# Patient Record
Sex: MALE | Race: WHITE | NOT HISPANIC OR LATINO
[De-identification: names, ages, dates, MRNs, and addresses within clinical notes are randomized per-mention and may not be internally consistent; named-entity substitution may affect disease eponyms.]

---

## 2017-02-15 ENCOUNTER — APPOINTMENT (OUTPATIENT)
Dept: SURGERY | Facility: CLINIC | Age: 72
End: 2017-02-15

## 2017-02-15 VITALS
BODY MASS INDEX: 29.4 KG/M2 | RESPIRATION RATE: 16 BRPM | HEIGHT: 71 IN | SYSTOLIC BLOOD PRESSURE: 130 MMHG | HEART RATE: 72 BPM | OXYGEN SATURATION: 95 % | TEMPERATURE: 97.6 F | WEIGHT: 210 LBS | DIASTOLIC BLOOD PRESSURE: 89 MMHG

## 2017-02-15 DIAGNOSIS — Z78.9 OTHER SPECIFIED HEALTH STATUS: ICD-10-CM

## 2017-02-15 DIAGNOSIS — I25.2 OLD MYOCARDIAL INFARCTION: ICD-10-CM

## 2017-03-09 ENCOUNTER — APPOINTMENT (OUTPATIENT)
Dept: SURGERY | Facility: AMBULATORY MEDICAL SERVICES | Age: 72
End: 2017-03-09

## 2017-03-15 ENCOUNTER — APPOINTMENT (OUTPATIENT)
Dept: SURGERY | Facility: CLINIC | Age: 72
End: 2017-03-15

## 2017-03-15 VITALS
RESPIRATION RATE: 15 BRPM | SYSTOLIC BLOOD PRESSURE: 118 MMHG | OXYGEN SATURATION: 95 % | TEMPERATURE: 97.7 F | HEART RATE: 84 BPM | DIASTOLIC BLOOD PRESSURE: 77 MMHG

## 2017-03-31 ENCOUNTER — APPOINTMENT (OUTPATIENT)
Dept: SURGERY | Facility: CLINIC | Age: 72
End: 2017-03-31

## 2017-03-31 VITALS
OXYGEN SATURATION: 96 % | HEART RATE: 83 BPM | SYSTOLIC BLOOD PRESSURE: 129 MMHG | DIASTOLIC BLOOD PRESSURE: 83 MMHG | RESPIRATION RATE: 16 BRPM | TEMPERATURE: 97.7 F

## 2017-06-07 ENCOUNTER — APPOINTMENT (OUTPATIENT)
Dept: ELECTROPHYSIOLOGY | Facility: CLINIC | Age: 72
End: 2017-06-07

## 2017-06-07 VITALS
OXYGEN SATURATION: 93 % | DIASTOLIC BLOOD PRESSURE: 83 MMHG | SYSTOLIC BLOOD PRESSURE: 119 MMHG | HEIGHT: 71 IN | BODY MASS INDEX: 30.8 KG/M2 | WEIGHT: 220 LBS | HEART RATE: 65 BPM

## 2017-12-13 ENCOUNTER — NON-APPOINTMENT (OUTPATIENT)
Age: 72
End: 2017-12-13

## 2017-12-13 ENCOUNTER — APPOINTMENT (OUTPATIENT)
Dept: ELECTROPHYSIOLOGY | Facility: CLINIC | Age: 72
End: 2017-12-13
Payer: MEDICARE

## 2017-12-13 VITALS
DIASTOLIC BLOOD PRESSURE: 80 MMHG | OXYGEN SATURATION: 95 % | HEIGHT: 71 IN | HEART RATE: 69 BPM | SYSTOLIC BLOOD PRESSURE: 129 MMHG

## 2017-12-13 PROCEDURE — 93280 PM DEVICE PROGR EVAL DUAL: CPT

## 2018-03-14 ENCOUNTER — APPOINTMENT (OUTPATIENT)
Dept: ELECTROPHYSIOLOGY | Facility: CLINIC | Age: 73
End: 2018-03-14
Payer: MEDICARE

## 2018-03-14 VITALS
OXYGEN SATURATION: 97 % | DIASTOLIC BLOOD PRESSURE: 81 MMHG | SYSTOLIC BLOOD PRESSURE: 127 MMHG | BODY MASS INDEX: 26.5 KG/M2 | WEIGHT: 190 LBS | HEART RATE: 63 BPM

## 2018-03-14 PROCEDURE — 93280 PM DEVICE PROGR EVAL DUAL: CPT

## 2018-06-20 ENCOUNTER — APPOINTMENT (OUTPATIENT)
Dept: ELECTROPHYSIOLOGY | Facility: CLINIC | Age: 73
End: 2018-06-20
Payer: MEDICARE

## 2018-06-20 VITALS
DIASTOLIC BLOOD PRESSURE: 79 MMHG | BODY MASS INDEX: 27.89 KG/M2 | SYSTOLIC BLOOD PRESSURE: 124 MMHG | OXYGEN SATURATION: 95 % | HEART RATE: 75 BPM | WEIGHT: 200 LBS

## 2018-06-20 PROCEDURE — 93280 PM DEVICE PROGR EVAL DUAL: CPT

## 2018-07-25 ENCOUNTER — APPOINTMENT (OUTPATIENT)
Dept: ELECTROPHYSIOLOGY | Facility: CLINIC | Age: 73
End: 2018-07-25
Payer: MEDICARE

## 2018-07-25 VITALS — DIASTOLIC BLOOD PRESSURE: 89 MMHG | SYSTOLIC BLOOD PRESSURE: 128 MMHG | OXYGEN SATURATION: 96 % | HEART RATE: 86 BPM

## 2018-07-25 PROCEDURE — 93280 PM DEVICE PROGR EVAL DUAL: CPT

## 2018-09-26 ENCOUNTER — APPOINTMENT (OUTPATIENT)
Dept: ELECTROPHYSIOLOGY | Facility: CLINIC | Age: 73
End: 2018-09-26
Payer: MEDICARE

## 2018-09-26 VITALS
HEART RATE: 73 BPM | WEIGHT: 200 LBS | OXYGEN SATURATION: 96 % | BODY MASS INDEX: 28 KG/M2 | HEIGHT: 71 IN | DIASTOLIC BLOOD PRESSURE: 81 MMHG | SYSTOLIC BLOOD PRESSURE: 134 MMHG

## 2018-09-26 PROCEDURE — 93280 PM DEVICE PROGR EVAL DUAL: CPT

## 2018-10-31 ENCOUNTER — APPOINTMENT (OUTPATIENT)
Dept: ELECTROPHYSIOLOGY | Facility: CLINIC | Age: 73
End: 2018-10-31
Payer: MEDICARE

## 2018-10-31 VITALS
WEIGHT: 190 LBS | HEART RATE: 76 BPM | SYSTOLIC BLOOD PRESSURE: 112 MMHG | OXYGEN SATURATION: 97 % | HEIGHT: 71 IN | BODY MASS INDEX: 26.6 KG/M2 | DIASTOLIC BLOOD PRESSURE: 74 MMHG

## 2018-10-31 DIAGNOSIS — I10 ESSENTIAL (PRIMARY) HYPERTENSION: ICD-10-CM

## 2018-10-31 PROCEDURE — 93000 ELECTROCARDIOGRAM COMPLETE: CPT

## 2018-10-31 PROCEDURE — 99214 OFFICE O/P EST MOD 30 MIN: CPT

## 2018-11-18 ENCOUNTER — NON-APPOINTMENT (OUTPATIENT)
Age: 73
End: 2018-11-18

## 2018-11-18 NOTE — PROCEDURE
[No] : not [NSR] : normal sinus rhythm [Pacemaker] : pacemaker [Threshold Testing Performed] : Threshold testing was performed [Atrial] : Atrial [None] : none

## 2018-11-18 NOTE — DISCUSSION/SUMMARY
[Pacemaker Function Normal] : normal pacemaker function [Patient] : the patient [FreeTextEntry1] : There is question of lung disease ( Emphysema/COPD) and he is following up with pulmonary.\par The rate sensor was reprogrammed to allow of quicker increase in HR\par Follow up echo.\par follow up eval within 3 months.

## 2018-11-18 NOTE — PHYSICAL EXAM
[General Appearance - Well Developed] : well developed [General Appearance - In No Acute Distress] : no acute distress [Respiration, Rhythm And Depth] : normal respiratory rhythm and effort [Exaggerated Use Of Accessory Muscles For Inspiration] : no accessory muscle use [Auscultation Breath Sounds / Voice Sounds] : lungs were clear to auscultation bilaterally [Heart Rate And Rhythm] : heart rate and rhythm were normal [Edema] : no peripheral edema present [Abdomen Tenderness] : non-tender [Nail Clubbing] : no clubbing of the fingernails [Cyanosis, Localized] : no localized cyanosis [Petechial Hemorrhages (___cm)] : no petechial hemorrhages [Well-Healed] : well-healed [Normal Conjunctiva] : the conjunctiva exhibited no abnormalities [No Oral Pallor] : no oral pallor [Abnormal Walk] : normal gait [] : no rash [Impaired Insight] : insight and judgment were intact [Left Infraclavicular] : left infraclavicular area

## 2018-12-19 ENCOUNTER — APPOINTMENT (OUTPATIENT)
Dept: ELECTROPHYSIOLOGY | Facility: CLINIC | Age: 73
End: 2018-12-19
Payer: MEDICARE

## 2018-12-19 ENCOUNTER — NON-APPOINTMENT (OUTPATIENT)
Age: 73
End: 2018-12-19

## 2018-12-19 VITALS
DIASTOLIC BLOOD PRESSURE: 76 MMHG | SYSTOLIC BLOOD PRESSURE: 113 MMHG | WEIGHT: 195 LBS | HEART RATE: 73 BPM | BODY MASS INDEX: 27.3 KG/M2 | HEIGHT: 71 IN | OXYGEN SATURATION: 94 %

## 2018-12-19 DIAGNOSIS — J43.9 EMPHYSEMA, UNSPECIFIED: ICD-10-CM

## 2018-12-19 DIAGNOSIS — I44.2 ATRIOVENTRICULAR BLOCK, COMPLETE: ICD-10-CM

## 2018-12-19 PROCEDURE — 99214 OFFICE O/P EST MOD 30 MIN: CPT

## 2018-12-19 PROCEDURE — 93000 ELECTROCARDIOGRAM COMPLETE: CPT

## 2018-12-19 RX ORDER — FLUTICASONE FUROATE, UMECLIDINIUM BROMIDE AND VILANTEROL TRIFENATATE 100; 62.5; 25 UG/1; UG/1; UG/1
100-62.5-25 POWDER RESPIRATORY (INHALATION)
Refills: 0 | Status: ACTIVE | COMMUNITY
Start: 2018-12-19

## 2018-12-30 ENCOUNTER — NON-APPOINTMENT (OUTPATIENT)
Age: 73
End: 2018-12-30

## 2018-12-30 PROBLEM — J43.9 EMPHYSEMA/COPD: Status: ACTIVE | Noted: 2017-02-15

## 2018-12-30 NOTE — HISTORY OF PRESENT ILLNESS
[SOB] : dyspnea [Syncope] : no syncope [Dizziness] : no dizziness [Chest Pain] : no chest pain or discomfort [Shoulder Pain] : no shoulder pain [Erythema at Site] : no erythema at device site [Swelling at Site] : no swelling at device site

## 2018-12-30 NOTE — DISCUSSION/SUMMARY
[Pacemaker Function Normal] : normal pacemaker function [Patient] : the patient [FreeTextEntry1] : He is feeling better since rate sensor adjusted. Saw pulmonary in followup and he has significant COPd and meds adjusted.  \par Follow up echo pending\par Schedule PM replacement.

## 2018-12-30 NOTE — PHYSICAL EXAM
[General Appearance - Well Developed] : well developed [General Appearance - In No Acute Distress] : no acute distress [Normal Conjunctiva] : the conjunctiva exhibited no abnormalities [No Oral Pallor] : no oral pallor [Respiration, Rhythm And Depth] : normal respiratory rhythm and effort [Exaggerated Use Of Accessory Muscles For Inspiration] : no accessory muscle use [Auscultation Breath Sounds / Voice Sounds] : lungs were clear to auscultation bilaterally [Heart Rate And Rhythm] : heart rate and rhythm were normal [Edema] : no peripheral edema present [Abdomen Tenderness] : non-tender [Abnormal Walk] : normal gait [Nail Clubbing] : no clubbing of the fingernails [Cyanosis, Localized] : no localized cyanosis [Petechial Hemorrhages (___cm)] : no petechial hemorrhages [] : no rash [Impaired Insight] : insight and judgment were intact [Left Infraclavicular] : left infraclavicular area [Well-Healed] : well-healed

## 2019-01-22 ENCOUNTER — APPOINTMENT (OUTPATIENT)
Dept: ELECTROPHYSIOLOGY | Facility: CLINIC | Age: 74
End: 2019-01-22

## 2019-01-22 ENCOUNTER — APPOINTMENT (OUTPATIENT)
Dept: ELECTROPHYSIOLOGY | Facility: CLINIC | Age: 74
End: 2019-01-22
Payer: MEDICARE

## 2019-01-22 VITALS
OXYGEN SATURATION: 95 % | SYSTOLIC BLOOD PRESSURE: 122 MMHG | HEIGHT: 71 IN | HEART RATE: 64 BPM | WEIGHT: 195 LBS | BODY MASS INDEX: 27.3 KG/M2 | DIASTOLIC BLOOD PRESSURE: 80 MMHG

## 2019-01-22 PROCEDURE — 93280 PM DEVICE PROGR EVAL DUAL: CPT

## 2019-01-23 ENCOUNTER — OUTPATIENT (OUTPATIENT)
Dept: OUTPATIENT SERVICES | Facility: HOSPITAL | Age: 74
LOS: 1 days | End: 2019-01-23
Payer: MEDICARE

## 2019-01-23 ENCOUNTER — APPOINTMENT (OUTPATIENT)
Dept: CV DIAGNOSITCS | Facility: HOSPITAL | Age: 74
End: 2019-01-23

## 2019-01-23 DIAGNOSIS — I44.2 ATRIOVENTRICULAR BLOCK, COMPLETE: ICD-10-CM

## 2019-01-23 PROCEDURE — 93306 TTE W/DOPPLER COMPLETE: CPT

## 2019-01-23 PROCEDURE — 93306 TTE W/DOPPLER COMPLETE: CPT | Mod: 26

## 2019-01-31 ENCOUNTER — OUTPATIENT (OUTPATIENT)
Dept: OUTPATIENT SERVICES | Facility: HOSPITAL | Age: 74
LOS: 1 days | End: 2019-01-31
Payer: MEDICARE

## 2019-01-31 VITALS
HEIGHT: 71 IN | RESPIRATION RATE: 16 BRPM | WEIGHT: 190.04 LBS | HEART RATE: 65 BPM | SYSTOLIC BLOOD PRESSURE: 171 MMHG | DIASTOLIC BLOOD PRESSURE: 87 MMHG | TEMPERATURE: 98 F | OXYGEN SATURATION: 94 %

## 2019-01-31 DIAGNOSIS — I25.10 ATHEROSCLEROTIC HEART DISEASE OF NATIVE CORONARY ARTERY WITHOUT ANGINA PECTORIS: ICD-10-CM

## 2019-01-31 DIAGNOSIS — Z95.5 PRESENCE OF CORONARY ANGIOPLASTY IMPLANT AND GRAFT: Chronic | ICD-10-CM

## 2019-01-31 LAB
ALBUMIN SERPL ELPH-MCNC: 4.9 G/DL — SIGNIFICANT CHANGE UP (ref 3.3–5)
ALP SERPL-CCNC: 62 U/L — SIGNIFICANT CHANGE UP (ref 40–120)
ALT FLD-CCNC: 21 U/L — SIGNIFICANT CHANGE UP (ref 10–45)
ANION GAP SERPL CALC-SCNC: 14 MMOL/L — SIGNIFICANT CHANGE UP (ref 5–17)
AST SERPL-CCNC: 25 U/L — SIGNIFICANT CHANGE UP (ref 10–40)
BILIRUB SERPL-MCNC: 0.5 MG/DL — SIGNIFICANT CHANGE UP (ref 0.2–1.2)
BUN SERPL-MCNC: 19 MG/DL — SIGNIFICANT CHANGE UP (ref 7–23)
CALCIUM SERPL-MCNC: 10.1 MG/DL — SIGNIFICANT CHANGE UP (ref 8.4–10.5)
CHLORIDE SERPL-SCNC: 105 MMOL/L — SIGNIFICANT CHANGE UP (ref 96–108)
CO2 SERPL-SCNC: 24 MMOL/L — SIGNIFICANT CHANGE UP (ref 22–31)
CREAT SERPL-MCNC: 1.26 MG/DL — SIGNIFICANT CHANGE UP (ref 0.5–1.3)
GLUCOSE SERPL-MCNC: 107 MG/DL — HIGH (ref 70–99)
HCT VFR BLD CALC: 46.9 % — SIGNIFICANT CHANGE UP (ref 39–50)
HGB BLD-MCNC: 15.7 G/DL — SIGNIFICANT CHANGE UP (ref 13–17)
MCHC RBC-ENTMCNC: 31.2 PG — SIGNIFICANT CHANGE UP (ref 27–34)
MCHC RBC-ENTMCNC: 33.5 GM/DL — SIGNIFICANT CHANGE UP (ref 32–36)
MCV RBC AUTO: 93.3 FL — SIGNIFICANT CHANGE UP (ref 80–100)
PLATELET # BLD AUTO: 258 K/UL — SIGNIFICANT CHANGE UP (ref 150–400)
POTASSIUM SERPL-MCNC: 4.7 MMOL/L — SIGNIFICANT CHANGE UP (ref 3.5–5.3)
POTASSIUM SERPL-SCNC: 4.7 MMOL/L — SIGNIFICANT CHANGE UP (ref 3.5–5.3)
PROT SERPL-MCNC: 8.5 G/DL — HIGH (ref 6–8.3)
RBC # BLD: 5.02 M/UL — SIGNIFICANT CHANGE UP (ref 4.2–5.8)
RBC # FLD: 12.7 % — SIGNIFICANT CHANGE UP (ref 10.3–14.5)
SODIUM SERPL-SCNC: 143 MMOL/L — SIGNIFICANT CHANGE UP (ref 135–145)
WBC # BLD: 10.3 K/UL — SIGNIFICANT CHANGE UP (ref 3.8–10.5)
WBC # FLD AUTO: 10.3 K/UL — SIGNIFICANT CHANGE UP (ref 3.8–10.5)

## 2019-01-31 PROCEDURE — 99152 MOD SED SAME PHYS/QHP 5/>YRS: CPT

## 2019-01-31 PROCEDURE — 93458 L HRT ARTERY/VENTRICLE ANGIO: CPT | Mod: 26,GC

## 2019-01-31 PROCEDURE — 93005 ELECTROCARDIOGRAM TRACING: CPT

## 2019-01-31 PROCEDURE — C1894: CPT

## 2019-01-31 PROCEDURE — 85027 COMPLETE CBC AUTOMATED: CPT

## 2019-01-31 PROCEDURE — 93458 L HRT ARTERY/VENTRICLE ANGIO: CPT

## 2019-01-31 PROCEDURE — 76937 US GUIDE VASCULAR ACCESS: CPT

## 2019-01-31 PROCEDURE — 93010 ELECTROCARDIOGRAM REPORT: CPT

## 2019-01-31 PROCEDURE — 80053 COMPREHEN METABOLIC PANEL: CPT

## 2019-01-31 PROCEDURE — 99203 OFFICE O/P NEW LOW 30 MIN: CPT

## 2019-01-31 PROCEDURE — 99152 MOD SED SAME PHYS/QHP 5/>YRS: CPT | Mod: GC

## 2019-01-31 PROCEDURE — C1887: CPT

## 2019-01-31 PROCEDURE — C1769: CPT

## 2019-01-31 PROCEDURE — 76937 US GUIDE VASCULAR ACCESS: CPT | Mod: 26,GC

## 2019-01-31 RX ORDER — SODIUM CHLORIDE 9 MG/ML
250 INJECTION INTRAMUSCULAR; INTRAVENOUS; SUBCUTANEOUS ONCE
Qty: 0 | Refills: 0 | Status: DISCONTINUED | OUTPATIENT
Start: 2019-01-31 | End: 2019-02-15

## 2019-01-31 NOTE — H&P CARDIOLOGY - PMH
Bradycardia    CAD (Coronary Artery Disease)    Depression    Dyslipidemia    HTN (Hypertension)    S/P PTCA (Percutaneous Transluminal Coronary Angioplasty)  2001  TIA (Transient Ischemic Attack)  18 years ago  PT was on coumadin for short while Bradycardia    CAD (Coronary Artery Disease)    COPD (chronic obstructive pulmonary disease)    Depression    Dyslipidemia    HTN (Hypertension)    S/P PTCA (Percutaneous Transluminal Coronary Angioplasty)  2001  TIA (Transient Ischemic Attack)  18 years ago  PT was on coumadin for short while

## 2019-01-31 NOTE — H&P CARDIOLOGY - HISTORY OF PRESENT ILLNESS
75 y/o male c/o tiredness dizziness never lost consciousness went to DR. Jones for physical check up and found to have sinus bradycardia. Pt here for PPM insertion. 75 y/o male with PMHx of CAD, s/p PCI, TIA, HTN, HLD, presents today for coroanry angiogram. Patient states he gets [place] 75 y/o male with PMHx of CAD, with PCI, TIA, HTN, HLD, presents today for coroanry angiogram. Patient states he gets WELLINGTON for over a year. Patient states  he w 73 y/o male with PMHx of CAD, with PCI, COPD, TIA, HTN, HLD, presents today for coronary angiogram. Patient states he gets [place] for over a year. Patient was seen and evaluated by Dr. Borden, TTE done which revealed severe LV dysfunction with EF of 30%. Patient is referred for angiogram. Denies chest pain, palpitation, dizziness / syncope.

## 2019-02-08 PROBLEM — J44.9 CHRONIC OBSTRUCTIVE PULMONARY DISEASE, UNSPECIFIED: Chronic | Status: ACTIVE | Noted: 2019-01-31

## 2019-02-25 ENCOUNTER — OUTPATIENT (OUTPATIENT)
Dept: OUTPATIENT SERVICES | Facility: HOSPITAL | Age: 74
LOS: 1 days | End: 2019-02-25
Payer: MEDICARE

## 2019-02-25 VITALS
WEIGHT: 190.04 LBS | SYSTOLIC BLOOD PRESSURE: 141 MMHG | HEIGHT: 70 IN | RESPIRATION RATE: 16 BRPM | TEMPERATURE: 98 F | DIASTOLIC BLOOD PRESSURE: 76 MMHG | HEART RATE: 67 BPM

## 2019-02-25 DIAGNOSIS — Z01.818 ENCOUNTER FOR OTHER PREPROCEDURAL EXAMINATION: ICD-10-CM

## 2019-02-25 DIAGNOSIS — Z95.5 PRESENCE OF CORONARY ANGIOPLASTY IMPLANT AND GRAFT: Chronic | ICD-10-CM

## 2019-02-25 DIAGNOSIS — I42.9 CARDIOMYOPATHY, UNSPECIFIED: ICD-10-CM

## 2019-02-25 LAB
ANION GAP SERPL CALC-SCNC: 14 MMOL/L — SIGNIFICANT CHANGE UP (ref 5–17)
BLD GP AB SCN SERPL QL: NEGATIVE — SIGNIFICANT CHANGE UP
BUN SERPL-MCNC: 24 MG/DL — HIGH (ref 7–23)
CALCIUM SERPL-MCNC: 10.4 MG/DL — SIGNIFICANT CHANGE UP (ref 8.4–10.5)
CHLORIDE SERPL-SCNC: 104 MMOL/L — SIGNIFICANT CHANGE UP (ref 96–108)
CO2 SERPL-SCNC: 24 MMOL/L — SIGNIFICANT CHANGE UP (ref 22–31)
CREAT SERPL-MCNC: 1.33 MG/DL — HIGH (ref 0.5–1.3)
GLUCOSE SERPL-MCNC: 121 MG/DL — HIGH (ref 70–99)
HCT VFR BLD CALC: 45.8 % — SIGNIFICANT CHANGE UP (ref 39–50)
HGB BLD-MCNC: 15.1 G/DL — SIGNIFICANT CHANGE UP (ref 13–17)
INR BLD: 1.05 RATIO — SIGNIFICANT CHANGE UP (ref 0.88–1.16)
MCHC RBC-ENTMCNC: 30.8 PG — SIGNIFICANT CHANGE UP (ref 27–34)
MCHC RBC-ENTMCNC: 33.1 GM/DL — SIGNIFICANT CHANGE UP (ref 32–36)
MCV RBC AUTO: 93 FL — SIGNIFICANT CHANGE UP (ref 80–100)
PLATELET # BLD AUTO: 206 K/UL — SIGNIFICANT CHANGE UP (ref 150–400)
POTASSIUM SERPL-MCNC: 4.8 MMOL/L — SIGNIFICANT CHANGE UP (ref 3.5–5.3)
POTASSIUM SERPL-SCNC: 4.8 MMOL/L — SIGNIFICANT CHANGE UP (ref 3.5–5.3)
PROTHROM AB SERPL-ACNC: 12 SEC — SIGNIFICANT CHANGE UP (ref 10–12.9)
RBC # BLD: 4.92 M/UL — SIGNIFICANT CHANGE UP (ref 4.2–5.8)
RBC # FLD: 12.6 % — SIGNIFICANT CHANGE UP (ref 10.3–14.5)
RH IG SCN BLD-IMP: NEGATIVE — SIGNIFICANT CHANGE UP
SODIUM SERPL-SCNC: 142 MMOL/L — SIGNIFICANT CHANGE UP (ref 135–145)
WBC # BLD: 7.5 K/UL — SIGNIFICANT CHANGE UP (ref 3.8–10.5)
WBC # FLD AUTO: 7.5 K/UL — SIGNIFICANT CHANGE UP (ref 3.8–10.5)

## 2019-02-25 PROCEDURE — 93010 ELECTROCARDIOGRAM REPORT: CPT

## 2019-02-25 NOTE — H&P CARDIOLOGY - HISTORY OF PRESENT ILLNESS
73 y/o  male with PMHx of CAD with PCI, COPD, TIA, HTN, HLD, s/p recent diagnostic angiogram for complaints of dyspnea and TTE which revealed severe LV dysfunction with EF of 30%. Pt presents for PST for pacemaker upgrade tomorrow with Dr Borden.  Pt denied dizziness, diaphoresis, palpitations, nausea, vomiting, peripheral edema, recent weight gain, or syncope.     Cards Dr Borden     < from: Cardiac Cath Lab - Adult (01.31.19 @ 16:49) >  VENTRICLES: No left ventriculogram was performed.  CORONARY VESSELS: The coronary circulation is right dominant.  LM:   --  Distal left main: There was a discrete 20 % stenosis.  LAD:   --  Proximal LAD: There was a 40 % stenosis.  --  Mid LAD: There was a 50 % stenosis. Lesion severity was assessed by  quantitative coronary angiography (QCA).  --  D1: There was a discrete 50 % stenosis at the ostium of the vessel  segment. In a second lesion, there was a discrete 60 % stenosis in the  middle third of the vessel segment.  --  D2: There was a 100 % stenosis.  CX:   --  Proximal circumflex: There was a discrete 30 % stenosisat the  site of a prior stent, in-stent.  --  OM1: Angiography showed minor luminal irregularities with no flow  limiting lesions.  RCA:   --  Proximal RCA: There was a 30 % stenosis.  --  Distal RCA: There was a 40 % stenosis.  COMPLICATIONS: There were no complications.  DIAGNOSTIC IMPRESSIONS: Progression of CAD since previous cath. Patent  stent. Severe small vessel D1 and D2 disease. Moderate LAD and RCA  disease.  DIAGNOSTIC RECOMMENDATIONS: Intensify medical therapy and f/u with Dr. Borden  Prepared and signed by  Winnie Mata M.D.  Signed 02/01/2019 06:56:27    < end of copied text >

## 2019-02-25 NOTE — H&P CARDIOLOGY - PMH
Bradycardia    CAD (Coronary Artery Disease)    COPD (chronic obstructive pulmonary disease)    Depression    Dyslipidemia    HTN (Hypertension)    S/P PTCA (Percutaneous Transluminal Coronary Angioplasty)  2001  TIA (Transient Ischemic Attack)  18 years ago  PT was on coumadin for short while

## 2019-02-26 ENCOUNTER — TRANSCRIPTION ENCOUNTER (OUTPATIENT)
Age: 74
End: 2019-02-26

## 2019-02-26 ENCOUNTER — INPATIENT (INPATIENT)
Facility: HOSPITAL | Age: 74
LOS: 0 days | Discharge: HOME | DRG: 227 | End: 2019-02-27
Attending: INTERNAL MEDICINE | Admitting: INTERNAL MEDICINE
Payer: MEDICARE

## 2019-02-26 VITALS
OXYGEN SATURATION: 98 % | SYSTOLIC BLOOD PRESSURE: 162 MMHG | DIASTOLIC BLOOD PRESSURE: 99 MMHG | RESPIRATION RATE: 18 BRPM | TEMPERATURE: 98 F | HEART RATE: 64 BPM

## 2019-02-26 DIAGNOSIS — I42.9 CARDIOMYOPATHY, UNSPECIFIED: ICD-10-CM

## 2019-02-26 DIAGNOSIS — Z95.5 PRESENCE OF CORONARY ANGIOPLASTY IMPLANT AND GRAFT: Chronic | ICD-10-CM

## 2019-02-26 PROCEDURE — 85610 PROTHROMBIN TIME: CPT

## 2019-02-26 PROCEDURE — 86901 BLOOD TYPING SEROLOGIC RH(D): CPT

## 2019-02-26 PROCEDURE — 93010 ELECTROCARDIOGRAM REPORT: CPT

## 2019-02-26 PROCEDURE — 86900 BLOOD TYPING SEROLOGIC ABO: CPT

## 2019-02-26 PROCEDURE — 33225 L VENTRIC PACING LEAD ADD-ON: CPT

## 2019-02-26 PROCEDURE — 86850 RBC ANTIBODY SCREEN: CPT

## 2019-02-26 PROCEDURE — 71045 X-RAY EXAM CHEST 1 VIEW: CPT | Mod: 26

## 2019-02-26 PROCEDURE — 33249 INSJ/RPLCMT DEFIB W/LEAD(S): CPT

## 2019-02-26 PROCEDURE — 93005 ELECTROCARDIOGRAM TRACING: CPT

## 2019-02-26 PROCEDURE — 80048 BASIC METABOLIC PNL TOTAL CA: CPT

## 2019-02-26 PROCEDURE — 85027 COMPLETE CBC AUTOMATED: CPT

## 2019-02-26 RX ORDER — OXYCODONE AND ACETAMINOPHEN 5; 325 MG/1; MG/1
1 TABLET ORAL ONCE
Qty: 0 | Refills: 0 | Status: DISCONTINUED | OUTPATIENT
Start: 2019-02-26 | End: 2019-02-26

## 2019-02-26 RX ORDER — ASPIRIN/CALCIUM CARB/MAGNESIUM 324 MG
81 TABLET ORAL DAILY
Qty: 0 | Refills: 0 | Status: DISCONTINUED | OUTPATIENT
Start: 2019-02-26 | End: 2019-02-27

## 2019-02-26 RX ORDER — CEFAZOLIN SODIUM 1 G
1000 VIAL (EA) INJECTION EVERY 8 HOURS
Qty: 0 | Refills: 0 | Status: COMPLETED | OUTPATIENT
Start: 2019-02-26 | End: 2019-02-27

## 2019-02-26 RX ORDER — ALBUTEROL 90 UG/1
2 AEROSOL, METERED ORAL EVERY 6 HOURS
Qty: 0 | Refills: 0 | Status: DISCONTINUED | OUTPATIENT
Start: 2019-02-26 | End: 2019-02-27

## 2019-02-26 RX ORDER — ACETAMINOPHEN 500 MG
650 TABLET ORAL EVERY 6 HOURS
Qty: 0 | Refills: 0 | Status: DISCONTINUED | OUTPATIENT
Start: 2019-02-26 | End: 2019-02-27

## 2019-02-26 RX ORDER — ASPIRIN/CALCIUM CARB/MAGNESIUM 324 MG
325 TABLET ORAL DAILY
Qty: 0 | Refills: 0 | Status: DISCONTINUED | OUTPATIENT
Start: 2019-02-26 | End: 2019-02-26

## 2019-02-26 RX ORDER — FOLIC ACID 0.8 MG
1 TABLET ORAL DAILY
Qty: 0 | Refills: 0 | Status: DISCONTINUED | OUTPATIENT
Start: 2019-02-26 | End: 2019-02-27

## 2019-02-26 RX ORDER — ATENOLOL 25 MG/1
25 TABLET ORAL DAILY
Qty: 0 | Refills: 0 | Status: DISCONTINUED | OUTPATIENT
Start: 2019-02-26 | End: 2019-02-27

## 2019-02-26 RX ORDER — SIMVASTATIN 20 MG/1
40 TABLET, FILM COATED ORAL AT BEDTIME
Qty: 0 | Refills: 0 | Status: DISCONTINUED | OUTPATIENT
Start: 2019-02-26 | End: 2019-02-27

## 2019-02-26 RX ORDER — FLUOXETINE HCL 10 MG
20 CAPSULE ORAL DAILY
Qty: 0 | Refills: 0 | Status: DISCONTINUED | OUTPATIENT
Start: 2019-02-26 | End: 2019-02-27

## 2019-02-26 RX ORDER — ZALEPLON 10 MG
5 CAPSULE ORAL ONCE
Qty: 0 | Refills: 0 | Status: DISCONTINUED | OUTPATIENT
Start: 2019-02-26 | End: 2019-02-26

## 2019-02-26 RX ADMIN — SIMVASTATIN 40 MILLIGRAM(S): 20 TABLET, FILM COATED ORAL at 22:27

## 2019-02-26 RX ADMIN — OXYCODONE AND ACETAMINOPHEN 1 TABLET(S): 5; 325 TABLET ORAL at 13:31

## 2019-02-26 RX ADMIN — Medication 650 MILLIGRAM(S): at 16:35

## 2019-02-26 RX ADMIN — Medication 100 MILLIGRAM(S): at 16:36

## 2019-02-26 RX ADMIN — Medication 650 MILLIGRAM(S): at 15:35

## 2019-02-26 RX ADMIN — OXYCODONE AND ACETAMINOPHEN 1 TABLET(S): 5; 325 TABLET ORAL at 14:15

## 2019-02-26 RX ADMIN — Medication 5 MILLIGRAM(S): at 22:47

## 2019-02-26 NOTE — CHART NOTE - NSCHARTNOTEFT_GEN_A_CORE
s/p Bi-V-ICD upgrade    tolerated procedure well- no complaints  site benign- with no hematoma/bleeding  EKG AV paced 60    monitor overnight.   for discharge in am if site/condition remain stable  chest xray result pending

## 2019-02-27 VITALS
TEMPERATURE: 98 F | HEART RATE: 64 BPM | SYSTOLIC BLOOD PRESSURE: 161 MMHG | OXYGEN SATURATION: 94 % | DIASTOLIC BLOOD PRESSURE: 85 MMHG | RESPIRATION RATE: 18 BRPM

## 2019-02-27 DIAGNOSIS — I50.22 CHRONIC SYSTOLIC (CONGESTIVE) HEART FAILURE: ICD-10-CM

## 2019-02-27 DIAGNOSIS — I25.10 ATHEROSCLEROTIC HEART DISEASE OF NATIVE CORONARY ARTERY WITHOUT ANGINA PECTORIS: ICD-10-CM

## 2019-02-27 DIAGNOSIS — J44.9 CHRONIC OBSTRUCTIVE PULMONARY DISEASE, UNSPECIFIED: ICD-10-CM

## 2019-02-27 DIAGNOSIS — E78.5 HYPERLIPIDEMIA, UNSPECIFIED: ICD-10-CM

## 2019-02-27 DIAGNOSIS — I10 ESSENTIAL (PRIMARY) HYPERTENSION: ICD-10-CM

## 2019-02-27 LAB
ANION GAP SERPL CALC-SCNC: 13 MMOL/L — SIGNIFICANT CHANGE UP (ref 5–17)
BUN SERPL-MCNC: 19 MG/DL — SIGNIFICANT CHANGE UP (ref 7–23)
CALCIUM SERPL-MCNC: 9.7 MG/DL — SIGNIFICANT CHANGE UP (ref 8.4–10.5)
CHLORIDE SERPL-SCNC: 102 MMOL/L — SIGNIFICANT CHANGE UP (ref 96–108)
CO2 SERPL-SCNC: 24 MMOL/L — SIGNIFICANT CHANGE UP (ref 22–31)
CREAT SERPL-MCNC: 1.05 MG/DL — SIGNIFICANT CHANGE UP (ref 0.5–1.3)
GLUCOSE SERPL-MCNC: 98 MG/DL — SIGNIFICANT CHANGE UP (ref 70–99)
HCT VFR BLD CALC: 41.3 % — SIGNIFICANT CHANGE UP (ref 39–50)
HGB BLD-MCNC: 14.1 G/DL — SIGNIFICANT CHANGE UP (ref 13–17)
MCHC RBC-ENTMCNC: 31.9 PG — SIGNIFICANT CHANGE UP (ref 27–34)
MCHC RBC-ENTMCNC: 34.1 GM/DL — SIGNIFICANT CHANGE UP (ref 32–36)
MCV RBC AUTO: 93.5 FL — SIGNIFICANT CHANGE UP (ref 80–100)
PLATELET # BLD AUTO: 175 K/UL — SIGNIFICANT CHANGE UP (ref 150–400)
POTASSIUM SERPL-MCNC: 4.4 MMOL/L — SIGNIFICANT CHANGE UP (ref 3.5–5.3)
POTASSIUM SERPL-SCNC: 4.4 MMOL/L — SIGNIFICANT CHANGE UP (ref 3.5–5.3)
RBC # BLD: 4.41 M/UL — SIGNIFICANT CHANGE UP (ref 4.2–5.8)
RBC # FLD: 12.5 % — SIGNIFICANT CHANGE UP (ref 10.3–14.5)
SODIUM SERPL-SCNC: 139 MMOL/L — SIGNIFICANT CHANGE UP (ref 135–145)
WBC # BLD: 9.8 K/UL — SIGNIFICANT CHANGE UP (ref 3.8–10.5)
WBC # FLD AUTO: 9.8 K/UL — SIGNIFICANT CHANGE UP (ref 3.8–10.5)

## 2019-02-27 PROCEDURE — 85027 COMPLETE CBC AUTOMATED: CPT

## 2019-02-27 PROCEDURE — 93005 ELECTROCARDIOGRAM TRACING: CPT

## 2019-02-27 PROCEDURE — 71046 X-RAY EXAM CHEST 2 VIEWS: CPT | Mod: 26

## 2019-02-27 PROCEDURE — 33249 INSJ/RPLCMT DEFIB W/LEAD(S): CPT

## 2019-02-27 PROCEDURE — C1900: CPT

## 2019-02-27 PROCEDURE — C1777: CPT

## 2019-02-27 PROCEDURE — 93010 ELECTROCARDIOGRAM REPORT: CPT

## 2019-02-27 PROCEDURE — C1892: CPT

## 2019-02-27 PROCEDURE — 33225 L VENTRIC PACING LEAD ADD-ON: CPT

## 2019-02-27 PROCEDURE — C1882: CPT

## 2019-02-27 PROCEDURE — C1769: CPT

## 2019-02-27 PROCEDURE — 71045 X-RAY EXAM CHEST 1 VIEW: CPT

## 2019-02-27 PROCEDURE — C1887: CPT

## 2019-02-27 PROCEDURE — 80048 BASIC METABOLIC PNL TOTAL CA: CPT

## 2019-02-27 PROCEDURE — 71046 X-RAY EXAM CHEST 2 VIEWS: CPT

## 2019-02-27 PROCEDURE — C1894: CPT

## 2019-02-27 RX ORDER — DIAZEPAM 5 MG
1 TABLET ORAL
Qty: 0 | Refills: 0 | COMMUNITY

## 2019-02-27 RX ORDER — ASPIRIN/CALCIUM CARB/MAGNESIUM 324 MG
1 TABLET ORAL
Qty: 0 | Refills: 0 | COMMUNITY

## 2019-02-27 RX ORDER — ASPIRIN/CALCIUM CARB/MAGNESIUM 324 MG
1 TABLET ORAL
Qty: 0 | Refills: 0 | DISCHARGE
Start: 2019-02-27

## 2019-02-27 RX ADMIN — Medication 100 MILLIGRAM(S): at 07:52

## 2019-02-27 RX ADMIN — Medication 100 MILLIGRAM(S): at 00:24

## 2019-02-27 RX ADMIN — Medication 650 MILLIGRAM(S): at 06:57

## 2019-02-27 RX ADMIN — Medication 650 MILLIGRAM(S): at 07:55

## 2019-02-27 RX ADMIN — ATENOLOL 25 MILLIGRAM(S): 25 TABLET ORAL at 05:29

## 2019-02-27 RX ADMIN — Medication 81 MILLIGRAM(S): at 11:02

## 2019-02-27 NOTE — PROGRESS NOTE ADULT - PROBLEM SELECTOR PLAN 5
Goal is to keep LDL<70. Continue with your cholesterol medications as prescribed. Eat a heart healthy diet that is low in saturated fats and salt, and includes whole grains, fruits, vegetables and lean protein; exercise regularly (consult with your physician or cardiologist first); maintain a heart healthy weight; if you smoke - quit (A resource to help you stop smoking is the [facility] Tobacco Control – phone number [phone].). Continue to follow with your primary physician or cardiologist.

## 2019-02-27 NOTE — PROGRESS NOTE ADULT - ASSESSMENT
75 y/o  male with PMHx of CAD with PCI, COPD, TIA, HTN, HLD, s/p recent diagnostic angiogram for complaints of dyspnea and TTE which revealed severe LV dysfunction with EF of 30%. Pt presents for PST for pacemaker upgrade tomorrow with Dr Borden.  Pt denied dizziness, diaphoresis, palpitations, nausea, vomiting, peripheral edema, recent weight gain, or syncope.      s/p Bi-V-ICD upgrade 2/26/19. Tolerated procedure well. No complaints. Site is benign-no hematoma, bleeding or swelling  AV paced rate 60s on tele. VSS  Await chest xray results and plan for discharge to home today

## 2019-02-27 NOTE — DISCHARGE NOTE ADULT - SECONDARY DIAGNOSIS.
Heart failure with reduced ejection fraction COPD (chronic obstructive pulmonary disease) HTN (Hypertension)

## 2019-02-27 NOTE — DISCHARGE NOTE ADULT - PLAN OF CARE
Your incision will be without infection. Your heartbeat will remain controlled. Appointment: follow up with your electrophysiology (EP) doctor in 7-10 days after discharge. Please call the EP office ([phone]) to make appointment.   Incision care (where your cut was made): sugical glue will naturally fall off our skin.  Do not scrub, rub, or pick at the surgical glue. Call your doctor if you have any fever; chills; redness; swelling; increased soreness; warmth or drainage at the incision site.    ID Card: Do carry your ICD Identification (ID) card with you at all times.   Call your doctor immediately if you have hiccups for a long time, fainting, dizziness, lightheadedness, palpitations, chest pain or the same symptoms that you had before the procedure.   You should not have a MRI unless you have a MRI safe device.   IF YOU RECEIVE A SHOCK: • If you receive 1 shock, you must call our EP office. • If you receive 2 or more shocks, you should call 911 and go to ER for further evaluation or treatment by the EP team. remain symptom free take medications as instructed  daily weight  call Dr Borden for 3 lb weight gain continue current medications  follow up with Pulmonary as directed BP<140/80 Continue with your blood pressure medications; eat a heart healthy diet with low salt diet; exercise regularly (consult with your physician or cardiologist first); maintain a heart healthy weight; if you smoke - quit (A resource to help you stop smoking is the [facility] Tobacco [facility] – phone number [phone].); include healthy ways to manage stress. Continue to follow with your primary care physician or cardiologist.

## 2019-02-27 NOTE — DISCHARGE NOTE ADULT - HOSPITAL COURSE
73 y/o  male with PMHx of CAD with PCI, COPD, TIA, HTN, HLD, s/p recent diagnostic angiogram for complaints of dyspnea and TTE which revealed severe LV dysfunction with EF of 30%. Pt presents for PST for pacemaker upgrade tomorrow with Dr Borden.  Pt denied dizziness, diaphoresis, palpitations, nausea, vomiting, peripheral edema, recent weight gain, or syncope. Pt is now s/p BIV upgrade. Pt tolerated the procedure well, site benign. Post procedure discharge instructions discussed and questions addressed 75 y/o  male with PMHx of CAD with PCI, COPD, TIA, HTN, HLD, s/p recent diagnostic angiogram for complaints of dyspnea and TTE which revealed severe LV dysfunction with EF of 30%. Pt presents for PST for pacemaker upgrade tomorrow with Dr Borden.  Pt denied dizziness, diaphoresis, palpitations, nausea, vomiting, peripheral edema, recent weight gain, or syncope.      Pt is now s/p BIV upgrade. Pt tolerated the procedure well, site benign. chest Xray reviewed - no pneumo. Post procedure discharge instructions discussed and questions addressed .  S/B [employer] Rep and teaching done  seen by Dr Borden this am and cleared for discharge to home-  ACE to be started as outpatient and ASA 81 mg on discharge (as per Dr Borden

## 2019-02-27 NOTE — DISCHARGE NOTE ADULT - PATIENT PORTAL LINK FT
You can access the [facility] Patient Portal, offered by [facility], by registering with the following website: [email or web address]

## 2019-02-27 NOTE — PROGRESS NOTE ADULT - SUBJECTIVE AND OBJECTIVE BOX
58seen and examined at bedside  no complaints    MEDICATIONS  (STANDING):  aspirin enteric coated 81 milliGRAM(s) Oral daily  ATENolol  Tablet 25 milliGRAM(s) Oral daily  FLUoxetine 20 milliGRAM(s) Oral daily  folic acid 1 milliGRAM(s) Oral daily  simvastatin 40 milliGRAM(s) Oral at bedtime    PAST MEDICAL & SURGICAL HISTORY:  COPD (chronic obstructive pulmonary disease)  TIA (Transient Ischemic Attack): 18 years ago  PT was on coumadin for short while  S/P PTCA (Percutaneous Transluminal Coronary Angioplasty): 2001  CAD (Coronary Artery Disease)  Dyslipidemia  Bradycardia  Depression  HTN (Hypertension)  S/P drug eluting coronary stent placement  S/P Rotator Cuff Surgery    Vital Signs Last 24 Hrs  T(C): 36.4 (27 Feb 2019 05:17), Max: 36.9 (26 Feb 2019 22:29)  T(F): 97.5 (27 Feb 2019 05:17), Max: 98.4 (26 Feb 2019 22:29)  HR: 73 (27 Feb 2019 05:17) (60 - 73)  BP: 150/89 (27 Feb 2019 05:17) (106/95 - 162/99)  BP(mean): --  RR: 17 (27 Feb 2019 05:17) (17 - 18)  SpO2: 97% (27 Feb 2019 05:17) (93% - 98%)    PE  A+O x 4 ambulating without complaint  Pulm decreased BS alyse- no audible rales, scattered scant wheeze   CVS  S1S2 RRR   abd soft, non tender +BS  site benign- no hematoma, no bleeding or swelling  no evidence of infection seen and examined at bedside  no complaints    MEDICATIONS  (STANDING):  aspirin enteric coated 81 milliGRAM(s) Oral daily  ATENolol  Tablet 25 milliGRAM(s) Oral daily  FLUoxetine 20 milliGRAM(s) Oral daily  folic acid 1 milliGRAM(s) Oral daily  simvastatin 40 milliGRAM(s) Oral at bedtime    PAST MEDICAL & SURGICAL HISTORY:  COPD (chronic obstructive pulmonary disease)  TIA (Transient Ischemic Attack): 18 years ago  PT was on coumadin for short while  S/P PTCA (Percutaneous Transluminal Coronary Angioplasty): 2001  CAD (Coronary Artery Disease)  Dyslipidemia  Bradycardia  Depression  HTN (Hypertension)  S/P drug eluting coronary stent placement  S/P Rotator Cuff Surgery    Vital Signs Last 24 Hrs  T(C): 36.4 (27 Feb 2019 05:17), Max: 36.9 (26 Feb 2019 22:29)  T(F): 97.5 (27 Feb 2019 05:17), Max: 98.4 (26 Feb 2019 22:29)  HR: 73 (27 Feb 2019 05:17) (60 - 73)  BP: 150/89 (27 Feb 2019 05:17) (106/95 - 162/99)  BP(mean): --  RR: 17 (27 Feb 2019 05:17) (17 - 18)  SpO2: 97% (27 Feb 2019 05:17) (93% - 98%)    PE  A+O x 4 ambulating without complaint  Pulm decreased BS alyse- no audible rales, scattered scant wheeze   CVS  S1S2 RRR   abd soft, non tender +BS  site benign- no hematoma, no bleeding or swelling  no evidence of infection

## 2019-02-27 NOTE — PROGRESS NOTE ADULT - PROBLEM SELECTOR PLAN 4
Continue with your blood pressure medications; eat a heart healthy diet with low salt diet; exercise regularly (consult with your physician or cardiologist first); maintain a heart healthy weight; if you smoke - quit (A resource to help you stop smoking is the [facility] Tobacco [facility] – phone number [phone].); include healthy ways to manage stress. Continue to follow with your primary care physician or cardiologist.

## 2019-02-27 NOTE — DISCHARGE NOTE ADULT - MEDICATION SUMMARY - MEDICATIONS TO TAKE
I will START or STAY ON the medications listed below when I get home from the hospital:    Ecotrin 325 mg oral delayed release tablet  -- 1 tab(s) by mouth once a day  -- Indication: For Cardiac protection     FLUoxetine 20 mg oral capsule  -- 1 cap(s) by mouth once a day  -- Indication: For anxiety    simvastatin 40 mg oral tablet  -- 1 tab(s) by mouth once a day (at bedtime)  -- Indication: For high cholesterol    atenolol 25 mg oral tablet  -- 1 tab(s) by mouth once a day  -- Indication: For High blood pressure     Trelegy Ellipta inhalation powder  -- 1 puff(s) inhaled once a day  -- Indication: For COPD    ProAir HFA 90 mcg/inh inhalation aerosol  -- 2 puff(s) inhaled 4 times a day, As Needed  -- Indication: For COPD    folic acid 1 mg oral tablet  -- 1 tab(s) by mouth once a day  -- Indication: For suopplement I will START or STAY ON the medications listed below when I get home from the hospital:    aspirin 81 mg oral delayed release tablet  -- 1 tab(s) by mouth once a day  -- Indication: For CAD (Coronary Artery Disease)    FLUoxetine 20 mg oral capsule  -- 1 cap(s) by mouth once a day  -- Indication: For anxiety    simvastatin 40 mg oral tablet  -- 1 tab(s) by mouth once a day (at bedtime)  -- Indication: For high cholesterol    atenolol 25 mg oral tablet  -- 1 tab(s) by mouth once a day  -- Indication: For High blood pressure     ProAir HFA 90 mcg/inh inhalation aerosol  -- 2 puff(s) inhaled 4 times a day, As Needed  -- Indication: For COPD    Trelegy Ellipta inhalation powder  -- 1 puff(s) inhaled once a day  -- Indication: For COPD    folic acid 1 mg oral tablet  -- 1 tab(s) by mouth once a day  -- Indication: For suopplement

## 2019-02-27 NOTE — DISCHARGE NOTE ADULT - CARE PROVIDER_API CALL
Patient coughing x 4 days, gen malaise, chills at night. States she is here with patient who is having surgery on the 8th floor, feels like she may have bronchitis. Unknown fever  
Mark Borden (MD)  Cardiac Electrophysiology; Cardiology; Internal Medicine  [address] [place] [address] [id number]  Phone: [phone]  Fax: [phone]  Follow Up Time:

## 2019-02-27 NOTE — PROGRESS NOTE ADULT - PROBLEM SELECTOR PLAN 1
s/p Bi-V-ICD upgrade  continue currentmedications  daily weight - report weight gain in excess of 3 lbs  low salt diet  follow up with Dr Borden as scheduled s/p Bi-V-ICD upgrade  continue current medications  daily weight - report weight gain in excess of 3 lbs  low salt diet  follow up with Dr Borden as scheduled

## 2019-02-27 NOTE — DISCHARGE NOTE ADULT - CARE PLAN
Principal Discharge DX:	ICD (implantable cardioverter-defibrillator) in place  Goal:	Your incision will be without infection. Your heartbeat will remain controlled.  Assessment and plan of treatment:	Appointment: follow up with your electrophysiology (EP) doctor in 7-10 days after discharge. Please call the EP office ([phone]) to make appointment.   Incision care (where your cut was made): sugical glue will naturally fall off our skin.  Do not scrub, rub, or pick at the surgical glue. Call your doctor if you have any fever; chills; redness; swelling; increased soreness; warmth or drainage at the incision site.    ID Card: Do carry your ICD Identification (ID) card with you at all times.   Call your doctor immediately if you have hiccups for a long time, fainting, dizziness, lightheadedness, palpitations, chest pain or the same symptoms that you had before the procedure.   You should not have a MRI unless you have a MRI safe device.   IF YOU RECEIVE A SHOCK: • If you receive 1 shock, you must call our EP office. • If you receive 2 or more shocks, you should call 911 and go to ER for further evaluation or treatment by the EP team. Principal Discharge DX:	ICD (implantable cardioverter-defibrillator) in place  Goal:	Your incision will be without infection. Your heartbeat will remain controlled.  Assessment and plan of treatment:	Appointment: follow up with your electrophysiology (EP) doctor in 7-10 days after discharge. Please call the EP office ([phone]) to make appointment.   Incision care (where your cut was made): sugical glue will naturally fall off our skin.  Do not scrub, rub, or pick at the surgical glue. Call your doctor if you have any fever; chills; redness; swelling; increased soreness; warmth or drainage at the incision site.    ID Card: Do carry your ICD Identification (ID) card with you at all times.   Call your doctor immediately if you have hiccups for a long time, fainting, dizziness, lightheadedness, palpitations, chest pain or the same symptoms that you had before the procedure.   You should not have a MRI unless you have a MRI safe device.   IF YOU RECEIVE A SHOCK: • If you receive 1 shock, you must call our EP office. • If you receive 2 or more shocks, you should call 911 and go to ER for further evaluation or treatment by the EP team.  Secondary Diagnosis:	Heart failure with reduced ejection fraction  Goal:	remain symptom free  Assessment and plan of treatment:	take medications as instructed  daily weight  call Dr Borden for 3 lb weight gain  Secondary Diagnosis:	COPD (chronic obstructive pulmonary disease)  Goal:	remain symptom free  Assessment and plan of treatment:	continue current medications  follow up with Pulmonary as directed  Secondary Diagnosis:	HTN (Hypertension)  Goal:	BP<140/80  Assessment and plan of treatment:	Continue with your blood pressure medications; eat a heart healthy diet with low salt diet; exercise regularly (consult with your physician or cardiologist first); maintain a heart healthy weight; if you smoke - quit (A resource to help you stop smoking is the [facility] Tobacco Control – phone number [phone].); include healthy ways to manage stress. Continue to follow with your primary care physician or cardiologist.

## 2019-02-27 NOTE — DISCHARGE NOTE ADULT - NS AS DC FU [facility] CONTRAINDICATIONS ACEI/ARB MEDS
ACE will be initiated as outpatient as d/w Dr Borden/other reasons documented by MD/NP/PA or Pharmacist

## 2019-03-05 ENCOUNTER — INBOUND DOCUMENT (OUTPATIENT)
Age: 74
End: 2019-03-05

## 2019-03-06 ENCOUNTER — APPOINTMENT (OUTPATIENT)
Dept: ELECTROPHYSIOLOGY | Facility: CLINIC | Age: 74
End: 2019-03-06
Payer: MEDICARE

## 2019-03-06 VITALS
WEIGHT: 195 LBS | OXYGEN SATURATION: 99 % | DIASTOLIC BLOOD PRESSURE: 91 MMHG | BODY MASS INDEX: 27.3 KG/M2 | HEART RATE: 63 BPM | SYSTOLIC BLOOD PRESSURE: 146 MMHG | HEIGHT: 71 IN

## 2019-03-06 PROCEDURE — 99024 POSTOP FOLLOW-UP VISIT: CPT

## 2019-03-19 ENCOUNTER — NON-APPOINTMENT (OUTPATIENT)
Age: 74
End: 2019-03-19

## 2019-06-18 ENCOUNTER — APPOINTMENT (OUTPATIENT)
Dept: ELECTROPHYSIOLOGY | Facility: CLINIC | Age: 74
End: 2019-06-18
Payer: MEDICARE

## 2019-06-18 VITALS
SYSTOLIC BLOOD PRESSURE: 159 MMHG | DIASTOLIC BLOOD PRESSURE: 76 MMHG | HEART RATE: 70 BPM | HEIGHT: 71 IN | OXYGEN SATURATION: 93 %

## 2019-06-18 PROCEDURE — 93284 PRGRMG EVAL IMPLANTABLE DFB: CPT

## 2019-07-12 ENCOUNTER — TRANSCRIPTION ENCOUNTER (OUTPATIENT)
Age: 74
End: 2019-07-12

## 2019-09-20 ENCOUNTER — APPOINTMENT (OUTPATIENT)
Dept: ELECTROPHYSIOLOGY | Facility: CLINIC | Age: 74
End: 2019-09-20
Payer: MEDICARE

## 2019-09-20 PROCEDURE — 93296 REM INTERROG EVL PM/IDS: CPT

## 2019-09-20 PROCEDURE — 93295 DEV INTERROG REMOTE 1/2/MLT: CPT

## 2019-12-20 ENCOUNTER — APPOINTMENT (OUTPATIENT)
Dept: ELECTROPHYSIOLOGY | Facility: CLINIC | Age: 74
End: 2019-12-20
Payer: MEDICARE

## 2019-12-20 PROCEDURE — 93296 REM INTERROG EVL PM/IDS: CPT

## 2019-12-20 PROCEDURE — 93295 DEV INTERROG REMOTE 1/2/MLT: CPT

## 2020-03-19 ENCOUNTER — APPOINTMENT (OUTPATIENT)
Dept: ELECTROPHYSIOLOGY | Facility: CLINIC | Age: 75
End: 2020-03-19
Payer: MEDICARE

## 2020-03-19 PROCEDURE — 93296 REM INTERROG EVL PM/IDS: CPT

## 2020-03-19 PROCEDURE — 93295 DEV INTERROG REMOTE 1/2/MLT: CPT

## 2020-06-18 ENCOUNTER — APPOINTMENT (OUTPATIENT)
Dept: ELECTROPHYSIOLOGY | Facility: CLINIC | Age: 75
End: 2020-06-18
Payer: MEDICARE

## 2020-06-18 PROCEDURE — 93296 REM INTERROG EVL PM/IDS: CPT

## 2020-06-18 PROCEDURE — 93295 DEV INTERROG REMOTE 1/2/MLT: CPT

## 2020-07-31 NOTE — DISCHARGE NOTE ADULT - PRINCIPAL DIAGNOSIS
ICD (implantable cardioverter-defibrillator) in place
Use Map Statement For Sites (Optional): No
12-Feb-2017

## 2020-09-17 ENCOUNTER — APPOINTMENT (OUTPATIENT)
Dept: ELECTROPHYSIOLOGY | Facility: CLINIC | Age: 75
End: 2020-09-17

## 2020-09-18 ENCOUNTER — APPOINTMENT (OUTPATIENT)
Dept: ELECTROPHYSIOLOGY | Facility: CLINIC | Age: 75
End: 2020-09-18
Payer: MEDICARE

## 2020-09-18 PROCEDURE — 93295 DEV INTERROG REMOTE 1/2/MLT: CPT

## 2020-09-18 PROCEDURE — 93296 REM INTERROG EVL PM/IDS: CPT

## 2020-10-19 ENCOUNTER — APPOINTMENT (OUTPATIENT)
Dept: ELECTROPHYSIOLOGY | Facility: CLINIC | Age: 75
End: 2020-10-19
Payer: MEDICARE

## 2020-10-19 ENCOUNTER — NON-APPOINTMENT (OUTPATIENT)
Age: 75
End: 2020-10-19

## 2020-10-19 VITALS
BODY MASS INDEX: 26.5 KG/M2 | RESPIRATION RATE: 15 BRPM | WEIGHT: 190 LBS | DIASTOLIC BLOOD PRESSURE: 84 MMHG | SYSTOLIC BLOOD PRESSURE: 135 MMHG | HEART RATE: 63 BPM | OXYGEN SATURATION: 94 %

## 2020-10-19 PROCEDURE — 99213 OFFICE O/P EST LOW 20 MIN: CPT

## 2020-10-19 PROCEDURE — 93289 INTERROG DEVICE EVAL HEART: CPT

## 2020-10-19 PROCEDURE — 93000 ELECTROCARDIOGRAM COMPLETE: CPT | Mod: 59

## 2020-10-19 PROCEDURE — 99072 ADDL SUPL MATRL&STAF TM PHE: CPT

## 2020-10-19 RX ORDER — FOLIC ACID-PYRIDOXINE-CYANOCOBALAMIN TAB 2.5-25-2 MG 2.5-25-2 MG
2.5-25-2 TAB ORAL
Qty: 30 | Refills: 4 | Status: ACTIVE | COMMUNITY

## 2020-10-19 NOTE — PHYSICAL EXAM
[General Appearance - Well Developed] : well developed [Normal Appearance] : normal appearance [Well Groomed] : well groomed [General Appearance - Well Nourished] : well nourished [Heart Rate And Rhythm] : heart rate and rhythm were normal [No Deformities] : no deformities [General Appearance - In No Acute Distress] : no acute distress [Heart Sounds] : normal S1 and S2 [Murmurs] : no murmurs present [Respiration, Rhythm And Depth] : normal respiratory rhythm and effort [Auscultation Breath Sounds / Voice Sounds] : lungs were clear to auscultation bilaterally [Exaggerated Use Of Accessory Muscles For Inspiration] : no accessory muscle use [Clean] : clean [Dry] : dry [Well-Healed] : well-healed [Abdomen Tenderness] : non-tender [Abdomen Soft] : soft [Abdomen Mass (___ Cm)] : no abdominal mass palpated [Nail Clubbing] : no clubbing of the fingernails [Cyanosis, Localized] : no localized cyanosis [] : no ischemic changes [Petechial Hemorrhages (___cm)] : no petechial hemorrhages

## 2020-10-19 NOTE — DISCUSSION/SUMMARY
[FreeTextEntry1] : In summary, this is a 75 year old man with ICM s/p CRT-D device. I am pleased to see him doing well today with normal device function. He will undergo repeat TTE at his PMDs office and have the results sent to us.\par \par  *** appeared to understand the whole discussion and verbalized that all of his questions were answered to his satisfaction.\par \par Thank you for allowing me to be involved in the care of this pleasant man. Please feel free to contact me with any questions.\par \par \par \par Jose Alberto Grande MD\par  of Cardiology\par Electrophysiology Section\par [address] [place] [address], [address] [place] [address]\[place] [address]\par Office: [phone]\par Fax: [phone]\par

## 2021-01-13 ENCOUNTER — APPOINTMENT (OUTPATIENT)
Dept: ELECTROPHYSIOLOGY | Facility: CLINIC | Age: 76
End: 2021-01-13
Payer: MEDICARE

## 2021-01-13 PROCEDURE — 93295 DEV INTERROG REMOTE 1/2/MLT: CPT

## 2021-01-13 PROCEDURE — 93296 REM INTERROG EVL PM/IDS: CPT

## 2021-02-03 ENCOUNTER — APPOINTMENT (OUTPATIENT)
Dept: SURGERY | Facility: CLINIC | Age: 76
End: 2021-02-03
Payer: MEDICARE

## 2021-02-03 VITALS
BODY MASS INDEX: 27.2 KG/M2 | HEART RATE: 78 BPM | HEIGHT: 70 IN | TEMPERATURE: 98.1 F | DIASTOLIC BLOOD PRESSURE: 102 MMHG | OXYGEN SATURATION: 95 % | SYSTOLIC BLOOD PRESSURE: 160 MMHG | WEIGHT: 190 LBS | RESPIRATION RATE: 16 BRPM

## 2021-02-03 PROCEDURE — 99204 OFFICE O/P NEW MOD 45 MIN: CPT

## 2021-02-03 PROCEDURE — 99072 ADDL SUPL MATRL&STAF TM PHE: CPT

## 2021-02-03 RX ORDER — ASPIRIN ENTERIC COATED TABLETS 81 MG 81 MG/1
81 TABLET, DELAYED RELEASE ORAL
Qty: 90 | Refills: 3 | Status: DISCONTINUED | COMMUNITY
End: 2021-02-03

## 2021-02-03 RX ORDER — DIAZEPAM 5 MG/1
5 TABLET ORAL
Refills: 0 | Status: ACTIVE | COMMUNITY

## 2021-02-03 RX ORDER — IRBESARTAN 300 MG/1
300 TABLET ORAL
Refills: 0 | Status: ACTIVE | COMMUNITY

## 2021-02-03 RX ORDER — VITAMIN B COMPLEX
CAPSULE ORAL
Refills: 0 | Status: DISCONTINUED | COMMUNITY
End: 2021-02-03

## 2021-02-03 NOTE — HISTORY OF PRESENT ILLNESS
[de-identified] : Jim is a 76 year old male here for evaluation of abdominal pain. Patient is status post left scrotal hernia repair on 3/9/17.   [de-identified] : Status post left scrotal hernia repair 4 years ago. Recently patient had an episode of blood per rectum and noted some abdominal pain for which he was seen by his gastroenterologist. Patient was sent for a CT scan which noted a right inguinal hernia. Patient denies having noted a bulge in the right groin.

## 2021-02-03 NOTE — PHYSICAL EXAM
[Normal Heart Sounds] : normal heart sounds [No Rash or Lesion] : No rash or lesion [Alert] : alert [Oriented to Person] : oriented to person [Oriented to Place] : oriented to place [Oriented to Time] : oriented to time [Calm] : calm [JVD] : no jugular venous distention  [de-identified] : Well nourished male, in no apparent distress [de-identified] : WNL [de-identified] : Soft, nondistended, nontender. No palpable mass or organomegaly. Left groin- well-healed surgical scar and no palpable recurrence. Right groin-  moderate sized, nontender, reducible inguinal hernia. [de-identified] : Full ROM

## 2021-03-11 ENCOUNTER — APPOINTMENT (OUTPATIENT)
Dept: SURGERY | Facility: AMBULATORY MEDICAL SERVICES | Age: 76
End: 2021-03-11
Payer: MEDICARE

## 2021-03-11 PROCEDURE — 49505 PRP I/HERN INIT REDUC >5 YR: CPT | Mod: RT

## 2021-03-11 PROCEDURE — 55520 REMOVAL OF SPERM CORD LESION: CPT | Mod: 59,RT

## 2021-03-24 ENCOUNTER — APPOINTMENT (OUTPATIENT)
Dept: SURGERY | Facility: CLINIC | Age: 76
End: 2021-03-24
Payer: MEDICARE

## 2021-03-24 VITALS
TEMPERATURE: 97.6 F | SYSTOLIC BLOOD PRESSURE: 160 MMHG | RESPIRATION RATE: 18 BRPM | DIASTOLIC BLOOD PRESSURE: 74 MMHG | OXYGEN SATURATION: 94 % | HEART RATE: 61 BPM

## 2021-03-24 DIAGNOSIS — K40.90 UNILATERAL INGUINAL HERNIA, W/OUT OBSTRUCTION OR GANGRENE, NOT SPECIFIED AS RECURRENT: ICD-10-CM

## 2021-03-24 PROCEDURE — 99024 POSTOP FOLLOW-UP VISIT: CPT

## 2021-03-24 NOTE — HISTORY OF PRESENT ILLNESS
[de-identified] : Jim is a 75 y/o male here for a post operative visit. 3/11/21- ambulatory OR at [facility]- right inguinal hernia repair with medium oval. Pathology:  lipoma. \par \par In the office today the patient reports feeling well. No pain at incision site. Denies fever/chills. He is having some GI disturbances today but he states that he has a long standing hx of IBS-C.  [de-identified] : Status post right inguinal hernia repair on 3/11/21. At present has no complaint of pain other than gas pains likely related to recent constipation.

## 2021-03-24 NOTE — CONSULT LETTER
[Dear  ___] : Dear ~ELYSE, [Sincerely,] : Sincerely, [FreeTextEntry2] : Dr. Charmaine Sears [FreeTextEntry1] : I saw Jim Olvera back in the office for a postop check on March 24th. Since undergoing his right inguinal hernia repair on the 11th, Mr. Olvera has done well. At today's visit he stated that he was pain free, having required no narcotics in the postop period. His only complaint was that of some gas pains, likely related to a degree of constipation postop.\par \par On exam, the abdomen was soft, nondistended and nontender and the right groin incision was noted to be healing well. The repair was fully intact and there were no signs of infection or palpable seroma present.\par \par I advised Mr. Olvera that he may gradually liberalize his activities as he feels able and I suggested that he use MiraLax as needed for constipation. Otherwise, I have discharged him from any further postoperative followup. Thanks once again for allowing me to participate in this pleasant gentleman's care. [FreeTextEntry3] : \par \par Federico Wahl M.D., F.A.C.S.

## 2021-03-24 NOTE — PHYSICAL EXAM
[de-identified] : Soft, nondistended, nontender. Right groin incision healing well with a normal ridge. Repair intact. No palpable seroma or signs of infection. Small area of fading ecchymosis below the umbilicus. Right testicle normal.

## 2021-03-24 NOTE — PLAN
[FreeTextEntry1] : Patient to liberalize activities as tolerated and return here p.r.n. Advised to use MiraLax as needed for constipation.

## 2021-04-12 RX ORDER — METFORMIN HYDROCHLORIDE 500 MG/1
500 TABLET, COATED ORAL
Refills: 0 | Status: DISCONTINUED | COMMUNITY
End: 2021-04-12

## 2021-04-12 RX ORDER — SITAGLIPTIN 100 MG/1
100 TABLET, FILM COATED ORAL
Refills: 0 | Status: DISCONTINUED | COMMUNITY
End: 2021-04-12

## 2021-04-13 ENCOUNTER — APPOINTMENT (OUTPATIENT)
Dept: SURGERY | Facility: CLINIC | Age: 76
End: 2021-04-13
Payer: MEDICARE

## 2021-04-13 VITALS
TEMPERATURE: 96.9 F | HEART RATE: 87 BPM | SYSTOLIC BLOOD PRESSURE: 157 MMHG | DIASTOLIC BLOOD PRESSURE: 61 MMHG | OXYGEN SATURATION: 95 % | RESPIRATION RATE: 17 BRPM

## 2021-04-13 DIAGNOSIS — Z09 ENCOUNTER FOR FOLLOW-UP EXAMINATION AFTER COMPLETED TREATMENT FOR CONDITIONS OTHER THAN MALIGNANT NEOPLASM: ICD-10-CM

## 2021-04-13 PROCEDURE — 99024 POSTOP FOLLOW-UP VISIT: CPT

## 2021-04-13 NOTE — HISTORY OF PRESENT ILLNESS
[de-identified] : Jim is a 75 y/o male here for a post operative visit. 3/11/21- ambulatory OR at [facility]- right inguinal hernia repair with medium oval. Pathology: lipoma. \par Last seen Dr. Wahl on 05/24/2021.  Patient's complaint is that of a jason feeling when walking on the balls of his toes.\par

## 2021-04-13 NOTE — PHYSICAL EXAM
[de-identified] : The right inguinal hernia repair is intact as is the left inguinal hernia repair.  Examination of sensation along the ilioinguinal and genital branch of the genitofemoral are intact as is the lateral femoral cutaneous distribution.  The patient has good dorsiflexion and plantar flexion of the of the ankle sensation in the balls of the feet and the along the calcaneus are normal on examination of the balls of the toes patient states they feel very abnormal.

## 2021-04-13 NOTE — ASSESSMENT
[FreeTextEntry1] : I have told the patient that I do not have an answer for his complaint and that I would see a podiatrist to better evaluate this which may be a local foot problem.

## 2021-04-15 ENCOUNTER — NON-APPOINTMENT (OUTPATIENT)
Age: 76
End: 2021-04-15

## 2021-04-15 ENCOUNTER — APPOINTMENT (OUTPATIENT)
Dept: ELECTROPHYSIOLOGY | Facility: CLINIC | Age: 76
End: 2021-04-15
Payer: MEDICARE

## 2021-04-15 PROCEDURE — 93296 REM INTERROG EVL PM/IDS: CPT

## 2021-04-15 PROCEDURE — 93295 DEV INTERROG REMOTE 1/2/MLT: CPT

## 2021-05-18 ENCOUNTER — APPOINTMENT (OUTPATIENT)
Dept: VASCULAR SURGERY | Facility: CLINIC | Age: 76
End: 2021-05-18

## 2021-07-15 ENCOUNTER — NON-APPOINTMENT (OUTPATIENT)
Age: 76
End: 2021-07-15

## 2021-07-15 ENCOUNTER — APPOINTMENT (OUTPATIENT)
Dept: ELECTROPHYSIOLOGY | Facility: CLINIC | Age: 76
End: 2021-07-15
Payer: MEDICARE

## 2021-07-15 PROCEDURE — 93296 REM INTERROG EVL PM/IDS: CPT

## 2021-07-15 PROCEDURE — 93295 DEV INTERROG REMOTE 1/2/MLT: CPT

## 2021-10-12 ENCOUNTER — APPOINTMENT (OUTPATIENT)
Dept: ELECTROPHYSIOLOGY | Facility: CLINIC | Age: 76
End: 2021-10-12

## 2021-10-14 ENCOUNTER — APPOINTMENT (OUTPATIENT)
Dept: ELECTROPHYSIOLOGY | Facility: CLINIC | Age: 76
End: 2021-10-14
Payer: MEDICARE

## 2021-10-14 ENCOUNTER — NON-APPOINTMENT (OUTPATIENT)
Age: 76
End: 2021-10-14

## 2021-10-14 PROCEDURE — 93295 DEV INTERROG REMOTE 1/2/MLT: CPT | Mod: NC

## 2021-10-14 PROCEDURE — 93296 REM INTERROG EVL PM/IDS: CPT

## 2021-10-20 ENCOUNTER — NON-APPOINTMENT (OUTPATIENT)
Age: 76
End: 2021-10-20

## 2021-10-20 ENCOUNTER — APPOINTMENT (OUTPATIENT)
Dept: ELECTROPHYSIOLOGY | Facility: CLINIC | Age: 76
End: 2021-10-20
Payer: MEDICARE

## 2021-10-20 VITALS
HEART RATE: 66 BPM | OXYGEN SATURATION: 96 % | SYSTOLIC BLOOD PRESSURE: 125 MMHG | HEIGHT: 70 IN | DIASTOLIC BLOOD PRESSURE: 78 MMHG

## 2021-10-20 PROCEDURE — 93288 INTERROG EVL PM/LDLS PM IP: CPT

## 2021-10-20 PROCEDURE — 93000 ELECTROCARDIOGRAM COMPLETE: CPT | Mod: 59

## 2021-10-20 PROCEDURE — 99213 OFFICE O/P EST LOW 20 MIN: CPT

## 2021-10-20 RX ORDER — TAMOXIFEN CITRATE 20 MG/1
20 TABLET, FILM COATED ORAL
Refills: 0 | Status: DISCONTINUED | COMMUNITY
End: 2021-10-20

## 2021-10-20 NOTE — DISCUSSION/SUMMARY
[FreeTextEntry1] : In summary, this is a 76 year old man with ICM s/p CRT-D device. I am pleased to see him doing well today with normal device function. He will undergo repeat TTE at his [facility]Ds office and have the results sent to us.\par \par  *** appeared to understand the whole discussion and verbalized that all of his questions were answered to his satisfaction.\par \par Thank you for allowing me to be involved in the care of this pleasant man. Please feel free to contact me with any questions.\par \par \par \par Jose Alberto Grande MD\par  of Cardiology\par Electrophysiology Section\par [address] [place] [address], [address] [place] [address]\[place] [address]\par Office: [phone]\par Fax: [phone]\par

## 2021-10-20 NOTE — HISTORY OF PRESENT ILLNESS
[de-identified] : Mr. Jim Olvera presents for device follow up. He is a 76 year old man with history of complete heart s/p PPM with worsening LV dysfunction s/p upgrade to CRT-D, CAD s/p MI, HTN, HL. He presents for device follow up. He denies any chest pain, SOB, PND/orthopnea, LE edema, dizziness or syncope.\par \par Interrogation shows normal pace/sense function on this [employer] CRT-D device. He is biV paced 99% with battery life 6.8 years. There have been no events.

## 2021-10-25 NOTE — H&P CARDIOLOGY - PSH
Prescription sent for amoxicillin 7-day course.   Please advise him to call back if symptoms do not improve S/P drug eluting coronary stent placement    S/P Rotator Cuff Surgery

## 2022-01-01 ENCOUNTER — APPOINTMENT (OUTPATIENT)
Dept: ELECTROPHYSIOLOGY | Facility: CLINIC | Age: 77
End: 2022-01-01

## 2022-01-01 ENCOUNTER — NON-APPOINTMENT (OUTPATIENT)
Age: 77
End: 2022-01-01

## 2022-01-01 PROCEDURE — 93295 DEV INTERROG REMOTE 1/2/MLT: CPT

## 2022-01-01 PROCEDURE — 93296 REM INTERROG EVL PM/IDS: CPT

## 2022-01-14 ENCOUNTER — NON-APPOINTMENT (OUTPATIENT)
Age: 77
End: 2022-01-14

## 2022-01-14 ENCOUNTER — APPOINTMENT (OUTPATIENT)
Dept: ELECTROPHYSIOLOGY | Facility: CLINIC | Age: 77
End: 2022-01-14
Payer: MEDICARE

## 2022-01-14 PROCEDURE — 93295 DEV INTERROG REMOTE 1/2/MLT: CPT

## 2022-01-14 PROCEDURE — 93296 REM INTERROG EVL PM/IDS: CPT

## 2022-04-15 ENCOUNTER — APPOINTMENT (OUTPATIENT)
Dept: ELECTROPHYSIOLOGY | Facility: CLINIC | Age: 77
End: 2022-04-15
Payer: MEDICARE

## 2022-04-15 ENCOUNTER — NON-APPOINTMENT (OUTPATIENT)
Age: 77
End: 2022-04-15

## 2022-04-15 PROCEDURE — 93296 REM INTERROG EVL PM/IDS: CPT

## 2022-04-15 PROCEDURE — 93295 DEV INTERROG REMOTE 1/2/MLT: CPT

## 2022-07-15 ENCOUNTER — APPOINTMENT (OUTPATIENT)
Dept: ELECTROPHYSIOLOGY | Facility: CLINIC | Age: 77
End: 2022-07-15

## 2022-07-15 ENCOUNTER — NON-APPOINTMENT (OUTPATIENT)
Age: 77
End: 2022-07-15

## 2022-07-15 PROCEDURE — 93295 DEV INTERROG REMOTE 1/2/MLT: CPT

## 2022-07-15 PROCEDURE — 93296 REM INTERROG EVL PM/IDS: CPT

## 2023-01-01 ENCOUNTER — APPOINTMENT (OUTPATIENT)
Dept: NUCLEAR MEDICINE | Facility: IMAGING CENTER | Age: 78
End: 2023-01-01
Payer: MEDICARE

## 2023-01-01 ENCOUNTER — TRANSCRIPTION ENCOUNTER (OUTPATIENT)
Age: 78
End: 2023-01-01

## 2023-01-01 ENCOUNTER — EMERGENCY (EMERGENCY)
Facility: HOSPITAL | Age: 78
LOS: 1 days | Discharge: HOME | End: 2023-01-01
Attending: EMERGENCY MEDICINE
Payer: MEDICARE

## 2023-01-01 ENCOUNTER — APPOINTMENT (OUTPATIENT)
Dept: CARDIOLOGY | Facility: CLINIC | Age: 78
End: 2023-01-01
Payer: MEDICARE

## 2023-01-01 ENCOUNTER — RESULT REVIEW (OUTPATIENT)
Age: 78
End: 2023-01-01

## 2023-01-01 ENCOUNTER — APPOINTMENT (OUTPATIENT)
Dept: ELECTROPHYSIOLOGY | Facility: CLINIC | Age: 78
End: 2023-01-01
Payer: MEDICARE

## 2023-01-01 ENCOUNTER — INPATIENT (INPATIENT)
Facility: HOSPITAL | Age: 78
LOS: 8 days | Discharge: OTHER INSTITUTION | End: 2023-07-26
Attending: HOSPITALIST | Admitting: HOSPITALIST
Payer: MEDICARE

## 2023-01-01 ENCOUNTER — APPOINTMENT (OUTPATIENT)
Dept: CT IMAGING | Facility: IMAGING CENTER | Age: 78
End: 2023-01-01

## 2023-01-01 ENCOUNTER — INPATIENT (INPATIENT)
Facility: HOSPITAL | Age: 78
LOS: 3 days | Discharge: NURSING, REHAB OR LONG-TERM CARE | DRG: 687 | End: 2023-06-16
Attending: HOSPITALIST | Admitting: HOSPITALIST
Payer: MEDICARE

## 2023-01-01 ENCOUNTER — LABORATORY RESULT (OUTPATIENT)
Age: 78
End: 2023-01-01

## 2023-01-01 ENCOUNTER — APPOINTMENT (OUTPATIENT)
Dept: HEMATOLOGY ONCOLOGY | Facility: CLINIC | Age: 78
End: 2023-01-01
Payer: MEDICARE

## 2023-01-01 ENCOUNTER — APPOINTMENT (OUTPATIENT)
Dept: CARDIOLOGY | Facility: CLINIC | Age: 78
End: 2023-01-01

## 2023-01-01 ENCOUNTER — NON-APPOINTMENT (OUTPATIENT)
Age: 78
End: 2023-01-01

## 2023-01-01 ENCOUNTER — APPOINTMENT (OUTPATIENT)
Dept: OTOLARYNGOLOGY | Facility: CLINIC | Age: 78
End: 2023-01-01
Payer: MEDICARE

## 2023-01-01 ENCOUNTER — INPATIENT (INPATIENT)
Facility: HOSPITAL | Age: 78
LOS: 3 days | Discharge: NURSING, REHAB OR LONG-TERM CARE | DRG: 669 | End: 2023-04-28
Attending: SPECIALIST | Admitting: SPECIALIST
Payer: MEDICARE

## 2023-01-01 ENCOUNTER — OUTPATIENT (OUTPATIENT)
Dept: OUTPATIENT SERVICES | Facility: HOSPITAL | Age: 78
LOS: 1 days | End: 2023-01-01
Payer: MEDICARE

## 2023-01-01 ENCOUNTER — APPOINTMENT (OUTPATIENT)
Dept: OTOLARYNGOLOGY | Facility: CLINIC | Age: 78
End: 2023-01-01

## 2023-01-01 ENCOUNTER — APPOINTMENT (OUTPATIENT)
Dept: UROLOGY | Facility: HOSPITAL | Age: 78
End: 2023-01-01

## 2023-01-01 ENCOUNTER — INPATIENT (INPATIENT)
Facility: HOSPITAL | Age: 78
LOS: 5 days | Discharge: HOME HEALTH | DRG: 314 | End: 2023-07-11
Attending: INTERNAL MEDICINE | Admitting: STUDENT IN AN ORGANIZED HEALTH CARE EDUCATION/TRAINING PROGRAM
Payer: MEDICARE

## 2023-01-01 ENCOUNTER — INPATIENT (INPATIENT)
Facility: HOSPITAL | Age: 78
LOS: 5 days | Discharge: NURSING, REHAB OR LONG-TERM CARE | DRG: 190 | End: 2023-05-05
Attending: INTERNAL MEDICINE | Admitting: STUDENT IN AN ORGANIZED HEALTH CARE EDUCATION/TRAINING PROGRAM
Payer: MEDICARE

## 2023-01-01 ENCOUNTER — APPOINTMENT (OUTPATIENT)
Dept: UROLOGY | Facility: CLINIC | Age: 78
End: 2023-01-01
Payer: MEDICARE

## 2023-01-01 ENCOUNTER — EMERGENCY (EMERGENCY)
Facility: HOSPITAL | Age: 78
LOS: 1 days | Discharge: HOME | End: 2023-01-01
Attending: STUDENT IN AN ORGANIZED HEALTH CARE EDUCATION/TRAINING PROGRAM
Payer: MEDICARE

## 2023-01-01 ENCOUNTER — APPOINTMENT (OUTPATIENT)
Dept: CT IMAGING | Facility: CLINIC | Age: 78
End: 2023-01-01
Payer: MEDICARE

## 2023-01-01 ENCOUNTER — OUTPATIENT (OUTPATIENT)
Dept: OUTPATIENT SERVICES | Facility: HOSPITAL | Age: 78
LOS: 1 days | Discharge: HOME | End: 2023-01-01

## 2023-01-01 VITALS
DIASTOLIC BLOOD PRESSURE: 74 MMHG | HEIGHT: 70 IN | WEIGHT: 119.93 LBS | HEART RATE: 73 BPM | TEMPERATURE: 98 F | OXYGEN SATURATION: 100 % | RESPIRATION RATE: 24 BRPM | SYSTOLIC BLOOD PRESSURE: 109 MMHG

## 2023-01-01 VITALS
OXYGEN SATURATION: 98 % | RESPIRATION RATE: 20 BRPM | TEMPERATURE: 97.4 F | DIASTOLIC BLOOD PRESSURE: 67 MMHG | HEART RATE: 94 BPM | SYSTOLIC BLOOD PRESSURE: 99 MMHG | BODY MASS INDEX: 19.29 KG/M2 | WEIGHT: 125.64 LBS

## 2023-01-01 VITALS
HEIGHT: 66 IN | TEMPERATURE: 98 F | HEART RATE: 75 BPM | OXYGEN SATURATION: 96 % | WEIGHT: 138.01 LBS | DIASTOLIC BLOOD PRESSURE: 78 MMHG | RESPIRATION RATE: 20 BRPM | SYSTOLIC BLOOD PRESSURE: 150 MMHG

## 2023-01-01 VITALS
TEMPERATURE: 98 F | OXYGEN SATURATION: 100 % | HEART RATE: 67 BPM | HEIGHT: 69 IN | DIASTOLIC BLOOD PRESSURE: 70 MMHG | RESPIRATION RATE: 17 BRPM | WEIGHT: 128.31 LBS | SYSTOLIC BLOOD PRESSURE: 108 MMHG

## 2023-01-01 VITALS
BODY MASS INDEX: 19.62 KG/M2 | WEIGHT: 125 LBS | HEART RATE: 90 BPM | HEIGHT: 67 IN | SYSTOLIC BLOOD PRESSURE: 90 MMHG | DIASTOLIC BLOOD PRESSURE: 62 MMHG

## 2023-01-01 VITALS
TEMPERATURE: 99 F | HEART RATE: 71 BPM | DIASTOLIC BLOOD PRESSURE: 92 MMHG | RESPIRATION RATE: 20 BRPM | HEIGHT: 66 IN | OXYGEN SATURATION: 97 % | SYSTOLIC BLOOD PRESSURE: 125 MMHG

## 2023-01-01 VITALS
WEIGHT: 130.07 LBS | SYSTOLIC BLOOD PRESSURE: 81 MMHG | DIASTOLIC BLOOD PRESSURE: 46 MMHG | OXYGEN SATURATION: 97 % | HEART RATE: 65 BPM | RESPIRATION RATE: 17 BRPM | TEMPERATURE: 98 F | HEIGHT: 70 IN

## 2023-01-01 VITALS
RESPIRATION RATE: 17 BRPM | DIASTOLIC BLOOD PRESSURE: 62 MMHG | SYSTOLIC BLOOD PRESSURE: 92 MMHG | OXYGEN SATURATION: 97 % | HEIGHT: 66 IN | HEART RATE: 67 BPM | WEIGHT: 138.01 LBS | TEMPERATURE: 98 F

## 2023-01-01 VITALS
DIASTOLIC BLOOD PRESSURE: 78 MMHG | RESPIRATION RATE: 18 BRPM | HEART RATE: 70 BPM | TEMPERATURE: 98 F | OXYGEN SATURATION: 98 % | SYSTOLIC BLOOD PRESSURE: 133 MMHG

## 2023-01-01 VITALS
HEART RATE: 65 BPM | HEIGHT: 67.68 IN | RESPIRATION RATE: 18 BRPM | BODY MASS INDEX: 20.78 KG/M2 | WEIGHT: 135.56 LBS | DIASTOLIC BLOOD PRESSURE: 79 MMHG | OXYGEN SATURATION: 99 % | TEMPERATURE: 97.5 F | SYSTOLIC BLOOD PRESSURE: 131 MMHG

## 2023-01-01 VITALS
DIASTOLIC BLOOD PRESSURE: 87 MMHG | SYSTOLIC BLOOD PRESSURE: 146 MMHG | HEIGHT: 68 IN | RESPIRATION RATE: 20 BRPM | TEMPERATURE: 98 F | HEART RATE: 70 BPM | WEIGHT: 149.91 LBS | OXYGEN SATURATION: 96 %

## 2023-01-01 VITALS
DIASTOLIC BLOOD PRESSURE: 69 MMHG | HEART RATE: 76 BPM | OXYGEN SATURATION: 95 % | SYSTOLIC BLOOD PRESSURE: 120 MMHG | TEMPERATURE: 98 F | RESPIRATION RATE: 18 BRPM

## 2023-01-01 VITALS — WEIGHT: 138 LBS | OXYGEN SATURATION: 96 % | BODY MASS INDEX: 19.8 KG/M2 | HEART RATE: 81 BPM

## 2023-01-01 VITALS
OXYGEN SATURATION: 99 % | TEMPERATURE: 98 F | DIASTOLIC BLOOD PRESSURE: 65 MMHG | HEART RATE: 61 BPM | RESPIRATION RATE: 18 BRPM | SYSTOLIC BLOOD PRESSURE: 105 MMHG

## 2023-01-01 VITALS
HEART RATE: 61 BPM | RESPIRATION RATE: 18 BRPM | SYSTOLIC BLOOD PRESSURE: 118 MMHG | OXYGEN SATURATION: 95 % | DIASTOLIC BLOOD PRESSURE: 65 MMHG | TEMPERATURE: 98 F

## 2023-01-01 VITALS
TEMPERATURE: 99 F | HEART RATE: 76 BPM | SYSTOLIC BLOOD PRESSURE: 105 MMHG | RESPIRATION RATE: 18 BRPM | OXYGEN SATURATION: 95 % | DIASTOLIC BLOOD PRESSURE: 73 MMHG

## 2023-01-01 VITALS
OXYGEN SATURATION: 95 % | SYSTOLIC BLOOD PRESSURE: 104 MMHG | TEMPERATURE: 98 F | DIASTOLIC BLOOD PRESSURE: 56 MMHG | HEART RATE: 68 BPM | RESPIRATION RATE: 19 BRPM

## 2023-01-01 VITALS — HEIGHT: 70 IN

## 2023-01-01 VITALS — SYSTOLIC BLOOD PRESSURE: 100 MMHG | DIASTOLIC BLOOD PRESSURE: 60 MMHG

## 2023-01-01 VITALS
DIASTOLIC BLOOD PRESSURE: 74 MMHG | OXYGEN SATURATION: 100 % | RESPIRATION RATE: 18 BRPM | SYSTOLIC BLOOD PRESSURE: 125 MMHG | HEART RATE: 65 BPM

## 2023-01-01 DIAGNOSIS — E78.5 HYPERLIPIDEMIA, UNSPECIFIED: ICD-10-CM

## 2023-01-01 DIAGNOSIS — C67.9 MALIGNANT NEOPLASM OF BLADDER, UNSPECIFIED: ICD-10-CM

## 2023-01-01 DIAGNOSIS — N17.9 ACUTE KIDNEY FAILURE, UNSPECIFIED: ICD-10-CM

## 2023-01-01 DIAGNOSIS — N32.89 OTHER SPECIFIED DISORDERS OF BLADDER: ICD-10-CM

## 2023-01-01 DIAGNOSIS — I31.39 OTHER PERICARDIAL EFFUSION (NONINFLAMMATORY): ICD-10-CM

## 2023-01-01 DIAGNOSIS — Z95.5 PRESENCE OF CORONARY ANGIOPLASTY IMPLANT AND GRAFT: Chronic | ICD-10-CM

## 2023-01-01 DIAGNOSIS — Z01.818 ENCOUNTER FOR OTHER PREPROCEDURAL EXAMINATION: ICD-10-CM

## 2023-01-01 DIAGNOSIS — F41.9 ANXIETY DISORDER, UNSPECIFIED: ICD-10-CM

## 2023-01-01 DIAGNOSIS — Z95.810 PRESENCE OF AUTOMATIC (IMPLANTABLE) CARDIAC DEFIBRILLATOR: Chronic | ICD-10-CM

## 2023-01-01 DIAGNOSIS — Z95.810 PRESENCE OF AUTOMATIC (IMPLANTABLE) CARDIAC DEFIBRILLATOR: ICD-10-CM

## 2023-01-01 DIAGNOSIS — I25.10 ATHEROSCLEROTIC HEART DISEASE OF NATIVE CORONARY ARTERY WITHOUT ANGINA PECTORIS: ICD-10-CM

## 2023-01-01 DIAGNOSIS — D64.9 ANEMIA, UNSPECIFIED: ICD-10-CM

## 2023-01-01 DIAGNOSIS — R31.9 HEMATURIA, UNSPECIFIED: ICD-10-CM

## 2023-01-01 DIAGNOSIS — I10 ESSENTIAL (PRIMARY) HYPERTENSION: ICD-10-CM

## 2023-01-01 DIAGNOSIS — R31.0 GROSS HEMATURIA: ICD-10-CM

## 2023-01-01 DIAGNOSIS — Z95.0 PRESENCE OF CARDIAC PACEMAKER: Chronic | ICD-10-CM

## 2023-01-01 DIAGNOSIS — C80.1 MALIGNANT (PRIMARY) NEOPLASM, UNSPECIFIED: ICD-10-CM

## 2023-01-01 DIAGNOSIS — K59.00 CONSTIPATION, UNSPECIFIED: ICD-10-CM

## 2023-01-01 DIAGNOSIS — Z29.9 ENCOUNTER FOR PROPHYLACTIC MEASURES, UNSPECIFIED: ICD-10-CM

## 2023-01-01 DIAGNOSIS — R09.89 OTHER SPECIFIED SYMPTOMS AND SIGNS INVOLVING THE CIRCULATORY AND RESPIRATORY SYSTEMS: ICD-10-CM

## 2023-01-01 DIAGNOSIS — Z98.890 OTHER SPECIFIED POSTPROCEDURAL STATES: Chronic | ICD-10-CM

## 2023-01-01 DIAGNOSIS — N39.0 URINARY TRACT INFECTION, SITE NOT SPECIFIED: ICD-10-CM

## 2023-01-01 DIAGNOSIS — N18.30 CHRONIC KIDNEY DISEASE, STAGE 3 UNSPECIFIED: ICD-10-CM

## 2023-01-01 DIAGNOSIS — I50.22 CHRONIC SYSTOLIC (CONGESTIVE) HEART FAILURE: ICD-10-CM

## 2023-01-01 DIAGNOSIS — Z00.8 ENCOUNTER FOR OTHER GENERAL EXAMINATION: ICD-10-CM

## 2023-01-01 DIAGNOSIS — G93.40 ENCEPHALOPATHY, UNSPECIFIED: ICD-10-CM

## 2023-01-01 DIAGNOSIS — R06.02 SHORTNESS OF BREATH: ICD-10-CM

## 2023-01-01 DIAGNOSIS — J44.9 CHRONIC OBSTRUCTIVE PULMONARY DISEASE, UNSPECIFIED: ICD-10-CM

## 2023-01-01 DIAGNOSIS — D72.829 ELEVATED WHITE BLOOD CELL COUNT, UNSPECIFIED: ICD-10-CM

## 2023-01-01 DIAGNOSIS — R33.9 RETENTION OF URINE, UNSPECIFIED: ICD-10-CM

## 2023-01-01 DIAGNOSIS — I50.9 HEART FAILURE, UNSPECIFIED: ICD-10-CM

## 2023-01-01 DIAGNOSIS — R91.8 OTHER NONSPECIFIC ABNORMAL FINDING OF LUNG FIELD: ICD-10-CM

## 2023-01-01 DIAGNOSIS — J43.9 EMPHYSEMA, UNSPECIFIED: ICD-10-CM

## 2023-01-01 DIAGNOSIS — G89.3 NEOPLASM RELATED PAIN (ACUTE) (CHRONIC): ICD-10-CM

## 2023-01-01 DIAGNOSIS — Z95.0 PRESENCE OF CARDIAC PACEMAKER: ICD-10-CM

## 2023-01-01 DIAGNOSIS — G93.41 METABOLIC ENCEPHALOPATHY: ICD-10-CM

## 2023-01-01 DIAGNOSIS — Z71.89 OTHER SPECIFIED COUNSELING: ICD-10-CM

## 2023-01-01 DIAGNOSIS — J44.1 CHRONIC OBSTRUCTIVE PULMONARY DISEASE WITH (ACUTE) EXACERBATION: ICD-10-CM

## 2023-01-01 DIAGNOSIS — Z79.899 OTHER LONG TERM (CURRENT) DRUG THERAPY: ICD-10-CM

## 2023-01-01 DIAGNOSIS — Z85.51 PERSONAL HISTORY OF MALIGNANT NEOPLASM OF BLADDER: ICD-10-CM

## 2023-01-01 DIAGNOSIS — I50.20 UNSPECIFIED SYSTOLIC (CONGESTIVE) HEART FAILURE: ICD-10-CM

## 2023-01-01 DIAGNOSIS — N40.1 BENIGN PROSTATIC HYPERPLASIA WITH LOWER URINARY TRACT SYMPTOMS: ICD-10-CM

## 2023-01-01 DIAGNOSIS — I25.5 ISCHEMIC CARDIOMYOPATHY: ICD-10-CM

## 2023-01-01 DIAGNOSIS — R53.81 OTHER MALAISE: ICD-10-CM

## 2023-01-01 DIAGNOSIS — R22.1 LOCALIZED SWELLING, MASS AND LUMP, NECK: ICD-10-CM

## 2023-01-01 DIAGNOSIS — Z51.5 ENCOUNTER FOR PALLIATIVE CARE: ICD-10-CM

## 2023-01-01 LAB
ALBUMIN SERPL ELPH-MCNC: 2.5 G/DL — LOW (ref 3.3–5)
ALBUMIN SERPL ELPH-MCNC: 2.9 G/DL — LOW (ref 3.3–5)
ALBUMIN SERPL ELPH-MCNC: 2.9 G/DL — LOW (ref 3.3–5)
ALBUMIN SERPL ELPH-MCNC: 3 G/DL — LOW (ref 3.3–5)
ALBUMIN SERPL ELPH-MCNC: 3 G/DL — LOW (ref 3.3–5)
ALBUMIN SERPL ELPH-MCNC: 3.1 G/DL — LOW (ref 3.3–5)
ALBUMIN SERPL ELPH-MCNC: 3.3 G/DL — SIGNIFICANT CHANGE UP (ref 3.3–5)
ALBUMIN SERPL ELPH-MCNC: 3.4 G/DL
ALBUMIN SERPL ELPH-MCNC: 3.4 G/DL — SIGNIFICANT CHANGE UP (ref 3.3–5)
ALBUMIN SERPL ELPH-MCNC: 3.7 G/DL — SIGNIFICANT CHANGE UP (ref 3.3–5)
ALBUMIN SERPL ELPH-MCNC: 3.7 G/DL — SIGNIFICANT CHANGE UP (ref 3.3–5)
ALBUMIN SERPL ELPH-MCNC: 4.2 G/DL — SIGNIFICANT CHANGE UP (ref 3.3–5)
ALP BLD-CCNC: 75 U/L
ALP SERPL-CCNC: 58 U/L — SIGNIFICANT CHANGE UP (ref 40–120)
ALP SERPL-CCNC: 60 U/L — SIGNIFICANT CHANGE UP (ref 40–120)
ALP SERPL-CCNC: 60 U/L — SIGNIFICANT CHANGE UP (ref 40–120)
ALP SERPL-CCNC: 61 U/L — SIGNIFICANT CHANGE UP (ref 40–120)
ALP SERPL-CCNC: 62 U/L — SIGNIFICANT CHANGE UP (ref 40–120)
ALP SERPL-CCNC: 62 U/L — SIGNIFICANT CHANGE UP (ref 40–120)
ALP SERPL-CCNC: 64 U/L — SIGNIFICANT CHANGE UP (ref 40–120)
ALP SERPL-CCNC: 64 U/L — SIGNIFICANT CHANGE UP (ref 40–120)
ALP SERPL-CCNC: 74 U/L — SIGNIFICANT CHANGE UP (ref 40–120)
ALP SERPL-CCNC: 76 U/L — SIGNIFICANT CHANGE UP (ref 40–120)
ALP SERPL-CCNC: 77 U/L — SIGNIFICANT CHANGE UP (ref 40–120)
ALT FLD-CCNC: 10 U/L — SIGNIFICANT CHANGE UP (ref 10–45)
ALT FLD-CCNC: 10 U/L — SIGNIFICANT CHANGE UP (ref 10–45)
ALT FLD-CCNC: 10 U/L — SIGNIFICANT CHANGE UP (ref 4–41)
ALT FLD-CCNC: 10 U/L — SIGNIFICANT CHANGE UP (ref 4–41)
ALT FLD-CCNC: 12 U/L — SIGNIFICANT CHANGE UP (ref 10–45)
ALT FLD-CCNC: 12 U/L — SIGNIFICANT CHANGE UP (ref 10–45)
ALT FLD-CCNC: 15 U/L — SIGNIFICANT CHANGE UP (ref 10–45)
ALT FLD-CCNC: 5 U/L — LOW (ref 10–45)
ALT FLD-CCNC: 8 U/L — LOW (ref 10–45)
ALT FLD-CCNC: 9 U/L — LOW (ref 10–45)
ALT FLD-CCNC: 9 U/L — SIGNIFICANT CHANGE UP (ref 4–41)
ALT SERPL-CCNC: 13 U/L
AMPHET UR-MCNC: NEGATIVE — SIGNIFICANT CHANGE UP
ANION GAP SERPL CALC-SCNC: 10 MMOL/L
ANION GAP SERPL CALC-SCNC: 10 MMOL/L — SIGNIFICANT CHANGE UP (ref 5–17)
ANION GAP SERPL CALC-SCNC: 10 MMOL/L — SIGNIFICANT CHANGE UP (ref 5–17)
ANION GAP SERPL CALC-SCNC: 11 MMOL/L — SIGNIFICANT CHANGE UP (ref 5–17)
ANION GAP SERPL CALC-SCNC: 12 MMOL/L — SIGNIFICANT CHANGE UP (ref 5–17)
ANION GAP SERPL CALC-SCNC: 12 MMOL/L — SIGNIFICANT CHANGE UP (ref 5–17)
ANION GAP SERPL CALC-SCNC: 13 MMOL/L — SIGNIFICANT CHANGE UP (ref 5–17)
ANION GAP SERPL CALC-SCNC: 14 MMOL/L — SIGNIFICANT CHANGE UP (ref 5–17)
ANION GAP SERPL CALC-SCNC: 15 MMOL/L — SIGNIFICANT CHANGE UP (ref 5–17)
ANION GAP SERPL CALC-SCNC: 16 MMOL/L — SIGNIFICANT CHANGE UP (ref 5–17)
ANION GAP SERPL CALC-SCNC: 16 MMOL/L — SIGNIFICANT CHANGE UP (ref 5–17)
ANION GAP SERPL CALC-SCNC: 17 MMOL/L — HIGH (ref 7–14)
ANION GAP SERPL CALC-SCNC: 18 MMOL/L — HIGH (ref 7–14)
ANION GAP SERPL CALC-SCNC: 20 MMOL/L — HIGH (ref 7–14)
APAP SERPL-MCNC: <15 UG/ML — SIGNIFICANT CHANGE UP (ref 10–30)
APPEARANCE UR: ABNORMAL
APPEARANCE UR: CLEAR — SIGNIFICANT CHANGE UP
APTT BLD: 28.3 SEC — SIGNIFICANT CHANGE UP (ref 27.5–35.5)
APTT BLD: 33.6 SEC — SIGNIFICANT CHANGE UP (ref 27.5–35.5)
APTT BLD: 35.2 SEC — SIGNIFICANT CHANGE UP (ref 27.5–35.5)
APTT BLD: 35.3 SEC — SIGNIFICANT CHANGE UP (ref 27.5–35.5)
APTT BLD: 35.3 SEC — SIGNIFICANT CHANGE UP (ref 27–36.3)
APTT BLD: 35.6 SEC — SIGNIFICANT CHANGE UP (ref 27–36.3)
AST SERPL-CCNC: 12 U/L — SIGNIFICANT CHANGE UP (ref 4–40)
AST SERPL-CCNC: 13 U/L — SIGNIFICANT CHANGE UP (ref 4–40)
AST SERPL-CCNC: 14 U/L — SIGNIFICANT CHANGE UP (ref 10–40)
AST SERPL-CCNC: 15 U/L — SIGNIFICANT CHANGE UP (ref 10–40)
AST SERPL-CCNC: 16 U/L — SIGNIFICANT CHANGE UP (ref 10–40)
AST SERPL-CCNC: 17 U/L
AST SERPL-CCNC: 17 U/L — SIGNIFICANT CHANGE UP (ref 10–40)
AST SERPL-CCNC: 19 U/L — SIGNIFICANT CHANGE UP (ref 10–40)
AST SERPL-CCNC: 20 U/L — SIGNIFICANT CHANGE UP (ref 10–40)
AST SERPL-CCNC: 24 U/L — SIGNIFICANT CHANGE UP (ref 10–40)
AST SERPL-CCNC: 24 U/L — SIGNIFICANT CHANGE UP (ref 10–40)
AST SERPL-CCNC: 9 U/L — SIGNIFICANT CHANGE UP (ref 4–40)
BACTERIA # UR AUTO: ABNORMAL
BACTERIA # UR AUTO: ABNORMAL
BACTERIA # UR AUTO: NEGATIVE — SIGNIFICANT CHANGE UP
BACTERIA # UR AUTO: NEGATIVE — SIGNIFICANT CHANGE UP
BACTERIA UR CULT: NORMAL
BARBITURATES UR SCN-MCNC: NEGATIVE — SIGNIFICANT CHANGE UP
BASE EXCESS BLDV CALC-SCNC: -0.2 MMOL/L — SIGNIFICANT CHANGE UP (ref -2–3)
BASE EXCESS BLDV CALC-SCNC: 0.3 MMOL/L — SIGNIFICANT CHANGE UP (ref -2–3)
BASE EXCESS BLDV CALC-SCNC: 1.7 MMOL/L — SIGNIFICANT CHANGE UP (ref -2–3)
BASE EXCESS BLDV CALC-SCNC: 2.4 MMOL/L — SIGNIFICANT CHANGE UP (ref -2–3)
BASOPHILS # BLD AUTO: 0.01 K/UL — SIGNIFICANT CHANGE UP (ref 0–0.2)
BASOPHILS # BLD AUTO: 0.02 K/UL — SIGNIFICANT CHANGE UP (ref 0–0.2)
BASOPHILS # BLD AUTO: 0.03 K/UL — SIGNIFICANT CHANGE UP (ref 0–0.2)
BASOPHILS # BLD AUTO: 0.03 K/UL — SIGNIFICANT CHANGE UP (ref 0–0.2)
BASOPHILS # BLD AUTO: 0.04 K/UL — SIGNIFICANT CHANGE UP (ref 0–0.2)
BASOPHILS # BLD AUTO: 0.06 K/UL — SIGNIFICANT CHANGE UP (ref 0–0.2)
BASOPHILS NFR BLD AUTO: 0.1 % — SIGNIFICANT CHANGE UP (ref 0–2)
BASOPHILS NFR BLD AUTO: 0.2 % — SIGNIFICANT CHANGE UP (ref 0–2)
BASOPHILS NFR BLD AUTO: 0.2 % — SIGNIFICANT CHANGE UP (ref 0–2)
BASOPHILS NFR BLD AUTO: 0.3 % — SIGNIFICANT CHANGE UP (ref 0–2)
BASOPHILS NFR BLD AUTO: 0.3 % — SIGNIFICANT CHANGE UP (ref 0–2)
BASOPHILS NFR BLD AUTO: 0.4 % — SIGNIFICANT CHANGE UP (ref 0–2)
BENZODIAZ UR-MCNC: POSITIVE
BILIRUB SERPL-MCNC: 0.2 MG/DL — SIGNIFICANT CHANGE UP (ref 0.2–1.2)
BILIRUB SERPL-MCNC: 0.3 MG/DL — SIGNIFICANT CHANGE UP (ref 0.2–1.2)
BILIRUB SERPL-MCNC: <0.2 MG/DL
BILIRUB SERPL-MCNC: <0.2 MG/DL — SIGNIFICANT CHANGE UP (ref 0.2–1.2)
BILIRUB UR-MCNC: ABNORMAL
BILIRUB UR-MCNC: NEGATIVE — SIGNIFICANT CHANGE UP
BLD GP AB SCN SERPL QL: NEGATIVE — SIGNIFICANT CHANGE UP
BLD GP AB SCN SERPL QL: NEGATIVE — SIGNIFICANT CHANGE UP
BUN SERPL-MCNC: 110 MG/DL — HIGH (ref 7–23)
BUN SERPL-MCNC: 18 MG/DL — SIGNIFICANT CHANGE UP (ref 7–23)
BUN SERPL-MCNC: 20 MG/DL — SIGNIFICANT CHANGE UP (ref 7–23)
BUN SERPL-MCNC: 22 MG/DL — SIGNIFICANT CHANGE UP (ref 7–23)
BUN SERPL-MCNC: 25 MG/DL — HIGH (ref 7–23)
BUN SERPL-MCNC: 26 MG/DL
BUN SERPL-MCNC: 26 MG/DL — HIGH (ref 7–23)
BUN SERPL-MCNC: 26 MG/DL — HIGH (ref 7–23)
BUN SERPL-MCNC: 27 MG/DL — HIGH (ref 7–23)
BUN SERPL-MCNC: 27 MG/DL — HIGH (ref 7–23)
BUN SERPL-MCNC: 28 MG/DL — HIGH (ref 7–23)
BUN SERPL-MCNC: 29 MG/DL — HIGH (ref 7–23)
BUN SERPL-MCNC: 30 MG/DL — HIGH (ref 7–23)
BUN SERPL-MCNC: 35 MG/DL — HIGH (ref 7–23)
BUN SERPL-MCNC: 37 MG/DL — HIGH (ref 7–23)
BUN SERPL-MCNC: 39 MG/DL — HIGH (ref 7–23)
BUN SERPL-MCNC: 42 MG/DL — HIGH (ref 7–23)
BUN SERPL-MCNC: 49 MG/DL — HIGH (ref 7–23)
BUN SERPL-MCNC: 57 MG/DL — HIGH (ref 7–23)
BUN SERPL-MCNC: 57 MG/DL — HIGH (ref 7–23)
BUN SERPL-MCNC: 89 MG/DL — HIGH (ref 7–23)
BUN SERPL-MCNC: 93 MG/DL — HIGH (ref 7–23)
CA-I SERPL-SCNC: 1.3 MMOL/L — SIGNIFICANT CHANGE UP (ref 1.15–1.33)
CA-I SERPL-SCNC: 1.32 MMOL/L — SIGNIFICANT CHANGE UP (ref 1.15–1.33)
CA-I SERPL-SCNC: 1.39 MMOL/L — HIGH (ref 1.15–1.33)
CA-I SERPL-SCNC: 1.42 MMOL/L — HIGH (ref 1.15–1.33)
CALCIUM SERPL-MCNC: 10.1 MG/DL — SIGNIFICANT CHANGE UP (ref 8.4–10.5)
CALCIUM SERPL-MCNC: 10.1 MG/DL — SIGNIFICANT CHANGE UP (ref 8.4–10.5)
CALCIUM SERPL-MCNC: 10.2 MG/DL — SIGNIFICANT CHANGE UP (ref 8.4–10.5)
CALCIUM SERPL-MCNC: 10.5 MG/DL — SIGNIFICANT CHANGE UP (ref 8.4–10.5)
CALCIUM SERPL-MCNC: 10.7 MG/DL — HIGH (ref 8.4–10.5)
CALCIUM SERPL-MCNC: 10.7 MG/DL — HIGH (ref 8.4–10.5)
CALCIUM SERPL-MCNC: 9.2 MG/DL — SIGNIFICANT CHANGE UP (ref 8.4–10.5)
CALCIUM SERPL-MCNC: 9.2 MG/DL — SIGNIFICANT CHANGE UP (ref 8.4–10.5)
CALCIUM SERPL-MCNC: 9.3 MG/DL — SIGNIFICANT CHANGE UP (ref 8.4–10.5)
CALCIUM SERPL-MCNC: 9.4 MG/DL
CALCIUM SERPL-MCNC: 9.5 MG/DL — SIGNIFICANT CHANGE UP (ref 8.4–10.5)
CALCIUM SERPL-MCNC: 9.6 MG/DL — SIGNIFICANT CHANGE UP (ref 8.4–10.5)
CALCIUM SERPL-MCNC: 9.7 MG/DL — SIGNIFICANT CHANGE UP (ref 8.4–10.5)
CALCIUM SERPL-MCNC: 9.8 MG/DL — SIGNIFICANT CHANGE UP (ref 8.4–10.5)
CALCIUM SERPL-MCNC: 9.9 MG/DL — SIGNIFICANT CHANGE UP (ref 8.4–10.5)
CHLORIDE BLDV-SCNC: 104 MMOL/L — SIGNIFICANT CHANGE UP (ref 96–108)
CHLORIDE BLDV-SCNC: 105 MMOL/L — SIGNIFICANT CHANGE UP (ref 96–108)
CHLORIDE BLDV-SCNC: 105 MMOL/L — SIGNIFICANT CHANGE UP (ref 96–108)
CHLORIDE BLDV-SCNC: 106 MMOL/L — SIGNIFICANT CHANGE UP (ref 96–108)
CHLORIDE SERPL-SCNC: 100 MMOL/L — SIGNIFICANT CHANGE UP (ref 96–108)
CHLORIDE SERPL-SCNC: 100 MMOL/L — SIGNIFICANT CHANGE UP (ref 96–108)
CHLORIDE SERPL-SCNC: 101 MMOL/L — SIGNIFICANT CHANGE UP (ref 96–108)
CHLORIDE SERPL-SCNC: 101 MMOL/L — SIGNIFICANT CHANGE UP (ref 96–108)
CHLORIDE SERPL-SCNC: 102 MMOL/L — SIGNIFICANT CHANGE UP (ref 96–108)
CHLORIDE SERPL-SCNC: 102 MMOL/L — SIGNIFICANT CHANGE UP (ref 96–108)
CHLORIDE SERPL-SCNC: 103 MMOL/L — SIGNIFICANT CHANGE UP (ref 96–108)
CHLORIDE SERPL-SCNC: 104 MMOL/L
CHLORIDE SERPL-SCNC: 104 MMOL/L — SIGNIFICANT CHANGE UP (ref 96–108)
CHLORIDE SERPL-SCNC: 105 MMOL/L — SIGNIFICANT CHANGE UP (ref 96–108)
CHLORIDE SERPL-SCNC: 95 MMOL/L — LOW (ref 96–108)
CHLORIDE SERPL-SCNC: 95 MMOL/L — LOW (ref 96–108)
CHLORIDE SERPL-SCNC: 97 MMOL/L — LOW (ref 98–107)
CHLORIDE SERPL-SCNC: 97 MMOL/L — LOW (ref 98–107)
CHLORIDE SERPL-SCNC: 97 MMOL/L — SIGNIFICANT CHANGE UP (ref 96–108)
CHLORIDE SERPL-SCNC: 99 MMOL/L — SIGNIFICANT CHANGE UP (ref 96–108)
CHLORIDE SERPL-SCNC: 99 MMOL/L — SIGNIFICANT CHANGE UP (ref 98–107)
CK SERPL-CCNC: 15 U/L — LOW (ref 30–200)
CO2 BLDV-SCNC: 27 MMOL/L — HIGH (ref 22–26)
CO2 BLDV-SCNC: 27 MMOL/L — HIGH (ref 22–26)
CO2 BLDV-SCNC: 28 MMOL/L — HIGH (ref 22–26)
CO2 BLDV-SCNC: 28 MMOL/L — HIGH (ref 22–26)
CO2 SERPL-SCNC: 19 MMOL/L — LOW (ref 22–31)
CO2 SERPL-SCNC: 19 MMOL/L — LOW (ref 22–31)
CO2 SERPL-SCNC: 20 MMOL/L — LOW (ref 22–31)
CO2 SERPL-SCNC: 21 MMOL/L — LOW (ref 22–31)
CO2 SERPL-SCNC: 22 MMOL/L — SIGNIFICANT CHANGE UP (ref 22–31)
CO2 SERPL-SCNC: 23 MMOL/L — SIGNIFICANT CHANGE UP (ref 22–31)
CO2 SERPL-SCNC: 23 MMOL/L — SIGNIFICANT CHANGE UP (ref 22–31)
CO2 SERPL-SCNC: 25 MMOL/L — SIGNIFICANT CHANGE UP (ref 22–31)
CO2 SERPL-SCNC: 25 MMOL/L — SIGNIFICANT CHANGE UP (ref 22–31)
CO2 SERPL-SCNC: 26 MMOL/L — SIGNIFICANT CHANGE UP (ref 22–31)
CO2 SERPL-SCNC: 26 MMOL/L — SIGNIFICANT CHANGE UP (ref 22–31)
CO2 SERPL-SCNC: 27 MMOL/L
CO2 SERPL-SCNC: 27 MMOL/L — SIGNIFICANT CHANGE UP (ref 22–31)
CO2 SERPL-SCNC: 27 MMOL/L — SIGNIFICANT CHANGE UP (ref 22–31)
CO2 SERPL-SCNC: 28 MMOL/L — SIGNIFICANT CHANGE UP (ref 22–31)
CO2 SERPL-SCNC: 28 MMOL/L — SIGNIFICANT CHANGE UP (ref 22–31)
CO2 SERPL-SCNC: 30 MMOL/L — SIGNIFICANT CHANGE UP (ref 22–31)
COCAINE METAB.OTHER UR-MCNC: NEGATIVE — SIGNIFICANT CHANGE UP
COLOR SPEC: ABNORMAL
COLOR SPEC: ABNORMAL
COLOR SPEC: SIGNIFICANT CHANGE UP
COLOR SPEC: YELLOW — SIGNIFICANT CHANGE UP
CREAT SERPL-MCNC: 1.2 MG/DL — SIGNIFICANT CHANGE UP (ref 0.5–1.3)
CREAT SERPL-MCNC: 1.41 MG/DL — HIGH (ref 0.5–1.3)
CREAT SERPL-MCNC: 1.5 MG/DL — HIGH (ref 0.5–1.3)
CREAT SERPL-MCNC: 1.5 MG/DL — HIGH (ref 0.5–1.3)
CREAT SERPL-MCNC: 1.51 MG/DL — HIGH (ref 0.5–1.3)
CREAT SERPL-MCNC: 1.54 MG/DL — HIGH (ref 0.5–1.3)
CREAT SERPL-MCNC: 1.55 MG/DL — HIGH (ref 0.5–1.3)
CREAT SERPL-MCNC: 1.57 MG/DL
CREAT SERPL-MCNC: 1.58 MG/DL — HIGH (ref 0.5–1.3)
CREAT SERPL-MCNC: 1.63 MG/DL — HIGH (ref 0.5–1.3)
CREAT SERPL-MCNC: 1.7 MG/DL — HIGH (ref 0.5–1.3)
CREAT SERPL-MCNC: 1.8 MG/DL — HIGH (ref 0.5–1.3)
CREAT SERPL-MCNC: 1.86 MG/DL — HIGH (ref 0.5–1.3)
CREAT SERPL-MCNC: 11.38 MG/DL — HIGH (ref 0.5–1.3)
CREAT SERPL-MCNC: 2.01 MG/DL — HIGH (ref 0.5–1.3)
CREAT SERPL-MCNC: 2.02 MG/DL — HIGH (ref 0.5–1.3)
CREAT SERPL-MCNC: 2.05 MG/DL — HIGH (ref 0.5–1.3)
CREAT SERPL-MCNC: 2.53 MG/DL — HIGH (ref 0.5–1.3)
CREAT SERPL-MCNC: 2.82 MG/DL — HIGH (ref 0.5–1.3)
CREAT SERPL-MCNC: 2.9 MG/DL — HIGH (ref 0.5–1.3)
CREAT SERPL-MCNC: 8.68 MG/DL — HIGH (ref 0.5–1.3)
CREAT SERPL-MCNC: 9.54 MG/DL — HIGH (ref 0.5–1.3)
CULTURE RESULTS: NO GROWTH — SIGNIFICANT CHANGE UP
CULTURE RESULTS: SIGNIFICANT CHANGE UP
DIFF PNL FLD: ABNORMAL
EGFR: 21 ML/MIN/1.73M2 — LOW
EGFR: 22 ML/MIN/1.73M2 — LOW
EGFR: 25 ML/MIN/1.73M2 — LOW
EGFR: 32 ML/MIN/1.73M2 — LOW
EGFR: 33 ML/MIN/1.73M2 — LOW
EGFR: 33 ML/MIN/1.73M2 — LOW
EGFR: 36 ML/MIN/1.73M2 — LOW
EGFR: 38 ML/MIN/1.73M2 — LOW
EGFR: 4 ML/MIN/1.73M2 — LOW
EGFR: 40 ML/MIN/1.73M2 — LOW
EGFR: 42 ML/MIN/1.73M2 — LOW
EGFR: 44 ML/MIN/1.73M2
EGFR: 44 ML/MIN/1.73M2 — LOW
EGFR: 45 ML/MIN/1.73M2 — LOW
EGFR: 45 ML/MIN/1.73M2 — LOW
EGFR: 46 ML/MIN/1.73M2 — LOW
EGFR: 47 ML/MIN/1.73M2 — LOW
EGFR: 47 ML/MIN/1.73M2 — LOW
EGFR: 5 ML/MIN/1.73M2 — LOW
EGFR: 50 ML/MIN/1.73M2 — LOW
EGFR: 6 ML/MIN/1.73M2 — LOW
EGFR: 61 ML/MIN/1.73M2 — SIGNIFICANT CHANGE UP
EOSINOPHIL # BLD AUTO: 0 K/UL — SIGNIFICANT CHANGE UP (ref 0–0.5)
EOSINOPHIL # BLD AUTO: 0.01 K/UL — SIGNIFICANT CHANGE UP (ref 0–0.5)
EOSINOPHIL # BLD AUTO: 0.03 K/UL — SIGNIFICANT CHANGE UP (ref 0–0.5)
EOSINOPHIL # BLD AUTO: 0.06 K/UL — SIGNIFICANT CHANGE UP (ref 0–0.5)
EOSINOPHIL # BLD AUTO: 0.07 K/UL — SIGNIFICANT CHANGE UP (ref 0–0.5)
EOSINOPHIL # BLD AUTO: 0.08 K/UL — SIGNIFICANT CHANGE UP (ref 0–0.5)
EOSINOPHIL # BLD AUTO: 0.09 K/UL — SIGNIFICANT CHANGE UP (ref 0–0.5)
EOSINOPHIL # BLD AUTO: 0.11 K/UL — SIGNIFICANT CHANGE UP (ref 0–0.5)
EOSINOPHIL # BLD AUTO: 0.18 K/UL — SIGNIFICANT CHANGE UP (ref 0–0.5)
EOSINOPHIL # BLD AUTO: 0.36 K/UL — SIGNIFICANT CHANGE UP (ref 0–0.5)
EOSINOPHIL NFR BLD AUTO: 0 % — SIGNIFICANT CHANGE UP (ref 0–6)
EOSINOPHIL NFR BLD AUTO: 0.1 % — SIGNIFICANT CHANGE UP (ref 0–6)
EOSINOPHIL NFR BLD AUTO: 0.2 % — SIGNIFICANT CHANGE UP (ref 0–6)
EOSINOPHIL NFR BLD AUTO: 0.5 % — SIGNIFICANT CHANGE UP (ref 0–6)
EOSINOPHIL NFR BLD AUTO: 0.6 % — SIGNIFICANT CHANGE UP (ref 0–6)
EOSINOPHIL NFR BLD AUTO: 0.9 % — SIGNIFICANT CHANGE UP (ref 0–6)
EOSINOPHIL NFR BLD AUTO: 1.1 % — SIGNIFICANT CHANGE UP (ref 0–6)
EOSINOPHIL NFR BLD AUTO: 3.5 % — SIGNIFICANT CHANGE UP (ref 0–6)
EPI CELLS # UR: 1 /HPF — SIGNIFICANT CHANGE UP
EPI CELLS # UR: 1 /HPF — SIGNIFICANT CHANGE UP
EPI CELLS # UR: 3 /HPF — SIGNIFICANT CHANGE UP
ETHANOL SERPL-MCNC: <10 MG/DL — SIGNIFICANT CHANGE UP (ref 0–10)
FERRITIN SERPL-MCNC: 182 NG/ML — SIGNIFICANT CHANGE UP (ref 30–400)
FLUAV AG NPH QL: SIGNIFICANT CHANGE UP
FLUBV AG NPH QL: SIGNIFICANT CHANGE UP
FOLATE SERPL-MCNC: >20 NG/ML — SIGNIFICANT CHANGE UP
GAS PNL BLDV: 137 MMOL/L — SIGNIFICANT CHANGE UP (ref 136–145)
GAS PNL BLDV: 137 MMOL/L — SIGNIFICANT CHANGE UP (ref 136–145)
GAS PNL BLDV: 138 MMOL/L — SIGNIFICANT CHANGE UP (ref 136–145)
GAS PNL BLDV: 141 MMOL/L — SIGNIFICANT CHANGE UP (ref 136–145)
GAS PNL BLDV: SIGNIFICANT CHANGE UP
GI PCR PANEL: DETECTED
GLUCOSE BLDC GLUCOMTR-MCNC: 116 MG/DL — HIGH (ref 70–99)
GLUCOSE BLDC GLUCOMTR-MCNC: 116 MG/DL — HIGH (ref 70–99)
GLUCOSE BLDV-MCNC: 112 MG/DL — HIGH (ref 70–99)
GLUCOSE BLDV-MCNC: 114 MG/DL — HIGH (ref 70–99)
GLUCOSE BLDV-MCNC: 118 MG/DL — HIGH (ref 70–99)
GLUCOSE BLDV-MCNC: 170 MG/DL — HIGH (ref 70–99)
GLUCOSE SERPL-MCNC: 100 MG/DL — HIGH (ref 70–99)
GLUCOSE SERPL-MCNC: 101 MG/DL — HIGH (ref 70–99)
GLUCOSE SERPL-MCNC: 104 MG/DL — HIGH (ref 70–99)
GLUCOSE SERPL-MCNC: 107 MG/DL — HIGH (ref 70–99)
GLUCOSE SERPL-MCNC: 110 MG/DL — HIGH (ref 70–99)
GLUCOSE SERPL-MCNC: 112 MG/DL — HIGH (ref 70–99)
GLUCOSE SERPL-MCNC: 113 MG/DL — HIGH (ref 70–99)
GLUCOSE SERPL-MCNC: 114 MG/DL
GLUCOSE SERPL-MCNC: 116 MG/DL — HIGH (ref 70–99)
GLUCOSE SERPL-MCNC: 121 MG/DL — HIGH (ref 70–99)
GLUCOSE SERPL-MCNC: 122 MG/DL — HIGH (ref 70–99)
GLUCOSE SERPL-MCNC: 122 MG/DL — HIGH (ref 70–99)
GLUCOSE SERPL-MCNC: 129 MG/DL — HIGH (ref 70–99)
GLUCOSE SERPL-MCNC: 147 MG/DL — HIGH (ref 70–99)
GLUCOSE SERPL-MCNC: 175 MG/DL — HIGH (ref 70–99)
GLUCOSE SERPL-MCNC: 97 MG/DL — SIGNIFICANT CHANGE UP (ref 70–99)
GLUCOSE SERPL-MCNC: 97 MG/DL — SIGNIFICANT CHANGE UP (ref 70–99)
GLUCOSE SERPL-MCNC: 99 MG/DL — SIGNIFICANT CHANGE UP (ref 70–99)
GLUCOSE UR QL: ABNORMAL
GLUCOSE UR QL: NEGATIVE — SIGNIFICANT CHANGE UP
HBV CORE AB SER-ACNC: REACTIVE
HBV CORE IGG+IGM SER QL: REACTIVE
HBV SURFACE AB SER QL: NONREACTIVE
HBV SURFACE AB SER-ACNC: SIGNIFICANT CHANGE UP
HBV SURFACE AB SER-ACNC: SIGNIFICANT CHANGE UP
HBV SURFACE AG SER QL: NONREACTIVE
HBV SURFACE AG SER-ACNC: SIGNIFICANT CHANGE UP
HCO3 BLDV-SCNC: 25 MMOL/L — SIGNIFICANT CHANGE UP (ref 22–29)
HCO3 BLDV-SCNC: 26 MMOL/L — SIGNIFICANT CHANGE UP (ref 22–29)
HCO3 BLDV-SCNC: 26 MMOL/L — SIGNIFICANT CHANGE UP (ref 22–29)
HCO3 BLDV-SCNC: 27 MMOL/L — SIGNIFICANT CHANGE UP (ref 22–29)
HCT VFR BLD CALC: 24.9 % — LOW (ref 39–50)
HCT VFR BLD CALC: 25.3 % — LOW (ref 39–50)
HCT VFR BLD CALC: 25.4 % — LOW (ref 39–50)
HCT VFR BLD CALC: 25.8 % — LOW (ref 39–50)
HCT VFR BLD CALC: 27 % — LOW (ref 39–50)
HCT VFR BLD CALC: 27.2 % — LOW (ref 39–50)
HCT VFR BLD CALC: 27.9 % — LOW (ref 39–50)
HCT VFR BLD CALC: 28.2 % — LOW (ref 39–50)
HCT VFR BLD CALC: 28.2 % — LOW (ref 39–50)
HCT VFR BLD CALC: 28.4 % — LOW (ref 39–50)
HCT VFR BLD CALC: 28.7 % — LOW (ref 39–50)
HCT VFR BLD CALC: 28.8 % — LOW (ref 39–50)
HCT VFR BLD CALC: 29 % — LOW (ref 39–50)
HCT VFR BLD CALC: 29.3 % — LOW (ref 39–50)
HCT VFR BLD CALC: 29.6 % — LOW (ref 39–50)
HCT VFR BLD CALC: 30 % — LOW (ref 39–50)
HCT VFR BLD CALC: 30 % — LOW (ref 39–50)
HCT VFR BLD CALC: 30.3 % — LOW (ref 39–50)
HCT VFR BLD CALC: 30.6 % — LOW (ref 39–50)
HCT VFR BLD CALC: 30.7 % — LOW (ref 39–50)
HCT VFR BLD CALC: 30.9 % — LOW (ref 39–50)
HCT VFR BLD CALC: 31.6 % — LOW (ref 39–50)
HCT VFR BLD CALC: 36.3 % — LOW (ref 39–50)
HCT VFR BLD CALC: 39.3 % — SIGNIFICANT CHANGE UP (ref 39–50)
HCT VFR BLDA CALC: 26 % — LOW (ref 39–51)
HCT VFR BLDA CALC: 27 % — LOW (ref 39–51)
HCT VFR BLDA CALC: 29 % — LOW (ref 39–51)
HCT VFR BLDA CALC: 29 % — LOW (ref 39–51)
HCV AB S/CO SERPL IA: 12.16 S/CO — HIGH (ref 0–0.99)
HCV AB SER QL: REACTIVE
HCV AB SERPL-IMP: REACTIVE
HCV RNA FLD QL NAA+PROBE: SIGNIFICANT CHANGE UP
HCV RNA SPEC QL PROBE+SIG AMP: SIGNIFICANT CHANGE UP
HCV S/CO RATIO: 13.43 S/CO
HGB BLD CALC-MCNC: 8.7 G/DL — LOW (ref 12.6–17.4)
HGB BLD CALC-MCNC: 9.1 G/DL — LOW (ref 12.6–17.4)
HGB BLD CALC-MCNC: 9.7 G/DL — LOW (ref 12.6–17.4)
HGB BLD CALC-MCNC: 9.7 G/DL — LOW (ref 12.6–17.4)
HGB BLD-MCNC: 10 G/DL — LOW (ref 13–17)
HGB BLD-MCNC: 10 G/DL — LOW (ref 13–17)
HGB BLD-MCNC: 12.1 G/DL — LOW (ref 13–17)
HGB BLD-MCNC: 12.8 G/DL — LOW (ref 13–17)
HGB BLD-MCNC: 7.7 G/DL — LOW (ref 13–17)
HGB BLD-MCNC: 8.3 G/DL — LOW (ref 13–17)
HGB BLD-MCNC: 8.5 G/DL — LOW (ref 13–17)
HGB BLD-MCNC: 8.6 G/DL — LOW (ref 13–17)
HGB BLD-MCNC: 8.7 G/DL — LOW (ref 13–17)
HGB BLD-MCNC: 8.9 G/DL — LOW (ref 13–17)
HGB BLD-MCNC: 9.2 G/DL — LOW (ref 13–17)
HGB BLD-MCNC: 9.2 G/DL — LOW (ref 13–17)
HGB BLD-MCNC: 9.3 G/DL — LOW (ref 13–17)
HGB BLD-MCNC: 9.4 G/DL — LOW (ref 13–17)
HGB BLD-MCNC: 9.5 G/DL — LOW (ref 13–17)
HGB BLD-MCNC: 9.5 G/DL — LOW (ref 13–17)
HGB BLD-MCNC: 9.6 G/DL — LOW (ref 13–17)
HGB BLD-MCNC: 9.7 G/DL — LOW (ref 13–17)
HGB BLD-MCNC: 9.7 G/DL — LOW (ref 13–17)
HGB BLD-MCNC: 9.9 G/DL — LOW (ref 13–17)
HYALINE CASTS # UR AUTO: 0 /LPF — SIGNIFICANT CHANGE UP (ref 0–7)
HYALINE CASTS # UR AUTO: 1 /LPF — SIGNIFICANT CHANGE UP (ref 0–7)
HYALINE CASTS # UR AUTO: 2 /LPF — SIGNIFICANT CHANGE UP (ref 0–2)
HYALINE CASTS # UR AUTO: 6 /LPF — HIGH (ref 0–2)
IANC: 13.01 K/UL — HIGH (ref 1.8–7.4)
IMM GRANULOCYTES NFR BLD AUTO: 0.2 % — SIGNIFICANT CHANGE UP (ref 0–0.9)
IMM GRANULOCYTES NFR BLD AUTO: 0.5 % — SIGNIFICANT CHANGE UP (ref 0–0.9)
IMM GRANULOCYTES NFR BLD AUTO: 0.6 % — SIGNIFICANT CHANGE UP (ref 0–0.9)
IMM GRANULOCYTES NFR BLD AUTO: 0.6 % — SIGNIFICANT CHANGE UP (ref 0–0.9)
IMM GRANULOCYTES NFR BLD AUTO: 0.7 % — SIGNIFICANT CHANGE UP (ref 0–0.9)
IMM GRANULOCYTES NFR BLD AUTO: 0.8 % — SIGNIFICANT CHANGE UP (ref 0–0.9)
INR BLD: 1.09 RATIO — SIGNIFICANT CHANGE UP (ref 0.88–1.16)
INR BLD: 1.16 RATIO — SIGNIFICANT CHANGE UP (ref 0.88–1.16)
INR BLD: 1.19 RATIO — HIGH (ref 0.88–1.16)
INR BLD: 1.2 RATIO — HIGH (ref 0.88–1.16)
INR BLD: 1.23 RATIO — HIGH (ref 0.88–1.16)
INR BLD: 1.25 RATIO — HIGH (ref 0.88–1.16)
IRON SATN MFR SERPL: 17 % — SIGNIFICANT CHANGE UP (ref 16–55)
IRON SATN MFR SERPL: 45 UG/DL — SIGNIFICANT CHANGE UP (ref 45–165)
KETONES UR-MCNC: ABNORMAL
KETONES UR-MCNC: ABNORMAL
KETONES UR-MCNC: NEGATIVE — SIGNIFICANT CHANGE UP
KETONES UR-MCNC: NEGATIVE — SIGNIFICANT CHANGE UP
LACTATE BLDV-MCNC: 1.6 MMOL/L — SIGNIFICANT CHANGE UP (ref 0.5–2)
LACTATE BLDV-MCNC: 2.3 MMOL/L — HIGH (ref 0.5–2)
LACTATE BLDV-MCNC: 2.5 MMOL/L — HIGH (ref 0.5–2)
LACTATE BLDV-MCNC: 4.1 MMOL/L — CRITICAL HIGH (ref 0.5–2)
LACTATE SERPL-SCNC: 1.6 MMOL/L — SIGNIFICANT CHANGE UP (ref 0.5–2)
LEUKOCYTE ESTERASE UR-ACNC: ABNORMAL
LEUKOCYTE ESTERASE UR-ACNC: ABNORMAL
LEUKOCYTE ESTERASE UR-ACNC: NEGATIVE — SIGNIFICANT CHANGE UP
LEUKOCYTE ESTERASE UR-ACNC: NEGATIVE — SIGNIFICANT CHANGE UP
LIDOCAIN IGE QN: 13 U/L — SIGNIFICANT CHANGE UP (ref 7–60)
LIDOCAIN IGE QN: 28 U/L — SIGNIFICANT CHANGE UP (ref 7–60)
LIDOCAIN IGE QN: 35 U/L — SIGNIFICANT CHANGE UP (ref 7–60)
LYMPHOCYTES # BLD AUTO: 0.68 K/UL — LOW (ref 1–3.3)
LYMPHOCYTES # BLD AUTO: 0.71 K/UL — LOW (ref 1–3.3)
LYMPHOCYTES # BLD AUTO: 0.86 K/UL — LOW (ref 1–3.3)
LYMPHOCYTES # BLD AUTO: 1.38 K/UL — SIGNIFICANT CHANGE UP (ref 1–3.3)
LYMPHOCYTES # BLD AUTO: 1.42 K/UL — SIGNIFICANT CHANGE UP (ref 1–3.3)
LYMPHOCYTES # BLD AUTO: 1.42 K/UL — SIGNIFICANT CHANGE UP (ref 1–3.3)
LYMPHOCYTES # BLD AUTO: 1.66 K/UL — SIGNIFICANT CHANGE UP (ref 1–3.3)
LYMPHOCYTES # BLD AUTO: 1.79 K/UL — SIGNIFICANT CHANGE UP (ref 1–3.3)
LYMPHOCYTES # BLD AUTO: 1.98 K/UL — SIGNIFICANT CHANGE UP (ref 1–3.3)
LYMPHOCYTES # BLD AUTO: 10.2 % — LOW (ref 13–44)
LYMPHOCYTES # BLD AUTO: 12.7 % — LOW (ref 13–44)
LYMPHOCYTES # BLD AUTO: 13.3 % — SIGNIFICANT CHANGE UP (ref 13–44)
LYMPHOCYTES # BLD AUTO: 14 % — SIGNIFICANT CHANGE UP (ref 13–44)
LYMPHOCYTES # BLD AUTO: 15 % — SIGNIFICANT CHANGE UP (ref 13–44)
LYMPHOCYTES # BLD AUTO: 2.13 K/UL — SIGNIFICANT CHANGE UP (ref 1–3.3)
LYMPHOCYTES # BLD AUTO: 21 % — SIGNIFICANT CHANGE UP (ref 13–44)
LYMPHOCYTES # BLD AUTO: 4.8 % — LOW (ref 13–44)
LYMPHOCYTES # BLD AUTO: 5.7 % — LOW (ref 13–44)
LYMPHOCYTES # BLD AUTO: 7.9 % — LOW (ref 13–44)
LYMPHOCYTES # BLD AUTO: 8.9 % — LOW (ref 13–44)
MAGNESIUM SERPL-MCNC: 2 MG/DL — SIGNIFICANT CHANGE UP (ref 1.6–2.6)
MAGNESIUM SERPL-MCNC: 2 MG/DL — SIGNIFICANT CHANGE UP (ref 1.6–2.6)
MAGNESIUM SERPL-MCNC: 2.1 MG/DL — SIGNIFICANT CHANGE UP (ref 1.6–2.6)
MAGNESIUM SERPL-MCNC: 2.1 MG/DL — SIGNIFICANT CHANGE UP (ref 1.6–2.6)
MAGNESIUM SERPL-MCNC: 2.4 MG/DL — SIGNIFICANT CHANGE UP (ref 1.6–2.6)
MAGNESIUM SERPL-MCNC: 2.4 MG/DL — SIGNIFICANT CHANGE UP (ref 1.6–2.6)
MAGNESIUM SERPL-MCNC: 2.5 MG/DL — SIGNIFICANT CHANGE UP (ref 1.6–2.6)
MCHC RBC-ENTMCNC: 28.7 PG — SIGNIFICANT CHANGE UP (ref 27–34)
MCHC RBC-ENTMCNC: 28.8 PG — SIGNIFICANT CHANGE UP (ref 27–34)
MCHC RBC-ENTMCNC: 28.9 PG — SIGNIFICANT CHANGE UP (ref 27–34)
MCHC RBC-ENTMCNC: 29 PG — SIGNIFICANT CHANGE UP (ref 27–34)
MCHC RBC-ENTMCNC: 29.1 PG — SIGNIFICANT CHANGE UP (ref 27–34)
MCHC RBC-ENTMCNC: 29.2 PG — SIGNIFICANT CHANGE UP (ref 27–34)
MCHC RBC-ENTMCNC: 29.2 PG — SIGNIFICANT CHANGE UP (ref 27–34)
MCHC RBC-ENTMCNC: 29.7 PG — SIGNIFICANT CHANGE UP (ref 27–34)
MCHC RBC-ENTMCNC: 29.7 PG — SIGNIFICANT CHANGE UP (ref 27–34)
MCHC RBC-ENTMCNC: 29.8 PG — SIGNIFICANT CHANGE UP (ref 27–34)
MCHC RBC-ENTMCNC: 30 PG — SIGNIFICANT CHANGE UP (ref 27–34)
MCHC RBC-ENTMCNC: 30.1 PG — SIGNIFICANT CHANGE UP (ref 27–34)
MCHC RBC-ENTMCNC: 30.2 PG — SIGNIFICANT CHANGE UP (ref 27–34)
MCHC RBC-ENTMCNC: 30.2 PG — SIGNIFICANT CHANGE UP (ref 27–34)
MCHC RBC-ENTMCNC: 30.6 GM/DL — LOW (ref 32–36)
MCHC RBC-ENTMCNC: 30.7 GM/DL — LOW (ref 32–36)
MCHC RBC-ENTMCNC: 30.7 GM/DL — LOW (ref 32–36)
MCHC RBC-ENTMCNC: 30.8 PG — SIGNIFICANT CHANGE UP (ref 27–34)
MCHC RBC-ENTMCNC: 30.9 GM/DL — LOW (ref 32–36)
MCHC RBC-ENTMCNC: 30.9 PG — SIGNIFICANT CHANGE UP (ref 27–34)
MCHC RBC-ENTMCNC: 30.9 PG — SIGNIFICANT CHANGE UP (ref 27–34)
MCHC RBC-ENTMCNC: 31 G/DL — LOW (ref 32–36)
MCHC RBC-ENTMCNC: 31 GM/DL — LOW (ref 32–36)
MCHC RBC-ENTMCNC: 31 PG — SIGNIFICANT CHANGE UP (ref 27–34)
MCHC RBC-ENTMCNC: 31.1 PG — SIGNIFICANT CHANGE UP (ref 27–34)
MCHC RBC-ENTMCNC: 31.4 GM/DL — LOW (ref 32–36)
MCHC RBC-ENTMCNC: 31.4 PG — SIGNIFICANT CHANGE UP (ref 27–34)
MCHC RBC-ENTMCNC: 31.6 GM/DL — LOW (ref 32–36)
MCHC RBC-ENTMCNC: 31.6 PG — SIGNIFICANT CHANGE UP (ref 27–34)
MCHC RBC-ENTMCNC: 31.7 GM/DL — LOW (ref 32–36)
MCHC RBC-ENTMCNC: 31.7 GM/DL — LOW (ref 32–36)
MCHC RBC-ENTMCNC: 31.7 PG — SIGNIFICANT CHANGE UP (ref 27–34)
MCHC RBC-ENTMCNC: 31.8 GM/DL — LOW (ref 32–36)
MCHC RBC-ENTMCNC: 31.8 PG — SIGNIFICANT CHANGE UP (ref 27–34)
MCHC RBC-ENTMCNC: 31.9 GM/DL — LOW (ref 32–36)
MCHC RBC-ENTMCNC: 32 GM/DL — SIGNIFICANT CHANGE UP (ref 32–36)
MCHC RBC-ENTMCNC: 32.2 GM/DL — SIGNIFICANT CHANGE UP (ref 32–36)
MCHC RBC-ENTMCNC: 32.3 PG — SIGNIFICANT CHANGE UP (ref 27–34)
MCHC RBC-ENTMCNC: 32.6 GM/DL — SIGNIFICANT CHANGE UP (ref 32–36)
MCHC RBC-ENTMCNC: 32.6 GM/DL — SIGNIFICANT CHANGE UP (ref 32–36)
MCHC RBC-ENTMCNC: 32.8 GM/DL — SIGNIFICANT CHANGE UP (ref 32–36)
MCHC RBC-ENTMCNC: 33 GM/DL — SIGNIFICANT CHANGE UP (ref 32–36)
MCHC RBC-ENTMCNC: 33 GM/DL — SIGNIFICANT CHANGE UP (ref 32–36)
MCHC RBC-ENTMCNC: 33.3 GM/DL — SIGNIFICANT CHANGE UP (ref 32–36)
MCHC RBC-ENTMCNC: 33.5 GM/DL — SIGNIFICANT CHANGE UP (ref 32–36)
MCHC RBC-ENTMCNC: 34 GM/DL — SIGNIFICANT CHANGE UP (ref 32–36)
MCV RBC AUTO: 90.7 FL — SIGNIFICANT CHANGE UP (ref 80–100)
MCV RBC AUTO: 91 FL — SIGNIFICANT CHANGE UP (ref 80–100)
MCV RBC AUTO: 91.3 FL — SIGNIFICANT CHANGE UP (ref 80–100)
MCV RBC AUTO: 92.8 FL — SIGNIFICANT CHANGE UP (ref 80–100)
MCV RBC AUTO: 93.8 FL — SIGNIFICANT CHANGE UP (ref 80–100)
MCV RBC AUTO: 93.8 FL — SIGNIFICANT CHANGE UP (ref 80–100)
MCV RBC AUTO: 94.1 FL — SIGNIFICANT CHANGE UP (ref 80–100)
MCV RBC AUTO: 94.3 FL — SIGNIFICANT CHANGE UP (ref 80–100)
MCV RBC AUTO: 94.5 FL — SIGNIFICANT CHANGE UP (ref 80–100)
MCV RBC AUTO: 94.6 FL — SIGNIFICANT CHANGE UP (ref 80–100)
MCV RBC AUTO: 94.7 FL — SIGNIFICANT CHANGE UP (ref 80–100)
MCV RBC AUTO: 94.8 FL — SIGNIFICANT CHANGE UP (ref 80–100)
MCV RBC AUTO: 95.2 FL — SIGNIFICANT CHANGE UP (ref 80–100)
MCV RBC AUTO: 95.3 FL — SIGNIFICANT CHANGE UP (ref 80–100)
MCV RBC AUTO: 95.3 FL — SIGNIFICANT CHANGE UP (ref 80–100)
MCV RBC AUTO: 95.6 FL — SIGNIFICANT CHANGE UP (ref 80–100)
MCV RBC AUTO: 95.7 FL — SIGNIFICANT CHANGE UP (ref 80–100)
MCV RBC AUTO: 96.6 FL — SIGNIFICANT CHANGE UP (ref 80–100)
MCV RBC AUTO: 96.6 FL — SIGNIFICANT CHANGE UP (ref 80–100)
MCV RBC AUTO: 96.9 FL — SIGNIFICANT CHANGE UP (ref 80–100)
MCV RBC AUTO: 97.2 FL — SIGNIFICANT CHANGE UP (ref 80–100)
MCV RBC AUTO: 97.8 FL — SIGNIFICANT CHANGE UP (ref 80–100)
METHADONE UR-MCNC: NEGATIVE — SIGNIFICANT CHANGE UP
MONOCYTES # BLD AUTO: 0.13 K/UL — SIGNIFICANT CHANGE UP (ref 0–0.9)
MONOCYTES # BLD AUTO: 0.48 K/UL — SIGNIFICANT CHANGE UP (ref 0–0.9)
MONOCYTES # BLD AUTO: 0.9 K/UL — SIGNIFICANT CHANGE UP (ref 0–0.9)
MONOCYTES # BLD AUTO: 0.92 K/UL — HIGH (ref 0–0.9)
MONOCYTES # BLD AUTO: 0.95 K/UL — HIGH (ref 0–0.9)
MONOCYTES # BLD AUTO: 0.97 K/UL — HIGH (ref 0–0.9)
MONOCYTES # BLD AUTO: 1.03 K/UL — HIGH (ref 0–0.9)
MONOCYTES # BLD AUTO: 1.06 K/UL — HIGH (ref 0–0.9)
MONOCYTES # BLD AUTO: 1.2 K/UL — HIGH (ref 0–0.9)
MONOCYTES # BLD AUTO: 1.21 K/UL — HIGH (ref 0–0.9)
MONOCYTES NFR BLD AUTO: 1.5 % — LOW (ref 2–14)
MONOCYTES NFR BLD AUTO: 3.2 % — SIGNIFICANT CHANGE UP (ref 2–14)
MONOCYTES NFR BLD AUTO: 6.4 % — SIGNIFICANT CHANGE UP (ref 2–14)
MONOCYTES NFR BLD AUTO: 7 % — SIGNIFICANT CHANGE UP (ref 2–14)
MONOCYTES NFR BLD AUTO: 7.5 % — SIGNIFICANT CHANGE UP (ref 2–14)
MONOCYTES NFR BLD AUTO: 7.9 % — SIGNIFICANT CHANGE UP (ref 2–14)
MONOCYTES NFR BLD AUTO: 8.3 % — SIGNIFICANT CHANGE UP (ref 2–14)
MONOCYTES NFR BLD AUTO: 8.6 % — SIGNIFICANT CHANGE UP (ref 2–14)
MONOCYTES NFR BLD AUTO: 8.7 % — SIGNIFICANT CHANGE UP (ref 2–14)
MONOCYTES NFR BLD AUTO: 9.4 % — SIGNIFICANT CHANGE UP (ref 2–14)
MRSA PCR RESULT.: SIGNIFICANT CHANGE UP
MRSA PCR RESULT.: SIGNIFICANT CHANGE UP
NEUTROPHILS # BLD AUTO: 10.08 K/UL — HIGH (ref 1.8–7.4)
NEUTROPHILS # BLD AUTO: 10.27 K/UL — HIGH (ref 1.8–7.4)
NEUTROPHILS # BLD AUTO: 11.06 K/UL — HIGH (ref 1.8–7.4)
NEUTROPHILS # BLD AUTO: 12.99 K/UL — HIGH (ref 1.8–7.4)
NEUTROPHILS # BLD AUTO: 13.01 K/UL — HIGH (ref 1.8–7.4)
NEUTROPHILS # BLD AUTO: 13.63 K/UL — HIGH (ref 1.8–7.4)
NEUTROPHILS # BLD AUTO: 6.67 K/UL — SIGNIFICANT CHANGE UP (ref 1.8–7.4)
NEUTROPHILS # BLD AUTO: 7.7 K/UL — HIGH (ref 1.8–7.4)
NEUTROPHILS # BLD AUTO: 7.99 K/UL — HIGH (ref 1.8–7.4)
NEUTROPHILS # BLD AUTO: 9.69 K/UL — HIGH (ref 1.8–7.4)
NEUTROPHILS NFR BLD AUTO: 65.6 % — SIGNIFICANT CHANGE UP (ref 43–77)
NEUTROPHILS NFR BLD AUTO: 75.9 % — SIGNIFICANT CHANGE UP (ref 43–77)
NEUTROPHILS NFR BLD AUTO: 76.3 % — SIGNIFICANT CHANGE UP (ref 43–77)
NEUTROPHILS NFR BLD AUTO: 76.7 % — SIGNIFICANT CHANGE UP (ref 43–77)
NEUTROPHILS NFR BLD AUTO: 78.5 % — HIGH (ref 43–77)
NEUTROPHILS NFR BLD AUTO: 79.7 % — HIGH (ref 43–77)
NEUTROPHILS NFR BLD AUTO: 81.6 % — HIGH (ref 43–77)
NEUTROPHILS NFR BLD AUTO: 86.5 % — HIGH (ref 43–77)
NEUTROPHILS NFR BLD AUTO: 89.7 % — HIGH (ref 43–77)
NEUTROPHILS NFR BLD AUTO: 91.1 % — HIGH (ref 43–77)
NITRITE UR-MCNC: NEGATIVE — SIGNIFICANT CHANGE UP
NRBC # BLD: 0 /100 WBCS — SIGNIFICANT CHANGE UP (ref 0–0)
NRBC # FLD: 0 K/UL — SIGNIFICANT CHANGE UP (ref 0–0)
NT-PROBNP SERPL-SCNC: 2346 PG/ML — HIGH (ref 0–300)
NT-PROBNP SERPL-SCNC: 4021 PG/ML — HIGH (ref 0–300)
NT-PROBNP SERPL-SCNC: 4113 PG/ML — HIGH (ref 0–300)
NT-PROBNP SERPL-SCNC: 5463 PG/ML — HIGH (ref 0–300)
OPIATES UR-MCNC: NEGATIVE — SIGNIFICANT CHANGE UP
OXYCODONE UR-MCNC: NEGATIVE — SIGNIFICANT CHANGE UP
PCO2 BLDV: 37 MMHG — LOW (ref 42–55)
PCO2 BLDV: 42 MMHG — SIGNIFICANT CHANGE UP (ref 42–55)
PCO2 BLDV: 45 MMHG — SIGNIFICANT CHANGE UP (ref 42–55)
PCO2 BLDV: 49 MMHG — SIGNIFICANT CHANGE UP (ref 42–55)
PCP SPEC-MCNC: SIGNIFICANT CHANGE UP
PCP UR-MCNC: NEGATIVE — SIGNIFICANT CHANGE UP
PH BLDV: 7.34 — SIGNIFICANT CHANGE UP (ref 7.32–7.43)
PH BLDV: 7.36 — SIGNIFICANT CHANGE UP (ref 7.32–7.43)
PH BLDV: 7.41 — SIGNIFICANT CHANGE UP (ref 7.32–7.43)
PH BLDV: 7.46 — HIGH (ref 7.32–7.43)
PH UR: 5.5 — SIGNIFICANT CHANGE UP (ref 5–8)
PH UR: 6.5 — SIGNIFICANT CHANGE UP (ref 5–8)
PH UR: 7 — SIGNIFICANT CHANGE UP (ref 5–8)
PH UR: 8.5 — HIGH (ref 5–8)
PHOSPHATE SERPL-MCNC: 2.8 MG/DL — SIGNIFICANT CHANGE UP (ref 2.5–4.5)
PHOSPHATE SERPL-MCNC: 3 MG/DL — SIGNIFICANT CHANGE UP (ref 2.5–4.5)
PHOSPHATE SERPL-MCNC: 3.7 MG/DL — SIGNIFICANT CHANGE UP (ref 2.5–4.5)
PHOSPHATE SERPL-MCNC: 6 MG/DL — HIGH (ref 2.5–4.5)
PHOSPHATE SERPL-MCNC: 6.9 MG/DL — HIGH (ref 2.5–4.5)
PHOSPHATE SERPL-MCNC: 7.5 MG/DL — HIGH (ref 2.5–4.5)
PLATELET # BLD AUTO: 184 K/UL — SIGNIFICANT CHANGE UP (ref 150–400)
PLATELET # BLD AUTO: 188 K/UL — SIGNIFICANT CHANGE UP (ref 150–400)
PLATELET # BLD AUTO: 193 K/UL — SIGNIFICANT CHANGE UP (ref 150–400)
PLATELET # BLD AUTO: 206 K/UL — SIGNIFICANT CHANGE UP (ref 150–400)
PLATELET # BLD AUTO: 209 K/UL — SIGNIFICANT CHANGE UP (ref 150–400)
PLATELET # BLD AUTO: 248 K/UL — SIGNIFICANT CHANGE UP (ref 150–400)
PLATELET # BLD AUTO: 284 K/UL — SIGNIFICANT CHANGE UP (ref 150–400)
PLATELET # BLD AUTO: 290 K/UL — SIGNIFICANT CHANGE UP (ref 150–400)
PLATELET # BLD AUTO: 297 K/UL — SIGNIFICANT CHANGE UP (ref 150–400)
PLATELET # BLD AUTO: 310 K/UL — SIGNIFICANT CHANGE UP (ref 150–400)
PLATELET # BLD AUTO: 313 K/UL — SIGNIFICANT CHANGE UP (ref 150–400)
PLATELET # BLD AUTO: 313 K/UL — SIGNIFICANT CHANGE UP (ref 150–400)
PLATELET # BLD AUTO: 326 K/UL — SIGNIFICANT CHANGE UP (ref 150–400)
PLATELET # BLD AUTO: 334 K/UL — SIGNIFICANT CHANGE UP (ref 150–400)
PLATELET # BLD AUTO: 366 K/UL — SIGNIFICANT CHANGE UP (ref 150–400)
PLATELET # BLD AUTO: 371 K/UL — SIGNIFICANT CHANGE UP (ref 150–400)
PLATELET # BLD AUTO: 371 K/UL — SIGNIFICANT CHANGE UP (ref 150–400)
PLATELET # BLD AUTO: 388 K/UL — SIGNIFICANT CHANGE UP (ref 150–400)
PLATELET # BLD AUTO: 394 K/UL — SIGNIFICANT CHANGE UP (ref 150–400)
PLATELET # BLD AUTO: 399 K/UL — SIGNIFICANT CHANGE UP (ref 150–400)
PLATELET # BLD AUTO: 421 K/UL — HIGH (ref 150–400)
PLATELET # BLD AUTO: 436 K/UL — HIGH (ref 150–400)
PLATELET # BLD AUTO: 438 K/UL — HIGH (ref 150–400)
PLATELET # BLD AUTO: 443 K/UL — HIGH (ref 150–400)
PO2 BLDV: 17 MMHG — LOW (ref 25–45)
PO2 BLDV: 20 MMHG — LOW (ref 25–45)
PO2 BLDV: 24 MMHG — LOW (ref 25–45)
PO2 BLDV: 39 MMHG — SIGNIFICANT CHANGE UP (ref 25–45)
POTASSIUM BLDV-SCNC: 4.2 MMOL/L — SIGNIFICANT CHANGE UP (ref 3.5–5.1)
POTASSIUM BLDV-SCNC: 4.7 MMOL/L — SIGNIFICANT CHANGE UP (ref 3.5–5.1)
POTASSIUM BLDV-SCNC: 4.9 MMOL/L — SIGNIFICANT CHANGE UP (ref 3.5–5.1)
POTASSIUM BLDV-SCNC: 5.1 MMOL/L — SIGNIFICANT CHANGE UP (ref 3.5–5.1)
POTASSIUM SERPL-MCNC: 3.7 MMOL/L — SIGNIFICANT CHANGE UP (ref 3.5–5.3)
POTASSIUM SERPL-MCNC: 3.8 MMOL/L — SIGNIFICANT CHANGE UP (ref 3.5–5.3)
POTASSIUM SERPL-MCNC: 3.9 MMOL/L — SIGNIFICANT CHANGE UP (ref 3.5–5.3)
POTASSIUM SERPL-MCNC: 4.1 MMOL/L — SIGNIFICANT CHANGE UP (ref 3.5–5.3)
POTASSIUM SERPL-MCNC: 4.1 MMOL/L — SIGNIFICANT CHANGE UP (ref 3.5–5.3)
POTASSIUM SERPL-MCNC: 4.2 MMOL/L — SIGNIFICANT CHANGE UP (ref 3.5–5.3)
POTASSIUM SERPL-MCNC: 4.2 MMOL/L — SIGNIFICANT CHANGE UP (ref 3.5–5.3)
POTASSIUM SERPL-MCNC: 4.3 MMOL/L — SIGNIFICANT CHANGE UP (ref 3.5–5.3)
POTASSIUM SERPL-MCNC: 4.4 MMOL/L — SIGNIFICANT CHANGE UP (ref 3.5–5.3)
POTASSIUM SERPL-MCNC: 4.5 MMOL/L — SIGNIFICANT CHANGE UP (ref 3.5–5.3)
POTASSIUM SERPL-MCNC: 4.6 MMOL/L — SIGNIFICANT CHANGE UP (ref 3.5–5.3)
POTASSIUM SERPL-MCNC: 4.7 MMOL/L — SIGNIFICANT CHANGE UP (ref 3.5–5.3)
POTASSIUM SERPL-MCNC: 4.7 MMOL/L — SIGNIFICANT CHANGE UP (ref 3.5–5.3)
POTASSIUM SERPL-MCNC: 4.8 MMOL/L — SIGNIFICANT CHANGE UP (ref 3.5–5.3)
POTASSIUM SERPL-MCNC: 5.2 MMOL/L — SIGNIFICANT CHANGE UP (ref 3.5–5.3)
POTASSIUM SERPL-SCNC: 3.7 MMOL/L — SIGNIFICANT CHANGE UP (ref 3.5–5.3)
POTASSIUM SERPL-SCNC: 3.8 MMOL/L — SIGNIFICANT CHANGE UP (ref 3.5–5.3)
POTASSIUM SERPL-SCNC: 3.9 MMOL/L — SIGNIFICANT CHANGE UP (ref 3.5–5.3)
POTASSIUM SERPL-SCNC: 4.1 MMOL/L — SIGNIFICANT CHANGE UP (ref 3.5–5.3)
POTASSIUM SERPL-SCNC: 4.1 MMOL/L — SIGNIFICANT CHANGE UP (ref 3.5–5.3)
POTASSIUM SERPL-SCNC: 4.2 MMOL/L — SIGNIFICANT CHANGE UP (ref 3.5–5.3)
POTASSIUM SERPL-SCNC: 4.2 MMOL/L — SIGNIFICANT CHANGE UP (ref 3.5–5.3)
POTASSIUM SERPL-SCNC: 4.3 MMOL/L — SIGNIFICANT CHANGE UP (ref 3.5–5.3)
POTASSIUM SERPL-SCNC: 4.4 MMOL/L — SIGNIFICANT CHANGE UP (ref 3.5–5.3)
POTASSIUM SERPL-SCNC: 4.5 MMOL/L — SIGNIFICANT CHANGE UP (ref 3.5–5.3)
POTASSIUM SERPL-SCNC: 4.6 MMOL/L — SIGNIFICANT CHANGE UP (ref 3.5–5.3)
POTASSIUM SERPL-SCNC: 4.7 MMOL/L — SIGNIFICANT CHANGE UP (ref 3.5–5.3)
POTASSIUM SERPL-SCNC: 4.7 MMOL/L — SIGNIFICANT CHANGE UP (ref 3.5–5.3)
POTASSIUM SERPL-SCNC: 4.8 MMOL/L — SIGNIFICANT CHANGE UP (ref 3.5–5.3)
POTASSIUM SERPL-SCNC: 4.9 MMOL/L
POTASSIUM SERPL-SCNC: 5.2 MMOL/L — SIGNIFICANT CHANGE UP (ref 3.5–5.3)
PROT SERPL-MCNC: 5.9 G/DL — LOW (ref 6–8.3)
PROT SERPL-MCNC: 6.1 G/DL
PROT SERPL-MCNC: 6.2 G/DL — SIGNIFICANT CHANGE UP (ref 6–8.3)
PROT SERPL-MCNC: 6.5 G/DL — SIGNIFICANT CHANGE UP (ref 6–8.3)
PROT SERPL-MCNC: 6.6 G/DL — SIGNIFICANT CHANGE UP (ref 6–8.3)
PROT SERPL-MCNC: 6.6 G/DL — SIGNIFICANT CHANGE UP (ref 6–8.3)
PROT SERPL-MCNC: 6.7 G/DL — SIGNIFICANT CHANGE UP (ref 6–8.3)
PROT SERPL-MCNC: 6.9 G/DL — SIGNIFICANT CHANGE UP (ref 6–8.3)
PROT SERPL-MCNC: 6.9 G/DL — SIGNIFICANT CHANGE UP (ref 6–8.3)
PROT SERPL-MCNC: 7.2 G/DL — SIGNIFICANT CHANGE UP (ref 6–8.3)
PROT SERPL-MCNC: 7.4 G/DL — SIGNIFICANT CHANGE UP (ref 6–8.3)
PROT SERPL-MCNC: 7.9 G/DL — SIGNIFICANT CHANGE UP (ref 6–8.3)
PROT UR-MCNC: ABNORMAL
PROTHROM AB SERPL-ACNC: 12.6 SEC — SIGNIFICANT CHANGE UP (ref 10.5–13.4)
PROTHROM AB SERPL-ACNC: 13.5 SEC — HIGH (ref 10.5–13.4)
PROTHROM AB SERPL-ACNC: 13.8 SEC — HIGH (ref 10.5–13.4)
PROTHROM AB SERPL-ACNC: 13.9 SEC — HIGH (ref 10.5–13.4)
PROTHROM AB SERPL-ACNC: 14.3 SEC — HIGH (ref 10.5–13.4)
PROTHROM AB SERPL-ACNC: 14.4 SEC — HIGH (ref 10.5–13.4)
RAPID RVP RESULT: SIGNIFICANT CHANGE UP
RBC # BLD: 2.62 M/UL — LOW (ref 4.2–5.8)
RBC # BLD: 2.63 M/UL — LOW (ref 4.2–5.8)
RBC # BLD: 2.64 M/UL — LOW (ref 4.2–5.8)
RBC # BLD: 2.75 M/UL — LOW (ref 4.2–5.8)
RBC # BLD: 2.87 M/UL — LOW (ref 4.2–5.8)
RBC # BLD: 2.91 M/UL — LOW (ref 4.2–5.8)
RBC # BLD: 2.96 M/UL — LOW (ref 4.2–5.8)
RBC # BLD: 2.99 M/UL — LOW (ref 4.2–5.8)
RBC # BLD: 3 M/UL — LOW (ref 4.2–5.8)
RBC # BLD: 3 M/UL — LOW (ref 4.2–5.8)
RBC # BLD: 3.06 M/UL — LOW (ref 4.2–5.8)
RBC # BLD: 3.07 M/UL — LOW (ref 4.2–5.8)
RBC # BLD: 3.11 M/UL — LOW (ref 4.2–5.8)
RBC # BLD: 3.13 M/UL — LOW (ref 4.2–5.8)
RBC # BLD: 3.13 M/UL — LOW (ref 4.2–5.8)
RBC # BLD: 3.15 M/UL — LOW (ref 4.2–5.8)
RBC # BLD: 3.18 M/UL — LOW (ref 4.2–5.8)
RBC # BLD: 3.18 M/UL — LOW (ref 4.2–5.8)
RBC # BLD: 3.21 M/UL — LOW (ref 4.2–5.8)
RBC # BLD: 3.25 M/UL — LOW (ref 4.2–5.8)
RBC # BLD: 3.25 M/UL — LOW (ref 4.2–5.8)
RBC # BLD: 3.33 M/UL — LOW (ref 4.2–5.8)
RBC # BLD: 3.81 M/UL — LOW (ref 4.2–5.8)
RBC # BLD: 4.11 M/UL — LOW (ref 4.2–5.8)
RBC # FLD: 13.1 % — SIGNIFICANT CHANGE UP (ref 10.3–14.5)
RBC # FLD: 15.3 % — HIGH (ref 10.3–14.5)
RBC # FLD: 15.4 % — HIGH (ref 10.3–14.5)
RBC # FLD: 15.5 % — HIGH (ref 10.3–14.5)
RBC # FLD: 15.7 % — HIGH (ref 10.3–14.5)
RBC # FLD: 15.8 % — HIGH (ref 10.3–14.5)
RBC # FLD: 15.9 % — HIGH (ref 10.3–14.5)
RBC # FLD: 15.9 % — HIGH (ref 10.3–14.5)
RBC # FLD: 16 % — HIGH (ref 10.3–14.5)
RBC # FLD: 16 % — HIGH (ref 10.3–14.5)
RBC # FLD: 17.1 % — HIGH (ref 10.3–14.5)
RBC # FLD: 17.2 % — HIGH (ref 10.3–14.5)
RBC # FLD: 17.2 % — HIGH (ref 10.3–14.5)
RBC # FLD: 17.4 % — HIGH (ref 10.3–14.5)
RBC # FLD: 18.5 % — HIGH (ref 10.3–14.5)
RBC # FLD: 19.2 % — HIGH (ref 10.3–14.5)
RBC # FLD: 19.3 % — HIGH (ref 10.3–14.5)
RBC # FLD: 19.6 % — HIGH (ref 10.3–14.5)
RBC # FLD: 19.6 % — HIGH (ref 10.3–14.5)
RBC # FLD: 19.8 % — HIGH (ref 10.3–14.5)
RBC CASTS # UR COMP ASSIST: 1155 /HPF — HIGH (ref 0–4)
RBC CASTS # UR COMP ASSIST: 4476 /HPF — HIGH (ref 0–4)
RBC CASTS # UR COMP ASSIST: 55 /HPF — HIGH (ref 0–4)
RBC CASTS # UR COMP ASSIST: >50 /HPF — HIGH (ref 0–4)
RH IG SCN BLD-IMP: NEGATIVE — SIGNIFICANT CHANGE UP
RH IG SCN BLD-IMP: NEGATIVE — SIGNIFICANT CHANGE UP
RSV RNA NPH QL NAA+NON-PROBE: SIGNIFICANT CHANGE UP
S AUREUS DNA NOSE QL NAA+PROBE: SIGNIFICANT CHANGE UP
S AUREUS DNA NOSE QL NAA+PROBE: SIGNIFICANT CHANGE UP
SALICYLATES SERPL-MCNC: <2 MG/DL — LOW (ref 15–30)
SAO2 % BLDV: 19.6 % — LOW (ref 67–88)
SAO2 % BLDV: 24.2 % — LOW (ref 67–88)
SAO2 % BLDV: 32.7 % — LOW (ref 67–88)
SAO2 % BLDV: 62.5 % — LOW (ref 67–88)
SARS-COV-2 RNA SPEC QL NAA+PROBE: SIGNIFICANT CHANGE UP
SODIUM SERPL-SCNC: 132 MMOL/L — LOW (ref 135–145)
SODIUM SERPL-SCNC: 134 MMOL/L — LOW (ref 135–145)
SODIUM SERPL-SCNC: 135 MMOL/L — SIGNIFICANT CHANGE UP (ref 135–145)
SODIUM SERPL-SCNC: 136 MMOL/L — SIGNIFICANT CHANGE UP (ref 135–145)
SODIUM SERPL-SCNC: 137 MMOL/L — SIGNIFICANT CHANGE UP (ref 135–145)
SODIUM SERPL-SCNC: 137 MMOL/L — SIGNIFICANT CHANGE UP (ref 135–145)
SODIUM SERPL-SCNC: 138 MMOL/L — SIGNIFICANT CHANGE UP (ref 135–145)
SODIUM SERPL-SCNC: 139 MMOL/L — SIGNIFICANT CHANGE UP (ref 135–145)
SODIUM SERPL-SCNC: 140 MMOL/L — SIGNIFICANT CHANGE UP (ref 135–145)
SODIUM SERPL-SCNC: 142 MMOL/L
SODIUM SERPL-SCNC: 142 MMOL/L — SIGNIFICANT CHANGE UP (ref 135–145)
SP GR SPEC: 1.01 — SIGNIFICANT CHANGE UP (ref 1.01–1.03)
SP GR SPEC: 1.02 — SIGNIFICANT CHANGE UP (ref 1.01–1.02)
SP GR SPEC: 1.05 — HIGH (ref 1.01–1.02)
SP GR SPEC: >1.05 (ref 1.01–1.02)
SPECIMEN SOURCE: SIGNIFICANT CHANGE UP
SURGICAL PATHOLOGY STUDY: SIGNIFICANT CHANGE UP
SURGICAL PATHOLOGY STUDY: SIGNIFICANT CHANGE UP
T PALLIDUM AB TITR SER: NEGATIVE — SIGNIFICANT CHANGE UP
THC UR QL: POSITIVE
TIBC SERPL-MCNC: 274 UG/DL — SIGNIFICANT CHANGE UP (ref 220–430)
TROPONIN T, HIGH SENSITIVITY RESULT: 25 NG/L — SIGNIFICANT CHANGE UP (ref 0–51)
TROPONIN T, HIGH SENSITIVITY RESULT: 26 NG/L — SIGNIFICANT CHANGE UP (ref 0–51)
TROPONIN T, HIGH SENSITIVITY RESULT: 32 NG/L — SIGNIFICANT CHANGE UP (ref 0–51)
TSH SERPL-MCNC: 1.55 UIU/ML — SIGNIFICANT CHANGE UP (ref 0.27–4.2)
UIBC SERPL-MCNC: 228 UG/DL — SIGNIFICANT CHANGE UP (ref 110–370)
URATE SERPL-MCNC: 10.9 MG/DL — HIGH (ref 3.4–8.8)
URINE CYTOLOGY: NORMAL
UROBILINOGEN FLD QL: ABNORMAL
UROBILINOGEN FLD QL: NEGATIVE — SIGNIFICANT CHANGE UP
VIT B12 SERPL-MCNC: 1684 PG/ML — HIGH (ref 232–1245)
WBC # BLD: 10.16 K/UL — SIGNIFICANT CHANGE UP (ref 3.8–10.5)
WBC # BLD: 10.41 K/UL — SIGNIFICANT CHANGE UP (ref 3.8–10.5)
WBC # BLD: 10.85 K/UL — HIGH (ref 3.8–10.5)
WBC # BLD: 11.46 K/UL — HIGH (ref 3.8–10.5)
WBC # BLD: 11.89 K/UL — HIGH (ref 3.8–10.5)
WBC # BLD: 12.27 K/UL — HIGH (ref 3.8–10.5)
WBC # BLD: 12.77 K/UL — HIGH (ref 3.8–10.5)
WBC # BLD: 12.81 K/UL — HIGH (ref 3.8–10.5)
WBC # BLD: 12.85 K/UL — HIGH (ref 3.8–10.5)
WBC # BLD: 12.9 K/UL — HIGH (ref 3.8–10.5)
WBC # BLD: 12.98 K/UL — HIGH (ref 3.8–10.5)
WBC # BLD: 13 K/UL — HIGH (ref 3.8–10.5)
WBC # BLD: 13.03 K/UL — HIGH (ref 3.8–10.5)
WBC # BLD: 13.08 K/UL — HIGH (ref 3.8–10.5)
WBC # BLD: 13.1 K/UL — HIGH (ref 3.8–10.5)
WBC # BLD: 13.11 K/UL — HIGH (ref 3.8–10.5)
WBC # BLD: 13.2 K/UL — HIGH (ref 3.8–10.5)
WBC # BLD: 13.89 K/UL — HIGH (ref 3.8–10.5)
WBC # BLD: 14.49 K/UL — HIGH (ref 3.8–10.5)
WBC # BLD: 14.94 K/UL — HIGH (ref 3.8–10.5)
WBC # BLD: 15.06 K/UL — HIGH (ref 3.8–10.5)
WBC # BLD: 15.4 K/UL — HIGH (ref 3.8–10.5)
WBC # BLD: 15.93 K/UL — HIGH (ref 3.8–10.5)
WBC # BLD: 8.59 K/UL — SIGNIFICANT CHANGE UP (ref 3.8–10.5)
WBC # FLD AUTO: 10.16 K/UL — SIGNIFICANT CHANGE UP (ref 3.8–10.5)
WBC # FLD AUTO: 10.41 K/UL — SIGNIFICANT CHANGE UP (ref 3.8–10.5)
WBC # FLD AUTO: 10.85 K/UL — HIGH (ref 3.8–10.5)
WBC # FLD AUTO: 11.46 K/UL — HIGH (ref 3.8–10.5)
WBC # FLD AUTO: 11.89 K/UL — HIGH (ref 3.8–10.5)
WBC # FLD AUTO: 12.27 K/UL — HIGH (ref 3.8–10.5)
WBC # FLD AUTO: 12.77 K/UL — HIGH (ref 3.8–10.5)
WBC # FLD AUTO: 12.81 K/UL — HIGH (ref 3.8–10.5)
WBC # FLD AUTO: 12.85 K/UL — HIGH (ref 3.8–10.5)
WBC # FLD AUTO: 12.9 K/UL — HIGH (ref 3.8–10.5)
WBC # FLD AUTO: 12.98 K/UL — HIGH (ref 3.8–10.5)
WBC # FLD AUTO: 13 K/UL — HIGH (ref 3.8–10.5)
WBC # FLD AUTO: 13.03 K/UL — HIGH (ref 3.8–10.5)
WBC # FLD AUTO: 13.08 K/UL — HIGH (ref 3.8–10.5)
WBC # FLD AUTO: 13.1 K/UL — HIGH (ref 3.8–10.5)
WBC # FLD AUTO: 13.11 K/UL — HIGH (ref 3.8–10.5)
WBC # FLD AUTO: 13.2 K/UL — HIGH (ref 3.8–10.5)
WBC # FLD AUTO: 13.89 K/UL — HIGH (ref 3.8–10.5)
WBC # FLD AUTO: 14.49 K/UL — HIGH (ref 3.8–10.5)
WBC # FLD AUTO: 14.94 K/UL — HIGH (ref 3.8–10.5)
WBC # FLD AUTO: 15.06 K/UL — HIGH (ref 3.8–10.5)
WBC # FLD AUTO: 15.4 K/UL — HIGH (ref 3.8–10.5)
WBC # FLD AUTO: 15.93 K/UL — HIGH (ref 3.8–10.5)
WBC # FLD AUTO: 8.59 K/UL — SIGNIFICANT CHANGE UP (ref 3.8–10.5)
WBC UR QL: 10 /HPF — HIGH (ref 0–5)
WBC UR QL: 19 /HPF — HIGH (ref 0–5)
WBC UR QL: 37 /HPF — HIGH (ref 0–5)
WBC UR QL: 7 /HPF — HIGH (ref 0–5)
Y ENTEROCOL DNA STL QL NAA+NON-PROBE: DETECTED

## 2023-01-01 PROCEDURE — 99285 EMERGENCY DEPT VISIT HI MDM: CPT

## 2023-01-01 PROCEDURE — 31575 DIAGNOSTIC LARYNGOSCOPY: CPT

## 2023-01-01 PROCEDURE — 99223 1ST HOSP IP/OBS HIGH 75: CPT

## 2023-01-01 PROCEDURE — 86901 BLOOD TYPING SEROLOGIC RH(D): CPT

## 2023-01-01 PROCEDURE — 93005 ELECTROCARDIOGRAM TRACING: CPT

## 2023-01-01 PROCEDURE — 99233 SBSQ HOSP IP/OBS HIGH 50: CPT

## 2023-01-01 PROCEDURE — 85730 THROMBOPLASTIN TIME PARTIAL: CPT

## 2023-01-01 PROCEDURE — 94660 CPAP INITIATION&MGMT: CPT

## 2023-01-01 PROCEDURE — 80048 BASIC METABOLIC PNL TOTAL CA: CPT

## 2023-01-01 PROCEDURE — 99283 EMERGENCY DEPT VISIT LOW MDM: CPT

## 2023-01-01 PROCEDURE — 85027 COMPLETE CBC AUTOMATED: CPT

## 2023-01-01 PROCEDURE — 99497 ADVNCD CARE PLAN 30 MIN: CPT | Mod: 25

## 2023-01-01 PROCEDURE — 96376 TX/PRO/DX INJ SAME DRUG ADON: CPT | Mod: XU

## 2023-01-01 PROCEDURE — 85025 COMPLETE CBC W/AUTO DIFF WBC: CPT

## 2023-01-01 PROCEDURE — 87640 STAPH A DNA AMP PROBE: CPT

## 2023-01-01 PROCEDURE — 97116 GAIT TRAINING THERAPY: CPT

## 2023-01-01 PROCEDURE — 87635 SARS-COV-2 COVID-19 AMP PRB: CPT

## 2023-01-01 PROCEDURE — 87086 URINE CULTURE/COLONY COUNT: CPT

## 2023-01-01 PROCEDURE — 99233 SBSQ HOSP IP/OBS HIGH 50: CPT | Mod: GC

## 2023-01-01 PROCEDURE — 84132 ASSAY OF SERUM POTASSIUM: CPT

## 2023-01-01 PROCEDURE — 99232 SBSQ HOSP IP/OBS MODERATE 35: CPT | Mod: GC

## 2023-01-01 PROCEDURE — 99285 EMERGENCY DEPT VISIT HI MDM: CPT | Mod: 25

## 2023-01-01 PROCEDURE — 99204 OFFICE O/P NEW MOD 45 MIN: CPT | Mod: 25

## 2023-01-01 PROCEDURE — 84443 ASSAY THYROID STIM HORMONE: CPT

## 2023-01-01 PROCEDURE — 80053 COMPREHEN METABOLIC PANEL: CPT

## 2023-01-01 PROCEDURE — 84484 ASSAY OF TROPONIN QUANT: CPT

## 2023-01-01 PROCEDURE — 93306 TTE W/DOPPLER COMPLETE: CPT | Mod: 26

## 2023-01-01 PROCEDURE — 71260 CT THORAX DX C+: CPT | Mod: 26,MA

## 2023-01-01 PROCEDURE — 36410 VNPNXR 3YR/> PHY/QHP DX/THER: CPT

## 2023-01-01 PROCEDURE — 96375 TX/PRO/DX INJ NEW DRUG ADDON: CPT | Mod: XU

## 2023-01-01 PROCEDURE — 71045 X-RAY EXAM CHEST 1 VIEW: CPT | Mod: 26

## 2023-01-01 PROCEDURE — 84100 ASSAY OF PHOSPHORUS: CPT

## 2023-01-01 PROCEDURE — 85018 HEMOGLOBIN: CPT

## 2023-01-01 PROCEDURE — 88341 IMHCHEM/IMCYTCHM EA ADD ANTB: CPT

## 2023-01-01 PROCEDURE — 88342 IMHCHEM/IMCYTCHM 1ST ANTB: CPT

## 2023-01-01 PROCEDURE — 93308 TTE F-UP OR LMTD: CPT | Mod: 26

## 2023-01-01 PROCEDURE — 76705 ECHO EXAM OF ABDOMEN: CPT | Mod: 26

## 2023-01-01 PROCEDURE — 74177 CT ABD & PELVIS W/CONTRAST: CPT | Mod: 26,MA

## 2023-01-01 PROCEDURE — 97161 PT EVAL LOW COMPLEX 20 MIN: CPT

## 2023-01-01 PROCEDURE — 76937 US GUIDE VASCULAR ACCESS: CPT

## 2023-01-01 PROCEDURE — 12345: CPT | Mod: NC,GC

## 2023-01-01 PROCEDURE — 94640 AIRWAY INHALATION TREATMENT: CPT

## 2023-01-01 PROCEDURE — 36000 PLACE NEEDLE IN VEIN: CPT

## 2023-01-01 PROCEDURE — 82803 BLOOD GASES ANY COMBINATION: CPT

## 2023-01-01 PROCEDURE — 84295 ASSAY OF SERUM SODIUM: CPT

## 2023-01-01 PROCEDURE — 99222 1ST HOSP IP/OBS MODERATE 55: CPT

## 2023-01-01 PROCEDURE — 88360 TUMOR IMMUNOHISTOCHEM/MANUAL: CPT | Mod: 26

## 2023-01-01 PROCEDURE — 71250 CT THORAX DX C-: CPT | Mod: 26

## 2023-01-01 PROCEDURE — 82435 ASSAY OF BLOOD CHLORIDE: CPT

## 2023-01-01 PROCEDURE — 82330 ASSAY OF CALCIUM: CPT

## 2023-01-01 PROCEDURE — 83540 ASSAY OF IRON: CPT

## 2023-01-01 PROCEDURE — 83880 ASSAY OF NATRIURETIC PEPTIDE: CPT

## 2023-01-01 PROCEDURE — 86850 RBC ANTIBODY SCREEN: CPT

## 2023-01-01 PROCEDURE — 99291 CRITICAL CARE FIRST HOUR: CPT

## 2023-01-01 PROCEDURE — 99232 SBSQ HOSP IP/OBS MODERATE 35: CPT

## 2023-01-01 PROCEDURE — 78815 PET IMAGE W/CT SKULL-THIGH: CPT | Mod: 26,PI

## 2023-01-01 PROCEDURE — 81001 URINALYSIS AUTO W/SCOPE: CPT

## 2023-01-01 PROCEDURE — 80307 DRUG TEST PRSMV CHEM ANLYZR: CPT

## 2023-01-01 PROCEDURE — 85610 PROTHROMBIN TIME: CPT

## 2023-01-01 PROCEDURE — 99221 1ST HOSP IP/OBS SF/LOW 40: CPT | Mod: GC

## 2023-01-01 PROCEDURE — 96375 TX/PRO/DX INJ NEW DRUG ADDON: CPT

## 2023-01-01 PROCEDURE — 83735 ASSAY OF MAGNESIUM: CPT

## 2023-01-01 PROCEDURE — 85014 HEMATOCRIT: CPT

## 2023-01-01 PROCEDURE — 83690 ASSAY OF LIPASE: CPT

## 2023-01-01 PROCEDURE — 96374 THER/PROPH/DIAG INJ IV PUSH: CPT | Mod: XU

## 2023-01-01 PROCEDURE — 86900 BLOOD TYPING SEROLOGIC ABO: CPT

## 2023-01-01 PROCEDURE — 71260 CT THORAX DX C+: CPT | Mod: MA

## 2023-01-01 PROCEDURE — 99204 OFFICE O/P NEW MOD 45 MIN: CPT

## 2023-01-01 PROCEDURE — 99222 1ST HOSP IP/OBS MODERATE 55: CPT | Mod: GC

## 2023-01-01 PROCEDURE — U0005: CPT

## 2023-01-01 PROCEDURE — 82947 ASSAY GLUCOSE BLOOD QUANT: CPT

## 2023-01-01 PROCEDURE — 88112 CYTOPATH CELL ENHANCE TECH: CPT | Mod: 26

## 2023-01-01 PROCEDURE — 36415 COLL VENOUS BLD VENIPUNCTURE: CPT

## 2023-01-01 PROCEDURE — 99221 1ST HOSP IP/OBS SF/LOW 40: CPT

## 2023-01-01 PROCEDURE — 86923 COMPATIBILITY TEST ELECTRIC: CPT

## 2023-01-01 PROCEDURE — 93296 REM INTERROG EVL PM/IDS: CPT

## 2023-01-01 PROCEDURE — 87637 SARSCOV2&INF A&B&RSV AMP PRB: CPT

## 2023-01-01 PROCEDURE — 83605 ASSAY OF LACTIC ACID: CPT

## 2023-01-01 PROCEDURE — 71045 X-RAY EXAM CHEST 1 VIEW: CPT

## 2023-01-01 PROCEDURE — G0463: CPT

## 2023-01-01 PROCEDURE — 99239 HOSP IP/OBS DSCHRG MGMT >30: CPT

## 2023-01-01 PROCEDURE — 82746 ASSAY OF FOLIC ACID SERUM: CPT

## 2023-01-01 PROCEDURE — 97162 PT EVAL MOD COMPLEX 30 MIN: CPT

## 2023-01-01 PROCEDURE — 99239 HOSP IP/OBS DSCHRG MGMT >30: CPT | Mod: GC

## 2023-01-01 PROCEDURE — 82962 GLUCOSE BLOOD TEST: CPT

## 2023-01-01 PROCEDURE — 36430 TRANSFUSION BLD/BLD COMPNT: CPT

## 2023-01-01 PROCEDURE — 97110 THERAPEUTIC EXERCISES: CPT

## 2023-01-01 PROCEDURE — 86780 TREPONEMA PALLIDUM: CPT

## 2023-01-01 PROCEDURE — 12345: CPT | Mod: NC

## 2023-01-01 PROCEDURE — 74177 CT ABD & PELVIS W/CONTRAST: CPT | Mod: MA

## 2023-01-01 PROCEDURE — 76705 ECHO EXAM OF ABDOMEN: CPT

## 2023-01-01 PROCEDURE — 87641 MR-STAPH DNA AMP PROBE: CPT

## 2023-01-01 PROCEDURE — 93308 TTE F-UP OR LMTD: CPT

## 2023-01-01 PROCEDURE — 88307 TISSUE EXAM BY PATHOLOGIST: CPT

## 2023-01-01 PROCEDURE — 82607 VITAMIN B-12: CPT

## 2023-01-01 PROCEDURE — 71250 CT THORAX DX C-: CPT

## 2023-01-01 PROCEDURE — 74174 CTA ABD&PLVS W/CONTRAST: CPT | Mod: 26,MA

## 2023-01-01 PROCEDURE — 96374 THER/PROPH/DIAG INJ IV PUSH: CPT

## 2023-01-01 PROCEDURE — 93306 TTE W/DOPPLER COMPLETE: CPT

## 2023-01-01 PROCEDURE — 78815 PET IMAGE W/CT SKULL-THIGH: CPT

## 2023-01-01 PROCEDURE — 93000 ELECTROCARDIOGRAM COMPLETE: CPT

## 2023-01-01 PROCEDURE — P9016: CPT

## 2023-01-01 PROCEDURE — 0225U NFCT DS DNA&RNA 21 SARSCOV2: CPT

## 2023-01-01 PROCEDURE — 99284 EMERGENCY DEPT VISIT MOD MDM: CPT | Mod: 25

## 2023-01-01 PROCEDURE — 71275 CT ANGIOGRAPHY CHEST: CPT | Mod: MA

## 2023-01-01 PROCEDURE — A9552: CPT

## 2023-01-01 PROCEDURE — 99284 EMERGENCY DEPT VISIT MOD MDM: CPT

## 2023-01-01 PROCEDURE — 93010 ELECTROCARDIOGRAM REPORT: CPT | Mod: 76

## 2023-01-01 PROCEDURE — 71275 CT ANGIOGRAPHY CHEST: CPT | Mod: 26,MA

## 2023-01-01 PROCEDURE — 99223 1ST HOSP IP/OBS HIGH 75: CPT | Mod: GC

## 2023-01-01 PROCEDURE — U0003: CPT

## 2023-01-01 PROCEDURE — 76937 US GUIDE VASCULAR ACCESS: CPT | Mod: 26

## 2023-01-01 PROCEDURE — 99205 OFFICE O/P NEW HI 60 MIN: CPT

## 2023-01-01 PROCEDURE — 74174 CTA ABD&PLVS W/CONTRAST: CPT | Mod: MA

## 2023-01-01 PROCEDURE — 51700 IRRIGATION OF BLADDER: CPT

## 2023-01-01 PROCEDURE — 99214 OFFICE O/P EST MOD 30 MIN: CPT

## 2023-01-01 PROCEDURE — 76770 US EXAM ABDO BACK WALL COMP: CPT | Mod: 26

## 2023-01-01 PROCEDURE — 82728 ASSAY OF FERRITIN: CPT

## 2023-01-01 PROCEDURE — 93295 DEV INTERROG REMOTE 1/2/MLT: CPT

## 2023-01-01 PROCEDURE — 88360 TUMOR IMMUNOHISTOCHEM/MANUAL: CPT

## 2023-01-01 PROCEDURE — 83550 IRON BINDING TEST: CPT

## 2023-01-01 RX ORDER — SODIUM CHLORIDE 9 MG/ML
750 INJECTION INTRAMUSCULAR; INTRAVENOUS; SUBCUTANEOUS
Refills: 0 | Status: DISCONTINUED | OUTPATIENT
Start: 2023-01-01 | End: 2023-01-01

## 2023-01-01 RX ORDER — SENNA PLUS 8.6 MG/1
2 TABLET ORAL AT BEDTIME
Refills: 0 | Status: DISCONTINUED | OUTPATIENT
Start: 2023-01-01 | End: 2023-01-01

## 2023-01-01 RX ORDER — DIAZEPAM 5 MG
5 TABLET ORAL ONCE
Refills: 0 | Status: DISCONTINUED | OUTPATIENT
Start: 2023-01-01 | End: 2023-01-01

## 2023-01-01 RX ORDER — OXYBUTYNIN CHLORIDE 5 MG
1 TABLET ORAL
Qty: 0 | Refills: 0 | DISCHARGE

## 2023-01-01 RX ORDER — POLYETHYLENE GLYCOL 3350 17 G/17G
17 POWDER, FOR SOLUTION ORAL DAILY
Refills: 0 | Status: DISCONTINUED | OUTPATIENT
Start: 2023-01-01 | End: 2023-01-01

## 2023-01-01 RX ORDER — FLUOXETINE HCL 10 MG
40 CAPSULE ORAL DAILY
Refills: 0 | Status: DISCONTINUED | OUTPATIENT
Start: 2023-01-01 | End: 2023-01-01

## 2023-01-01 RX ORDER — SODIUM CHLORIDE 9 MG/ML
750 INJECTION INTRAMUSCULAR; INTRAVENOUS; SUBCUTANEOUS ONCE
Refills: 0 | Status: DISCONTINUED | OUTPATIENT
Start: 2023-01-01 | End: 2023-01-01

## 2023-01-01 RX ORDER — LANOLIN ALCOHOL/MO/W.PET/CERES
6 CREAM (GRAM) TOPICAL AT BEDTIME
Refills: 0 | Status: COMPLETED | OUTPATIENT
Start: 2023-01-01 | End: 2023-01-01

## 2023-01-01 RX ORDER — IPRATROPIUM/ALBUTEROL SULFATE 18-103MCG
3 AEROSOL WITH ADAPTER (GRAM) INHALATION EVERY 6 HOURS
Refills: 0 | Status: DISCONTINUED | OUTPATIENT
Start: 2023-01-01 | End: 2023-01-01

## 2023-01-01 RX ORDER — IBUPROFEN 600 MG/1
600 TABLET, FILM COATED ORAL
Qty: 30 | Refills: 0 | Status: COMPLETED | COMMUNITY
Start: 2023-01-01

## 2023-01-01 RX ORDER — HYDROMORPHONE HYDROCHLORIDE 2 MG/ML
0.3 INJECTION INTRAMUSCULAR; INTRAVENOUS; SUBCUTANEOUS ONCE
Refills: 0 | Status: DISCONTINUED | OUTPATIENT
Start: 2023-01-01 | End: 2023-01-01

## 2023-01-01 RX ORDER — DIAZEPAM 5 MG
5 TABLET ORAL THREE TIMES A DAY
Refills: 0 | Status: DISCONTINUED | OUTPATIENT
Start: 2023-01-01 | End: 2023-01-01

## 2023-01-01 RX ORDER — CHLORHEXIDINE GLUCONATE 213 G/1000ML
1 SOLUTION TOPICAL DAILY
Refills: 0 | Status: DISCONTINUED | OUTPATIENT
Start: 2023-01-01 | End: 2023-01-01

## 2023-01-01 RX ORDER — POLYETHYLENE GLYCOL 3350 17 G/17G
17 POWDER, FOR SOLUTION ORAL
Refills: 0 | Status: DISCONTINUED | OUTPATIENT
Start: 2023-01-01 | End: 2023-01-01

## 2023-01-01 RX ORDER — MORPHINE SULFATE 50 MG/1
2 CAPSULE, EXTENDED RELEASE ORAL ONCE
Refills: 0 | Status: DISCONTINUED | OUTPATIENT
Start: 2023-01-01 | End: 2023-01-01

## 2023-01-01 RX ORDER — PIPERACILLIN AND TAZOBACTAM 4; .5 G/20ML; G/20ML
3.38 INJECTION, POWDER, LYOPHILIZED, FOR SOLUTION INTRAVENOUS ONCE
Refills: 0 | Status: COMPLETED | OUTPATIENT
Start: 2023-01-01 | End: 2023-01-01

## 2023-01-01 RX ORDER — DIAZEPAM 5 MG
5 TABLET ORAL AT BEDTIME
Refills: 0 | Status: DISCONTINUED | OUTPATIENT
Start: 2023-01-01 | End: 2023-01-01

## 2023-01-01 RX ORDER — TIOTROPIUM BROMIDE 18 UG/1
2 CAPSULE ORAL; RESPIRATORY (INHALATION) DAILY
Refills: 0 | Status: DISCONTINUED | OUTPATIENT
Start: 2023-01-01 | End: 2023-01-01

## 2023-01-01 RX ORDER — CEFAZOLIN SODIUM 1 G
1000 VIAL (EA) INJECTION ONCE
Refills: 0 | Status: COMPLETED | OUTPATIENT
Start: 2023-01-01 | End: 2023-01-01

## 2023-01-01 RX ORDER — ONDANSETRON 8 MG/1
4 TABLET, FILM COATED ORAL EVERY 8 HOURS
Refills: 0 | Status: DISCONTINUED | OUTPATIENT
Start: 2023-01-01 | End: 2023-01-01

## 2023-01-01 RX ORDER — TAMSULOSIN HYDROCHLORIDE 0.4 MG/1
0.4 CAPSULE ORAL AT BEDTIME
Refills: 0 | Status: DISCONTINUED | OUTPATIENT
Start: 2023-01-01 | End: 2023-01-01

## 2023-01-01 RX ORDER — OXYCODONE HYDROCHLORIDE 5 MG/1
2.5 TABLET ORAL ONCE
Refills: 0 | Status: DISCONTINUED | OUTPATIENT
Start: 2023-01-01 | End: 2023-01-01

## 2023-01-01 RX ORDER — DIAZEPAM 5 MG
1 TABLET ORAL
Refills: 0 | DISCHARGE

## 2023-01-01 RX ORDER — ACETAMINOPHEN 500 MG
2 TABLET ORAL
Refills: 0 | DISCHARGE

## 2023-01-01 RX ORDER — ATENOLOL 25 MG/1
25 TABLET ORAL DAILY
Refills: 0 | Status: DISCONTINUED | OUTPATIENT
Start: 2023-01-01 | End: 2023-01-01

## 2023-01-01 RX ORDER — ATORVASTATIN CALCIUM 80 MG/1
40 TABLET, FILM COATED ORAL AT BEDTIME
Refills: 0 | Status: DISCONTINUED | OUTPATIENT
Start: 2023-01-01 | End: 2023-01-01

## 2023-01-01 RX ORDER — FLUTICASONE FUROATE, UMECLIDINIUM BROMIDE AND VILANTEROL TRIFENATATE 200; 62.5; 25 UG/1; UG/1; UG/1
1 POWDER RESPIRATORY (INHALATION) DAILY
Refills: 0 | Status: DISCONTINUED | OUTPATIENT
Start: 2023-01-01 | End: 2023-01-01

## 2023-01-01 RX ORDER — ALBUTEROL 90 UG/1
2 AEROSOL, METERED ORAL EVERY 6 HOURS
Refills: 0 | Status: DISCONTINUED | OUTPATIENT
Start: 2023-01-01 | End: 2023-01-01

## 2023-01-01 RX ORDER — IPRATROPIUM/ALBUTEROL SULFATE 18-103MCG
3 AEROSOL WITH ADAPTER (GRAM) INHALATION ONCE
Refills: 0 | Status: COMPLETED | OUTPATIENT
Start: 2023-01-01 | End: 2023-01-01

## 2023-01-01 RX ORDER — ALBUTEROL 90 UG/1
2 AEROSOL, METERED ORAL
Qty: 0 | Refills: 0 | DISCHARGE

## 2023-01-01 RX ORDER — METOPROLOL TARTRATE 50 MG
25 TABLET ORAL DAILY
Refills: 0 | Status: DISCONTINUED | OUTPATIENT
Start: 2023-01-01 | End: 2023-01-01

## 2023-01-01 RX ORDER — ACETAMINOPHEN 500 MG
975 TABLET ORAL ONCE
Refills: 0 | Status: COMPLETED | OUTPATIENT
Start: 2023-01-01 | End: 2023-01-01

## 2023-01-01 RX ORDER — ATROPA BELLADONNA AND OPIUM 16.2; 6 MG/1; MG/1
1 SUPPOSITORY RECTAL ONCE
Refills: 0 | Status: DISCONTINUED | OUTPATIENT
Start: 2023-01-01 | End: 2023-01-01

## 2023-01-01 RX ORDER — ACETAMINOPHEN 500 MG
1000 TABLET ORAL ONCE
Refills: 0 | Status: COMPLETED | OUTPATIENT
Start: 2023-01-01 | End: 2023-01-01

## 2023-01-01 RX ORDER — ACETAMINOPHEN 500 MG
650 TABLET ORAL EVERY 6 HOURS
Refills: 0 | Status: DISCONTINUED | OUTPATIENT
Start: 2023-01-01 | End: 2023-01-01

## 2023-01-01 RX ORDER — FENTANYL CITRATE 50 UG/ML
25 INJECTION INTRAVENOUS ONCE
Refills: 0 | Status: DISCONTINUED | OUTPATIENT
Start: 2023-01-01 | End: 2023-01-01

## 2023-01-01 RX ORDER — HYDROMORPHONE HYDROCHLORIDE 2 MG/ML
0.25 INJECTION INTRAMUSCULAR; INTRAVENOUS; SUBCUTANEOUS
Refills: 0 | Status: DISCONTINUED | OUTPATIENT
Start: 2023-01-01 | End: 2023-01-01

## 2023-01-01 RX ORDER — IPRATROPIUM/ALBUTEROL SULFATE 18-103MCG
3 AEROSOL WITH ADAPTER (GRAM) INHALATION
Refills: 0 | Status: COMPLETED | OUTPATIENT
Start: 2023-01-01 | End: 2023-01-01

## 2023-01-01 RX ORDER — BUDESONIDE AND FORMOTEROL FUMARATE DIHYDRATE 160; 4.5 UG/1; UG/1
2 AEROSOL RESPIRATORY (INHALATION)
Refills: 0 | Status: DISCONTINUED | OUTPATIENT
Start: 2023-01-01 | End: 2023-01-01

## 2023-01-01 RX ORDER — OXYBUTYNIN CHLORIDE 5 MG
5 TABLET ORAL EVERY 8 HOURS
Refills: 0 | Status: DISCONTINUED | OUTPATIENT
Start: 2023-01-01 | End: 2023-01-01

## 2023-01-01 RX ORDER — HYDROMORPHONE HYDROCHLORIDE 2 MG/ML
2 INJECTION INTRAMUSCULAR; INTRAVENOUS; SUBCUTANEOUS EVERY 4 HOURS
Refills: 0 | Status: DISCONTINUED | OUTPATIENT
Start: 2023-01-01 | End: 2023-01-01

## 2023-01-01 RX ORDER — POLYETHYLENE GLYCOL 3350 17 G/17G
17 POWDER, FOR SOLUTION ORAL ONCE
Refills: 0 | Status: COMPLETED | OUTPATIENT
Start: 2023-01-01 | End: 2023-01-01

## 2023-01-01 RX ORDER — FLUTICASONE FUROATE, UMECLIDINIUM BROMIDE AND VILANTEROL TRIFENATATE 200; 62.5; 25 UG/1; UG/1; UG/1
1 POWDER RESPIRATORY (INHALATION)
Refills: 0 | DISCHARGE

## 2023-01-01 RX ORDER — SENNA PLUS 8.6 MG/1
2 TABLET ORAL
Qty: 0 | Refills: 0 | DISCHARGE
Start: 2023-01-01

## 2023-01-01 RX ORDER — ATENOLOL 25 MG/1
1 TABLET ORAL
Refills: 0 | DISCHARGE

## 2023-01-01 RX ORDER — LANOLIN ALCOHOL/MO/W.PET/CERES
3 CREAM (GRAM) TOPICAL AT BEDTIME
Refills: 0 | Status: DISCONTINUED | OUTPATIENT
Start: 2023-01-01 | End: 2023-01-01

## 2023-01-01 RX ORDER — FENTANYL CITRATE 50 UG/ML
50 INJECTION INTRAVENOUS ONCE
Refills: 0 | Status: DISCONTINUED | OUTPATIENT
Start: 2023-01-01 | End: 2023-01-01

## 2023-01-01 RX ORDER — SODIUM CHLORIDE 9 MG/ML
1000 INJECTION INTRAMUSCULAR; INTRAVENOUS; SUBCUTANEOUS ONCE
Refills: 0 | Status: COMPLETED | OUTPATIENT
Start: 2023-01-01 | End: 2023-01-01

## 2023-01-01 RX ORDER — FLUOXETINE HCL 10 MG
1 CAPSULE ORAL
Refills: 0 | DISCHARGE

## 2023-01-01 RX ORDER — ALPRAZOLAM 0.25 MG
0.25 TABLET ORAL ONCE
Refills: 0 | Status: DISCONTINUED | OUTPATIENT
Start: 2023-01-01 | End: 2023-01-01

## 2023-01-01 RX ORDER — TAMSULOSIN HYDROCHLORIDE 0.4 MG/1
0.4 CAPSULE ORAL
Qty: 100 | Refills: 0 | Status: ACTIVE | COMMUNITY
Start: 2023-01-01

## 2023-01-01 RX ORDER — CEFAZOLIN SODIUM 1 G
VIAL (EA) INJECTION
Refills: 0 | Status: DISCONTINUED | OUTPATIENT
Start: 2023-01-01 | End: 2023-01-01

## 2023-01-01 RX ORDER — FOLIC ACID 0.8 MG
1 TABLET ORAL
Qty: 0 | Refills: 0 | DISCHARGE

## 2023-01-01 RX ORDER — FLUOXETINE HCL 10 MG
1 CAPSULE ORAL
Qty: 0 | Refills: 0 | DISCHARGE
Start: 2023-01-01

## 2023-01-01 RX ORDER — HYDROMORPHONE HYDROCHLORIDE 2 MG/ML
1 INJECTION INTRAMUSCULAR; INTRAVENOUS; SUBCUTANEOUS EVERY 4 HOURS
Refills: 0 | Status: DISCONTINUED | OUTPATIENT
Start: 2023-01-01 | End: 2023-01-01

## 2023-01-01 RX ORDER — BISACODYL 5 MG
5 TABLET, DELAYED RELEASE (ENTERIC COATED) ORAL
Qty: 15 | Refills: 0 | Status: COMPLETED | COMMUNITY
Start: 2023-01-01 | End: 2023-01-01

## 2023-01-01 RX ORDER — FLUOXETINE HCL 10 MG
1 CAPSULE ORAL
Qty: 0 | Refills: 0 | DISCHARGE

## 2023-01-01 RX ORDER — METOPROLOL TARTRATE 50 MG
1 TABLET ORAL
Qty: 30 | Refills: 0
Start: 2023-01-01

## 2023-01-01 RX ORDER — OLANZAPINE 15 MG/1
2.5 TABLET, FILM COATED ORAL ONCE
Refills: 0 | Status: COMPLETED | OUTPATIENT
Start: 2023-01-01 | End: 2023-01-01

## 2023-01-01 RX ORDER — PHENAZOPYRIDINE HCL 100 MG
100 TABLET ORAL EVERY 8 HOURS
Refills: 0 | Status: DISCONTINUED | OUTPATIENT
Start: 2023-01-01 | End: 2023-01-01

## 2023-01-01 RX ORDER — MIRTAZAPINE 45 MG/1
1 TABLET, ORALLY DISINTEGRATING ORAL
Qty: 0 | Refills: 0 | DISCHARGE
Start: 2023-01-01

## 2023-01-01 RX ORDER — POLYETHYLENE GLYCOL 3350 17 G/17G
17 POWDER, FOR SOLUTION ORAL
Qty: 0 | Refills: 0 | DISCHARGE
Start: 2023-01-01

## 2023-01-01 RX ORDER — DIAZEPAM 5 MG
5 TABLET ORAL DAILY
Refills: 0 | Status: DISCONTINUED | OUTPATIENT
Start: 2023-01-01 | End: 2023-01-01

## 2023-01-01 RX ORDER — DIAZEPAM 5 MG
5 TABLET ORAL
Refills: 0 | Status: DISCONTINUED | OUTPATIENT
Start: 2023-01-01 | End: 2023-01-01

## 2023-01-01 RX ORDER — HYDROMORPHONE HYDROCHLORIDE 2 MG/ML
0.5 INJECTION INTRAMUSCULAR; INTRAVENOUS; SUBCUTANEOUS EVERY 4 HOURS
Refills: 0 | Status: DISCONTINUED | OUTPATIENT
Start: 2023-01-01 | End: 2023-01-01

## 2023-01-01 RX ORDER — ALBUTEROL 90 UG/1
2.5 AEROSOL, METERED ORAL ONCE
Refills: 0 | Status: COMPLETED | OUTPATIENT
Start: 2023-01-01 | End: 2023-01-01

## 2023-01-01 RX ORDER — ACETAMINOPHEN 500 MG
9758 TABLET ORAL ONCE
Refills: 0 | Status: DISCONTINUED | OUTPATIENT
Start: 2023-01-01 | End: 2023-01-01

## 2023-01-01 RX ORDER — FENTANYL CITRATE 50 UG/ML
25 INJECTION INTRAVENOUS
Refills: 0 | Status: DISCONTINUED | OUTPATIENT
Start: 2023-01-01 | End: 2023-01-01

## 2023-01-01 RX ORDER — DIAZEPAM 5 MG
1 TABLET ORAL
Qty: 0 | Refills: 0 | DISCHARGE
Start: 2023-01-01

## 2023-01-01 RX ORDER — ONDANSETRON 8 MG/1
4 TABLET, FILM COATED ORAL ONCE
Refills: 0 | Status: DISCONTINUED | OUTPATIENT
Start: 2023-01-01 | End: 2023-01-01

## 2023-01-01 RX ORDER — ATENOLOL 25 MG/1
1 TABLET ORAL
Qty: 0 | Refills: 0 | DISCHARGE
Start: 2023-01-01

## 2023-01-01 RX ORDER — ACETAMINOPHEN 500 MG
2 TABLET ORAL
Qty: 50 | Refills: 0
Start: 2023-01-01

## 2023-01-01 RX ORDER — MAGNESIUM SULFATE 500 MG/ML
2 VIAL (ML) INJECTION ONCE
Refills: 0 | Status: COMPLETED | OUTPATIENT
Start: 2023-01-01 | End: 2023-01-01

## 2023-01-01 RX ORDER — DIAZEPAM 5 MG
10 TABLET ORAL ONCE
Refills: 0 | Status: DISCONTINUED | OUTPATIENT
Start: 2023-01-01 | End: 2023-01-01

## 2023-01-01 RX ORDER — HEPARIN SODIUM 5000 [USP'U]/ML
5000 INJECTION INTRAVENOUS; SUBCUTANEOUS EVERY 12 HOURS
Refills: 0 | Status: DISCONTINUED | OUTPATIENT
Start: 2023-01-01 | End: 2023-01-01

## 2023-01-01 RX ORDER — FLUTICASONE FUROATE, UMECLIDINIUM BROMIDE AND VILANTEROL TRIFENATATE 200; 62.5; 25 UG/1; UG/1; UG/1
1 POWDER RESPIRATORY (INHALATION)
Qty: 0 | Refills: 0 | DISCHARGE

## 2023-01-01 RX ORDER — MIRTAZAPINE 45 MG/1
7.5 TABLET, ORALLY DISINTEGRATING ORAL AT BEDTIME
Refills: 0 | Status: DISCONTINUED | OUTPATIENT
Start: 2023-01-01 | End: 2023-01-01

## 2023-01-01 RX ORDER — ACETAMINOPHEN 500 MG
2 TABLET ORAL
Qty: 0 | Refills: 0 | DISCHARGE
Start: 2023-01-01

## 2023-01-01 RX ORDER — ALBUTEROL 90 UG/1
2 AEROSOL, METERED ORAL
Refills: 0 | Status: DISCONTINUED | OUTPATIENT
Start: 2023-01-01 | End: 2023-01-01

## 2023-01-01 RX ORDER — HYDROMORPHONE HYDROCHLORIDE 2 MG/ML
4 INJECTION INTRAMUSCULAR; INTRAVENOUS; SUBCUTANEOUS EVERY 4 HOURS
Refills: 0 | Status: DISCONTINUED | OUTPATIENT
Start: 2023-01-01 | End: 2023-01-01

## 2023-01-01 RX ORDER — SODIUM CHLORIDE 9 MG/ML
1000 INJECTION, SOLUTION INTRAVENOUS
Refills: 0 | Status: DISCONTINUED | OUTPATIENT
Start: 2023-01-01 | End: 2023-01-01

## 2023-01-01 RX ORDER — CEFTRIAXONE 500 MG/1
1000 INJECTION, POWDER, FOR SOLUTION INTRAMUSCULAR; INTRAVENOUS ONCE
Refills: 0 | Status: COMPLETED | OUTPATIENT
Start: 2023-01-01 | End: 2023-01-01

## 2023-01-01 RX ORDER — CEFAZOLIN SODIUM 1 G
2000 VIAL (EA) INJECTION ONCE
Refills: 0 | Status: COMPLETED | OUTPATIENT
Start: 2023-01-01 | End: 2023-01-01

## 2023-01-01 RX ORDER — LIDOCAINE HCL 20 MG/ML
1 VIAL (ML) INJECTION EVERY 8 HOURS
Refills: 0 | Status: DISCONTINUED | OUTPATIENT
Start: 2023-01-01 | End: 2023-01-01

## 2023-01-01 RX ORDER — HALOPERIDOL DECANOATE 100 MG/ML
2.5 INJECTION INTRAMUSCULAR ONCE
Refills: 0 | Status: COMPLETED | OUTPATIENT
Start: 2023-01-01 | End: 2023-01-01

## 2023-01-01 RX ORDER — CEFAZOLIN SODIUM 1 G
1000 VIAL (EA) INJECTION EVERY 12 HOURS
Refills: 0 | Status: DISCONTINUED | OUTPATIENT
Start: 2023-01-01 | End: 2023-01-01

## 2023-01-01 RX ORDER — IPRATROPIUM/ALBUTEROL SULFATE 18-103MCG
3 AEROSOL WITH ADAPTER (GRAM) INHALATION EVERY 4 HOURS
Refills: 0 | Status: DISCONTINUED | OUTPATIENT
Start: 2023-01-01 | End: 2023-01-01

## 2023-01-01 RX ORDER — SIMETHICONE 80 MG/1
80 TABLET, CHEWABLE ORAL ONCE
Refills: 0 | Status: COMPLETED | OUTPATIENT
Start: 2023-01-01 | End: 2023-01-01

## 2023-01-01 RX ORDER — SIMVASTATIN 20 MG/1
1 TABLET, FILM COATED ORAL
Qty: 0 | Refills: 0 | DISCHARGE

## 2023-01-01 RX ORDER — ALBUTEROL SULFATE 90 UG/1
108 (90 BASE) AEROSOL, METERED RESPIRATORY (INHALATION)
Refills: 0 | Status: DISCONTINUED | COMMUNITY
End: 2023-01-01

## 2023-01-01 RX ORDER — PANTOPRAZOLE SODIUM 20 MG/1
40 TABLET, DELAYED RELEASE ORAL
Refills: 0 | Status: DISCONTINUED | OUTPATIENT
Start: 2023-01-01 | End: 2023-01-01

## 2023-01-01 RX ORDER — HYDROMORPHONE HYDROCHLORIDE 2 MG/ML
1 INJECTION INTRAMUSCULAR; INTRAVENOUS; SUBCUTANEOUS
Qty: 0 | Refills: 0 | DISCHARGE
Start: 2023-01-01

## 2023-01-01 RX ORDER — MAGNESIUM HYDROXIDE 400 MG/1
30 TABLET, CHEWABLE ORAL ONCE
Refills: 0 | Status: COMPLETED | OUTPATIENT
Start: 2023-01-01 | End: 2023-01-01

## 2023-01-01 RX ORDER — HEPARIN SODIUM 5000 [USP'U]/ML
5000 INJECTION INTRAVENOUS; SUBCUTANEOUS EVERY 8 HOURS
Refills: 0 | Status: DISCONTINUED | OUTPATIENT
Start: 2023-01-01 | End: 2023-01-01

## 2023-01-01 RX ORDER — CEFTRIAXONE 500 MG/1
1000 INJECTION, POWDER, FOR SOLUTION INTRAMUSCULAR; INTRAVENOUS EVERY 24 HOURS
Refills: 0 | Status: DISCONTINUED | OUTPATIENT
Start: 2023-01-01 | End: 2023-01-01

## 2023-01-01 RX ORDER — ACETAMINOPHEN 500 MG
1000 TABLET ORAL ONCE
Refills: 0 | Status: DISCONTINUED | OUTPATIENT
Start: 2023-01-01 | End: 2023-01-01

## 2023-01-01 RX ORDER — MORPHINE SULFATE 50 MG/1
4 CAPSULE, EXTENDED RELEASE ORAL ONCE
Refills: 0 | Status: DISCONTINUED | OUTPATIENT
Start: 2023-01-01 | End: 2023-01-01

## 2023-01-01 RX ORDER — CEFPODOXIME PROXETIL 100 MG
1 TABLET ORAL
Qty: 14 | Refills: 0
Start: 2023-01-01 | End: 2023-01-01

## 2023-01-01 RX ORDER — OXYBUTYNIN CHLORIDE 5 MG
1 TABLET ORAL
Qty: 0 | Refills: 0 | DISCHARGE
Start: 2023-01-01

## 2023-01-01 RX ORDER — PIPERACILLIN AND TAZOBACTAM 4; .5 G/20ML; G/20ML
3.38 INJECTION, POWDER, LYOPHILIZED, FOR SOLUTION INTRAVENOUS EVERY 12 HOURS
Refills: 0 | Status: DISCONTINUED | OUTPATIENT
Start: 2023-01-01 | End: 2023-01-01

## 2023-01-01 RX ORDER — OXYBUTYNIN CHLORIDE 5 MG
5 TABLET ORAL THREE TIMES A DAY
Refills: 0 | Status: DISCONTINUED | OUTPATIENT
Start: 2023-01-01 | End: 2023-01-01

## 2023-01-01 RX ORDER — MAGNESIUM HYDROXIDE 400 MG/1
30 TABLET, CHEWABLE ORAL
Qty: 0 | Refills: 0 | DISCHARGE
Start: 2023-01-01

## 2023-01-01 RX ORDER — LANOLIN ALCOHOL/MO/W.PET/CERES
1 CREAM (GRAM) TOPICAL
Qty: 0 | Refills: 0 | DISCHARGE
Start: 2023-01-01

## 2023-01-01 RX ORDER — DIAZEPAM 5 MG
2 TABLET ORAL
Refills: 0 | DISCHARGE

## 2023-01-01 RX ORDER — LIDOCAINE HCL 20 MG/ML
0.2 VIAL (ML) INJECTION ONCE
Refills: 0 | Status: DISCONTINUED | OUTPATIENT
Start: 2023-01-01 | End: 2023-01-01

## 2023-01-01 RX ORDER — ALBUTEROL 90 UG/1
2 AEROSOL, METERED ORAL
Qty: 1 | Refills: 0
Start: 2023-01-01

## 2023-01-01 RX ORDER — FLUOXETINE HYDROCHLORIDE 40 MG/1
40 CAPSULE ORAL
Qty: 90 | Refills: 0 | Status: ACTIVE | COMMUNITY
Start: 2023-01-01

## 2023-01-01 RX ORDER — DIAZEPAM 5 MG
2 TABLET ORAL ONCE
Refills: 0 | Status: DISCONTINUED | OUTPATIENT
Start: 2023-01-01 | End: 2023-01-01

## 2023-01-01 RX ORDER — MAGNESIUM HYDROXIDE 400 MG/1
30 TABLET, CHEWABLE ORAL DAILY
Refills: 0 | Status: DISCONTINUED | OUTPATIENT
Start: 2023-01-01 | End: 2023-01-01

## 2023-01-01 RX ORDER — SODIUM CHLORIDE 9 MG/ML
3 INJECTION INTRAMUSCULAR; INTRAVENOUS; SUBCUTANEOUS EVERY 8 HOURS
Refills: 0 | Status: DISCONTINUED | OUTPATIENT
Start: 2023-01-01 | End: 2023-01-01

## 2023-01-01 RX ORDER — ATENOLOL 25 MG/1
1 TABLET ORAL
Qty: 0 | Refills: 0 | DISCHARGE

## 2023-01-01 RX ORDER — TAMSULOSIN HYDROCHLORIDE 0.4 MG/1
1 CAPSULE ORAL
Qty: 0 | Refills: 0 | DISCHARGE
Start: 2023-01-01

## 2023-01-01 RX ORDER — SIMVASTATIN 20 MG/1
1 TABLET, FILM COATED ORAL
Refills: 0 | DISCHARGE

## 2023-01-01 RX ADMIN — Medication 650 MILLIGRAM(S): at 22:36

## 2023-01-01 RX ADMIN — Medication 40 MILLIGRAM(S): at 05:16

## 2023-01-01 RX ADMIN — Medication 650 MILLIGRAM(S): at 16:02

## 2023-01-01 RX ADMIN — Medication 400 MILLIGRAM(S): at 21:30

## 2023-01-01 RX ADMIN — TAMSULOSIN HYDROCHLORIDE 0.4 MILLIGRAM(S): 0.4 CAPSULE ORAL at 22:14

## 2023-01-01 RX ADMIN — Medication 400 MILLIGRAM(S): at 15:19

## 2023-01-01 RX ADMIN — Medication 25 MILLIGRAM(S): at 05:18

## 2023-01-01 RX ADMIN — Medication 5 MILLIGRAM(S): at 22:36

## 2023-01-01 RX ADMIN — Medication 650 MILLIGRAM(S): at 06:35

## 2023-01-01 RX ADMIN — CEFTRIAXONE 100 MILLIGRAM(S): 500 INJECTION, POWDER, FOR SOLUTION INTRAMUSCULAR; INTRAVENOUS at 21:55

## 2023-01-01 RX ADMIN — Medication 10 MILLIGRAM(S): at 21:12

## 2023-01-01 RX ADMIN — Medication 100 MILLIGRAM(S): at 07:35

## 2023-01-01 RX ADMIN — Medication 40 MILLIGRAM(S): at 13:17

## 2023-01-01 RX ADMIN — Medication 1000 MILLIGRAM(S): at 21:52

## 2023-01-01 RX ADMIN — POLYETHYLENE GLYCOL 3350 17 GRAM(S): 17 POWDER, FOR SOLUTION ORAL at 11:27

## 2023-01-01 RX ADMIN — TAMSULOSIN HYDROCHLORIDE 0.4 MILLIGRAM(S): 0.4 CAPSULE ORAL at 22:35

## 2023-01-01 RX ADMIN — Medication 5 MILLIGRAM(S): at 09:15

## 2023-01-01 RX ADMIN — TAMSULOSIN HYDROCHLORIDE 0.4 MILLIGRAM(S): 0.4 CAPSULE ORAL at 21:24

## 2023-01-01 RX ADMIN — HEPARIN SODIUM 5000 UNIT(S): 5000 INJECTION INTRAVENOUS; SUBCUTANEOUS at 14:44

## 2023-01-01 RX ADMIN — SENNA PLUS 2 TABLET(S): 8.6 TABLET ORAL at 06:23

## 2023-01-01 RX ADMIN — ATENOLOL 25 MILLIGRAM(S): 25 TABLET ORAL at 05:54

## 2023-01-01 RX ADMIN — Medication 5 MILLIGRAM(S): at 11:30

## 2023-01-01 RX ADMIN — ATORVASTATIN CALCIUM 40 MILLIGRAM(S): 80 TABLET, FILM COATED ORAL at 21:24

## 2023-01-01 RX ADMIN — CHLORHEXIDINE GLUCONATE 1 APPLICATION(S): 213 SOLUTION TOPICAL at 09:24

## 2023-01-01 RX ADMIN — Medication 650 MILLIGRAM(S): at 16:59

## 2023-01-01 RX ADMIN — Medication 3 MILLILITER(S): at 17:58

## 2023-01-01 RX ADMIN — MORPHINE SULFATE 2 MILLIGRAM(S): 50 CAPSULE, EXTENDED RELEASE ORAL at 02:13

## 2023-01-01 RX ADMIN — MIRTAZAPINE 7.5 MILLIGRAM(S): 45 TABLET, ORALLY DISINTEGRATING ORAL at 21:50

## 2023-01-01 RX ADMIN — ATORVASTATIN CALCIUM 40 MILLIGRAM(S): 80 TABLET, FILM COATED ORAL at 21:32

## 2023-01-01 RX ADMIN — Medication 3 MILLILITER(S): at 05:53

## 2023-01-01 RX ADMIN — FLUTICASONE FUROATE, UMECLIDINIUM BROMIDE AND VILANTEROL TRIFENATATE 1 PUFF(S): 200; 62.5; 25 POWDER RESPIRATORY (INHALATION) at 11:40

## 2023-01-01 RX ADMIN — PIPERACILLIN AND TAZOBACTAM 25 GRAM(S): 4; .5 INJECTION, POWDER, LYOPHILIZED, FOR SOLUTION INTRAVENOUS at 20:29

## 2023-01-01 RX ADMIN — Medication 3 MILLILITER(S): at 09:54

## 2023-01-01 RX ADMIN — PANTOPRAZOLE SODIUM 40 MILLIGRAM(S): 20 TABLET, DELAYED RELEASE ORAL at 05:16

## 2023-01-01 RX ADMIN — Medication 3 MILLILITER(S): at 17:57

## 2023-01-01 RX ADMIN — ATORVASTATIN CALCIUM 40 MILLIGRAM(S): 80 TABLET, FILM COATED ORAL at 21:38

## 2023-01-01 RX ADMIN — Medication 5 MILLIGRAM(S): at 12:24

## 2023-01-01 RX ADMIN — Medication 3 MILLILITER(S): at 18:36

## 2023-01-01 RX ADMIN — Medication 3 MILLILITER(S): at 22:38

## 2023-01-01 RX ADMIN — HYDROMORPHONE HYDROCHLORIDE 0.3 MILLIGRAM(S): 2 INJECTION INTRAMUSCULAR; INTRAVENOUS; SUBCUTANEOUS at 14:23

## 2023-01-01 RX ADMIN — Medication 10 MILLIGRAM(S): at 21:43

## 2023-01-01 RX ADMIN — Medication 650 MILLIGRAM(S): at 02:00

## 2023-01-01 RX ADMIN — Medication 650 MILLIGRAM(S): at 23:15

## 2023-01-01 RX ADMIN — SODIUM CHLORIDE 1000 MILLILITER(S): 9 INJECTION INTRAMUSCULAR; INTRAVENOUS; SUBCUTANEOUS at 18:36

## 2023-01-01 RX ADMIN — Medication 30 MILLILITER(S): at 00:07

## 2023-01-01 RX ADMIN — Medication 40 MILLIGRAM(S): at 05:39

## 2023-01-01 RX ADMIN — Medication 5 MILLIGRAM(S): at 02:14

## 2023-01-01 RX ADMIN — ATORVASTATIN CALCIUM 40 MILLIGRAM(S): 80 TABLET, FILM COATED ORAL at 22:59

## 2023-01-01 RX ADMIN — Medication 650 MILLIGRAM(S): at 17:02

## 2023-01-01 RX ADMIN — HEPARIN SODIUM 5000 UNIT(S): 5000 INJECTION INTRAVENOUS; SUBCUTANEOUS at 05:39

## 2023-01-01 RX ADMIN — Medication 650 MILLIGRAM(S): at 04:10

## 2023-01-01 RX ADMIN — Medication 3 MILLIGRAM(S): at 02:10

## 2023-01-01 RX ADMIN — Medication 650 MILLIGRAM(S): at 10:33

## 2023-01-01 RX ADMIN — MIRTAZAPINE 7.5 MILLIGRAM(S): 45 TABLET, ORALLY DISINTEGRATING ORAL at 22:13

## 2023-01-01 RX ADMIN — SODIUM CHLORIDE 1000 MILLILITER(S): 9 INJECTION INTRAMUSCULAR; INTRAVENOUS; SUBCUTANEOUS at 10:06

## 2023-01-01 RX ADMIN — Medication 3 MILLIGRAM(S): at 02:08

## 2023-01-01 RX ADMIN — BUDESONIDE AND FORMOTEROL FUMARATE DIHYDRATE 2 PUFF(S): 160; 4.5 AEROSOL RESPIRATORY (INHALATION) at 05:45

## 2023-01-01 RX ADMIN — Medication 5 MILLIGRAM(S): at 00:37

## 2023-01-01 RX ADMIN — Medication 1 TABLET(S): at 13:20

## 2023-01-01 RX ADMIN — Medication 650 MILLIGRAM(S): at 11:15

## 2023-01-01 RX ADMIN — PIPERACILLIN AND TAZOBACTAM 25 GRAM(S): 4; .5 INJECTION, POWDER, LYOPHILIZED, FOR SOLUTION INTRAVENOUS at 18:04

## 2023-01-01 RX ADMIN — ATORVASTATIN CALCIUM 40 MILLIGRAM(S): 80 TABLET, FILM COATED ORAL at 21:46

## 2023-01-01 RX ADMIN — CEFTRIAXONE 100 MILLIGRAM(S): 500 INJECTION, POWDER, FOR SOLUTION INTRAMUSCULAR; INTRAVENOUS at 14:11

## 2023-01-01 RX ADMIN — Medication 100 MILLIGRAM(S): at 17:24

## 2023-01-01 RX ADMIN — PIPERACILLIN AND TAZOBACTAM 25 GRAM(S): 4; .5 INJECTION, POWDER, LYOPHILIZED, FOR SOLUTION INTRAVENOUS at 07:35

## 2023-01-01 RX ADMIN — Medication 650 MILLIGRAM(S): at 10:20

## 2023-01-01 RX ADMIN — Medication 3 MILLILITER(S): at 15:20

## 2023-01-01 RX ADMIN — Medication 400 MILLIGRAM(S): at 13:17

## 2023-01-01 RX ADMIN — Medication 40 MILLIGRAM(S): at 12:50

## 2023-01-01 RX ADMIN — BUDESONIDE AND FORMOTEROL FUMARATE DIHYDRATE 2 PUFF(S): 160; 4.5 AEROSOL RESPIRATORY (INHALATION) at 05:27

## 2023-01-01 RX ADMIN — Medication 5 MILLIGRAM(S): at 21:40

## 2023-01-01 RX ADMIN — SENNA PLUS 2 TABLET(S): 8.6 TABLET ORAL at 21:13

## 2023-01-01 RX ADMIN — Medication 650 MILLIGRAM(S): at 03:10

## 2023-01-01 RX ADMIN — Medication 150 GRAM(S): at 20:16

## 2023-01-01 RX ADMIN — Medication 3 MILLILITER(S): at 19:04

## 2023-01-01 RX ADMIN — Medication 3 MILLILITER(S): at 17:09

## 2023-01-01 RX ADMIN — Medication 650 MILLIGRAM(S): at 22:42

## 2023-01-01 RX ADMIN — Medication 650 MILLIGRAM(S): at 10:13

## 2023-01-01 RX ADMIN — Medication 650 MILLIGRAM(S): at 05:07

## 2023-01-01 RX ADMIN — Medication 650 MILLIGRAM(S): at 06:15

## 2023-01-01 RX ADMIN — Medication 3 MILLILITER(S): at 13:45

## 2023-01-01 RX ADMIN — Medication 2 MILLIGRAM(S): at 23:52

## 2023-01-01 RX ADMIN — BUDESONIDE AND FORMOTEROL FUMARATE DIHYDRATE 2 PUFF(S): 160; 4.5 AEROSOL RESPIRATORY (INHALATION) at 05:54

## 2023-01-01 RX ADMIN — Medication 5 MILLIGRAM(S): at 21:43

## 2023-01-01 RX ADMIN — Medication 40 MILLIGRAM(S): at 11:42

## 2023-01-01 RX ADMIN — Medication 3 MILLILITER(S): at 11:08

## 2023-01-01 RX ADMIN — Medication 1000 MILLIGRAM(S): at 22:00

## 2023-01-01 RX ADMIN — BUDESONIDE AND FORMOTEROL FUMARATE DIHYDRATE 2 PUFF(S): 160; 4.5 AEROSOL RESPIRATORY (INHALATION) at 17:58

## 2023-01-01 RX ADMIN — Medication 5 MILLIGRAM(S): at 14:48

## 2023-01-01 RX ADMIN — Medication 100 MILLIGRAM(S): at 17:58

## 2023-01-01 RX ADMIN — Medication 6 MILLIGRAM(S): at 00:15

## 2023-01-01 RX ADMIN — Medication 5 MILLIGRAM(S): at 21:32

## 2023-01-01 RX ADMIN — ATORVASTATIN CALCIUM 40 MILLIGRAM(S): 80 TABLET, FILM COATED ORAL at 21:39

## 2023-01-01 RX ADMIN — Medication 100 MILLIGRAM(S): at 06:31

## 2023-01-01 RX ADMIN — BUDESONIDE AND FORMOTEROL FUMARATE DIHYDRATE 2 PUFF(S): 160; 4.5 AEROSOL RESPIRATORY (INHALATION) at 05:39

## 2023-01-01 RX ADMIN — Medication 5 MILLIGRAM(S): at 21:38

## 2023-01-01 RX ADMIN — TAMSULOSIN HYDROCHLORIDE 0.4 MILLIGRAM(S): 0.4 CAPSULE ORAL at 23:10

## 2023-01-01 RX ADMIN — Medication 650 MILLIGRAM(S): at 12:45

## 2023-01-01 RX ADMIN — Medication 100 MILLIGRAM(S): at 17:16

## 2023-01-01 RX ADMIN — FLUTICASONE FUROATE, UMECLIDINIUM BROMIDE AND VILANTEROL TRIFENATATE 1 PUFF(S): 200; 62.5; 25 POWDER RESPIRATORY (INHALATION) at 14:14

## 2023-01-01 RX ADMIN — HYDROMORPHONE HYDROCHLORIDE 4 MILLIGRAM(S): 2 INJECTION INTRAMUSCULAR; INTRAVENOUS; SUBCUTANEOUS at 13:00

## 2023-01-01 RX ADMIN — ATORVASTATIN CALCIUM 40 MILLIGRAM(S): 80 TABLET, FILM COATED ORAL at 21:43

## 2023-01-01 RX ADMIN — Medication 650 MILLIGRAM(S): at 09:56

## 2023-01-01 RX ADMIN — Medication 3 MILLILITER(S): at 09:13

## 2023-01-01 RX ADMIN — BUDESONIDE AND FORMOTEROL FUMARATE DIHYDRATE 2 PUFF(S): 160; 4.5 AEROSOL RESPIRATORY (INHALATION) at 06:19

## 2023-01-01 RX ADMIN — Medication 650 MILLIGRAM(S): at 17:20

## 2023-01-01 RX ADMIN — ATENOLOL 25 MILLIGRAM(S): 25 TABLET ORAL at 05:53

## 2023-01-01 RX ADMIN — Medication 650 MILLIGRAM(S): at 21:13

## 2023-01-01 RX ADMIN — BUDESONIDE AND FORMOTEROL FUMARATE DIHYDRATE 2 PUFF(S): 160; 4.5 AEROSOL RESPIRATORY (INHALATION) at 09:23

## 2023-01-01 RX ADMIN — TAMSULOSIN HYDROCHLORIDE 0.4 MILLIGRAM(S): 0.4 CAPSULE ORAL at 21:44

## 2023-01-01 RX ADMIN — Medication 40 MILLIGRAM(S): at 12:10

## 2023-01-01 RX ADMIN — Medication 5 MILLIGRAM(S): at 06:28

## 2023-01-01 RX ADMIN — Medication 975 MILLIGRAM(S): at 18:36

## 2023-01-01 RX ADMIN — HALOPERIDOL DECANOATE 2.5 MILLIGRAM(S): 100 INJECTION INTRAMUSCULAR at 01:00

## 2023-01-01 RX ADMIN — MIRTAZAPINE 7.5 MILLIGRAM(S): 45 TABLET, ORALLY DISINTEGRATING ORAL at 22:45

## 2023-01-01 RX ADMIN — Medication 5 MILLIGRAM(S): at 10:58

## 2023-01-01 RX ADMIN — Medication 3 MILLILITER(S): at 13:39

## 2023-01-01 RX ADMIN — TAMSULOSIN HYDROCHLORIDE 0.4 MILLIGRAM(S): 0.4 CAPSULE ORAL at 21:40

## 2023-01-01 RX ADMIN — SODIUM CHLORIDE 1000 MILLILITER(S): 9 INJECTION INTRAMUSCULAR; INTRAVENOUS; SUBCUTANEOUS at 13:35

## 2023-01-01 RX ADMIN — MAGNESIUM HYDROXIDE 30 MILLILITER(S): 400 TABLET, CHEWABLE ORAL at 09:27

## 2023-01-01 RX ADMIN — TAMSULOSIN HYDROCHLORIDE 0.4 MILLIGRAM(S): 0.4 CAPSULE ORAL at 21:33

## 2023-01-01 RX ADMIN — CHLORHEXIDINE GLUCONATE 1 APPLICATION(S): 213 SOLUTION TOPICAL at 12:28

## 2023-01-01 RX ADMIN — CHLORHEXIDINE GLUCONATE 1 APPLICATION(S): 213 SOLUTION TOPICAL at 11:39

## 2023-01-01 RX ADMIN — BUDESONIDE AND FORMOTEROL FUMARATE DIHYDRATE 2 PUFF(S): 160; 4.5 AEROSOL RESPIRATORY (INHALATION) at 09:04

## 2023-01-01 RX ADMIN — ALBUTEROL 2.5 MILLIGRAM(S): 90 AEROSOL, METERED ORAL at 20:19

## 2023-01-01 RX ADMIN — SENNA PLUS 2 TABLET(S): 8.6 TABLET ORAL at 05:50

## 2023-01-01 RX ADMIN — BUDESONIDE AND FORMOTEROL FUMARATE DIHYDRATE 2 PUFF(S): 160; 4.5 AEROSOL RESPIRATORY (INHALATION) at 21:24

## 2023-01-01 RX ADMIN — ATENOLOL 25 MILLIGRAM(S): 25 TABLET ORAL at 05:35

## 2023-01-01 RX ADMIN — HYDROMORPHONE HYDROCHLORIDE 2 MILLIGRAM(S): 2 INJECTION INTRAMUSCULAR; INTRAVENOUS; SUBCUTANEOUS at 17:15

## 2023-01-01 RX ADMIN — BUDESONIDE AND FORMOTEROL FUMARATE DIHYDRATE 2 PUFF(S): 160; 4.5 AEROSOL RESPIRATORY (INHALATION) at 21:32

## 2023-01-01 RX ADMIN — CHLORHEXIDINE GLUCONATE 1 APPLICATION(S): 213 SOLUTION TOPICAL at 13:34

## 2023-01-01 RX ADMIN — SENNA PLUS 2 TABLET(S): 8.6 TABLET ORAL at 22:36

## 2023-01-01 RX ADMIN — Medication 40 MILLIGRAM(S): at 12:25

## 2023-01-01 RX ADMIN — Medication 5 MILLIGRAM(S): at 23:47

## 2023-01-01 RX ADMIN — Medication 40 MILLIGRAM(S): at 11:41

## 2023-01-01 RX ADMIN — TIOTROPIUM BROMIDE 2 PUFF(S): 18 CAPSULE ORAL; RESPIRATORY (INHALATION) at 11:26

## 2023-01-01 RX ADMIN — TIOTROPIUM BROMIDE 2 PUFF(S): 18 CAPSULE ORAL; RESPIRATORY (INHALATION) at 13:18

## 2023-01-01 RX ADMIN — Medication 3 MILLIGRAM(S): at 22:00

## 2023-01-01 RX ADMIN — SODIUM CHLORIDE 75 MILLILITER(S): 9 INJECTION, SOLUTION INTRAVENOUS at 14:18

## 2023-01-01 RX ADMIN — BUDESONIDE AND FORMOTEROL FUMARATE DIHYDRATE 2 PUFF(S): 160; 4.5 AEROSOL RESPIRATORY (INHALATION) at 17:36

## 2023-01-01 RX ADMIN — Medication 5 MILLIGRAM(S): at 08:43

## 2023-01-01 RX ADMIN — MORPHINE SULFATE 4 MILLIGRAM(S): 50 CAPSULE, EXTENDED RELEASE ORAL at 13:35

## 2023-01-01 RX ADMIN — SENNA PLUS 2 TABLET(S): 8.6 TABLET ORAL at 21:22

## 2023-01-01 RX ADMIN — ATORVASTATIN CALCIUM 40 MILLIGRAM(S): 80 TABLET, FILM COATED ORAL at 21:25

## 2023-01-01 RX ADMIN — Medication 40 MILLIGRAM(S): at 12:01

## 2023-01-01 RX ADMIN — TIOTROPIUM BROMIDE 2 PUFF(S): 18 CAPSULE ORAL; RESPIRATORY (INHALATION) at 12:26

## 2023-01-01 RX ADMIN — FENTANYL CITRATE 50 MICROGRAM(S): 50 INJECTION INTRAVENOUS at 10:30

## 2023-01-01 RX ADMIN — BUDESONIDE AND FORMOTEROL FUMARATE DIHYDRATE 2 PUFF(S): 160; 4.5 AEROSOL RESPIRATORY (INHALATION) at 05:20

## 2023-01-01 RX ADMIN — ATENOLOL 25 MILLIGRAM(S): 25 TABLET ORAL at 12:00

## 2023-01-01 RX ADMIN — Medication 975 MILLIGRAM(S): at 01:57

## 2023-01-01 RX ADMIN — Medication 40 MILLIGRAM(S): at 12:44

## 2023-01-01 RX ADMIN — Medication 3 MILLIGRAM(S): at 02:42

## 2023-01-01 RX ADMIN — Medication 100 MILLIGRAM(S): at 07:08

## 2023-01-01 RX ADMIN — Medication 5 MILLIGRAM(S): at 21:29

## 2023-01-01 RX ADMIN — MORPHINE SULFATE 2 MILLIGRAM(S): 50 CAPSULE, EXTENDED RELEASE ORAL at 21:54

## 2023-01-01 RX ADMIN — ATENOLOL 25 MILLIGRAM(S): 25 TABLET ORAL at 09:41

## 2023-01-01 RX ADMIN — Medication 650 MILLIGRAM(S): at 17:30

## 2023-01-01 RX ADMIN — Medication 1 MILLIGRAM(S): at 00:59

## 2023-01-01 RX ADMIN — Medication 3 MILLILITER(S): at 22:13

## 2023-01-01 RX ADMIN — Medication 5 MILLIGRAM(S): at 20:02

## 2023-01-01 RX ADMIN — Medication 3 MILLILITER(S): at 12:25

## 2023-01-01 RX ADMIN — Medication 650 MILLIGRAM(S): at 03:54

## 2023-01-01 RX ADMIN — Medication 3 MILLILITER(S): at 21:13

## 2023-01-01 RX ADMIN — ATENOLOL 25 MILLIGRAM(S): 25 TABLET ORAL at 05:07

## 2023-01-01 RX ADMIN — MIRTAZAPINE 7.5 MILLIGRAM(S): 45 TABLET, ORALLY DISINTEGRATING ORAL at 22:37

## 2023-01-01 RX ADMIN — Medication 3 MILLILITER(S): at 02:17

## 2023-01-01 RX ADMIN — TAMSULOSIN HYDROCHLORIDE 0.4 MILLIGRAM(S): 0.4 CAPSULE ORAL at 22:45

## 2023-01-01 RX ADMIN — BUDESONIDE AND FORMOTEROL FUMARATE DIHYDRATE 2 PUFF(S): 160; 4.5 AEROSOL RESPIRATORY (INHALATION) at 17:04

## 2023-01-01 RX ADMIN — MIRTAZAPINE 7.5 MILLIGRAM(S): 45 TABLET, ORALLY DISINTEGRATING ORAL at 21:38

## 2023-01-01 RX ADMIN — TAMSULOSIN HYDROCHLORIDE 0.4 MILLIGRAM(S): 0.4 CAPSULE ORAL at 21:39

## 2023-01-01 RX ADMIN — SENNA PLUS 2 TABLET(S): 8.6 TABLET ORAL at 21:46

## 2023-01-01 RX ADMIN — Medication 650 MILLIGRAM(S): at 21:32

## 2023-01-01 RX ADMIN — CEFTRIAXONE 100 MILLIGRAM(S): 500 INJECTION, POWDER, FOR SOLUTION INTRAMUSCULAR; INTRAVENOUS at 21:25

## 2023-01-01 RX ADMIN — Medication 40 MILLIGRAM(S): at 12:27

## 2023-01-01 RX ADMIN — HEPARIN SODIUM 5000 UNIT(S): 5000 INJECTION INTRAVENOUS; SUBCUTANEOUS at 18:21

## 2023-01-01 RX ADMIN — Medication 5 MILLIGRAM(S): at 09:40

## 2023-01-01 RX ADMIN — MORPHINE SULFATE 2 MILLIGRAM(S): 50 CAPSULE, EXTENDED RELEASE ORAL at 01:57

## 2023-01-01 RX ADMIN — Medication 1 TABLET(S): at 12:26

## 2023-01-01 RX ADMIN — SENNA PLUS 2 TABLET(S): 8.6 TABLET ORAL at 21:25

## 2023-01-01 RX ADMIN — Medication 650 MILLIGRAM(S): at 22:32

## 2023-01-01 RX ADMIN — HYDROMORPHONE HYDROCHLORIDE 0.3 MILLIGRAM(S): 2 INJECTION INTRAMUSCULAR; INTRAVENOUS; SUBCUTANEOUS at 13:52

## 2023-01-01 RX ADMIN — BUDESONIDE AND FORMOTEROL FUMARATE DIHYDRATE 2 PUFF(S): 160; 4.5 AEROSOL RESPIRATORY (INHALATION) at 09:00

## 2023-01-01 RX ADMIN — Medication 3 MILLILITER(S): at 01:28

## 2023-01-01 RX ADMIN — Medication 3 MILLIGRAM(S): at 23:34

## 2023-01-01 RX ADMIN — Medication 3 MILLILITER(S): at 06:16

## 2023-01-01 RX ADMIN — ATORVASTATIN CALCIUM 40 MILLIGRAM(S): 80 TABLET, FILM COATED ORAL at 23:10

## 2023-01-01 RX ADMIN — ATORVASTATIN CALCIUM 40 MILLIGRAM(S): 80 TABLET, FILM COATED ORAL at 22:35

## 2023-01-01 RX ADMIN — PIPERACILLIN AND TAZOBACTAM 25 GRAM(S): 4; .5 INJECTION, POWDER, LYOPHILIZED, FOR SOLUTION INTRAVENOUS at 19:01

## 2023-01-01 RX ADMIN — Medication 3 MILLILITER(S): at 06:19

## 2023-01-01 RX ADMIN — Medication 3 MILLILITER(S): at 05:20

## 2023-01-01 RX ADMIN — CHLORHEXIDINE GLUCONATE 1 APPLICATION(S): 213 SOLUTION TOPICAL at 13:17

## 2023-01-01 RX ADMIN — Medication 40 MILLIGRAM(S): at 12:40

## 2023-01-01 RX ADMIN — TIOTROPIUM BROMIDE 2 PUFF(S): 18 CAPSULE ORAL; RESPIRATORY (INHALATION) at 11:42

## 2023-01-01 RX ADMIN — Medication 3 MILLILITER(S): at 21:09

## 2023-01-01 RX ADMIN — Medication 100 MILLIGRAM(S): at 20:27

## 2023-01-01 RX ADMIN — Medication 100 MILLIGRAM(S): at 07:19

## 2023-01-01 RX ADMIN — MIRTAZAPINE 7.5 MILLIGRAM(S): 45 TABLET, ORALLY DISINTEGRATING ORAL at 21:13

## 2023-01-01 RX ADMIN — Medication 5 MILLIGRAM(S): at 11:08

## 2023-01-01 RX ADMIN — BUDESONIDE AND FORMOTEROL FUMARATE DIHYDRATE 2 PUFF(S): 160; 4.5 AEROSOL RESPIRATORY (INHALATION) at 17:09

## 2023-01-01 RX ADMIN — Medication 3 MILLIGRAM(S): at 23:52

## 2023-01-01 RX ADMIN — Medication 40 MILLIGRAM(S): at 14:12

## 2023-01-01 RX ADMIN — Medication 3 MILLILITER(S): at 05:08

## 2023-01-01 RX ADMIN — CEFTRIAXONE 100 MILLIGRAM(S): 500 INJECTION, POWDER, FOR SOLUTION INTRAMUSCULAR; INTRAVENOUS at 21:54

## 2023-01-01 RX ADMIN — Medication 5 MILLIGRAM(S): at 15:15

## 2023-01-01 RX ADMIN — Medication 3 MILLIGRAM(S): at 21:32

## 2023-01-01 RX ADMIN — Medication 125 MILLIGRAM(S): at 19:04

## 2023-01-01 RX ADMIN — HEPARIN SODIUM 5000 UNIT(S): 5000 INJECTION INTRAVENOUS; SUBCUTANEOUS at 06:30

## 2023-01-01 RX ADMIN — Medication 3 MILLILITER(S): at 05:26

## 2023-01-01 RX ADMIN — CHLORHEXIDINE GLUCONATE 1 APPLICATION(S): 213 SOLUTION TOPICAL at 12:14

## 2023-01-01 RX ADMIN — BUDESONIDE AND FORMOTEROL FUMARATE DIHYDRATE 2 PUFF(S): 160; 4.5 AEROSOL RESPIRATORY (INHALATION) at 05:07

## 2023-01-01 RX ADMIN — ATENOLOL 25 MILLIGRAM(S): 25 TABLET ORAL at 06:47

## 2023-01-01 RX ADMIN — Medication 1 MILLIGRAM(S): at 22:51

## 2023-01-01 RX ADMIN — Medication 650 MILLIGRAM(S): at 21:30

## 2023-01-01 RX ADMIN — HYDROMORPHONE HYDROCHLORIDE 4 MILLIGRAM(S): 2 INJECTION INTRAMUSCULAR; INTRAVENOUS; SUBCUTANEOUS at 12:05

## 2023-01-01 RX ADMIN — HYDROMORPHONE HYDROCHLORIDE 4 MILLIGRAM(S): 2 INJECTION INTRAMUSCULAR; INTRAVENOUS; SUBCUTANEOUS at 04:08

## 2023-01-01 RX ADMIN — BUDESONIDE AND FORMOTEROL FUMARATE DIHYDRATE 2 PUFF(S): 160; 4.5 AEROSOL RESPIRATORY (INHALATION) at 10:39

## 2023-01-01 RX ADMIN — Medication 5 MILLIGRAM(S): at 18:47

## 2023-01-01 RX ADMIN — OLANZAPINE 2.5 MILLIGRAM(S): 15 TABLET, FILM COATED ORAL at 00:44

## 2023-01-01 RX ADMIN — BUDESONIDE AND FORMOTEROL FUMARATE DIHYDRATE 2 PUFF(S): 160; 4.5 AEROSOL RESPIRATORY (INHALATION) at 06:10

## 2023-01-01 RX ADMIN — POLYETHYLENE GLYCOL 3350 17 GRAM(S): 17 POWDER, FOR SOLUTION ORAL at 12:25

## 2023-01-01 RX ADMIN — Medication 400 MILLIGRAM(S): at 20:59

## 2023-01-01 RX ADMIN — CHLORHEXIDINE GLUCONATE 1 APPLICATION(S): 213 SOLUTION TOPICAL at 11:29

## 2023-01-01 RX ADMIN — HEPARIN SODIUM 5000 UNIT(S): 5000 INJECTION INTRAVENOUS; SUBCUTANEOUS at 05:35

## 2023-01-01 RX ADMIN — MIRTAZAPINE 7.5 MILLIGRAM(S): 45 TABLET, ORALLY DISINTEGRATING ORAL at 21:02

## 2023-01-01 RX ADMIN — Medication 650 MILLIGRAM(S): at 19:22

## 2023-01-01 RX ADMIN — TIOTROPIUM BROMIDE 2 PUFF(S): 18 CAPSULE ORAL; RESPIRATORY (INHALATION) at 11:09

## 2023-01-01 RX ADMIN — BUDESONIDE AND FORMOTEROL FUMARATE DIHYDRATE 2 PUFF(S): 160; 4.5 AEROSOL RESPIRATORY (INHALATION) at 12:27

## 2023-01-01 RX ADMIN — Medication 40 MILLIGRAM(S): at 11:49

## 2023-01-01 RX ADMIN — BUDESONIDE AND FORMOTEROL FUMARATE DIHYDRATE 2 PUFF(S): 160; 4.5 AEROSOL RESPIRATORY (INHALATION) at 21:43

## 2023-01-01 RX ADMIN — Medication 5 MILLIGRAM(S): at 09:50

## 2023-01-01 RX ADMIN — ATORVASTATIN CALCIUM 40 MILLIGRAM(S): 80 TABLET, FILM COATED ORAL at 22:10

## 2023-01-01 RX ADMIN — Medication 650 MILLIGRAM(S): at 22:18

## 2023-01-01 RX ADMIN — HYDROMORPHONE HYDROCHLORIDE 0.5 MILLIGRAM(S): 2 INJECTION INTRAMUSCULAR; INTRAVENOUS; SUBCUTANEOUS at 17:30

## 2023-01-01 RX ADMIN — PIPERACILLIN AND TAZOBACTAM 200 GRAM(S): 4; .5 INJECTION, POWDER, LYOPHILIZED, FOR SOLUTION INTRAVENOUS at 12:37

## 2023-01-01 RX ADMIN — BUDESONIDE AND FORMOTEROL FUMARATE DIHYDRATE 2 PUFF(S): 160; 4.5 AEROSOL RESPIRATORY (INHALATION) at 22:58

## 2023-01-01 RX ADMIN — POLYETHYLENE GLYCOL 3350 17 GRAM(S): 17 POWDER, FOR SOLUTION ORAL at 12:00

## 2023-01-01 RX ADMIN — Medication 3 MILLILITER(S): at 10:45

## 2023-01-01 RX ADMIN — TIOTROPIUM BROMIDE 2 PUFF(S): 18 CAPSULE ORAL; RESPIRATORY (INHALATION) at 12:00

## 2023-01-01 RX ADMIN — PANTOPRAZOLE SODIUM 40 MILLIGRAM(S): 20 TABLET, DELAYED RELEASE ORAL at 08:20

## 2023-01-01 RX ADMIN — Medication 40 MILLIGRAM(S): at 05:18

## 2023-01-01 RX ADMIN — Medication 5 MILLIGRAM(S): at 19:21

## 2023-01-01 RX ADMIN — HEPARIN SODIUM 5000 UNIT(S): 5000 INJECTION INTRAVENOUS; SUBCUTANEOUS at 22:36

## 2023-01-01 RX ADMIN — BUDESONIDE AND FORMOTEROL FUMARATE DIHYDRATE 2 PUFF(S): 160; 4.5 AEROSOL RESPIRATORY (INHALATION) at 18:11

## 2023-01-01 RX ADMIN — MIRTAZAPINE 7.5 MILLIGRAM(S): 45 TABLET, ORALLY DISINTEGRATING ORAL at 23:10

## 2023-01-01 RX ADMIN — Medication 5 MILLIGRAM(S): at 15:27

## 2023-01-01 RX ADMIN — CHLORHEXIDINE GLUCONATE 1 APPLICATION(S): 213 SOLUTION TOPICAL at 12:45

## 2023-01-01 RX ADMIN — Medication 5 MILLIGRAM(S): at 18:25

## 2023-01-01 RX ADMIN — BUDESONIDE AND FORMOTEROL FUMARATE DIHYDRATE 2 PUFF(S): 160; 4.5 AEROSOL RESPIRATORY (INHALATION) at 05:19

## 2023-01-01 RX ADMIN — BUDESONIDE AND FORMOTEROL FUMARATE DIHYDRATE 2 PUFF(S): 160; 4.5 AEROSOL RESPIRATORY (INHALATION) at 05:16

## 2023-01-01 RX ADMIN — HEPARIN SODIUM 5000 UNIT(S): 5000 INJECTION INTRAVENOUS; SUBCUTANEOUS at 21:46

## 2023-01-01 RX ADMIN — Medication 100 MILLIGRAM(S): at 18:03

## 2023-01-01 RX ADMIN — Medication 5 MILLIGRAM(S): at 11:17

## 2023-01-01 RX ADMIN — TAMSULOSIN HYDROCHLORIDE 0.4 MILLIGRAM(S): 0.4 CAPSULE ORAL at 21:25

## 2023-01-01 RX ADMIN — Medication 650 MILLIGRAM(S): at 23:36

## 2023-01-01 RX ADMIN — Medication 3 MILLILITER(S): at 05:46

## 2023-01-01 RX ADMIN — SODIUM CHLORIDE 75 MILLILITER(S): 9 INJECTION, SOLUTION INTRAVENOUS at 21:19

## 2023-01-01 RX ADMIN — ATORVASTATIN CALCIUM 40 MILLIGRAM(S): 80 TABLET, FILM COATED ORAL at 21:22

## 2023-01-01 RX ADMIN — ATORVASTATIN CALCIUM 40 MILLIGRAM(S): 80 TABLET, FILM COATED ORAL at 21:40

## 2023-01-01 RX ADMIN — ATENOLOL 25 MILLIGRAM(S): 25 TABLET ORAL at 06:23

## 2023-01-01 RX ADMIN — HEPARIN SODIUM 5000 UNIT(S): 5000 INJECTION INTRAVENOUS; SUBCUTANEOUS at 13:20

## 2023-01-01 RX ADMIN — ATENOLOL 25 MILLIGRAM(S): 25 TABLET ORAL at 06:10

## 2023-01-01 RX ADMIN — SENNA PLUS 2 TABLET(S): 8.6 TABLET ORAL at 05:54

## 2023-01-01 RX ADMIN — Medication 5 MILLIGRAM(S): at 14:16

## 2023-01-01 RX ADMIN — Medication 40 MILLIGRAM(S): at 12:49

## 2023-01-01 RX ADMIN — TAMSULOSIN HYDROCHLORIDE 0.4 MILLIGRAM(S): 0.4 CAPSULE ORAL at 22:59

## 2023-01-01 RX ADMIN — Medication 40 MILLIGRAM(S): at 14:00

## 2023-01-01 RX ADMIN — BUDESONIDE AND FORMOTEROL FUMARATE DIHYDRATE 2 PUFF(S): 160; 4.5 AEROSOL RESPIRATORY (INHALATION) at 08:45

## 2023-01-01 RX ADMIN — POLYETHYLENE GLYCOL 3350 17 GRAM(S): 17 POWDER, FOR SOLUTION ORAL at 13:38

## 2023-01-01 RX ADMIN — Medication 40 MILLIGRAM(S): at 11:28

## 2023-01-01 RX ADMIN — POLYETHYLENE GLYCOL 3350 17 GRAM(S): 17 POWDER, FOR SOLUTION ORAL at 14:12

## 2023-01-01 RX ADMIN — POLYETHYLENE GLYCOL 3350 17 GRAM(S): 17 POWDER, FOR SOLUTION ORAL at 05:50

## 2023-01-01 RX ADMIN — HEPARIN SODIUM 5000 UNIT(S): 5000 INJECTION INTRAVENOUS; SUBCUTANEOUS at 17:00

## 2023-01-01 RX ADMIN — Medication 5 MILLIGRAM(S): at 09:13

## 2023-01-01 RX ADMIN — TIOTROPIUM BROMIDE 2 PUFF(S): 18 CAPSULE ORAL; RESPIRATORY (INHALATION) at 10:40

## 2023-01-01 RX ADMIN — Medication 650 MILLIGRAM(S): at 10:00

## 2023-01-01 RX ADMIN — ATENOLOL 25 MILLIGRAM(S): 25 TABLET ORAL at 05:32

## 2023-01-01 RX ADMIN — Medication 1000 MILLIGRAM(S): at 13:30

## 2023-01-01 RX ADMIN — TIOTROPIUM BROMIDE 2 PUFF(S): 18 CAPSULE ORAL; RESPIRATORY (INHALATION) at 11:30

## 2023-01-01 RX ADMIN — Medication 1 TABLET(S): at 11:42

## 2023-01-01 RX ADMIN — HEPARIN SODIUM 5000 UNIT(S): 5000 INJECTION INTRAVENOUS; SUBCUTANEOUS at 18:36

## 2023-01-01 RX ADMIN — Medication 5 MILLIGRAM(S): at 09:56

## 2023-01-01 RX ADMIN — ATORVASTATIN CALCIUM 40 MILLIGRAM(S): 80 TABLET, FILM COATED ORAL at 22:13

## 2023-01-01 RX ADMIN — Medication 40 MILLIGRAM(S): at 13:33

## 2023-01-01 RX ADMIN — TAMSULOSIN HYDROCHLORIDE 0.4 MILLIGRAM(S): 0.4 CAPSULE ORAL at 22:10

## 2023-01-01 RX ADMIN — CHLORHEXIDINE GLUCONATE 1 APPLICATION(S): 213 SOLUTION TOPICAL at 12:49

## 2023-01-01 RX ADMIN — Medication 650 MILLIGRAM(S): at 15:23

## 2023-01-01 RX ADMIN — Medication 100 MILLIGRAM(S): at 18:29

## 2023-01-01 RX ADMIN — TIOTROPIUM BROMIDE 2 PUFF(S): 18 CAPSULE ORAL; RESPIRATORY (INHALATION) at 11:06

## 2023-01-01 RX ADMIN — Medication 3 MILLILITER(S): at 06:09

## 2023-01-01 RX ADMIN — Medication 5 MILLIGRAM(S): at 16:02

## 2023-01-01 RX ADMIN — Medication 5 MILLIGRAM(S): at 21:08

## 2023-01-01 RX ADMIN — Medication 1 TABLET(S): at 14:11

## 2023-01-01 RX ADMIN — HEPARIN SODIUM 5000 UNIT(S): 5000 INJECTION INTRAVENOUS; SUBCUTANEOUS at 06:10

## 2023-01-01 RX ADMIN — FENTANYL CITRATE 50 MICROGRAM(S): 50 INJECTION INTRAVENOUS at 19:15

## 2023-01-01 RX ADMIN — Medication 3 MILLILITER(S): at 23:26

## 2023-01-01 RX ADMIN — FENTANYL CITRATE 50 MICROGRAM(S): 50 INJECTION INTRAVENOUS at 20:07

## 2023-01-01 RX ADMIN — BUDESONIDE AND FORMOTEROL FUMARATE DIHYDRATE 2 PUFF(S): 160; 4.5 AEROSOL RESPIRATORY (INHALATION) at 18:18

## 2023-01-01 RX ADMIN — Medication 5 MILLIGRAM(S): at 22:13

## 2023-01-01 RX ADMIN — HEPARIN SODIUM 5000 UNIT(S): 5000 INJECTION INTRAVENOUS; SUBCUTANEOUS at 18:14

## 2023-01-01 RX ADMIN — TIOTROPIUM BROMIDE 2 PUFF(S): 18 CAPSULE ORAL; RESPIRATORY (INHALATION) at 13:36

## 2023-01-01 RX ADMIN — CHLORHEXIDINE GLUCONATE 1 APPLICATION(S): 213 SOLUTION TOPICAL at 12:30

## 2023-01-01 RX ADMIN — CEFTRIAXONE 100 MILLIGRAM(S): 500 INJECTION, POWDER, FOR SOLUTION INTRAMUSCULAR; INTRAVENOUS at 23:10

## 2023-01-01 RX ADMIN — PIPERACILLIN AND TAZOBACTAM 25 GRAM(S): 4; .5 INJECTION, POWDER, LYOPHILIZED, FOR SOLUTION INTRAVENOUS at 08:35

## 2023-01-01 RX ADMIN — TIOTROPIUM BROMIDE 2 PUFF(S): 18 CAPSULE ORAL; RESPIRATORY (INHALATION) at 11:39

## 2023-01-01 RX ADMIN — TAMSULOSIN HYDROCHLORIDE 0.4 MILLIGRAM(S): 0.4 CAPSULE ORAL at 21:50

## 2023-01-01 RX ADMIN — OXYCODONE HYDROCHLORIDE 2.5 MILLIGRAM(S): 5 TABLET ORAL at 00:45

## 2023-01-01 RX ADMIN — Medication 1 MILLIGRAM(S): at 17:58

## 2023-01-01 RX ADMIN — TIOTROPIUM BROMIDE 2 PUFF(S): 18 CAPSULE ORAL; RESPIRATORY (INHALATION) at 12:10

## 2023-01-01 RX ADMIN — HEPARIN SODIUM 5000 UNIT(S): 5000 INJECTION INTRAVENOUS; SUBCUTANEOUS at 05:16

## 2023-01-01 RX ADMIN — Medication 1 TABLET(S): at 11:17

## 2023-01-01 RX ADMIN — TIOTROPIUM BROMIDE 2 PUFF(S): 18 CAPSULE ORAL; RESPIRATORY (INHALATION) at 09:02

## 2023-01-01 RX ADMIN — Medication 650 MILLIGRAM(S): at 22:02

## 2023-01-01 RX ADMIN — Medication 650 MILLIGRAM(S): at 22:03

## 2023-01-01 RX ADMIN — Medication 10 MILLIGRAM(S): at 16:13

## 2023-01-01 RX ADMIN — Medication 3 MILLILITER(S): at 17:04

## 2023-01-01 RX ADMIN — BUDESONIDE AND FORMOTEROL FUMARATE DIHYDRATE 2 PUFF(S): 160; 4.5 AEROSOL RESPIRATORY (INHALATION) at 12:00

## 2023-01-01 RX ADMIN — Medication 650 MILLIGRAM(S): at 06:30

## 2023-01-01 RX ADMIN — HYDROMORPHONE HYDROCHLORIDE 1 MILLIGRAM(S): 2 INJECTION INTRAMUSCULAR; INTRAVENOUS; SUBCUTANEOUS at 04:00

## 2023-01-01 RX ADMIN — FENTANYL CITRATE 25 MICROGRAM(S): 50 INJECTION INTRAVENOUS at 15:29

## 2023-01-01 RX ADMIN — Medication 0.25 MILLIGRAM(S): at 03:54

## 2023-01-01 RX ADMIN — Medication 650 MILLIGRAM(S): at 12:10

## 2023-01-01 RX ADMIN — Medication 3 MILLILITER(S): at 20:15

## 2023-01-01 RX ADMIN — MORPHINE SULFATE 2 MILLIGRAM(S): 50 CAPSULE, EXTENDED RELEASE ORAL at 09:34

## 2023-01-01 RX ADMIN — Medication 0.5 MILLIGRAM(S): at 10:58

## 2023-01-01 RX ADMIN — Medication 1 TABLET(S): at 12:40

## 2023-01-01 RX ADMIN — MIRTAZAPINE 7.5 MILLIGRAM(S): 45 TABLET, ORALLY DISINTEGRATING ORAL at 21:43

## 2023-01-01 RX ADMIN — ATORVASTATIN CALCIUM 40 MILLIGRAM(S): 80 TABLET, FILM COATED ORAL at 21:03

## 2023-01-01 RX ADMIN — Medication 40 MILLIGRAM(S): at 11:06

## 2023-01-01 RX ADMIN — Medication 650 MILLIGRAM(S): at 17:57

## 2023-01-01 RX ADMIN — ATENOLOL 25 MILLIGRAM(S): 25 TABLET ORAL at 05:20

## 2023-01-01 RX ADMIN — Medication 25 MILLIGRAM(S): at 05:39

## 2023-01-01 RX ADMIN — TAMSULOSIN HYDROCHLORIDE 0.4 MILLIGRAM(S): 0.4 CAPSULE ORAL at 21:32

## 2023-01-01 RX ADMIN — HEPARIN SODIUM 5000 UNIT(S): 5000 INJECTION INTRAVENOUS; SUBCUTANEOUS at 05:19

## 2023-01-01 RX ADMIN — TAMSULOSIN HYDROCHLORIDE 0.4 MILLIGRAM(S): 0.4 CAPSULE ORAL at 22:58

## 2023-01-01 RX ADMIN — Medication 10 MILLIGRAM(S): at 13:39

## 2023-01-01 RX ADMIN — PIPERACILLIN AND TAZOBACTAM 25 GRAM(S): 4; .5 INJECTION, POWDER, LYOPHILIZED, FOR SOLUTION INTRAVENOUS at 05:30

## 2023-01-01 RX ADMIN — HYDROMORPHONE HYDROCHLORIDE 1 MILLIGRAM(S): 2 INJECTION INTRAMUSCULAR; INTRAVENOUS; SUBCUTANEOUS at 03:45

## 2023-01-01 RX ADMIN — BUDESONIDE AND FORMOTEROL FUMARATE DIHYDRATE 2 PUFF(S): 160; 4.5 AEROSOL RESPIRATORY (INHALATION) at 18:39

## 2023-01-01 RX ADMIN — Medication 650 MILLIGRAM(S): at 10:50

## 2023-01-01 RX ADMIN — Medication 3 MILLILITER(S): at 17:35

## 2023-01-01 RX ADMIN — Medication 1 MILLIGRAM(S): at 01:02

## 2023-01-01 RX ADMIN — SODIUM CHLORIDE 75 MILLILITER(S): 9 INJECTION INTRAMUSCULAR; INTRAVENOUS; SUBCUTANEOUS at 11:42

## 2023-01-01 RX ADMIN — OXYCODONE HYDROCHLORIDE 2.5 MILLIGRAM(S): 5 TABLET ORAL at 01:15

## 2023-01-01 RX ADMIN — Medication 650 MILLIGRAM(S): at 12:58

## 2023-01-01 RX ADMIN — ATENOLOL 25 MILLIGRAM(S): 25 TABLET ORAL at 05:46

## 2023-01-01 RX ADMIN — Medication 100 MILLIGRAM(S): at 07:02

## 2023-01-01 RX ADMIN — Medication 40 MILLIGRAM(S): at 11:39

## 2023-01-01 RX ADMIN — POLYETHYLENE GLYCOL 3350 17 GRAM(S): 17 POWDER, FOR SOLUTION ORAL at 17:00

## 2023-01-01 RX ADMIN — Medication 400 MILLIGRAM(S): at 21:22

## 2023-01-01 RX ADMIN — ATENOLOL 25 MILLIGRAM(S): 25 TABLET ORAL at 06:19

## 2023-01-01 RX ADMIN — Medication 3 MILLILITER(S): at 14:46

## 2023-01-01 RX ADMIN — Medication 650 MILLIGRAM(S): at 09:28

## 2023-01-01 RX ADMIN — MORPHINE SULFATE 2 MILLIGRAM(S): 50 CAPSULE, EXTENDED RELEASE ORAL at 09:04

## 2023-01-01 RX ADMIN — Medication 3 MILLILITER(S): at 11:27

## 2023-01-01 RX ADMIN — Medication 650 MILLIGRAM(S): at 23:50

## 2023-01-01 RX ADMIN — TAMSULOSIN HYDROCHLORIDE 0.4 MILLIGRAM(S): 0.4 CAPSULE ORAL at 21:38

## 2023-01-01 RX ADMIN — FENTANYL CITRATE 50 MICROGRAM(S): 50 INJECTION INTRAVENOUS at 10:07

## 2023-01-01 RX ADMIN — TAMSULOSIN HYDROCHLORIDE 0.4 MILLIGRAM(S): 0.4 CAPSULE ORAL at 21:02

## 2023-01-01 RX ADMIN — Medication 3 MILLILITER(S): at 14:35

## 2023-01-01 RX ADMIN — POLYETHYLENE GLYCOL 3350 17 GRAM(S): 17 POWDER, FOR SOLUTION ORAL at 05:27

## 2023-01-01 RX ADMIN — Medication 40 MILLIGRAM(S): at 11:27

## 2023-01-01 RX ADMIN — Medication 40 MILLIGRAM(S): at 13:20

## 2023-01-01 RX ADMIN — Medication 650 MILLIGRAM(S): at 16:30

## 2023-01-01 RX ADMIN — PIPERACILLIN AND TAZOBACTAM 25 GRAM(S): 4; .5 INJECTION, POWDER, LYOPHILIZED, FOR SOLUTION INTRAVENOUS at 17:17

## 2023-01-01 RX ADMIN — Medication 125 MILLIGRAM(S): at 14:46

## 2023-01-01 RX ADMIN — TAMSULOSIN HYDROCHLORIDE 0.4 MILLIGRAM(S): 0.4 CAPSULE ORAL at 21:08

## 2023-01-01 RX ADMIN — Medication 650 MILLIGRAM(S): at 21:40

## 2023-01-01 RX ADMIN — Medication 25 MILLIGRAM(S): at 05:27

## 2023-01-01 RX ADMIN — ATORVASTATIN CALCIUM 40 MILLIGRAM(S): 80 TABLET, FILM COATED ORAL at 22:58

## 2023-01-01 RX ADMIN — Medication 5 MILLIGRAM(S): at 21:46

## 2023-01-01 RX ADMIN — Medication 3 MILLILITER(S): at 20:48

## 2023-01-01 RX ADMIN — TIOTROPIUM BROMIDE 2 PUFF(S): 18 CAPSULE ORAL; RESPIRATORY (INHALATION) at 09:23

## 2023-01-01 RX ADMIN — TIOTROPIUM BROMIDE 2 PUFF(S): 18 CAPSULE ORAL; RESPIRATORY (INHALATION) at 11:48

## 2023-01-01 RX ADMIN — ATORVASTATIN CALCIUM 40 MILLIGRAM(S): 80 TABLET, FILM COATED ORAL at 21:50

## 2023-01-01 RX ADMIN — ATENOLOL 25 MILLIGRAM(S): 25 TABLET ORAL at 05:48

## 2023-01-01 RX ADMIN — PIPERACILLIN AND TAZOBACTAM 25 GRAM(S): 4; .5 INJECTION, POWDER, LYOPHILIZED, FOR SOLUTION INTRAVENOUS at 07:23

## 2023-01-01 RX ADMIN — Medication 1 MILLIGRAM(S): at 13:53

## 2023-01-01 RX ADMIN — POLYETHYLENE GLYCOL 3350 17 GRAM(S): 17 POWDER, FOR SOLUTION ORAL at 19:41

## 2023-01-01 RX ADMIN — Medication 5 MILLIGRAM(S): at 18:13

## 2023-01-01 RX ADMIN — Medication 40 MILLIGRAM(S): at 05:27

## 2023-01-01 RX ADMIN — Medication 400 MILLIGRAM(S): at 14:57

## 2023-01-01 RX ADMIN — MORPHINE SULFATE 2 MILLIGRAM(S): 50 CAPSULE, EXTENDED RELEASE ORAL at 02:45

## 2023-01-01 RX ADMIN — HYDROMORPHONE HYDROCHLORIDE 0.5 MILLIGRAM(S): 2 INJECTION INTRAMUSCULAR; INTRAVENOUS; SUBCUTANEOUS at 17:17

## 2023-01-01 RX ADMIN — Medication 5 MILLIGRAM(S): at 04:59

## 2023-01-01 RX ADMIN — PIPERACILLIN AND TAZOBACTAM 25 GRAM(S): 4; .5 INJECTION, POWDER, LYOPHILIZED, FOR SOLUTION INTRAVENOUS at 05:38

## 2023-01-01 RX ADMIN — ATORVASTATIN CALCIUM 40 MILLIGRAM(S): 80 TABLET, FILM COATED ORAL at 21:08

## 2023-01-01 RX ADMIN — Medication 25 MILLIGRAM(S): at 05:16

## 2023-01-01 RX ADMIN — FLUTICASONE FUROATE, UMECLIDINIUM BROMIDE AND VILANTEROL TRIFENATATE 1 PUFF(S): 200; 62.5; 25 POWDER RESPIRATORY (INHALATION) at 14:00

## 2023-01-01 RX ADMIN — Medication 650 MILLIGRAM(S): at 05:15

## 2023-01-01 RX ADMIN — TIOTROPIUM BROMIDE 2 PUFF(S): 18 CAPSULE ORAL; RESPIRATORY (INHALATION) at 12:02

## 2023-01-01 RX ADMIN — TIOTROPIUM BROMIDE 2 PUFF(S): 18 CAPSULE ORAL; RESPIRATORY (INHALATION) at 12:28

## 2023-01-01 RX ADMIN — SIMETHICONE 80 MILLIGRAM(S): 80 TABLET, CHEWABLE ORAL at 18:17

## 2023-01-01 RX ADMIN — HYDROMORPHONE HYDROCHLORIDE 4 MILLIGRAM(S): 2 INJECTION INTRAMUSCULAR; INTRAVENOUS; SUBCUTANEOUS at 03:08

## 2023-01-01 RX ADMIN — CHLORHEXIDINE GLUCONATE 1 APPLICATION(S): 213 SOLUTION TOPICAL at 12:05

## 2023-01-01 RX ADMIN — BUDESONIDE AND FORMOTEROL FUMARATE DIHYDRATE 2 PUFF(S): 160; 4.5 AEROSOL RESPIRATORY (INHALATION) at 13:38

## 2023-01-01 RX ADMIN — Medication 1000 MILLIGRAM(S): at 15:39

## 2023-01-01 RX ADMIN — Medication 1 TABLET(S): at 11:27

## 2023-01-01 RX ADMIN — Medication 5 MILLIGRAM(S): at 05:50

## 2023-01-01 RX ADMIN — PIPERACILLIN AND TAZOBACTAM 25 GRAM(S): 4; .5 INJECTION, POWDER, LYOPHILIZED, FOR SOLUTION INTRAVENOUS at 15:58

## 2023-01-01 RX ADMIN — Medication 10 MILLIGRAM(S): at 11:13

## 2023-01-01 RX ADMIN — Medication 5 MILLIGRAM(S): at 19:41

## 2023-01-01 RX ADMIN — Medication 5 MILLIGRAM(S): at 19:09

## 2023-01-01 RX ADMIN — Medication 650 MILLIGRAM(S): at 20:21

## 2023-01-01 RX ADMIN — POLYETHYLENE GLYCOL 3350 17 GRAM(S): 17 POWDER, FOR SOLUTION ORAL at 22:42

## 2023-01-01 RX ADMIN — TIOTROPIUM BROMIDE 2 PUFF(S): 18 CAPSULE ORAL; RESPIRATORY (INHALATION) at 08:44

## 2023-01-01 RX ADMIN — FLUTICASONE FUROATE, UMECLIDINIUM BROMIDE AND VILANTEROL TRIFENATATE 1 PUFF(S): 200; 62.5; 25 POWDER RESPIRATORY (INHALATION) at 14:19

## 2023-01-01 RX ADMIN — Medication 100 MILLIGRAM(S): at 18:55

## 2023-01-01 RX ADMIN — ATORVASTATIN CALCIUM 40 MILLIGRAM(S): 80 TABLET, FILM COATED ORAL at 21:14

## 2023-01-01 RX ADMIN — Medication 100 MILLIGRAM(S): at 05:50

## 2023-01-01 RX ADMIN — Medication 650 MILLIGRAM(S): at 11:00

## 2023-01-01 RX ADMIN — PANTOPRAZOLE SODIUM 40 MILLIGRAM(S): 20 TABLET, DELAYED RELEASE ORAL at 05:27

## 2023-01-01 RX ADMIN — BUDESONIDE AND FORMOTEROL FUMARATE DIHYDRATE 2 PUFF(S): 160; 4.5 AEROSOL RESPIRATORY (INHALATION) at 17:00

## 2023-01-01 RX ADMIN — ATORVASTATIN CALCIUM 40 MILLIGRAM(S): 80 TABLET, FILM COATED ORAL at 22:36

## 2023-01-01 RX ADMIN — Medication 5 MILLIGRAM(S): at 11:41

## 2023-01-01 RX ADMIN — Medication 650 MILLIGRAM(S): at 23:31

## 2023-01-01 RX ADMIN — Medication 5 MILLIGRAM(S): at 14:06

## 2023-01-01 RX ADMIN — Medication 3 MILLILITER(S): at 22:29

## 2023-01-01 RX ADMIN — PANTOPRAZOLE SODIUM 40 MILLIGRAM(S): 20 TABLET, DELAYED RELEASE ORAL at 05:19

## 2023-01-01 RX ADMIN — Medication 40 MILLIGRAM(S): at 12:06

## 2023-01-01 RX ADMIN — BUDESONIDE AND FORMOTEROL FUMARATE DIHYDRATE 2 PUFF(S): 160; 4.5 AEROSOL RESPIRATORY (INHALATION) at 21:55

## 2023-01-01 RX ADMIN — TAMSULOSIN HYDROCHLORIDE 0.4 MILLIGRAM(S): 0.4 CAPSULE ORAL at 21:14

## 2023-01-01 RX ADMIN — Medication 40 MILLIGRAM(S): at 11:18

## 2023-01-01 RX ADMIN — ATORVASTATIN CALCIUM 40 MILLIGRAM(S): 80 TABLET, FILM COATED ORAL at 22:45

## 2023-01-01 RX ADMIN — MIRTAZAPINE 7.5 MILLIGRAM(S): 45 TABLET, ORALLY DISINTEGRATING ORAL at 21:08

## 2023-01-01 RX ADMIN — CHLORHEXIDINE GLUCONATE 1 APPLICATION(S): 213 SOLUTION TOPICAL at 11:49

## 2023-03-03 NOTE — ED ADULT TRIAGE NOTE - NS ED NURSE BANDS TYPE
Name band; elevated BNP but no dyspnea, no signs of heart failure.  -CXR possible congestion.  -monitor volume status no need for diuresis at present.  - Echo ordered  - will followup

## 2023-03-03 NOTE — ED PROVIDER NOTE - PROGRESS NOTE DETAILS
Lalo Zuleta PGY-3 Patient reassessed.  Patient notified of CAT scan results showing an enlarged prostate along with pancreatic duct dilation along with neoplasm in the bladder.  Bladder mass likely neoplasm as pain patient is currently having hematuria however no signs of obstruction no bladder distention.  Hemoglobin stable.  Patient explained that he will have to have his bladder mass followed up with a urologist who I will provide a rapid referral along with follow-up to the clinic.  Patient was offered repeat hemoglobin however at this time would like to follow-up with urology outpatient along with follow-up with the primary care doctor regarding pancreatic duct dilation.  We will also provide patient with antibiotics given possible urinary infection along with hematuria.    Strict return precautions for any worsening hematuria, trouble urinating, fever or any new concerning symptoms to please come back to the ER.

## 2023-03-03 NOTE — ED PROVIDER NOTE - CARE PLAN
1 Principal Discharge DX:	Hematuria   Principal Discharge DX:	Hematuria  Secondary Diagnosis:	Bladder mass

## 2023-03-03 NOTE — ED PROVIDER NOTE - OBJECTIVE STATEMENT
Pt is a 78y M pmhx of COPD, CHF, HTN, TIA presenting with worsening hematuria. Pt states he has had blood in his urine for the past few months, but was never formally diagnosed. One week ago, he had increased urinary frequency and dysuria, diagnosed with UTI, treated with bactrim. While his dysuria mostly improved, today, he experienced red blood in his urine with sediments throughout his entire urinary stream. Pt denies any fevers, nausea, vomiting, chest pain or SOB. He also has associated suprapubic pain and mild dysuria. No new medications, blood thinners flank pain or associated trauma. Pt is a 78y M pmhx of COPD, CHF, HTN, TIA presenting with worsening hematuria. Pt states he has had blood in his urine for the past few months, but was never formally diagnosed. One week ago, he had increased urinary frequency and dysuria, diagnosed with UTI, treated with bactrim. While his dysuria mostly improved, today, he experienced red blood in his urine with sediments throughout his entire urinary stream. Pt denies any fevers, nausea, vomiting, chest pain or SOB. He also has associated suprapubic pain and mild dysuria. No new medications, blood thinners flank pain or associated trauma. No recent illness.

## 2023-03-03 NOTE — ED PROVIDER NOTE - NSFOLLOWUPCLINICS_GEN_ALL_ED_FT
[facility] [place]  Urology  [address] [facility], [address] [address]  Phone: [phone]  Fax:     [facility]  Urology  [address] [facility], 3rd & 4th [place] [address]  Phone: [phone]  Fax:     [facility]  Neurology  [address] [facility], [facility] - [address] [place] [address]  Phone: [phone]  Fax:

## 2023-03-03 NOTE — ED PROVIDER NOTE - NSFOLLOWUPINSTRUCTIONS_ED_ALL_ED_FT
You were seen in the hospital for blood when you urinate.  Your CAT scan shows that you have a bladder mass along with a dilation in the pancreas which needs to be followed up by your primary care doctor.    The bladder mass needs to be followed up with a urologist whose name and number I have provided below and I have also provided you with rapid follow-up by the hospital for a urologist.    If you develop any worsening bloody urine, trouble urinating, fever or any new concerning symptoms to please come back to the ER as soon as possible.      To control your pain at home, Tylenol 650mg-1000mg every 6 to 8 hours. Limit your maximum daily Tylenol from all sources to 4000mg. Be aware that many other medications contain acetaminophen which is also known as Tyleno You need to respect all of the warnings on the bottles. You shouldn’t take these medications for more than a week without following up with your doctor. Both medications come with certain risks and side effects that you need to discuss with your doctor, especially if you are taking them for a prolonged period.       Hematuria, Adult       Hematuria is blood in the urine. Blood may be visible in the urine, or it may be identified with a test. This condition can be caused by infections of the bladder, urethra, kidney, or prostate. Other possible causes include:  •Kidney stones.      •Cancer of the urinary tract.      •Too much calcium in the urine.      •Conditions that are passed from parent to child (inherited conditions).       •Exercise that requires a lot of energy.      Infections can usually be treated with medicine, and a kidney stone usually will pass through your urine. If neither of these is the cause of your hematuria, more tests may be needed to identify the cause of your symptoms.    It is very important to tell your health care provider about any blood in your urine, even if it is painless or the blood stops without treatment. Blood in the urine, when it happens and then stops and then happens again, can be a symptom of a very serious condition, including cancer. There is no pain in the initial stages of many urinary cancers.      Follow these instructions at home:    Medicines     •Take over-the-counter and prescription medicines only as told by your health care provider.      •If you were prescribed an antibiotic medicine, take it as told by your health care provider. Do not stop taking the antibiotic even if you start to feel better.      Eating and drinking     •Drink enough fluid to keep your urine pale yellow. It is recommended that you drink 3–4 quarts (2.8–3.8 L) a day. If you have been diagnosed with an infection, drinking cranberry juice in addition to large amounts of water is recommended.      •Avoid caffeine, tea, and carbonated beverages. These tend to irritate the bladder.      •Avoid alcohol because it may irritate the prostate (in males).      General instructions     •If you have been diagnosed with a kidney stone, follow your health care provider's instructions about straining your urine to catch the stone.      •Empty your bladder often. Avoid holding urine for long periods of time.    •If you are female:  •After a bowel movement, wipe from front to back and use each piece of toilet paper only once.      •Empty your bladder before and after sex.        •Pay attention to any changes in your symptoms. Tell your health care provider about any changes or any new symptoms.      •It is up to you to get the results of any tests. Ask your health care provider, or the department that is doing the test, when your results will be ready.      •Keep all follow-up visits. This is important.        Contact a health care provider if:    •You develop back pain.      •You have a fever or chills.      •You have nausea or vomiting.      •Your symptoms do not improve after 3 days.      •Your symptoms get worse.        Get help right away if:    •You develop severe vomiting and are unable to take medicine without vomiting.      •You develop severe pain in your back or abdomen even though you are taking medicine.      •You pass a large amount of blood in your urine.      •You pass blood clots in your urine.      •You feel very weak or like you might faint.      •You faint.        Summary    •Hematuria is blood in the urine. It has many possible causes.      •It is very important that you tell your health care provider about any blood in your urine, even if it is painless or the blood stops without treatment.      •Take over-the-counter and prescription medicines only as told by your health care provider.      •Drink enough fluid to keep your urine pale yellow.      This information is not intended to replace advice given to you by your health care provider. Make sure you discuss any questions you have with your health care provider.

## 2023-03-03 NOTE — ED ADULT NURSE REASSESSMENT NOTE - NS ED NURSE REASSESS COMMENT FT1
Patient resting comfortably, breathing unlabored, VSS, no signs of acute distress, no requests at this time, plan of care explained, side rails raised, comfort and safety maintained.

## 2023-03-03 NOTE — ED PROVIDER NOTE - PHYSICAL EXAMINATION
GENERAL: NAD, lying in bed comfortably  HEAD: Atraumatic, normocephalic  EYES: EOMI, PERRLA, conjunctiva and sclera clear  ENT: Moist mucous membranes  HEART: S1, S2, Regular rate and rhythm, no murmurs, rubs, or gallops  LUNGS: Unlabored respirations, clear to auscultation bilaterally  ABDOMEN: Soft, nontender, nondistended, +BS,   EXTREMITIES: 2+ peripheral pulses bilaterally. No clubbing, cyanosis, or edema  NERVOUS SYSTEM:  A&Ox3, no focal deficits   SKIN: No rashes or lesions GENERAL: NAD, lying in bed comfortably  HEAD: Atraumatic, normocephalic  EYES: EOMI, PERRLA, conjunctiva and sclera clear  ENT: Moist mucous membranes  HEART: S1, S2, Regular rate and rhythm, no murmurs, rubs, or gallops  LUNGS: Unlabored respirations, clear to auscultation bilaterally  ABDOMEN: Soft, nontender, nondistended, +BS,   : blood at urethral meatus but no other signs of trauma, bilateral descended testicles with normal lie without ttp  EXTREMITIES: 2+ peripheral pulses bilaterally. No clubbing, cyanosis, or edema  NERVOUS SYSTEM:  A&Ox3, no focal deficits   SKIN: No rashes or lesions

## 2023-03-03 NOTE — ED PROVIDER NOTE - NS ED ROS FT
REVIEW OF SYSTEMS:  CONSTITUTIONAL: No weakness, fevers or chills  EYES/ENT: No visual changes;  No vertigo or throat pain   NECK: No pain or stiffness  RESPIRATORY: No cough, wheezing, hemoptysis; No shortness of breath  CARDIOVASCULAR: No chest pain or palpitations  GASTROINTESTINAL: No abdominal or epigastric pain. No nausea, vomiting, or hematemesis; No diarrhea or constipation. No melena or hematochezia.  GENITOURINARY: mild dysuria, hematuria, suprapubic pain  NEUROLOGICAL: No numbness or weakness  SKIN: No itching, rashes

## 2023-03-03 NOTE — ED PROVIDER NOTE - CLINICAL SUMMARY MEDICAL DECISION MAKING FREE TEXT BOX
77yo pmhx of copd, htn, CHD, presenting with painless hematuria, associated suprapubic tenderness. Vitals stable, PE unremarkable.   Concerning for UTI, nephrolithiasis vs. malignancy.   Plan for CBC, CMP, u/a, CTAP w. IV, US bladder Ximena Teran, Attending Physician: 78-year-old male here with hematuria without urinary retention.  Differential diagnosis includes but not limited to: BPH, traumatic injury (no evidence on exam or history of trauma per history.  Patient does have blood at the urethral meatus but this is just after urination I think this is residual findings after brown hematuria), nephrolithiasis (though less likely on clinical exam), painless bleeding suggestive of bladder cancer, UTI. Will obtain CT abdomen pelvis as patient has minimal suprapubic tenderness without urinary retention.  I cannot explain the hematuria based on his history. Will consider abx after labs/UA.    edited by Ximena Teran DO - attending physician.   Please see progress notes for status/labs/consult updates and ED course after initial presentation.

## 2023-03-03 NOTE — ED PROVIDER NOTE - CARE PROVIDER_API CALL
Tony Diallo  Urology  [address] [place] [address]  Phone: [phone]  Fax: [phone]  Follow Up Time:     Farhana Morelos)  Urology  [address] [place] [address], [place] [address]  Phone: [phone]  Fax: [phone]  Follow Up Time:

## 2023-03-03 NOTE — ED PROVIDER NOTE - NSICDXPASTMEDICALHX_GEN_ALL_CORE_FT
PAST MEDICAL HISTORY:  Bradycardia     CAD (Coronary Artery Disease)     COPD (chronic obstructive pulmonary disease)     Depression     Dyslipidemia     HTN (Hypertension)     S/P PTCA (Percutaneous Transluminal Coronary Angioplasty) 2001    TIA (Transient Ischemic Attack) 18 years ago  PT was on coumadin for short while

## 2023-03-03 NOTE — ED PROVIDER NOTE - PATIENT PORTAL LINK FT
You can access the FollowMyHealth Patient Portal offered by [facility] by registering at the following website: [email or web address]. By joining [facility]’s FollowMyHealth portal, you will also be able to view your health information using other applications (apps) compatible with our system.

## 2023-03-03 NOTE — ED PROVIDER NOTE - CARE PROVIDERS DIRECT ADDRESSES
[email or web address]

## 2023-03-05 NOTE — ED POST DISCHARGE NOTE - RESULT SUMMARY
Patient populated in incidental findings follow up for CT A/P; reviewed chart primary team discussed incidental findings and proper follow up/management. No need to further contact patient - Kristie Granger PA-C

## 2023-03-31 PROBLEM — K59.00 CONSTIPATION: Status: ACTIVE | Noted: 2023-01-01

## 2023-04-01 NOTE — ASSESSMENT
[FreeTextEntry1] : suspicious for invasive bladder cancer. needs TURBT. Noted how performed, may need to go home with a rodriguez \par also use in intraoperative chemotherapy.\par noit sure cause of his pain - doesn't seem to be the bladder and may be constipation - needs better management

## 2023-04-01 NOTE — HISTORY OF PRESENT ILLNESS
[FreeTextEntry1] : referred for management of a bladder mass.\par noted intermittent episodes gross hematuria for past 6 months.\par seen at [facility] at one point in clot retention needing a rodriguez\par seen more recently [facility] for same issue. CT scan - which we reviewed together, noted >5cm bladder mass. no evidence of mets.\par he has suprapubic lower abdominal pain. no association with voiding and feel voiding Ok.\par has constipation; no BM for past 2 days.\par \par no prior bladder tumors. \par

## 2023-04-10 NOTE — H&P PST ADULT - NSANTHOSAYNRD_GEN_A_CORE
No. CHAMP screening performed.  STOP BANG Legend: 0-2 = LOW Risk; 3-4 = INTERMEDIATE Risk; 5-8 = HIGH Risk

## 2023-04-10 NOTE — H&P PST ADULT - ASSESSMENT
Airway:  normal    Mallampati-       Dental: UPPER & LOWER Full dentures    Activity :  dasi mets :     Airway:  normal    Mallampati-  3     Dental: UPPER & LOWER Full dentures    Activity : walk with help , very weak since bladder tumor & pain for past month   dasi mets : 4 dasi mets

## 2023-04-10 NOTE — H&P PST ADULT - NSICDXPASTMEDICALHX_GEN_ALL_CORE_FT
PAST MEDICAL HISTORY:  Bradycardia     CAD (Coronary Artery Disease)     COPD (chronic obstructive pulmonary disease)     Depression     Dyslipidemia     H/O constipation     HTN (Hypertension)     NSTEMI (non-ST elevation myocardial infarction)     S/P PTCA (Percutaneous Transluminal Coronary Angioplasty) 2001    TIA (Transient Ischemic Attack) 18 years ago  PT was on coumadin for short while     PAST MEDICAL HISTORY:  Anxiety and depression     Bladder mass     Bradycardia     CAD (Coronary Artery Disease)     COPD (chronic obstructive pulmonary disease)     Dyslipidemia     H/O constipation     HTN (Hypertension)     LV dysfunction     NSTEMI (non-ST elevation myocardial infarction)     S/P PTCA (Percutaneous Transluminal Coronary Angioplasty) 2001    TIA (Transient Ischemic Attack) 18 years ago  PT was on coumadin for short while

## 2023-04-10 NOTE — H&P PST ADULT - NSICDXPASTSURGICALHX_GEN_ALL_CORE_FT
PAST SURGICAL HISTORY:  AICD (automatic cardioverter/defibrillator) present     S/P drug eluting coronary stent placement     S/P inguinal hernia repair     S/P Rotator Cuff Surgery      PAST SURGICAL HISTORY:  Cardiac resynchronization therapy defibrillator (CRT-D) in place     S/P cardiac pacemaker procedure     S/P drug eluting coronary stent placement     S/P inguinal hernia repair     S/P Rotator Cuff Surgery

## 2023-04-10 NOTE — H&P PST ADULT - HISTORY OF PRESENT ILLNESS
78 yr old male with history of       Denies Recent travel, Exposure or Covid symptoms   78 yr old male with history of HTN , CAD , MI - s/p stented CAD 2001, TIA (25 Yrs ago) , COPD on Trelegy , Bradycardia - s/p pacemaker placement 2011, Echo (2019) - showed EF - 30% - Device upgraded to BIV ICD (2019), with recent admission in ER for abdominal pain & blood in urine. CT scan showed Bladder mass in posterior wall concerning for neoplasm. Pt c/o severe suprapubic abdominal pain & spasms. Now coming for TURBT on 4/24/2023.     *****Pt with c/o severe suprapubic pain - advised pt to go to ER if pain in unbearable , pt refused to go to ER. ******  Denies Recent travel, Exposure or Covid symptoms

## 2023-04-13 PROBLEM — I51.9 HEART DISEASE, UNSPECIFIED: Chronic | Status: ACTIVE | Noted: 2023-01-01

## 2023-04-13 PROBLEM — I21.4 NON-ST ELEVATION (NSTEMI) MYOCARDIAL INFARCTION: Chronic | Status: ACTIVE | Noted: 2023-01-01

## 2023-04-13 PROBLEM — F41.9 ANXIETY DISORDER, UNSPECIFIED: Chronic | Status: ACTIVE | Noted: 2023-01-01

## 2023-04-13 PROBLEM — Z87.19 PERSONAL HISTORY OF OTHER DISEASES OF THE DIGESTIVE SYSTEM: Chronic | Status: ACTIVE | Noted: 2023-01-01

## 2023-04-13 PROBLEM — N32.89 OTHER SPECIFIED DISORDERS OF BLADDER: Chronic | Status: ACTIVE | Noted: 2023-01-01

## 2023-04-20 PROBLEM — N32.89 BLADDER MASS: Status: ACTIVE | Noted: 2023-01-01

## 2023-04-20 PROBLEM — I50.22 CHRONIC SYSTOLIC CONGESTIVE HEART FAILURE: Status: ACTIVE | Noted: 2019-06-18

## 2023-04-20 PROBLEM — I25.5 ISCHEMIC CARDIOMYOPATHY: Status: ACTIVE | Noted: 2019-03-06

## 2023-04-20 PROBLEM — Z95.0 PACEMAKER: Status: ACTIVE | Noted: 2017-02-15

## 2023-04-20 NOTE — HISTORY OF PRESENT ILLNESS
[FreeTextEntry1] : 78-year-old male being seen in preop cardiac evaluation prior to cystoscopy planned for 4/24.  He has a long cardiac history with coronary artery disease, left ventricular dysfunction associated with a pacemaker and improved with a biventricular upgrade, and COPD.  He has apparently function fairly well during the past few years without clinical CHF.  He has developed a mass in his bladder associated with hematuria and pain in the cystoscopy is for evaluation.  He has no current cardiac complaints, but is in pain and requiring narcotics for his bladder pain.  His nephew who is with him reports that since he has started on narcotics for his bladder pain he is becoming increasingly confused.

## 2023-04-20 NOTE — PHYSICAL EXAM
[Well Developed] : well developed [Well Nourished] : well nourished [No Acute Distress] : no acute distress [Ill-Appearing] : ill-appearing [Cachectic] : cachexia was observed [Normal Conjunctiva] : normal conjunctiva [Normal Venous Pressure] : normal venous pressure [No Carotid Bruit] : no carotid bruit [Normal S1, S2] : normal S1, S2 [No Murmur] : no murmur [No Rub] : no rub [No Gallop] : no gallop [Good Air Entry] : good air entry [Clear Lung Fields] : clear lung fields [No Respiratory Distress] : no respiratory distress  [Soft] : abdomen soft [Non Tender] : non-tender [No Masses/organomegaly] : no masses/organomegaly [Normal Bowel Sounds] : normal bowel sounds [Normal Gait] : normal gait [No Cyanosis] : no cyanosis [No Edema] : no edema [No Clubbing] : no clubbing [No Varicosities] : no varicosities [No Rash] : no rash [No Skin Lesions] : no skin lesions [Moves all extremities] : moves all extremities [No Focal Deficits] : no focal deficits [Normal Speech] : normal speech [Alert and Oriented] : alert and oriented [Normal memory] : normal memory

## 2023-04-20 NOTE — DISCUSSION/SUMMARY
[FreeTextEntry1] : In summary, Mr. Olvera is an 78-year-old male with a long cardiac history and a painful bladder mass who is scheduled for cystoscopy on Monday.  He has no cardiac complaints.  His exam shows a chronically ill male.  There is no evidence of CHF and his cardiac exam is within normal limits.\par \par He is obviously at increased risk, but his cardiac status appears stable at present and he urgently needs the procedure.  He is cleared to proceed as scheduled. [EKG obtained to assist in diagnosis and management of assessed problem(s)] : EKG obtained to assist in diagnosis and management of assessed problem(s)

## 2023-04-24 NOTE — PRE-ANESTHESIA EVALUATION ADULT - NSANTHPMHFT_GEN_ALL_CORE
79 yo M w/ PMHx of HTN, CAD s/p MI s/p stented CAD 2001, Ischemic Cardiomyopathy (EF~30%), hx of TIA, COPD, SSS - s/p PPM (2011) upgraded to CRT-D (2019), depression/anxiety, CKD with recent admission in ER for abdominal pain & blood in urine. CT scan showed Bladder mass in posterior wall concerning for neoplasm. Pt c/o severe suprapubic abdominal pain & spasms. Presents today for TURBT.    Denies active CP/SOB/Orthopnea

## 2023-04-24 NOTE — BRIEF OPERATIVE NOTE - NSICDXBRIEFPROCEDURE_GEN_ALL_CORE_FT
PROCEDURES:  Cystoscopy with fulguration and resection of bladder tumor 24-Apr-2023 13:40:52  Fausto Alvarado

## 2023-04-24 NOTE — PATIENT PROFILE ADULT - FALL HARM RISK - HARM RISK INTERVENTIONS

## 2023-04-24 NOTE — ASU DISCHARGE PLAN (ADULT/PEDIATRIC) - ASU DC SPECIAL INSTRUCTIONSFT
It is common to have blood in the urine after your procedure.  It may be pink or even red; inform your doctor if you have a significant amount of clots in the urine or if you are unable to void at all.  Be sure to drink plenty of liquids at all times.    -You may resume your regular diet and regular medication regimen.    -Provided that you are not restricted with fluids by your physician, you should drink 6-8 (8 oz.) glasses of fluid per day.  -You may shower or bathe.    -Provided you are not restricted by your physician, you may take Tylenol and Ibuprofen, available over the counter, for pain. You may take either one every 6 hours, you may alternate these medications so that you take one every 3 hours (e.g., Tylenol at 12pm, Ibuprofen at 3pm, Tylenol at 6pm, Ibuprofen at 9pm, etc...). Follow the maximum doses and administration instructions on the bottles. Do not exceed 4 grams of Tylenol daily. If your pain is not controlled with over the counter pain medications, please call your urologist.    -Do not perform strenuous activities or resume sexual activity until your are told to do so by your doctor.   You may climb stairs.  -Call your physician if you have a fever over 101F.  -Make a follow up appointment with your urologist when you arrive home (or the next business day).  -Call your urologist during normal business hours with any other routine questions.  -Dr. Diallo will call with pathology results

## 2023-04-24 NOTE — ASU DISCHARGE PLAN (ADULT/PEDIATRIC) - NS MD DC FALL RISK RISK
For information on Fall & Injury Prevention, visit: [email or web address] OR  [email or web address] OR  https://www.cdc.gov/steadi/patient.html

## 2023-04-24 NOTE — PROGRESS NOTE ADULT - ASSESSMENT
A/P: 78y Male s/p TURBT    f/u TOV  DVT prophylaxis/OOB  Incentive spirometry  Strict I&O's  Analgesia and antiemetics as needed  Regular Diet     A/P: 78y Male s/p TURBT    f/u TOV  DVT prophylaxis/OOB  Incentive spirometry  Strict I&O's  Analgesia and antiemetics as needed  Regular Diet  Dispo: home tomorrow AM

## 2023-04-24 NOTE — PRE-OP CHECKLIST - ASSESSMENT, HISTORY & PHYSICAL COMPLETED AND ON MEDICAL RECORD
Patient was seen and examined by me at bedside. I agree with resident's note, subjective, objective physical exam, assessment and plan with following modifications/additions.     1) Painless jaundice  2) Intrahepatic and extrahepatic duct dilation.   3) SLOAN on CKD  4) Hyperbilirubinemia  5) Transaminitis.     A/P: HD stable, euvolemic. Painless jaundice. Suggestive of biliary obstruction.   GI recs appreciated.   S/p ERCP with sphincterectomy and stent placement. Sent brush for biopsy.   Pre-renal SLOAN is improving. Transaminitis and hyperbilirubinemia improving.   F/u biopsies. done

## 2023-04-25 NOTE — DISCHARGE NOTE PROVIDER - NSDCCPCAREPLAN_GEN_ALL_CORE_FT
PRINCIPAL DISCHARGE DIAGNOSIS  Diagnosis: Bladder tumor  Assessment and Plan of Treatment:      PRINCIPAL DISCHARGE DIAGNOSIS  Diagnosis: Bladder tumor  Assessment and Plan of Treatment: you had a bladder tumor scraping on 4/23/23  please see Dr. Diallo one week after surgery for rodriguez removal and pathology results   Call the office if you experience fever, chills, uncontrolled pain, the inability to tolerate liquids, or the urine does not drain   to promote wound healing do not take a bath, continue to walk frequently, return to daily living activities slowly, no heavy lifting greater than 10lbs for 4-6 weeks,      SECONDARY DISCHARGE DIAGNOSES  Diagnosis: Anemia due to acute blood loss  Assessment and Plan of Treatment: you required a blood transfusion

## 2023-04-25 NOTE — PROGRESS NOTE ADULT - ASSESSMENT
s/p cysto TURBT POD#1      -cont npo for now  -?role for rodriguez placement  -?repeat Cr vs imaging   will discuss with attending

## 2023-04-25 NOTE — DISCHARGE NOTE PROVIDER - NSDCMRMEDTOKEN_GEN_ALL_CORE_FT
atenolol 25 mg oral tablet: 1 tab(s) orally once a day  diazePAM 5 mg oral tablet: 1 tab(s) orally 2 times a day As needed for anxiety  FLUoxetine 40 mg oral capsule: 1 cap(s) orally once a day  Folbic oral tablet: 1 tab(s) orally once a day  simvastatin 40 mg oral tablet: 1 tab(s) orally once a day  Trelegy Ellipta 100 mcg-62.5 mcg-25 mcg/inh inhalation powder: 1 puff(s) inhaled once a day   atenolol 25 mg oral tablet: 1 tab(s) orally once a day  diazePAM 5 mg oral tablet: 1 tab(s) orally 2 times a day As needed for anxiety  FLUoxetine 40 mg oral capsule: 1 cap(s) orally once a day  Folbic oral tablet: 1 tab(s) orally once a day  senna leaf extract oral tablet: 2 tab(s) orally once a day (at bedtime)  simvastatin 40 mg oral tablet: 1 tab(s) orally once a day  Trelegy Ellipta 100 mcg-62.5 mcg-25 mcg/inh inhalation powder: 1 puff(s) inhaled once a day

## 2023-04-25 NOTE — DISCHARGE NOTE PROVIDER - HOSPITAL COURSE
78 yr old male with history of HTN , CAD , MI - s/p stented CAD 2001, TIA (25 Yrs ago) , COPD on Trelegy , Bradycardia - s/p pacemaker placement 2011, Echo (2019) - showed EF - 30% - Device upgraded to BIV ICD (2019), with recent admission in ER for abdominal pain & blood in urine. CT scan showed Bladder mass in posterior wall concerning for neoplasm. Pt c/o severe suprapubic abdominal pain & spasms. Now coming for TURBT on 4/24/2023. Now s/p TURBT. Post op course complicated by urinary retention after TOV as well as rising Cr and low Hgb. Patient got 1u pRBC on post op day 1 and had a rodriguez replaced. Plan to discharge home with rodriguez and follow up outpatient in a few days with Dr. mejia for TOV. 78 yr old male with history of HTN , CAD , MI - s/p stented CAD 2001, TIA (25 Yrs ago) , COPD on Trelegy , Bradycardia - s/p pacemaker placement 2011, Echo (2019) - showed EF - 30% - Device upgraded to BIV ICD (2019), with recent admission in ER for abdominal pain & blood in urine. CT scan showed Bladder mass in posterior wall concerning for neoplasm. Pt c/o severe suprapubic abdominal pain & spasms. Now coming for TURBT on 4/24/2023. Now s/p TURBT. Post op course complicated by urinary retention after TOV as well as rising Cr and low Hgb. Patient got 1u pRBC on post op day 1 and had a rodriguez replaced. Plan to discharge home with rodriguez and follow up outpatient in a few days with Dr. mejia for TOV. Pt was seen by PT and was deemed a candidate for rehab.

## 2023-04-25 NOTE — DISCHARGE NOTE PROVIDER - NSDCFUADDINST_GEN_ALL_CORE_FT
It is common to have blood in the urine after your procedure.  It may be pink or even red; inform your doctor if you have a significant amount of clots in the urine or if you are unable to void at all.  Be sure to drink plenty of liquids at all times.    -You may resume your regular diet and regular medication regimen.    -Provided that you are not restricted with fluids by your physician, you should drink 6-8 (8 oz.) glasses of fluid per day.  -You may shower or bathe.    -Provided you are not restricted by your physician, you may take Tylenol and Ibuprofen, available over the counter, for pain. You may take either one every 6 hours, you may alternate these medications so that you take one every 3 hours (e.g., Tylenol at 12pm, Ibuprofen at 3pm, Tylenol at 6pm, Ibuprofen at 9pm, etc...). Follow the maximum doses and administration instructions on the bottles. Do not exceed 4 grams of Tylenol daily. If your pain is not controlled with over the counter pain medications, please call your urologist.    -Do not perform strenuous activities or resume sexual activity until your are told to do so by your doctor.   You may climb stairs.  -Call your physician if you have a fever over 101F.  -Make a follow up appointment with your urologist when you arrive home (or the next business day).  -Call your urologist during normal business hours with any other routine questions.    -Please call Dr. mejia's office to make an appointment within a week for rodriguez removal

## 2023-04-25 NOTE — DISCHARGE NOTE PROVIDER - NSDCCPTREATMENT_GEN_ALL_CORE_FT
Diabetic lab work:    A1C - This measures your average blood sugars from the past few months    If your A1c is 8.0 and higher you will need to get non fasting lab work done every 90 days. And have a follow up appointment with Dr Do to discuss the results.    If your A1c is 7.9 and under you will need to get your non fasting lab work done ever 5 months. And have a follow up appointment with Dr Do to discuss the results.    URINEMICROALBUMIN - to detect very small levels of a blood protein (albumin) in your urine. This is used to detect early signs of kidney damage in people who are at risk of developing kidney disease.    Due for NON- fasting lab work  [facility] , [address] [place], [place]  Mon-Wed -Fri 8:30 am - 5 pm  Tue & Thurs 8:30 am - 5:30 pm  Sat 8 am - 10 am    DIABETIC FOOT EXAM: ONCE A YEAR    DIABETIC EYE EXAM: ONCE A YEAR , if you are not seen by an [place] eye doctor , please have your eye exam faxed to Dr Do at [phone]      Fecal occult stool card, must do with in one week , once done take to the lab.       Medication Refills    If you are in need of Medication Refills per our new clinic policy   You will need to Contact your pharmacy to obtain these refills  Do Not Call the clinic anymore for medication refills.     Medication refills can take up to 24-48 hours -Business hours Monday-Friday    If you are in the office for an appointment you must let the staff know if you are in need of any refills at the time of your appointment.     Controlled Substance medications, you will still need to contact the clinic for these types of refills giving a 48 - 72 hour notification for these refills.               
PRINCIPAL PROCEDURE  Procedure: Cystoscopy with fulguration and resection of bladder tumor  Findings and Treatment:

## 2023-04-25 NOTE — CHART NOTE - NSCHARTNOTEFT_GEN_A_CORE
pt with acute blood loss anemia requiring transfusion of PRBC and follow up blood work.  Pt also with upward trending creatine. may need renal decompression  pt needs to be formally admitted for further care/workup

## 2023-04-25 NOTE — DISCHARGE NOTE PROVIDER - CARE PROVIDER_API CALL
Tony Diallo  Urology  [address] [place] [address]  Phone: [phone]  Fax: [phone]  Follow Up Time: 1 week

## 2023-04-26 NOTE — PHYSICAL THERAPY INITIAL EVALUATION ADULT - NSPTDISCHREC_GEN_A_CORE
If pt goes home, will need home PT and assist in all aspects of mobility & function as pt is at risk to fall./Sub-acute Rehab

## 2023-04-26 NOTE — PHYSICAL THERAPY INITIAL EVALUATION ADULT - ADDITIONAL COMMENTS
Pt lives in an apartment building, 1 step to enter, elevator access. Per nephew at bedside, pt was functionally independent until 1 month PTA when pt started getting weaker d/t lower abdominal pain. Pt does not own any DMEs

## 2023-04-27 NOTE — PROGRESS NOTE ADULT - ASSESSMENT
78 year old male s/p cysto TURBT POD#3    - reg diet   - continue rodriguez  - PT - recommending subacute rehab vs home PT  - discharge planning

## 2023-04-28 NOTE — DISCHARGE NOTE NURSING/CASE MANAGEMENT/SOCIAL WORK - PATIENT PORTAL LINK FT
You can access the FollowMyHealth Patient Portal offered by [facility] by registering at the following website: [email or web address]. By joining [employer]’s FollowMyHealth portal, you will also be able to view your health information using other applications (apps) compatible with our system.

## 2023-04-28 NOTE — PROGRESS NOTE ADULT - ASSESSMENT
78 year old male s/p cysto TURBT POD#4    - reg diet   - continue rodriguez  - PT - recommending subacute rehab  - discharge planning

## 2023-04-28 NOTE — DISCHARGE NOTE NURSING/CASE MANAGEMENT/SOCIAL WORK - NSDCPEFALRISK_GEN_ALL_CORE
For information on Fall & Injury Prevention, visit: [email or web address] OR  [email or web address] OR  https://www.cdc.gov/steadi/patient.html

## 2023-04-28 NOTE — PROGRESS NOTE ADULT - SUBJECTIVE AND OBJECTIVE BOX
Post op Check    Pt seen and examined without complaints. Awaiting trial of void. Pain is controlled. Denies SOB/CP/N/V.   Family member can no longer wait for patient and is requesting to have patient stay overnight. Pt also would feel more comfortable remaining in PACU overnight.     Vital Signs Last 24 Hrs  T(C): 36.5 (24 Apr 2023 17:00), Max: 36.8 (24 Apr 2023 13:32)  T(F): 97.7 (24 Apr 2023 17:00), Max: 98.2 (24 Apr 2023 13:32)  HR: 62 (24 Apr 2023 17:00) (59 - 67)  BP: 138/86 (24 Apr 2023 17:00) (92/62 - 171/90)  BP(mean): 95 (24 Apr 2023 16:15) (77 - 125)  RR: 16 (24 Apr 2023 17:00) (14 - 18)  SpO2: 100% (24 Apr 2023 17:00) (94% - 100%)    Parameters below as of 24 Apr 2023 17:00  Patient On (Oxygen Delivery Method): room air        I&O's Summary    24 Apr 2023 07:01  -  24 Apr 2023 20:35  --------------------------------------------------------  IN: 0 mL / OUT: 30 mL / NET: -30 mL        Physical Exam  Gen: NAD, A&Ox3  Pulm: No respiratory distress, no subcostal retractions  Abd: Soft, NT, ND  : nonpalp bladder. no suprapubic tenderness. PVR by bladder scan shows 86cc.                 
The patient was seen and examined at bedside.  No acute events overnight and the patient is without acute complaints this AM.    T(C): 36.5 (04-27-23 @ 05:33), Max: 36.7 (04-26-23 @ 13:42)  HR: 60 (04-27-23 @ 05:33) (60 - 71)  BP: 118/66 (04-27-23 @ 05:33) (89/57 - 126/69)  RR: 18 (04-27-23 @ 05:33) (18 - 18)  SpO2: 95% (04-27-23 @ 05:33) (95% - 96%)  Wt(kg): --    Physical Exam:    General: NAD, A+Ox3  Abdomen: soft, non-tender, non-distended      04-26 @ 07:01  -  04-27 @ 07:00  --------------------------------------------------------  IN: 1240 mL / OUT: 900 mL / NET: 340 mL      F - 400cc clear  
UROLOGY DAILY PROGRESS NOTE:     Subjective: Patient seen and examined at bedside. No overnight events. c/o constipation       Objective:  Vital signs  T(F): , Max: 98.1 (04-25-23 @ 08:00)  HR: 59 (04-26-23 @ 05:05)  BP: 103/68 (04-26-23 @ 05:05)  SpO2: 97% (04-26-23 @ 05:05)  Wt(kg): --    I&O's Summary    25 Apr 2023 07:01  -  26 Apr 2023 07:00  --------------------------------------------------------  IN: 1025 mL / OUT: 2140 mL / NET: -1115 mL        Gen: NAD  Pulm: No respiratory distress, no subcostal retractions  CV: RRR, no JVD  Abd: Soft, NT, ND  : Mcguire clear urine     Labs:  04-25  13.03 / 28.2  /2.53   04-25  11.89 / 25.4  /2.90                           9.6    13.03 )-----------( 297      ( 25 Apr 2023 14:55 )             28.2     04-25    136  |  97  |  49<H>  ----------------------------<  101<H>  4.1   |  28  |  2.53<H>    Ca    9.6      25 Apr 2023 14:55      PT/INR - ( 25 Apr 2023 06:40 )   PT: 12.6 sec;   INR: 1.09 ratio         PTT - ( 25 Apr 2023 06:40 )  PTT:28.3 sec    Urine Cx:      
  Subjective  no overnight events   Objective    Vital signs  T(F): , Max: 98.2 (04-27-23 @ 17:23)  HR: 66 (04-28-23 @ 05:00)  BP: 116/84 (04-28-23 @ 05:00)  SpO2: 97% (04-28-23 @ 05:00)  Wt(kg): --    Output     04-27 @ 07:01  -  04-28 @ 07:00  --------------------------------------------------------  IN: 710 mL / OUT: 1100 mL / NET: -390 mL        Gen awake alert nad axox3  Abd soft ntnd   Back no cvat bl    nonpalp bladder     Labs      04-26 @ 22:35    WBC 11.46 / Hct 30.3  / SCr 2.02       Urine Cx: Culture - Urine (03.03.23 @ 20:10)    Specimen Source: Clean Catch Clean Catch (Midstream)   Culture Results:   <10,000 CFU/mL Normal Urogenital Savi        
UROLOGY PROGRESS NOTE:     Subjective: Patient seen and examined at bedside.       Objective:  Vital signs  T(F): , Max: 98.2 (04-24-23 @ 13:32)  HR: 59 (04-25-23 @ 06:00)  BP: 104/57 (04-25-23 @ 06:00)  SpO2: 96% (04-25-23 @ 06:00)  Wt(kg): --    Output     I&O's Detail    24 Apr 2023 07:01  -  25 Apr 2023 07:00  --------------------------------------------------------  IN:    Lactated Ringers: 825 mL    Oral Fluid: 150 mL  Total IN: 975 mL    OUT:    Indwelling Catheter - Urethral (mL): 30 mL    Voided (mL): 290 mL  Total OUT: 320 mL    Total NET: 655 mL      25 Apr 2023 07:01  -  25 Apr 2023 07:36  --------------------------------------------------------  IN:    Lactated Ringers: 75 mL  Total IN: 75 mL    OUT:  Total OUT: 0 mL    Total NET: 75 mL          Physical Exam:  Gen: no acute distress  Back: no CVAT b/l  Abd: soft nt nd  : wnl    Labs:                        8.5    11.89 )-----------( 290      ( 25 Apr 2023 06:40 )             25.4     04-25    134<L>  |  95<L>  |  57<H>  ----------------------------<  100<H>  4.1   |  26  |  2.90<H>    Ca    9.6      25 Apr 2023 06:40      PT/INR - ( 25 Apr 2023 06:40 )   PT: 12.6 sec;   INR: 1.09 ratio         PTT - ( 25 Apr 2023 06:40 )  PTT:28.3 sec

## 2023-04-29 NOTE — H&P ADULT - PROBLEM SELECTOR PLAN 3
Not clear what baseline is but patient still has notable wheeze on exam. Otherwise normal respiratory status and able to speak in full sentences.  -Cont. Duonebs Q6hrs  -Consider continuing steroids if wheezing not improved on Duonebs, not clear what baseline is  -Will order trelegy substitution

## 2023-04-29 NOTE — H&P ADULT - PROBLEM SELECTOR PLAN 5
S/p pacemaker placement 2011, Echo (2019) - showed EF - 30% - Device upgraded to BIV ICD (2019)  -Cont. to monitor fluid status Appears to be improving.  -Trend BMP  -Renal dose medications

## 2023-04-29 NOTE — H&P ADULT - NSHPSOCIALHISTORY_GEN_ALL_CORE
Former smoker, Usually lives alone Former smoker, Usually lives alone. Smoked marijuana often. Used to own [employer]

## 2023-04-29 NOTE — H&P ADULT - HISTORY OF PRESENT ILLNESS
78M w/ a pmhx of HTN , CAD , MI - s/p stented CAD 2001, TIA (25 Yrs ago), COPD on Trelegy, Bradycardia - s/p pacemaker placement 2011, Echo (2019) - showed EF - 30% - Device upgraded to BIV ICD (2019), Discharge from hospital yesterday, is transferred to ED from [facility] for evaluation of shortness of breath.  Per EMS, staff concerned notes that patient was anxious since arrival.  Patient lives at complaining of abdominal pain and shortness of breath earlier today.  EMS was called and patient was transported to ED.  Recently in hospital for TURBT on 4/24/23 for bladder CA. With chronic rodriguez in place. Hx limited 2/2 AMS. Pt reports "I was on TV last night, [place]".    In ER: Given IV Ceftriaxone, Duoneb, diazepam 10mg PO, solumedrol 125mg IV,  78M w/ a pmhx of HTN , CAD , MI - s/p stented CAD 2001, TIA (25 Yrs ago), COPD on Trelegy, Bradycardia - s/p pacemaker placement 2011, Echo (2019) - showed EF - 30% - Device upgraded to BIV ICD (2019), Discharge from hospital yesterday, is transferred to ED from [facility] for evaluation of shortness of breath.  Per EMS, staff concerned notes that patient was anxious since arrival.  Patient lives at complaining of abdominal pain and shortness of breath earlier today.  EMS was called and patient was transported to ED.  Recently in hospital for TURBT on 4/24/23 for bladder CA. With chronic rodriguez in place. Hx limited 2/2 AMS. Pt reports "I was on TV last night, [place]". Spoke to nephew, who said uncle called very anxious. Claiming they would not give diazepam, when nephew spoke to staff they were just waiting for physician to order. Reportedly there was some concern for SOB today.    In ER: Given IV Ceftriaxone, Duoneb, diazepam 10mg PO, solumedrol 125mg IV,

## 2023-04-29 NOTE — ED PROVIDER NOTE - NSICDXPASTSURGICALHX_GEN_ALL_CORE_FT
PAST SURGICAL HISTORY:  Cardiac resynchronization therapy defibrillator (CRT-D) in place     S/P cardiac pacemaker procedure     S/P drug eluting coronary stent placement     S/P inguinal hernia repair     S/P Rotator Cuff Surgery

## 2023-04-29 NOTE — ED PROVIDER NOTE - NSICDXPASTMEDICALHX_GEN_ALL_CORE_FT
PAST MEDICAL HISTORY:  Anxiety and depression     Bladder mass     Bradycardia     CAD (Coronary Artery Disease)     COPD (chronic obstructive pulmonary disease)     Dyslipidemia     H/O constipation     HTN (Hypertension)     LV dysfunction     NSTEMI (non-ST elevation myocardial infarction)     S/P PTCA (Percutaneous Transluminal Coronary Angioplasty) 2001    TIA (Transient Ischemic Attack) 18 years ago  PT was on coumadin for short while

## 2023-04-29 NOTE — H&P ADULT - NSHPPHYSICALEXAM_GEN_ALL_CORE
Vital Signs Last 24 Hrs  T(C): 37.3 (04-29-23 @ 18:01), Max: 37.3 (04-29-23 @ 18:01)  T(F): 99.2 (04-29-23 @ 18:01), Max: 99.2 (04-29-23 @ 18:01)  HR: 71 (04-29-23 @ 18:01) (71 - 71)  BP: 125/92 (04-29-23 @ 18:01) (125/92 - 125/92)  BP(mean): --  RR: 20 (04-29-23 @ 18:01) (20 - 20)  SpO2: 97% (04-29-23 @ 18:01) (97% - 97%)

## 2023-04-29 NOTE — H&P ADULT - PROBLEM SELECTOR PLAN 4
S/p recent admission with TURBT and hematuria. Reportedly concern for bladder cancer. Post op course was complicated by urinary obstruction so rodriguez was placed  -Cont. rodriguez catheter  -Consider Urology consult as pt wants to know when he can TOV  -Start flomax?

## 2023-04-29 NOTE — ED ADULT NURSE NOTE - OBJECTIVE STATEMENT
77 y/o male BIBEMS c/o abd pain and SOB. As per EMS report "Pt coming from [facility] for SOB and abd pain starting today, pt felt anxious it was first day at rehab pt D/C'd from [facility] today and sent to [facility]". Pt presents to [facility] A&Ox2, pt alert to self, situation, and place, pt disoriented to time, pt placed on continuous cardiac monitoring and pulse oximetry, 20 G IV placed in R. AC by ER RN, pt endorsing 10/10 lower abd pain that's nonradiating starting today and SOB, pt has increased WOB but able to speak in full and complete sentences. PMHx bladder mass, anxiety, depression, NSTEMI, COPD, TIA, HLD, HTN. Pt denies chest pain, SOB/difficulty breathing, fever/chills, HA, N/V/D, lightheadedness/dizziness, numbness/tingling. Pt A&Ox2, breathing spontaneous and unlabored, IV locked and patent, pt instructed on use of call bell, bed locked and in lowest position.

## 2023-04-29 NOTE — H&P ADULT - PROBLEM SELECTOR PLAN 2
Pyuria could be normal given rodriguez. Given AMS, will cover empirically for now  -Cont. IV Ceftriaxone  -F/u UCx

## 2023-04-29 NOTE — H&P ADULT - PROBLEM SELECTOR PLAN 6
-Cont. simvastatin S/p pacemaker placement 2011, Echo (2019) - showed EF - 30% - Device upgraded to BIV ICD (2019)  -Cont. to monitor fluid status

## 2023-04-29 NOTE — ED PROVIDER NOTE - PROGRESS NOTE DETAILS
Ene Culp PGY3: Obtained collateral from pt's nephew/HCP, Aren ([phone]). Per him, pt has had mental decline over the last yr. States that pt had distant past of heroin abuse and marijuana use, and believe that he has been abusing his prescription medications (valium/librium) to get high since his wife passed away 1 yr ago. 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 He states just the Ene Culp PGY3: Obtained collateral from pt's nephew/HCP, Aren ([phone]). Per him, pt has had mental decline over the last yr. States that pt had distant past of heroin abuse and marijuana use, and believe that he has been abusing his prescription medications (valium/librium) to get high since his wife passed away 1 yr ago. 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 He states just the other night he called him from [facility], because he was very agitated, stating that he was not getting his full valium dose, even tho it was given as prescribed. Given this will send Serum and Urine tox screen and obs for possible benzo withdrawal. Pt likely will require admission.

## 2023-04-29 NOTE — H&P ADULT - ASSESSMENT
78M w/ a pmhx of HTN , CAD , MI - s/p stented CAD 2001, TIA (25 Yrs ago), COPD on Trelegy, Bradycardia - s/p pacemaker placement 2011, Echo (2019) - showed EF - 30% - Device upgraded to BIV ICD (2019) s/p recent admission for hematuria and TURBT now p/w acute on chronic encephalopathy and possible COPD exacerbation

## 2023-04-29 NOTE — ED ADULT NURSE NOTE - CHIEF COMPLAINT QUOTE
Pt brought in by EMS from [facility] for abdominal and sob x 1 day; h/o pacemaker, MI, CAD, COPD

## 2023-04-29 NOTE — ED PROVIDER NOTE - PHYSICAL EXAMINATION
General: well appearing, no acute distress, alert, oriented to self, not time or place   Skin: no rash, no pallor  Head: normocephalic, atraumatic  Eyes: clear conjunctiva, EOMI  ENMT: airway patent, no nasal discharge  Cardiovascular: normal rate, normal rhythm, S1/S2  Pulmonary: scant wheeze, tachypneic   Abdomen: soft, nontender, no guarding   Musculoskeletal: moving extremities well, no deformity  Psych: appears anxious, gets agitated at times

## 2023-04-29 NOTE — H&P ADULT - PROBLEM SELECTOR PLAN 1
As per nephew, pt has some As per nephew, pt has some baseline delusions regarding being a  worsening over past month. Mental status deteriorated after wife passed away 2 years ago. Yesterday, pt seemed more disoriented than usual and endorsed severe paranoia, also being on TV. Has been using marijuana and valium daily at home. During urology admission, pt was documented as oriented to month and year which he currently is not.  -UTox positive for benzos and THC. Not clear if patient was somehow able to take THC in hospital  -Cont. IV Ceftriaxone for possible UTI for now?  -Cont. diazepam 10mg daily PRN for now to avoid withdrawal. Not sure if librium taper is indicated right now  -Falls and aspiration precautions  -Consider behavioral health consult

## 2023-04-29 NOTE — ED PROVIDER NOTE - CLINICAL SUMMARY MEDICAL DECISION MAKING FREE TEXT BOX
Alberto Alfaro, PGY-3-   78-year-old male with recent hospitalization, presents to ED by EMS for evaluation of shortness of breath and abdominal pain.  Patient's symptoms started shortly before arrival.  Patient was soft and nontender abdomen.  With stable vitals on arrival.  Not requiring O2.  Patient with wheeze on exam.  Will treat with DuoNebs and steroids for possible COPD exacerbation.  Concern for possible panic attack.  Patient anxious and stating he was on TV last night.  Will treat symptomatically.  Dispo pending work-up.  We will also evaluate for ACS, heart failure, pneumonia, delirium.  Patient not febrile.  Suspicion for acute infection low. Alberto Alfaro, PGY-3-   78-year-old male with recent hospitalization, presents to ED by EMS for evaluation of shortness of breath and abdominal pain.  Patient's symptoms started shortly before arrival.  Patient was soft and nontender abdomen.  With stable vitals on arrival.  Not requiring O2.  Patient with wheeze on exam.  Will treat with DuoNebs and steroids for possible COPD exacerbation.  Concern for possible panic attack.  Patient anxious and stating he was on TV last night.  Will treat symptomatically.  Dispo pending work-up.  We will also evaluate for ACS, heart failure, pneumonia, delirium.  Patient not febrile.  Suspicion for acute infection low.    MITRA Adams MD: Agree with resident/ACP MDM, assessment and plan as above.

## 2023-04-29 NOTE — ED PROVIDER NOTE - OBJECTIVE STATEMENT
Alberto Alfaro, PGY-3- 78 year old male with a pmhx of HTN , CAD , MI - s/p stented CAD 2001, TIA (25 Yrs ago), COPD on Trelegy, Bradycardia - s/p pacemaker placement 2011, Echo (2019) - showed EF - 30% - Device upgraded to BIV ICD (2019), Discharge from hospital yesterday, is transferred to ED from [facility] for evaluation of shortness of breath.  Per EMS, staff concerned notes that patient was anxious since arrival.  Patient lives at complaining of abdominal pain and shortness of breath earlier today.  EMS was called and patient was transported to ED.  Recently in hospital for TURBT on 4/24/23 for bladder CA. With chronic rodriguez in place. Hx limited 2/2 AMS. Pt reports "I was on TV last night".

## 2023-04-29 NOTE — ED ADULT TRIAGE NOTE - CHIEF COMPLAINT QUOTE
Pt brought in by EMS from [facility] for abdominal and sob x 1 day; h/o pacemaker, MI, CAD, COPD

## 2023-04-29 NOTE — H&P ADULT - NSHPLABSRESULTS_GEN_ALL_CORE
I have reviewed the labs, imaging and ekg. EKG with NSR HR 71 QTc 478 V-paced rhythm, CXR w/ small R sided pleural effusion

## 2023-04-29 NOTE — ED PROVIDER NOTE - DIFFERENTIAL DIAGNOSIS
DDx includes but is not limited to-   copd exacerbation, chf exacerbation, anxiety, unlikely acs/pe Differential Diagnosis

## 2023-04-29 NOTE — ED ADULT NURSE NOTE - HIV OFFER
Previously Declined (within the last year) Retention Suture Text: Retention sutures were placed to support the closure and prevent dehiscence.

## 2023-04-30 NOTE — CONSULT NOTE ADULT - ASSESSMENT
79yo male h/o TURBT, post op retention s/p rodriguez, now POD#6, admitted with SOB and AMS; urology called regarding rodriguez plan. currently on CTX for possible UTI, no fevers, Lg LE on UA but in setting of rodriguez, negative nitrites.  - recommend starting flomax 0.4mg QHS  - would wait at least 2-3 days prior to TOV   - f/u urine cx  - can cont ctx for now until cx results   - d/w Dr. Andrade

## 2023-04-30 NOTE — PATIENT PROFILE ADULT - FALL HARM RISK - HARM RISK INTERVENTIONS

## 2023-04-30 NOTE — PATIENT PROFILE ADULT - FUNCTIONAL ASSESSMENT - BASIC MOBILITY 6.
3-calculated by average/Not able to assess (calculate score using [facility] averaging method)

## 2023-04-30 NOTE — PROGRESS NOTE ADULT - SUBJECTIVE AND OBJECTIVE BOX
Noman Rose MD  PGY 1 Department of Internal Medicine        Patient is a 78y old  Male who presents with a chief complaint of AMS and COPD exacerbation? (2023 23:00)      SUBJECTIVE / OVERNIGHT EVENTS: Pt seen and examined. No acute overnight events. Denies fevers, chills, CP, SOB, Abdominal pain, N/V, Constipation, Diarrhea        MEDICATIONS  (STANDING):  albuterol/ipratropium for Nebulization 3 milliLiter(s) Nebulizer every 6 hours  atenolol  Tablet 25 milliGRAM(s) Oral daily  atorvastatin 40 milliGRAM(s) Oral at bedtime  budesonide  80 MICROgram(s)/formoterol 4.5 MICROgram(s) Inhaler 2 Puff(s) Inhalation two times a day  cefTRIAXone   IVPB 1000 milliGRAM(s) IV Intermittent every 24 hours  FLUoxetine 40 milliGRAM(s) Oral daily  tiotropium 2.5 MICROgram(s) Inhaler 2 Puff(s) Inhalation daily    MEDICATIONS  (PRN):  acetaminophen     Tablet .. 650 milliGRAM(s) Oral every 6 hours PRN Temp greater or equal to 38C (100.4F), Mild Pain (1 - 3)  melatonin 3 milliGRAM(s) Oral at bedtime PRN Insomnia  ondansetron Injectable 4 milliGRAM(s) IV Push every 8 hours PRN Nausea and/or Vomiting      I&O's Summary    2023 07:01  -  2023 07:00  --------------------------------------------------------  IN: 0 mL / OUT: 650 mL / NET: -650 mL        Vital Signs Last 24 Hrs  T(C): 36.7 (2023 05:41), Max: 37.3 (2023 18:01)  T(F): 98 (2023 05:41), Max: 99.2 (2023 18:01)  HR: 68 (2023 05:41) (68 - 83)  BP: 126/67 (2023 05:41) (125/92 - 135/83)  BP(mean): 96 (2023 23:56) (96 - 96)  RR: 18 (2023 05:41) (18 - 20)  SpO2: 95% (2023 05:41) (95% - 98%)    Parameters below as of 2023 05:41  Patient On (Oxygen Delivery Method): room air        CAPILLARY BLOOD GLUCOSE      POCT Blood Glucose.: 111 mg/dL (2023 18:13)      PHYSICAL EXAM:  GENERAL: NAD,   HEAD:  Atraumatic, Normocephalic  EYES: EOMI, PERRL, conjunctiva and sclera clear  NECK: No JVD  CHEST/LUNG: Clear to auscultation bilaterally; No wheeze  HEART: Regular rate and rhythm; No murmurs, rubs, or gallops  ABDOMEN: Soft, Nontender, Nondistended; Bowel sounds present  EXTREMITIES:  2+ Peripheral Pulses, No clubbing, cyanosis, or edema  PSYCH: AAOx3  NEUROLOGY: non-focal  SKIN: No rashes or lesions       LABS:                        9.5    8.59  )-----------( 326      ( 2023 07:06 )             28.8     Auto Eosinophil # 0.00  / Auto Eosinophil % 0.0   / Auto Neutrophil # 7.70  / Auto Neutrophil % 89.7  / BANDS % x                            9.2    12.77 )-----------( 310      ( 2023 18:50 )             28.2     Auto Eosinophil # 0.11  / Auto Eosinophil % 0.9   / Auto Neutrophil # 9.69  / Auto Neutrophil % 75.9  / BANDS % x        04-30    138  |  102  |  27<H>  ----------------------------<  175<H>  4.2   |  21<L>  |  1.50<H>  04    139  |  105  |  26<H>  ----------------------------<  104<H>  3.9   |  22  |  1.63<H>    Ca    9.6      2023 07:07  Mg     2.0     04-30  TPro  6.9  /  Alb  3.7  /  TBili  0.2  /  DBili  x   /  AST  19  /  ALT  10  /  AlkPhos  58  04-30  TPro  6.6  /  Alb  3.4  /  TBili  0.3  /  DBili  x   /  AST  20  /  ALT  9<L>  /  AlkPhos  60            Urinalysis Basic - ( 2023 20:37 )    Color: Yellow / Appearance: Clear / S.020 / pH: x  Gluc: x / Ketone: Negative  / Bili: Negative / Urobili: Negative   Blood: x / Protein: 100 mg/dl / Nitrite: Negative   Leuk Esterase: Large / RBC: 55 /hpf / WBC 37 /HPF   Sq Epi: x / Non Sq Epi: x / Bacteria: Few            RADIOLOGY & ADDITIONAL TESTS:    Imaging Personally Reviewed:    Consultant(s) Notes Reviewed:      Care Discussed with Consultants/Other Providers:   Noman Rose MD  PGY 1 Department of Internal Medicine        Patient is a 78y old  Male who presents with a chief complaint of AMS and COPD exacerbation? (2023 23:00)      SUBJECTIVE / OVERNIGHT EVENTS: Pt seen and examined. No acute overnight events. Has complaint of suprapubic pain/burning sensation w/ urination. Denies fevers, chills, CP, SOB, Abdominal pain, N/V, Constipation, Diarrhea        MEDICATIONS  (STANDING):  albuterol/ipratropium for Nebulization 3 milliLiter(s) Nebulizer every 6 hours  atenolol  Tablet 25 milliGRAM(s) Oral daily  atorvastatin 40 milliGRAM(s) Oral at bedtime  budesonide  80 MICROgram(s)/formoterol 4.5 MICROgram(s) Inhaler 2 Puff(s) Inhalation two times a day  cefTRIAXone   IVPB 1000 milliGRAM(s) IV Intermittent every 24 hours  FLUoxetine 40 milliGRAM(s) Oral daily  tiotropium 2.5 MICROgram(s) Inhaler 2 Puff(s) Inhalation daily    MEDICATIONS  (PRN):  acetaminophen     Tablet .. 650 milliGRAM(s) Oral every 6 hours PRN Temp greater or equal to 38C (100.4F), Mild Pain (1 - 3)  melatonin 3 milliGRAM(s) Oral at bedtime PRN Insomnia  ondansetron Injectable 4 milliGRAM(s) IV Push every 8 hours PRN Nausea and/or Vomiting      I&O's Summary    2023 07:01  -  2023 07:00  --------------------------------------------------------  IN: 0 mL / OUT: 650 mL / NET: -650 mL        Vital Signs Last 24 Hrs  T(C): 36.7 (2023 05:41), Max: 37.3 (2023 18:01)  T(F): 98 (2023 05:41), Max: 99.2 (2023 18:01)  HR: 68 (2023 05:41) (68 - 83)  BP: 126/67 (2023 05:41) (125/92 - 135/83)  BP(mean): 96 (2023 23:56) (96 - 96)  RR: 18 (2023 05:41) (18 - 20)  SpO2: 95% (2023 05:41) (95% - 98%)    Parameters below as of 2023 05:41  Patient On (Oxygen Delivery Method): room air        CAPILLARY BLOOD GLUCOSE      POCT Blood Glucose.: 111 mg/dL (2023 18:13)      PHYSICAL EXAM:  GENERAL: NAD, lying in bed  HEAD:  Atraumatic, Normocephalic  EYES: EOMI, PERRL, conjunctiva and sclera clear  NECK: No JVD  CHEST/LUNG: Mild wheezing auscultated at R lung base.   HEART: Regular rate and rhythm; No murmurs, rubs, or gallops  ABDOMEN: Soft, Nontender, Nondistended; Bowel sounds present. Villarreal in place.   EXTREMITIES:  2+ Peripheral Pulses, No clubbing, cyanosis, or edema. R index finger contracted @ level of DIP  PSYCH: AAOx3  NEUROLOGY: non-focal  SKIN: No rashes or lesions       LABS:                        9.5    8.59  )-----------( 326      ( 2023 07:06 )             28.8     Auto Eosinophil # 0.00  / Auto Eosinophil % 0.0   / Auto Neutrophil # 7.70  / Auto Neutrophil % 89.7  / BANDS % x                            9.2    12.77 )-----------( 310      ( 2023 18:50 )             28.2     Auto Eosinophil # 0.11  / Auto Eosinophil % 0.9   / Auto Neutrophil # 9.69  / Auto Neutrophil % 75.9  / BANDS % x        04-30    138  |  102  |  27<H>  ----------------------------<  175<H>  4.2   |  21<L>  |  1.50<H>      139  |  105  |  26<H>  ----------------------------<  104<H>  3.9   |  22  |  1.63<H>    Ca    9.6      2023 07:07  Mg     2.0     04-30  TPro  6.9  /  Alb  3.7  /  TBili  0.2  /  DBili  x   /  AST  19  /  ALT  10  /  AlkPhos  58  -30  TPro  6.6  /  Alb  3.4  /  TBili  0.3  /  DBili  x   /  AST  20  /  ALT  9<L>  /  AlkPhos  60            Urinalysis Basic - ( 2023 20:37 )    Color: Yellow / Appearance: Clear / S.020 / pH: x  Gluc: x / Ketone: Negative  / Bili: Negative / Urobili: Negative   Blood: x / Protein: 100 mg/dl / Nitrite: Negative   Leuk Esterase: Large / RBC: 55 /hpf / WBC 37 /HPF   Sq Epi: x / Non Sq Epi: x / Bacteria: Few          Imaging Personally Reviewed: Yes    Consultant(s) Notes Reviewed:  Yes    Care Discussed with Consultants/Other Providers: Yes

## 2023-04-30 NOTE — PROGRESS NOTE ADULT - ATTENDING COMMENTS
78M PMH HTN, CAD, MI, TIA, COPD, recent admission for TURBPT and discharged 3 days ago, returns to hospital from [facility] due to agitation.    #Toxic metabolic encephalopathy  UA positive and with possible dysuria in setting of Villarreal placed on last hospitalization.   - Continue with ceftriaxone for now, would treat as complex UTI  - Urology consulted and following, will start tamsulosin, consider TOV in 2-3 days   - Hold home Valium as this may contribute to in hospital delirium, and overdosage may results in worsening encephalopathy    #Acute cystitis  - As above    Remainder as above 78M PMH HTN, CAD, MI, TIA, COPD, recent admission for TURBPT and discharged 3 days ago, returns to hospital from [facility] due to agitation.    #Toxic metabolic encephalopathy  UA positive and with possible dysuria in setting of Villarreal placed on last hospitalization.   - Continue with ceftriaxone for now, would treat as complicated UTI due to multiple risk factors (anatomic male, recent instrumentation, recent antibiotics).  - Urology consulted and following, will start tamsulosin, consider TOV in 2-3 days   - Hold home Valium as this may contribute to in hospital delirium, and overdosage may results in worsening encephalopathy    #Acute cystitis  - As above  - Follow up with urine cultures     Remainder as above

## 2023-04-30 NOTE — PROGRESS NOTE ADULT - PROBLEM SELECTOR PLAN 1
As per nephew, pt has some baseline delusions regarding being a  worsening over past month. Mental status deteriorated after wife passed away 2 years ago. Yesterday, pt seemed more disoriented than usual and endorsed severe paranoia, also being on TV. Has been using marijuana and valium daily at home. During urology admission, pt was documented as oriented to month and year which he currently is not.  -UTox positive for benzos and THC. Not clear if patient was somehow able to take THC in hospital  -Cont. IV Ceftriaxone for possible UTI for now?  -Cont. diazepam 10mg daily PRN for now to avoid withdrawal. Not sure if librium taper is indicated right now  -Falls and aspiration precautions  -Consider behavioral health consult As per nephew, pt has some baseline delusions regarding being a  worsening over past month. Mental status deteriorated after wife passed away 2 years ago. Yesterday, pt seemed more disoriented than usual and endorsed severe paranoia, also being on TV. Has been using marijuana and valium daily at home. During urology admission, pt was documented as oriented to month and year which he currently is not.  -UTox positive for benzos and THC. Not clear if patient was somehow able to take THC in hospital  -Cont. IV Ceftriaxone for possible UTI for now?  -Monitoring off of home dose diazepam 10mg daily PRN for now. Will resume vs librium taper if showing signs of withdrawal  -Falls and aspiration precautions  -Consider behavioral health consult As per nephew, pt has some baseline delusions regarding being a  worsening over past month. Mental status deteriorated after wife passed away 2 years ago. Yesterday, pt seemed more disoriented than usual and endorsed severe paranoia, also being on TV. Has been using marijuana and valium daily at home. During urology admission, pt was documented as oriented to month and year which he currently is not.  -UTox positive for benzos and THC. Not clear if patient was somehow able to take THC in hospital  -Cont. IV Ceftriaxone for possible UTI  -Monitoring off of home dose diazepam 10mg daily PRN for now. Will resume vs librium taper if showing signs of withdrawal  -Falls and aspiration precautions  -Consider behavioral health consult

## 2023-04-30 NOTE — PROGRESS NOTE ADULT - PROBLEM SELECTOR PLAN 4
S/p recent admission with TURBT and hematuria. Reportedly concern for bladder cancer. Post op course was complicated by urinary obstruction so rodriguez was placed  -Cont. rodriguez catheter  -Consider Urology consult as pt wants to know when he can TOV  -Start flomax? S/p recent admission with TURBT and hematuria. Reportedly concern for bladder cancer. Post op course was complicated by urinary obstruction so rodriguez was placed  -Cont. rodriguez catheter  -Consider Urology consult as pt wants to know when he can TOV  -Start flomax after d/w urology if pt clear for TOV

## 2023-05-01 NOTE — BH CONSULTATION LIAISON ASSESSMENT NOTE - DESCRIPTION
Pt used to work as a  but has been retired for a few years.  He has been living alone since his wife passed away in Dec 2021.

## 2023-05-01 NOTE — PROGRESS NOTE ADULT - SUBJECTIVE AND OBJECTIVE BOX
Noman Rose MD  PGY 1 Department of Internal Medicine        Patient is a 78y old  Male who presents with a chief complaint of AMS and COPD exacerbation? (2023 11:49)      SUBJECTIVE / OVERNIGHT EVENTS: Pt seen and examined. No acute overnight events. Denies fevers, chills, CP, SOB, Abdominal pain, N/V, Constipation, Diarrhea        MEDICATIONS  (STANDING):  albuterol/ipratropium for Nebulization 3 milliLiter(s) Nebulizer every 6 hours  atenolol  Tablet 25 milliGRAM(s) Oral daily  atorvastatin 40 milliGRAM(s) Oral at bedtime  bisacodyl Suppository 10 milliGRAM(s) Rectal once  budesonide  80 MICROgram(s)/formoterol 4.5 MICROgram(s) Inhaler 2 Puff(s) Inhalation two times a day  cefTRIAXone   IVPB 1000 milliGRAM(s) IV Intermittent every 24 hours  chlorhexidine 4% Liquid 1 Application(s) Topical daily  FLUoxetine 40 milliGRAM(s) Oral daily  oxyCODONE    IR 2.5 milliGRAM(s) Oral once  tamsulosin 0.4 milliGRAM(s) Oral at bedtime  tiotropium 2.5 MICROgram(s) Inhaler 2 Puff(s) Inhalation daily    MEDICATIONS  (PRN):  acetaminophen     Tablet .. 650 milliGRAM(s) Oral every 6 hours PRN Temp greater or equal to 38C (100.4F), Mild Pain (1 - 3)  melatonin 3 milliGRAM(s) Oral at bedtime PRN Insomnia  ondansetron Injectable 4 milliGRAM(s) IV Push every 8 hours PRN Nausea and/or Vomiting  polyethylene glycol 3350 17 Gram(s) Oral daily PRN Constipation  senna 2 Tablet(s) Oral at bedtime PRN Constipation      I&O's Summary    2023 07:01  -  2023 07:00  --------------------------------------------------------  IN: 0 mL / OUT: 650 mL / NET: -650 mL    2023 07:01  -  01 May 2023 06:32  --------------------------------------------------------  IN: 0 mL / OUT: 550 mL / NET: -550 mL        Vital Signs Last 24 Hrs  T(C): 36.6 (2023 20:21), Max: 36.8 (2023 11:40)  T(F): 97.9 (2023 20:21), Max: 98.2 (2023 11:40)  HR: 74 (2023 20:21) (68 - 74)  BP: 132/78 (2023 20:21) (123/64 - 144/80)  BP(mean): --  RR: 18 (2023 20:21) (18 - 18)  SpO2: 96% (2023 20:21) (96% - 98%)    Parameters below as of 2023 20:21  Patient On (Oxygen Delivery Method): room air        CAPILLARY BLOOD GLUCOSE          PHYSICAL EXAM:  GENERAL: NAD,   HEAD:  Atraumatic, Normocephalic  EYES: EOMI, PERRL, conjunctiva and sclera clear  NECK: No JVD  CHEST/LUNG: Clear to auscultation bilaterally; No wheeze  HEART: Regular rate and rhythm; No murmurs, rubs, or gallops  ABDOMEN: Soft, Nontender, Nondistended; Bowel sounds present  EXTREMITIES:  2+ Peripheral Pulses, No clubbing, cyanosis, or edema  PSYCH: AAOx3  NEUROLOGY: non-focal  SKIN: No rashes or lesions       LABS:                        9.5    8.59  )-----------( 326      ( 2023 07:06 )             28.8     Auto Eosinophil # 0.00  / Auto Eosinophil % 0.0   / Auto Neutrophil # 7.70  / Auto Neutrophil % 89.7  / BANDS % x                            9.2    12.77 )-----------( 310      ( 2023 18:50 )             28.2     Auto Eosinophil # 0.11  / Auto Eosinophil % 0.9   / Auto Neutrophil # 9.69  / Auto Neutrophil % 75.9  / BANDS % x        04-30    138  |  102  |  27<H>  ----------------------------<  175<H>  4.2   |  21<L>  |  1.50<H>      139  |  105  |  26<H>  ----------------------------<  104<H>  3.9   |  22  |  1.63<H>    Ca    9.6      2023 07:07  Mg     2.0       TPro  6.9  /  Alb  3.7  /  TBili  0.2  /  DBili  x   /  AST  19  /  ALT  10  /  AlkPhos  58    TPro  6.6  /  Alb  3.4  /  TBili  0.3  /  DBili  x   /  AST  20  /  ALT  9<L>  /  AlkPhos  60            Urinalysis Basic - ( 2023 20:37 )    Color: Yellow / Appearance: Clear / S.020 / pH: x  Gluc: x / Ketone: Negative  / Bili: Negative / Urobili: Negative   Blood: x / Protein: 100 mg/dl / Nitrite: Negative   Leuk Esterase: Large / RBC: 55 /hpf / WBC 37 /HPF   Sq Epi: x / Non Sq Epi: x / Bacteria: Few            RADIOLOGY & ADDITIONAL TESTS:    Imaging Personally Reviewed:    Consultant(s) Notes Reviewed:      Care Discussed with Consultants/Other Providers:   Noman Rose MD  PGY 1 Department of Internal Medicine        Patient is a 78y old  Male who presents with a chief complaint of AMS and COPD exacerbation? (2023 11:49)      SUBJECTIVE / OVERNIGHT EVENTS: Pt seen and examined. No acute overnight events. Complains of suprapubic pain, nonradiating and constant. Previously improved w/ morphine. Denies fevers, chills, CP, SOB, N/V, Constipation, Diarrhea        MEDICATIONS  (STANDING):  albuterol/ipratropium for Nebulization 3 milliLiter(s) Nebulizer every 6 hours  atenolol  Tablet 25 milliGRAM(s) Oral daily  atorvastatin 40 milliGRAM(s) Oral at bedtime  bisacodyl Suppository 10 milliGRAM(s) Rectal once  budesonide  80 MICROgram(s)/formoterol 4.5 MICROgram(s) Inhaler 2 Puff(s) Inhalation two times a day  cefTRIAXone   IVPB 1000 milliGRAM(s) IV Intermittent every 24 hours  chlorhexidine 4% Liquid 1 Application(s) Topical daily  FLUoxetine 40 milliGRAM(s) Oral daily  oxyCODONE    IR 2.5 milliGRAM(s) Oral once  tamsulosin 0.4 milliGRAM(s) Oral at bedtime  tiotropium 2.5 MICROgram(s) Inhaler 2 Puff(s) Inhalation daily    MEDICATIONS  (PRN):  acetaminophen     Tablet .. 650 milliGRAM(s) Oral every 6 hours PRN Temp greater or equal to 38C (100.4F), Mild Pain (1 - 3)  melatonin 3 milliGRAM(s) Oral at bedtime PRN Insomnia  ondansetron Injectable 4 milliGRAM(s) IV Push every 8 hours PRN Nausea and/or Vomiting  polyethylene glycol 3350 17 Gram(s) Oral daily PRN Constipation  senna 2 Tablet(s) Oral at bedtime PRN Constipation      I&O's Summary    2023 07:01  -  2023 07:00  --------------------------------------------------------  IN: 0 mL / OUT: 650 mL / NET: -650 mL    2023 07:01  -  01 May 2023 06:32  --------------------------------------------------------  IN: 0 mL / OUT: 550 mL / NET: -550 mL        Vital Signs Last 24 Hrs  T(C): 36.6 (2023 20:21), Max: 36.8 (2023 11:40)  T(F): 97.9 (2023 20:21), Max: 98.2 (2023 11:40)  HR: 74 (2023 20:21) (68 - 74)  BP: 132/78 (2023 20:21) (123/64 - 144/80)  BP(mean): --  RR: 18 (2023 20:21) (18 - 18)  SpO2: 96% (2023 20:21) (96% - 98%)    Parameters below as of 2023 20:21  Patient On (Oxygen Delivery Method): room air        CAPILLARY BLOOD GLUCOSE          PHYSICAL EXAM:  GENERAL: Lying in bed, mild distress  HEAD:  Atraumatic, Normocephalic  EYES: EOMI, PERRL, conjunctiva and sclera clear  NECK: No JVD  CHEST/LUNG: Clear to auscultation bilaterally; No wheeze  HEART: Regular rate and rhythm; No murmurs, rubs, or gallops  ABDOMEN: Soft, Nontender, Nondistended; Bowel sounds present  EXTREMITIES:  2+ Peripheral Pulses, No clubbing, cyanosis, or edema  PSYCH: AAOx3  NEUROLOGY: non-focal  SKIN: No rashes or lesions       LABS:                        9.5    8.59  )-----------( 326      ( 2023 07:06 )             28.8     Auto Eosinophil # 0.00  / Auto Eosinophil % 0.0   / Auto Neutrophil # 7.70  / Auto Neutrophil % 89.7  / BANDS % x                            9.2    12.77 )-----------( 310      ( 2023 18:50 )             28.2     Auto Eosinophil # 0.11  / Auto Eosinophil % 0.9   / Auto Neutrophil # 9.69  / Auto Neutrophil % 75.9  / BANDS % x        04-30    138  |  102  |  27<H>  ----------------------------<  175<H>  4.2   |  21<L>  |  1.50<H>      139  |  105  |  26<H>  ----------------------------<  104<H>  3.9   |  22  |  1.63<H>    Ca    9.6      2023 07:07  Mg     2.0       TPro  6.9  /  Alb  3.7  /  TBili  0.2  /  DBili  x   /  AST  19  /  ALT  10  /  AlkPhos  58    TPro  6.6  /  Alb  3.4  /  TBili  0.3  /  DBili  x   /  AST  20  /  ALT  9<L>  /  AlkPhos  60            Urinalysis Basic - ( 2023 20:37 )    Color: Yellow / Appearance: Clear / S.020 / pH: x  Gluc: x / Ketone: Negative  / Bili: Negative / Urobili: Negative   Blood: x / Protein: 100 mg/dl / Nitrite: Negative   Leuk Esterase: Large / RBC: 55 /hpf / WBC 37 /HPF   Sq Epi: x / Non Sq Epi: x / Bacteria: Few            Consultant(s) Notes Reviewed:  Yes    Care Discussed with Consultants/Other Providers: Yes

## 2023-05-01 NOTE — BH CONSULTATION LIAISON ASSESSMENT NOTE - CURRENT MEDICATION
MEDICATIONS  (STANDING):  atenolol  Tablet 25 milliGRAM(s) Oral daily  atorvastatin 40 milliGRAM(s) Oral at bedtime  cefTRIAXone   IVPB 1000 milliGRAM(s) IV Intermittent every 24 hours  chlorhexidine 4% Liquid 1 Application(s) Topical daily  FLUoxetine 40 milliGRAM(s) Oral daily  fluticasone furoate/umeclidinium/vilanterol 100-62.5-25 MICROgram(s) Inhaler 1 Puff(s) Inhalation daily  oxyCODONE    IR 2.5 milliGRAM(s) Oral once  tamsulosin 0.4 milliGRAM(s) Oral at bedtime  tiotropium 2.5 MICROgram(s) Inhaler 2 Puff(s) Inhalation daily    MEDICATIONS  (PRN):  acetaminophen     Tablet .. 650 milliGRAM(s) Oral every 6 hours PRN Temp greater or equal to 38C (100.4F), Mild Pain (1 - 3)  magnesium hydroxide Suspension 30 milliLiter(s) Oral daily PRN Constipation  melatonin 3 milliGRAM(s) Oral at bedtime PRN Insomnia  ondansetron Injectable 4 milliGRAM(s) IV Push every 8 hours PRN Nausea and/or Vomiting  polyethylene glycol 3350 17 Gram(s) Oral daily PRN Constipation  senna 2 Tablet(s) Oral at bedtime PRN Constipation

## 2023-05-01 NOTE — PHYSICAL THERAPY INITIAL EVALUATION ADULT - PERTINENT HX OF CURRENT PROBLEM, REHAB EVAL
78M w/ a pmhx of HTN , CAD , MI - s/p stented CAD 2001, TIA (25 Yrs ago), COPD on Trelegy, Bradycardia - s/p pacemaker placement 2011, Echo (2019) - showed EF - 30% - Device upgraded to BIV ICD (2019) s/p recent admission for hematuria and TURBT now p/w acute on chronic encephalopathy and possible COPD exacerbation. Hospital course: Pt. being managed by Internal medicine and Urology.

## 2023-05-01 NOTE — BH CONSULTATION LIAISON ASSESSMENT NOTE - SUMMARY
Pt is a 78yr old man who is  and presented with worsening confusion with a pmhx of HTN , CAD , MI - s/p stented CAD , TIA (25 Yrs ago), COPD on Trelegy, Bradycardia - s/p pacemaker placement , Echo () - showed EF - 30% - Device upgraded to BIV ICD (2019) s/p recent admission for hematuria and TURBT now p/w acute on chronic encephalopathy and possible COPD exacerbation. A psychiatric consultation was placed because of patient's worsening confusion and long term use of diazepam at home.    Pt was calm and cooperative on assessment though confused about the current date, insisting that the year is 2017 despite being reminded that the current year is .  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  Pt is also unable to recall when his wife passed away and thought that this occurred before last summer, but could not recall the month.  His nephew reports that she  in .  Pt complains that he has much difficulty sleeping without the Valium 5mg to 10mg he takes at home and says he has been on valium for many years, prescribed by his PCP.  He is willing to try Mirtazapine 7.5mg and Melatonin to see if they may be helpful as well.  He admits to a history of smoking both cigarettes and cannabis in the past but none since his diagnosis of COPD.   He has no history of suicidality and no history of harming self or others.  Pt is a 78yr old man who is  and presented with worsening confusion with a pmhx of HTN , CAD , MI - s/p stented CAD , TIA (25 Yrs ago), COPD on Trelegy, Bradycardia - s/p pacemaker placement , Echo () - showed EF - 30% - Device upgraded to BIV ICD (2019) s/p recent admission for hematuria and TURBT now p/w acute on chronic encephalopathy and possible COPD exacerbation. A psychiatric consultation was placed because of patient's worsening confusion and long term use of diazepam at home.    Pt was calm and cooperative on assessment though confused about the current date, insisting that the year is 2017 despite being reminded that the current year is .  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  Pt is also unable to recall when his wife passed away and thought that this occurred before last summer, but could not recall the month.  His nephew reports that she  in .  Pt complains that he has much difficulty sleeping without the Valium 5mg to 10mg he takes at home and says he has been on valium for many years, prescribed by his PCP.  He is willing to try Mirtazapine 7.5mg and Melatonin to see if they may be helpful as well.  He admits to a history of smoking both cigarettes and cannabis in the past but none since his diagnosis of COPD.   He has no history of suicidality and no history of harming self or others.   May continue Fluoxetine 40mg daily  May take a small dose of Mirtazapine 7.5mg for insomnia and   Melatonin 3mg po qhs

## 2023-05-01 NOTE — PROGRESS NOTE ADULT - SUBJECTIVE AND OBJECTIVE BOX
Subjective  Denies fevers, chills, nausea, vomiting, SOB, CP.  Tolerating diet.    Objective    Vital signs  T(F): , Max: 98.2 (04-30-23 @ 11:40)  HR: 74 (04-30-23 @ 20:21)  BP: 132/78 (04-30-23 @ 20:21)  SpO2: 96% (04-30-23 @ 20:21)  Wt(kg): --    Output     OUT:    Intermittent Catheterization - Urethral (mL): 550 mL  Total OUT: 550 mL    Total NET: -550 mL          Gen: NAD  Abd: soft, nontender, nondistended  : rodriguez secured in place, draining CYU    Labs      05-01 @ 07:36    WBC 15.40 / Hct 30.6  / SCr --       04-30 @ 07:07    WBC --    / Hct --    / SCr 1.50         Culture - Urine (collected 04-29-23 @ 20:37)  Source: Catheterized Catheterized  Final Report (05-01-23 @ 07:21):    <10,000 CFU/mL Normal Urogenital Savi        Urine Cx: ?  Blood Cx: ?    Imaging

## 2023-05-01 NOTE — CHART NOTE - NSCHARTNOTEFT_GEN_A_CORE
Called by RN for constipation and SOB with SpO2 91%. Assessed patient at bedside. Patient states he has severe lower abdominal pain which he believes is from constipation. States last BM was 4 days ago. He had told providers multiple times earlier in the day without any response. On exam, patient with suprapubic tenderness. Villarreal draining clear urine w/o leakage. VSS, SpO2 97% on RA noted on monitor. Ordered for dulcolax suppository & IV tylenol for pain given hx of constipation.

## 2023-05-01 NOTE — PROGRESS NOTE ADULT - PROBLEM SELECTOR PLAN 2
Pyuria could be normal given rodriguez. Given AMS, will cover empirically for now  -Cont. IV Ceftriaxone  -F/u UCx Pyuria could be normal given rodriguez. Given AMS, will cover empirically for now  -Cont. IV Ceftriaxone for 7 day course  -F/u UCx

## 2023-05-01 NOTE — PROGRESS NOTE ADULT - ASSESSMENT
79yo male h/o TURBT, post op retention s/p rodriguez, now POD#6, admitted with SOB and AMS; urology called regarding rodriguez plan. currently on CTX for possible UTI, no fevers, Lg LE on UA but in setting of rodriguez, negative nitrites.  - continue flomax 0.4mg QHS  - would wait at least 3-4 days prior to TOV   - if discharge beforehand please discharge with rodriguez and have patient follow up in a few days with Dr. mejia for TOV in the office  - f/u urine cx - negative  - abx plan per primary  - d/w Dr. Andrade

## 2023-05-01 NOTE — PHYSICAL THERAPY INITIAL EVALUATION ADULT - ADDITIONAL COMMENTS
Patient reported he lives alone in an apartment with no stairs to negotiate; reports he is independent in ADLs w/o use of AD; denies any recent fall 2/2 LOB

## 2023-05-01 NOTE — PROGRESS NOTE ADULT - PROBLEM SELECTOR PLAN 1
As per nephew, pt has some baseline delusions regarding being a  worsening over past month. Mental status deteriorated after wife passed away 2 years ago. Yesterday, pt seemed more disoriented than usual and endorsed severe paranoia, also being on TV. Has been using marijuana and valium daily at home. During urology admission, pt was documented as oriented to month and year which he currently is not.  -UTox positive for benzos and THC. Not clear if patient was somehow able to take THC in hospital  -Cont. IV Ceftriaxone for possible UTI  -Monitoring off of home dose diazepam 10mg daily PRN for now. Will resume vs librium taper if showing signs of withdrawal  -Falls and aspiration precautions  -Consider behavioral health consult As per nephew, pt has some baseline delusions regarding being a  worsening over past month. Mental status deteriorated after wife passed away 2 years ago. Yesterday, pt seemed more disoriented than usual and endorsed severe paranoia, also being on TV. Has been using marijuana and valium daily at home. During urology admission, pt was documented as oriented to month and year which he currently is not.  -UTox positive for benzos and THC. Not clear if patient was somehow able to take THC in hospital  -Cont. IV Ceftriaxone for possible UTI given suprapubic symptoms, though may be related to recent TURBT.   -Monitoring off of home dose diazepam 10mg daily PRN for now. Will resume vs taper if showing signs of withdrawal  -Falls and aspiration precautions  -Behavioral health consult to consider valium taper, recs appreciated  >mirtazapine 7.5mg qHS + melatonin 3mg vs 5mg valium nightly

## 2023-05-01 NOTE — BH CONSULTATION LIAISON ASSESSMENT NOTE - NSBHCHARTREVIEWVS_PSY_A_CORE FT
Vital Signs Last 24 Hrs  T(C): 36.6 (01 May 2023 11:35), Max: 36.6 (30 Apr 2023 20:21)  T(F): 97.8 (01 May 2023 11:35), Max: 97.9 (30 Apr 2023 20:21)  HR: 71 (01 May 2023 13:15) (67 - 74)  BP: 145/79 (01 May 2023 13:15) (123/64 - 145/79)  BP(mean): --  RR: 16 (01 May 2023 13:15) (16 - 18)  SpO2: 97% (01 May 2023 13:15) (96% - 98%)    Parameters below as of 01 May 2023 13:15  Patient On (Oxygen Delivery Method): room air

## 2023-05-01 NOTE — PROGRESS NOTE ADULT - ATTENDING COMMENTS
78M PMH HTN, CAD, MI, TIA, COPD, recent admission for TURBPT and discharged 3 days ago, returns to hospital from [email or web address] due to agitation. Patient seen and evaluated. Discussed that he was working for the [employer] currently. + suprapubic tenderness to palpation. No other copmplaints. Exam: +suprapubic tenderness to palpation. +BS. Villarreal with clear urine. No cVA tendereness.  Labs reviewed:   Leukocytosis, risinng creatinine. UCx shows no growth    #Acute metabolic encephalopathy superimposed on chronic dementia with delusions  -multifactorial, ins etting of previous surgery with anesthesia, complicated by benzodiazepime and marijuana use.  -behavioral psych to see if needs taper off of benzos and addition of alternative agent given risk of delirium of benzos.   -control pain.    #r/oAcute cystitis  -noted leukocytosis, but could be in setting of the 125mg solumedrol patient received in ED  -UCx negative, would d/c Abx and observe    #HARLEEN on CKD III  -patient with clear urine  -RN to flush  -if rising, will obtain official bladder US to ensure no blood clots retained.    #?COPD exacerbation  -no wheezing on my exam today, continue with inhaler therapy.     #Delirium with delusions  -behavioral health follow up

## 2023-05-01 NOTE — BH CONSULTATION LIAISON ASSESSMENT NOTE - HPI (INCLUDE ILLNESS QUALITY, SEVERITY, DURATION, TIMING, CONTEXT, MODIFYING FACTORS, ASSOCIATED SIGNS AND SYMPTOMS)
Pt is a 78yr old man who is  and presented with worsening confusion with a pmhx of HTN , CAD , MI - s/p stented CAD , TIA (25 Yrs ago), COPD on Trelegy, Bradycardia - s/p pacemaker placement , Echo () - showed EF - 30% - Device upgraded to BIV ICD (2019) s/p recent admission for hematuria and TURBT now p/w acute on chronic encephalopathy and possible COPD exacerbation. A psychiatric consultation was placed because of patient's worsening confusion and long term use of diazepam at home.    Pt was calm and cooperative on assessment though confused about the current date, insisting that the year is 2017 despite being reminded that the current year is .  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  Pt is also unable to recall when his wife passed away and thought that this occurred before last summer, but could not recall the month.  His nephew reports that she  in .  Pt complains that he has much difficulty sleeping without the Valium 5mg to 10mg he takes at home and says he has been on valium for many years, prescribed by his PCP.  He is willing to try Mirtazapine 7.5mg and Melatonin to see if they may be helpful as well.  He admits to a history of smoking both cigarettes and cannabis in the past but none since his diagnosis of COPD.   He has no history of suicidality and no history of harming self or others.

## 2023-05-01 NOTE — PROGRESS NOTE ADULT - PROBLEM SELECTOR PLAN 4
S/p recent admission with TURBT and hematuria. Reportedly concern for bladder cancer. Post op course was complicated by urinary obstruction so rodriguez was placed  -Cont. rodriguez catheter  -Consider Urology consult as pt wants to know when he can TOV  -Start flomax after d/w urology if pt clear for TOV

## 2023-05-01 NOTE — PROGRESS NOTE ADULT - PROBLEM SELECTOR PLAN 3
Not clear what baseline is but patient still has notable wheeze on exam. Otherwise normal respiratory status and able to speak in full sentences.  -Cont. Duonebs Q6hrs  -Consider continuing steroids if wheezing not improved on Duonebs, not clear what baseline is  -Will order trelegy substitution Pt not hypoxic, without wheezes on exam. Resolved.  -Consider continuing steroids if wheezing not improved on Duonebs, not clear what baseline is  -Trelegy

## 2023-05-02 NOTE — BH CONSULTATION LIAISON PROGRESS NOTE - NSBHASSESSMENTFT_PSY_ALL_CORE
Pt is a 78yr old man who is  and presented with worsening confusion with a pmhx of HTN , CAD , MI - s/p stented CAD , TIA (25 Yrs ago), COPD on Trelegy, Bradycardia - s/p pacemaker placement , Echo () - showed EF - 30% - Device upgraded to BIV ICD (2019) s/p recent admission for hematuria and TURBT now p/w acute on chronic encephalopathy and possible COPD exacerbation. A psychiatric consultation was placed because of patient's worsening confusion and long term use of diazepam at home.    Pt was calm and cooperative on assessment though confused about the current date, insisting that the year is 2017 despite being reminded that the current year is .  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  Pt is also unable to recall when his wife passed away and thought that this occurred before last summer, but could not recall the month.  His nephew reports that she  in .  Pt complains that he has much difficulty sleeping without the Valium 5mg to 10mg he takes at home and says he has been on valium for many years, prescribed by his PCP.  He is willing to try Mirtazapine 7.5mg and Melatonin to see if they may be helpful as well.  He admits to a history of smoking both cigarettes and cannabis in the past but none since his diagnosis of COPD.   He has no history of suicidality and no history of harming self or others.   May continue Fluoxetine 40mg daily  May take a small dose of Mirtazapine 7.5mg for insomnia and   Melatonin 3mg po qhs

## 2023-05-02 NOTE — DISCHARGE NOTE PROVIDER - NSDCCPTREATMENT_GEN_ALL_CORE_FT
PRINCIPAL PROCEDURE  Procedure: XR chest AP  Findings and Treatment: IMPRESSION:  Small right pleural effusion or atelectasis.

## 2023-05-02 NOTE — DISCHARGE NOTE PROVIDER - HOSPITAL COURSE
Pt is an 78y M with hx of HTN , CAD , MI - s/p stented CAD 2001, TIA (25 Yrs ago), COPD on Trelegy, Bradycardia - s/p pacemaker placement 2011, Echo (2019) - showed EF - 30% - Device upgraded to BIV ICD (2019), s/p TURBT w/ rodriguez placed 4/24 that was admitted from [facility] for abdominal pain and dyspnea. Initially concern was for COPD exacerbation (given wheezing on exam) +/- UTI (large leuk esterase on UA, nitrite negative, few bacteria) for which he was treated with solumedrol x1 as well as IV ceftriaxone. Urine cultures were negative and antibiotics discontinued. Pt saturated well, required no oxygen or further glucocorticoids. Continued with suprapubic pain 2/2 recent TURBT, was addressed with IV tylenol. Plans to dc w/ 4 days oxybutynin to control pain. Echo was also completed during admission with EF 41% (improved from prior).     Pt stable and medically cleared for discharge.     Medications initiated: mirtazapine 7.5mg nightly, flomax 0.4mg daily  Medications stopped: valium  Incidental findings: none  Relevant f/u: urology (TOV and further treatment for bladder tumor)    OUTSTANDING ISSUES AT DISCHARGE  [ ] rodriguez still in place, uro f/u   Pt is an 78y M with hx of HTN , CAD , MI - s/p stented CAD 2001, TIA (25 Yrs ago), COPD on Trelegy, Bradycardia - s/p pacemaker placement 2011, Echo (2019) - showed EF - 30% - Device upgraded to BIV ICD (2019), s/p TURBT w/ rodriguez placed 4/24 that was admitted from [facility] for abdominal pain and dyspnea. Initially concern was for COPD exacerbation (given wheezing on exam) +/- UTI (large leuk esterase on UA, nitrite negative, few bacteria) for which he was treated with solumedrol x1 as well as IV ceftriaxone. Urine cultures were negative and antibiotics discontinued. Pt saturated well, required no oxygen or further glucocorticoids. Continued with suprapubic pain 2/2 recent TURBT, was addressed with IV tylenol. Plans to dc w/ 4 days oxybutynin to control pain. Echo was also completed during admission with EF 41% (improved from prior).     Pt stable and medically cleared for discharge.     Medications initiated: mirtazapine 7.5mg nightly, flomax 0.4mg daily  Medications stopped: valium  Incidental findings: none  Relevant f/u: urology (TOV and further treatment for bladder tumor)    OUTSTANDING ISSUES AT DISCHARGE  [ ] rodriguez still in place, uro f/u  [ ] transition from atenolol to coreg/metoprolol for heart failure management/GDMT Pt is an 78y M with hx of HTN , CAD , MI - s/p stented CAD 2001, TIA (25 Yrs ago), COPD on Trelegy, Bradycardia - s/p pacemaker placement 2011, Echo (2019) - showed EF - 30% - Device upgraded to BIV ICD (2019), s/p TURBT w/ rodriguez placed 4/24 that was admitted from [facility] for abdominal pain and dyspnea. Initially concern was for COPD exacerbation (given wheezing on exam) +/- UTI (large leuk esterase on UA, nitrite negative, few bacteria) for which he was treated with solumedrol x1 as well as IV ceftriaxone. Urine cultures were negative and antibiotics discontinued. Pt saturated well, required no oxygen or further glucocorticoids. Continued with suprapubic pain 2/2 recent TURBT, was addressed with IV tylenol.  Echo was also completed during admission with EF 41% (improved from prior).     Pt stable and medically cleared for discharge.     Medications initiated: mirtazapine 7.5mg nightly, flomax 0.4mg daily  Medications stopped: valium  Incidental findings: none  Relevant f/u: urology (TOV and further treatment for bladder tumor)    OUTSTANDING ISSUES AT DISCHARGE    [ ] transition from atenolol to coreg/metoprolol for heart failure management/GDMT Pt is an 78y M with hx of HTN , CAD , MI - s/p stented CAD 2001, TIA (25 Yrs ago), COPD on Trelegy, Bradycardia - s/p pacemaker placement 2011, Echo (2019) - showed EF - 30% - Device upgraded to BIV ICD (2019), s/p TURBT w/ rodriguez placed 4/24 that was admitted from [facility] for abdominal pain and dyspnea. Initially concern was for COPD exacerbation (given wheezing on exam) +/- UTI (large leuk esterase on UA, nitrite negative, few bacteria) for which he was treated with solumedrol x1 as well as IV ceftriaxone. Urine cultures were negative and antibiotics discontinued. Pt saturated well, required no oxygen or further glucocorticoids. Continued with suprapubic pain 2/2 recent TURBT, was addressed with IV tylenol.  Echo was also completed during admission with EF 41% (improved from prior). Patient had rodriguez catheter removed while inpatient.     Pt stable and medically cleared for discharge.     Medications initiated: mirtazapine 7.5mg nightly, flomax 0.4mg daily  Incidental findings: none  Relevant f/u: urology (further treatment for bladder tumor)    OUTSTANDING ISSUES AT DISCHARGE    [ ] transition from atenolol to coreg/metoprolol for heart failure management/GDMT Pt is an 78y M with hx of HTN , CAD , MI - s/p stented CAD 2001, TIA (25 Yrs ago), COPD on Trelegy, Bradycardia - s/p pacemaker placement 2011, Echo (2019) - showed EF - 30% - Device upgraded to BIV ICD (2019), s/p TURBT w/ rodriguez placed 4/24 that was admitted from [facility] for abdominal pain and dyspnea. Initially concern was for COPD exacerbation (given wheezing on exam) +/- UTI (large leuk esterase on UA, nitrite negative, few bacteria) for which he was treated with solumedrol x1 as well as IV ceftriaxone. Urine cultures were negative and antibiotics discontinued. Pt saturated well, required no oxygen or further glucocorticoids. Continued with suprapubic pain 2/2 recent TURBT, was addressed with IV tylenol.  Echo was also completed during admission with EF 41% (improved from prior). Patient had rodriguez catheter removed while inpatient.     Pt stable and medically cleared for discharge.     Medications initiated: mirtazapine 7.5mg nightly, flomax 0.4mg daily  Incidental findings: none  Relevant f/u: urology (further treatment for bladder tumor)    OUTSTANDING ISSUES AT DISCHARGE    [ ] transition from atenolol to coreg/metoprolol for heart failure management/GDMT    Contingency:  Should patient develop shortness of breath, hypoxia consider lasix 20mg IV given hx of heart failure. Can also consider duonebs/inhaled corticosteroids if COPD is presumed cause.

## 2023-05-02 NOTE — PROGRESS NOTE ADULT - PROBLEM SELECTOR PLAN 3
S/p recent admission with TURBT and hematuria. Reportedly concern for bladder cancer. Post op course was complicated by urinary obstruction so rodriguez was placed  -Cont. rodriguez catheter  -Consider Urology consult as pt wants to know when he can TOV  -Start flomax after d/w urology if pt clear for TOV S/p recent admission with TURBT and hematuria. Reportedly concern for bladder cancer. Post op course was complicated by urinary obstruction so rodriguez was placed  -Cont. rodriguez catheter  -flomax 0.4mg daily

## 2023-05-02 NOTE — PROGRESS NOTE ADULT - PROBLEM SELECTOR PLAN 1
As per nephew, pt has some baseline delusions regarding being a  worsening over past month. Mental status deteriorated after wife passed away 2 years ago. Yesterday, pt seemed more disoriented than usual and endorsed severe paranoia, also being on TV. Has been using marijuana and valium daily at home. During urology admission, pt was documented as oriented to month and year which he currently is not.  -UTox positive for benzos and THC. Not clear if patient was somehow able to take THC in hospital  -Cont. IV Ceftriaxone for possible UTI given suprapubic symptoms, though may be related to recent TURBT.   -Monitoring off of home dose diazepam 10mg daily PRN for now. Will resume vs taper if showing signs of withdrawal  -Falls and aspiration precautions  -Behavioral health consult to consider valium taper, recs appreciated  >mirtazapine 7.5mg qHS + melatonin 3mg vs 5mg valium nightly As per nephew, pt has some baseline delusions regarding being a  worsening over past month. Mental status deteriorated after wife passed away 2 years ago. Yesterday, pt seemed more disoriented than usual and endorsed severe paranoia, also being on TV. Has been using marijuana and valium daily at home. During urology admission, pt was documented as oriented to month and year which he currently is not.   -UTox positive for benzos and THC. Not clear if patient was somehow able to take THC in hospital  -Cont. IV Ceftriaxone for possible UTI given suprapubic symptoms, though may be related to recent TURBT.   -Monitoring off of home dose diazepam 10mg daily PRN for now. Will resume vs taper if showing signs of withdrawal  -Falls and aspiration precautions  -Behavioral health consult to consider valium taper, recs appreciated  >mirtazapine 7.5mg qHS + melatonin 3mg vs 5mg valium nightly  >altered mentation more likely attributable to dementia

## 2023-05-02 NOTE — DISCHARGE NOTE PROVIDER - PROVIDER TOKENS
PROVIDER:[TOKEN:[[id number]],FOLLOWUP:[1-3 days],ESTABLISHEDPATIENT:[T]] PROVIDER:[TOKEN:[[id number]],FOLLOWUP:[1-3 days],ESTABLISHEDPATIENT:[T]],PROVIDER:[TOKEN:[[id number]],FOLLOWUP:[1 week],ESTABLISHEDPATIENT:[T]]

## 2023-05-02 NOTE — BH CONSULTATION LIAISON PROGRESS NOTE - CURRENT MEDICATION
MEDICATIONS  (STANDING):  atenolol  Tablet 25 milliGRAM(s) Oral daily  atorvastatin 40 milliGRAM(s) Oral at bedtime  belladonna 16.2 mG/opium 30 mg Suppository 1 Suppository(s) Rectal once  bisacodyl Suppository 10 milliGRAM(s) Rectal once  chlorhexidine 4% Liquid 1 Application(s) Topical daily  FLUoxetine 40 milliGRAM(s) Oral daily  fluticasone furoate/umeclidinium/vilanterol 100-62.5-25 MICROgram(s) Inhaler 1 Puff(s) Inhalation daily  mirtazapine 7.5 milliGRAM(s) Oral at bedtime  tamsulosin 0.4 milliGRAM(s) Oral at bedtime  tiotropium 2.5 MICROgram(s) Inhaler 2 Puff(s) Inhalation daily    MEDICATIONS  (PRN):  acetaminophen     Tablet .. 650 milliGRAM(s) Oral every 6 hours PRN Temp greater or equal to 38C (100.4F), Mild Pain (1 - 3)  magnesium hydroxide Suspension 30 milliLiter(s) Oral daily PRN Constipation  melatonin 3 milliGRAM(s) Oral at bedtime PRN Insomnia  ondansetron Injectable 4 milliGRAM(s) IV Push every 8 hours PRN Nausea and/or Vomiting  polyethylene glycol 3350 17 Gram(s) Oral daily PRN Constipation  senna 2 Tablet(s) Oral at bedtime PRN Constipation

## 2023-05-02 NOTE — PROGRESS NOTE ADULT - PROBLEM SELECTOR PLAN 2
Pyuria could be normal given rodriguez. Given AMS, will cover empirically for now  -Cont. IV Ceftriaxone for 7 day course  -F/u UCx Pyuria could be normal given rodriguez. Given AMS, will cover empirically for now  -IV Ceftriaxone (4/29 -5/1), now discontinued given urine cx NGTD  -F/u UCx -> NGTD

## 2023-05-02 NOTE — PROGRESS NOTE ADULT - PROBLEM SELECTOR PLAN 8
-SCDs DVT PPx: SCDs  Diet: Regular   Code status: Full code  Communication: Nephew contacted 5/2 3PM with regard to plan to which understanding was verbalized by party  Bowel regiment: Last BM:   Dispo: [id number]

## 2023-05-02 NOTE — BH CONSULTATION LIAISON PROGRESS NOTE - NSBHFUPINTERVALHXFT_PSY_A_CORE
Pt says she slept a little better.  He was able to take the Remeron and feels this was helpful.  He continues to believe that he works as a  and does not recall that he retired.

## 2023-05-02 NOTE — PROGRESS NOTE ADULT - SUBJECTIVE AND OBJECTIVE BOX
Noman Rose MD  PGY 1 Department of Internal Medicine        Patient is a 78y old  Male who presents with a chief complaint of AMS and COPD exacerbation? (01 May 2023 08:00)      SUBJECTIVE / OVERNIGHT EVENTS: Pt seen and examined. No acute overnight events. Denies fevers, chills, CP, SOB, Abdominal pain, N/V, Constipation, Diarrhea        MEDICATIONS  (STANDING):  atenolol  Tablet 25 milliGRAM(s) Oral daily  atorvastatin 40 milliGRAM(s) Oral at bedtime  belladonna 16.2 mG/opium 30 mg Suppository 1 Suppository(s) Rectal once  bisacodyl Suppository 10 milliGRAM(s) Rectal once  cefTRIAXone   IVPB 1000 milliGRAM(s) IV Intermittent every 24 hours  chlorhexidine 4% Liquid 1 Application(s) Topical daily  FLUoxetine 40 milliGRAM(s) Oral daily  fluticasone furoate/umeclidinium/vilanterol 100-62.5-25 MICROgram(s) Inhaler 1 Puff(s) Inhalation daily  mirtazapine 7.5 milliGRAM(s) Oral at bedtime  oxyCODONE    IR 2.5 milliGRAM(s) Oral once  tamsulosin 0.4 milliGRAM(s) Oral at bedtime  tiotropium 2.5 MICROgram(s) Inhaler 2 Puff(s) Inhalation daily    MEDICATIONS  (PRN):  acetaminophen     Tablet .. 650 milliGRAM(s) Oral every 6 hours PRN Temp greater or equal to 38C (100.4F), Mild Pain (1 - 3)  magnesium hydroxide Suspension 30 milliLiter(s) Oral daily PRN Constipation  melatonin 3 milliGRAM(s) Oral at bedtime PRN Insomnia  ondansetron Injectable 4 milliGRAM(s) IV Push every 8 hours PRN Nausea and/or Vomiting  polyethylene glycol 3350 17 Gram(s) Oral daily PRN Constipation  senna 2 Tablet(s) Oral at bedtime PRN Constipation      I&O's Summary    01 May 2023 07:01  -  02 May 2023 07:00  --------------------------------------------------------  IN: 390 mL / OUT: 1250 mL / NET: -860 mL        Vital Signs Last 24 Hrs  T(C): 36.4 (02 May 2023 05:00), Max: 36.6 (01 May 2023 11:35)  T(F): 97.6 (02 May 2023 05:00), Max: 97.8 (01 May 2023 11:35)  HR: 65 (02 May 2023 05:00) (65 - 84)  BP: 150/82 (02 May 2023 05:00) (121/68 - 150/82)  BP(mean): --  RR: 18 (02 May 2023 05:00) (16 - 18)  SpO2: 98% (02 May 2023 05:00) (96% - 98%)    Parameters below as of 02 May 2023 05:00  Patient On (Oxygen Delivery Method): room air        CAPILLARY BLOOD GLUCOSE          PHYSICAL EXAM:  GENERAL: NAD,   HEAD:  Atraumatic, Normocephalic  EYES: EOMI, PERRL, conjunctiva and sclera clear  NECK: No JVD  CHEST/LUNG: Clear to auscultation bilaterally; No wheeze  HEART: Regular rate and rhythm; No murmurs, rubs, or gallops  ABDOMEN: Soft, Nontender, Nondistended; Bowel sounds present  EXTREMITIES:  2+ Peripheral Pulses, No clubbing, cyanosis, or edema  PSYCH: AAOx3  NEUROLOGY: non-focal  SKIN: No rashes or lesions       LABS:                        9.7    15.40 )-----------( 334      ( 01 May 2023 07:36 )             30.6     Auto Eosinophil # x     / Auto Eosinophil % x     / Auto Neutrophil # x     / Auto Neutrophil % x     / BANDS % x                            9.5    8.59  )-----------( 326      ( 30 Apr 2023 07:06 )             28.8     Auto Eosinophil # 0.00  / Auto Eosinophil % 0.0   / Auto Neutrophil # 7.70  / Auto Neutrophil % 89.7  / BANDS % x        05-01    139  |  102  |  39<H>  ----------------------------<  100<H>  4.4   |  23  |  2.05<H>  04-30    138  |  102  |  27<H>  ----------------------------<  175<H>  4.2   |  21<L>  |  1.50<H>    Ca    9.8      01 May 2023 07:36  Mg     2.0     05-01  Phos  2.8     05-01  TPro  6.9  /  Alb  3.7  /  TBili  0.2  /  DBili  x   /  AST  19  /  ALT  10  /  AlkPhos  58  04-30                  RADIOLOGY & ADDITIONAL TESTS:    Imaging Personally Reviewed:    Consultant(s) Notes Reviewed:      Care Discussed with Consultants/Other Providers:   Noman Rose MD  PGY 1 Department of Internal Medicine        Patient is a 78y old  Male who presents with a chief complaint of AMS and COPD exacerbation? (01 May 2023 08:00)      SUBJECTIVE / OVERNIGHT EVENTS: Pt seen and examined. Had complaint of constipation last night and given tap water enema with relief. Feels abdominal pain is much improved. Denies fevers, chills, CP, SOB, Abdominal pain, N/V, Constipation, Diarrhea        MEDICATIONS  (STANDING):  atenolol  Tablet 25 milliGRAM(s) Oral daily  atorvastatin 40 milliGRAM(s) Oral at bedtime  belladonna 16.2 mG/opium 30 mg Suppository 1 Suppository(s) Rectal once  bisacodyl Suppository 10 milliGRAM(s) Rectal once  cefTRIAXone   IVPB 1000 milliGRAM(s) IV Intermittent every 24 hours  chlorhexidine 4% Liquid 1 Application(s) Topical daily  FLUoxetine 40 milliGRAM(s) Oral daily  fluticasone furoate/umeclidinium/vilanterol 100-62.5-25 MICROgram(s) Inhaler 1 Puff(s) Inhalation daily  mirtazapine 7.5 milliGRAM(s) Oral at bedtime  oxyCODONE    IR 2.5 milliGRAM(s) Oral once  tamsulosin 0.4 milliGRAM(s) Oral at bedtime  tiotropium 2.5 MICROgram(s) Inhaler 2 Puff(s) Inhalation daily    MEDICATIONS  (PRN):  acetaminophen     Tablet .. 650 milliGRAM(s) Oral every 6 hours PRN Temp greater or equal to 38C (100.4F), Mild Pain (1 - 3)  magnesium hydroxide Suspension 30 milliLiter(s) Oral daily PRN Constipation  melatonin 3 milliGRAM(s) Oral at bedtime PRN Insomnia  ondansetron Injectable 4 milliGRAM(s) IV Push every 8 hours PRN Nausea and/or Vomiting  polyethylene glycol 3350 17 Gram(s) Oral daily PRN Constipation  senna 2 Tablet(s) Oral at bedtime PRN Constipation      I&O's Summary    01 May 2023 07:01  -  02 May 2023 07:00  --------------------------------------------------------  IN: 390 mL / OUT: 1250 mL / NET: -860 mL        Vital Signs Last 24 Hrs  T(C): 36.4 (02 May 2023 05:00), Max: 36.6 (01 May 2023 11:35)  T(F): 97.6 (02 May 2023 05:00), Max: 97.8 (01 May 2023 11:35)  HR: 65 (02 May 2023 05:00) (65 - 84)  BP: 150/82 (02 May 2023 05:00) (121/68 - 150/82)  BP(mean): --  RR: 18 (02 May 2023 05:00) (16 - 18)  SpO2: 98% (02 May 2023 05:00) (96% - 98%)    Parameters below as of 02 May 2023 05:00  Patient On (Oxygen Delivery Method): room air        CAPILLARY BLOOD GLUCOSE          PHYSICAL EXAM:  GENERAL: NAD, lying in bed  HEAD:  Atraumatic, Normocephalic  EYES: EOMI, PERRL, conjunctiva and sclera clear  NECK: Supple, no JVD  CHEST/LUNG: Clear to auscultation bilaterally; No wheeze  HEART: Regular rate and rhythm; No murmurs, rubs, or gallops  ABDOMEN: Soft, Nontender, Nondistended; Bowel sounds present. Villarreal in place  EXTREMITIES:  2+ Peripheral Pulses, No clubbing, cyanosis, or edema  PSYCH: AAOx2  NEUROLOGY: non-focal  SKIN: No rashes or lesions       LABS:                        9.7    15.40 )-----------( 334      ( 01 May 2023 07:36 )             30.6     Auto Eosinophil # x     / Auto Eosinophil % x     / Auto Neutrophil # x     / Auto Neutrophil % x     / BANDS % x                            9.5    8.59  )-----------( 326      ( 30 Apr 2023 07:06 )             28.8     Auto Eosinophil # 0.00  / Auto Eosinophil % 0.0   / Auto Neutrophil # 7.70  / Auto Neutrophil % 89.7  / BANDS % x        05-01    139  |  102  |  39<H>  ----------------------------<  100<H>  4.4   |  23  |  2.05<H>  04-30    138  |  102  |  27<H>  ----------------------------<  175<H>  4.2   |  21<L>  |  1.50<H>    Ca    9.8      01 May 2023 07:36  Mg     2.0     05-01  Phos  2.8     05-01  TPro  6.9  /  Alb  3.7  /  TBili  0.2  /  DBili  x   /  AST  19  /  ALT  10  /  AlkPhos  58  04-30            Imaging Personally Reviewed: Yes    Consultant(s) Notes Reviewed:  Yes    Care Discussed with Consultants/Other Providers: Yes

## 2023-05-02 NOTE — DISCHARGE NOTE PROVIDER - NSDCCPCAREPLAN_GEN_ALL_CORE_FT
PRINCIPAL DISCHARGE DIAGNOSIS  Diagnosis: Acute UTI  Assessment and Plan of Treatment: We were concerned for UTI after taking a look at your urine studies. We treated you with a short course of antibiotics but cultures grew no bacteria so we were able to discontinue it. Please follow up at your scheduled appointment with Dr. Diallo to attempt the removal of the rodriguez catheter. If you develop worsening abdominal pain, cloudy urine or burning w/ urination, please seek medical attention.      SECONDARY DISCHARGE DIAGNOSES  Diagnosis: COPD exacerbation  Assessment and Plan of Treatment: You had shortness of breath and wheezing that concerned us for COPD exacerbation. We treated you with inhaled steroids and one dose of IV steroids. If you develop shortness of breath again, please seek medical attention.    Diagnosis: Acute metabolic encephalopathy  Assessment and Plan of Treatment: Your family was concerned for your mental status. We thought this may have been due to infection, which we treated. Additionally, taking valium can cause confusion and issues with memory. We would advise you minimize use of valium to prevent your risk of falling and other issues.     PRINCIPAL DISCHARGE DIAGNOSIS  Diagnosis: Acute UTI  Assessment and Plan of Treatment: We were concerned for UTI after taking a look at your urine studies. We treated you with a short course of antibiotics but cultures grew no bacteria so we were able to discontinue it. Please follow up at your scheduled appointment with Dr. Diallo of urology. If you develop worsening abdominal pain, cloudy urine or burning w/ urination, please seek medical attention.      SECONDARY DISCHARGE DIAGNOSES  Diagnosis: COPD exacerbation  Assessment and Plan of Treatment: You had shortness of breath and wheezing that concerned us for COPD exacerbation. We treated you with inhaled steroids and one dose of IV steroids. If you develop shortness of breath again, please seek medical attention.    Diagnosis: Acute metabolic encephalopathy  Assessment and Plan of Treatment: Your family was concerned for your mental status. We thought this may have been due to infection, which we treated. Additionally, taking valium can cause confusion and issues with memory. We would advise you minimize use of valium to prevent your risk of falling and other issues.

## 2023-05-02 NOTE — DISCHARGE NOTE PROVIDER - CARE PROVIDER_API CALL
Tony Diallo  Urology  [address] [place] [address]  Phone: [phone]  Fax: [phone]  Established Patient  Follow Up Time: 1-3 days   Tony Diallo  Urology  [address] [place] [address]  Phone: [phone]  Fax: [phone]  Established Patient  Follow Up Time: 1-3 days    [place] [address]  Phone: [phone]  Fax: [phone]  Established Patient  Follow Up Time: 1 week

## 2023-05-02 NOTE — DISCHARGE NOTE PROVIDER - CARE PROVIDERS DIRECT ADDRESSES
,[email or web address]

## 2023-05-02 NOTE — PROGRESS NOTE ADULT - ATTENDING COMMENTS
78M PMH HTN, CAD, MI, TIA, COPD, recent admission for TURBPT and discharged 3 days ago, returns to hospital from [facility] due to agitation. Patient with large BM x2 yesterday, states abdominal pain resolved. Saw with nephew, who is HCP and legal guardian. Patient still thinks it is 2017 and that his wife passed 1 week prior, however per nephew, patient was working as of 2-3 weeks prior as an , though not with [employer]. Per nephew, patient is about back to his baseline mental status, no agitation at this time. No wheezing on exam, no LE edema. Discussed with nephew and patient opinion to try and stop xanax given high risk of delirium, falls and transition to remeron and melatonin per behavioral psychiatry recs. He will consider.    #Acute metabolic encephalopathy superimposed on chronic dementia with delusions  -at baseline per nephew, possible in setting of severe constipation after proecture and rehab.   -bowel regimen.  -leukocytosis downtending, I suspect in setting of steroids given in ED.    #HARLEEN on CKD III  -patient with clear urine  -downtrending.    #?COPD exacerbation  -no wheezing on my exam today, continue with inhaler therapy.     #Delirium with delusions  -behavioral health follow up    d/c planning back to rehab once accepted and authorization given.

## 2023-05-02 NOTE — DISCHARGE NOTE PROVIDER - NSDCFUSCHEDAPPT_GEN_ALL_CORE_FT
[facility]  ELECTROPH [address]  Scheduled Appointment: 07/13/2023

## 2023-05-02 NOTE — DISCHARGE NOTE PROVIDER - NSDCFUADDAPPT_GEN_ALL_CORE_FT
APPTS ARE READY TO BE MADE: [x ] YES    Best Family or Patient Contact (if needed):  Aren Black   [phone]    Additional Information about above appointments (if needed):    1: Need follow up with urology for trial of Void, Dr. Tony Diallo (end of this week/next week)   2:   3:     Other comments or requests:    APPTS ARE READY TO BE MADE: [x ] YES    Best Family or Patient Contact (if needed):  Aren Black   [phone]    Additional Information about above appointments (if needed):    1: Need follow up with urology for trial of Void, Dr. Tony Diallo (end of this week/next week)   2:   3:     Other comments or requests:     Patient is being discharged to rehab. Caregiver will arrange follow up.

## 2023-05-02 NOTE — BH CONSULTATION LIAISON PROGRESS NOTE - NSBHCHARTREVIEWVS_PSY_A_CORE FT
Vital Signs Last 24 Hrs  T(C): 36.8 (02 May 2023 14:35), Max: 36.8 (02 May 2023 14:35)  T(F): 98.2 (02 May 2023 14:35), Max: 98.2 (02 May 2023 14:35)  HR: 80 (02 May 2023 14:35) (65 - 84)  BP: 102/61 (02 May 2023 14:35) (102/61 - 150/82)  BP(mean): --  RR: 18 (02 May 2023 14:35) (18 - 18)  SpO2: 98% (02 May 2023 14:35) (96% - 98%)    Parameters below as of 02 May 2023 14:35  Patient On (Oxygen Delivery Method): room air

## 2023-05-02 NOTE — DISCHARGE NOTE PROVIDER - NSDCMRMEDTOKEN_GEN_ALL_CORE_FT
acetaminophen 325 mg oral tablet: 2 orally every 6 hours  atenolol 25 mg oral tablet: 1 tab(s) orally once a day  FLUoxetine 40 mg oral capsule: 1 cap(s) orally once a day  Folbic oral tablet: 1 tab(s) orally once a day  magnesium hydroxide 8% oral suspension: 30 milliliter(s) orally once a day As needed Constipation  melatonin 3 mg oral tablet: 1 tab(s) orally once a day (at bedtime) As needed Insomnia  mirtazapine 7.5 mg oral tablet: 1 tab(s) orally once a day (at bedtime)  oxyBUTYnin 5 mg oral tablet: 1 tab(s) orally once a day Stop taking 5/5/23  polyethylene glycol 3350 oral powder for reconstitution: 17 gram(s) orally once a day As needed Constipation  senna leaf extract oral tablet: 2 tab(s) orally once a day (at bedtime) As needed Constipation  simvastatin 40 mg oral tablet: 1 tab(s) orally once a day  tamsulosin 0.4 mg oral capsule: 1 cap(s) orally once a day (at bedtime)  Trelegy Ellipta 100 mcg-62.5 mcg-25 mcg/inh inhalation powder: 1 puff(s) inhaled once a day   acetaminophen 325 mg oral tablet: 2 orally every 6 hours  atenolol 25 mg oral tablet: 1 tab(s) orally once a day  diazePAM 5 mg oral tablet: 1 tab(s) orally once a day (at bedtime)  FLUoxetine 40 mg oral capsule: 1 cap(s) orally once a day  Folbic oral tablet: 1 tab(s) orally once a day  magnesium hydroxide 8% oral suspension: 30 milliliter(s) orally once a day As needed Constipation  melatonin 3 mg oral tablet: 1 tab(s) orally once a day (at bedtime) As needed Insomnia  mirtazapine 7.5 mg oral tablet: 1 tab(s) orally once a day (at bedtime)  oxyBUTYnin 5 mg oral tablet: 1 tab(s) orally once a day Stop taking 5/5/23  polyethylene glycol 3350 oral powder for reconstitution: 17 gram(s) orally once a day As needed Constipation  senna leaf extract oral tablet: 2 tab(s) orally once a day (at bedtime) As needed Constipation  simvastatin 40 mg oral tablet: 1 tab(s) orally once a day  tamsulosin 0.4 mg oral capsule: 1 cap(s) orally once a day (at bedtime)  Trelegy Ellipta 100 mcg-62.5 mcg-25 mcg/inh inhalation powder: 1 puff(s) inhaled once a day   acetaminophen 325 mg oral tablet: 2 orally every 6 hours  atenolol 25 mg oral tablet: 1 tab(s) orally once a day  bisacodyl 10 mg rectal suppository: 1 suppository(ies) rectal once  diazePAM 5 mg oral tablet: 1 tab(s) orally once a day (at bedtime)  FLUoxetine 40 mg oral capsule: 1 cap(s) orally once a day  Folbic oral tablet: 1 tab(s) orally once a day  magnesium hydroxide 8% oral suspension: 30 milliliter(s) orally once a day As needed Constipation  melatonin 3 mg oral tablet: 1 tab(s) orally once a day (at bedtime) As needed Insomnia  mirtazapine 7.5 mg oral tablet: 1 tab(s) orally once a day (at bedtime)  polyethylene glycol 3350 oral powder for reconstitution: 17 gram(s) orally once a day As needed Constipation  senna leaf extract oral tablet: 2 tab(s) orally once a day (at bedtime) As needed Constipation  simvastatin 40 mg oral tablet: 1 tab(s) orally once a day  tamsulosin 0.4 mg oral capsule: 1 cap(s) orally once a day (at bedtime)  Trelegy Ellipta 100 mcg-62.5 mcg-25 mcg/inh inhalation powder: 1 puff(s) inhaled once a day

## 2023-05-02 NOTE — PROGRESS NOTE ADULT - ASSESSMENT
78M w/ a pmhx of HTN , CAD , MI - s/p stented CAD 2001, TIA (25 Yrs ago), COPD on Trelegy, Bradycardia - s/p pacemaker placement 2011, Echo (2019) - showed EF - 30% - Device upgraded to BIV ICD (2019) s/p recent admission for hematuria and TURBT now p/w acute on chronic encephalopathy and possible COPD exacerbation 78M w/ a pmhx of HTN , CAD , MI - s/p stented CAD 2001, TIA (25 Yrs ago), COPD on Trelegy, Bradycardia - s/p pacemaker placement 2011, Echo (2019) - showed EF - 30% - Device upgraded to BIV ICD (2019) s/p recent admission for hematuria and TURBT now p/w acute on chronic encephalopathy and possible COPD exacerbation. Urine Cx with NGTD and being monitored off of antibiotics, currently planning for dispo.

## 2023-05-03 NOTE — PROVIDER CONTACT NOTE (OTHER) - SITUATION
Pt agitated and is complaining of pain and constipation
pt having 10/10 pain in lower abdomen/suprapubic area. pt also complaining of constipation. pt given Miralax and PO tylenol with no relief.
Pt c/o numbness in his right thumb stating last time he had this it was followed by a stroke.
Pt complains of constipation and wants tap water enema stating that it helped yesterday with bowel movt  ; pt is agitated and restless and requests Valium

## 2023-05-03 NOTE — PROVIDER CONTACT NOTE (OTHER) - ACTION/TREATMENT ORDERED:
Bladder scan done showing only 15ml, 2.5mg oxy given with no relief. pt refused another 2.5mg oxy and requested morphine. Morphine 2mg ordered and given with relief
Tap water enema ordered and given per MD order with pt having large loose bowel movt; Zyprexa ordered but pt refused; Ativan given as per MD order for agitation
MD came at bedside and did a full neuro check on patient. No neuro deficits noted. MD will order zyprexa for anxiety. Will continue to monitor.
tap water Enema given per MD order with pt having large bowel movement with relief; Ofirmev given per MD order with pain relief noted

## 2023-05-03 NOTE — PROVIDER CONTACT NOTE (OTHER) - BACKGROUND
Adm with UTI
UTI,  bladder mass, COPD
DX: UTI  HX: COPD, bladder cancer  - rodriguez in place
UTI; bladder mass; CAD, HTN; COPD

## 2023-05-03 NOTE — PROGRESS NOTE ADULT - PROBLEM SELECTOR PLAN 8
DVT PPx: SCDs  Diet: Regular   Code status: Full code  Communication: Nephew contacted 5/2 3PM with regard to plan to which understanding was verbalized by party  Bowel regiment: miralax, senna  Last BM: 5/2  Dispo: [id number]

## 2023-05-03 NOTE — PROGRESS NOTE ADULT - ASSESSMENT
78M w/ a pmhx of HTN , CAD , MI - s/p stented CAD 2001, TIA (25 Yrs ago), COPD on Trelegy, Bradycardia - s/p pacemaker placement 2011, Echo (2019) - showed EF - 30% - Device upgraded to BIV ICD (2019) s/p recent admission for hematuria and TURBT now p/w acute on chronic encephalopathy and possible COPD exacerbation. Urine Cx with NGTD and being monitored off of antibiotics, currently planning for dispo.

## 2023-05-03 NOTE — PROGRESS NOTE ADULT - PROBLEM SELECTOR PLAN 1
As per nephew, pt has some baseline delusions regarding being a  worsening over past month. Mental status deteriorated after wife passed away 2 years ago. Yesterday, pt seemed more disoriented than usual and endorsed severe paranoia, also being on TV. Has been using marijuana and valium daily at home. During urology admission, pt was documented as oriented to month and year which he currently is not.   -UTox positive for benzos and THC. Not clear if patient was somehow able to take THC in hospital  -Cont. IV Ceftriaxone for possible UTI given suprapubic symptoms, though may be related to recent TURBT.   -Monitoring off of home dose diazepam 10mg daily PRN for now. Will resume vs taper if showing signs of withdrawal  -Falls and aspiration precautions  -Behavioral health consult to consider valium taper, recs appreciated  >mirtazapine 7.5mg qHS + melatonin 3mg vs 5mg valium nightly  >altered mentation more likely attributable to dementia As per nephew, pt has some baseline delusions regarding being a  worsening over past month. Mental status deteriorated after wife passed away 2 years ago. Yesterday, pt seemed more disoriented than usual and endorsed severe paranoia, also being on TV. Has been using marijuana and valium daily at home. During urology admission, pt was documented as oriented to month and year which he currently is not.   -UTox positive for benzos and THC. Not clear if patient was somehow able to take THC in hospital  -Cont. IV Ceftriaxone for possible UTI given suprapubic symptoms, though may be related to recent TURBT.   -Monitoring off of home dose diazepam 10mg daily PRN for now. Will resume vs taper if showing signs of withdrawal  -Falls and aspiration precautions  -Behavioral health consult to consider valium taper, recs appreciated  >5mg valium nightly  >altered mentation more likely attributable to dementia

## 2023-05-03 NOTE — PROVIDER CONTACT NOTE (OTHER) - ASSESSMENT
pain is not radiating anywhere, VSS, rodriguez in place and draining clear yellow urine
Pt is alert, confused to time. Denied chest pain, no SOB, no headache, no dizziness. Pt also requested additional valium for anxiety. Pt kept lamenting on recent death of his wife.
no respiratory distress noted SpO2 100%;  no pain noted, pt is restless and agitated
pt complains of feeling short of breath, SpO2 92-96%; pt is very agitated and states he will call the ; pt states he hasn't had a bowel movement in 4 days

## 2023-05-03 NOTE — PROVIDER CONTACT NOTE (OTHER) - REASON
Pt complains of constipation and is agitated
Pt complains of constipation and pain
pt having 10/10 abdominal pain
Pt complains of numbness in his R thumb, unable to life this right arm fully
Improved.

## 2023-05-03 NOTE — PROGRESS NOTE ADULT - PROBLEM SELECTOR PLAN 3
S/p recent admission with TURBT and hematuria. Reportedly concern for bladder cancer. Post op course was complicated by urinary obstruction so rodriguez was placed  -Cont. rodriguez catheter  -flomax 0.4mg daily

## 2023-05-03 NOTE — PROGRESS NOTE ADULT - ATTENDING COMMENTS
78M PMH HTN, CAD, MI, TIA, COPD, recent admission for TURBPT and discharged 3 days ago, returns to hospital from [facility] due to agitation. Patient with large BM x2 yesterday, states abdominal pain resolved. Saw with nephew, who is HCP and legal guardian. Patient still thinks it is 2017 and that his wife passed 1 week prior, however per nephew, patient was working as of 2-3 weeks prior as an , though not with [employer]. Per nephew, patient is about back to his baseline mental status, no agitation at this time. Overnight 5/2-5/3, requesting home valium, was given ativan. This AM no complaints. No abdominal pain, subjective SOB, n/v/d/c.     #Acute metabolic encephalopathy superimposed on chronic dementia with delusions  -at baseline per nephew, possible in setting of severe constipation after proecture and rehab.   -bowel regimen.  -leukocytosis downtending, I suspect in setting of steroids given in ED.    #HARLEEN on CKD III  -patient with clear urine  -downtrending.    #?COPD exacerbation  -slight wheezing today, did not get inhaler therapy. Continue inhaler therapy.     #Delirium with delusions  -behavioral health follow up    #chronic HFrEF  -EF 41%  -not on diuretic, appears euvolemic on exam.   -hold off diuretics.   -as outaptient, can discuss with his cardiologist re: changing atenolol to toprol vs. carvedilol for GDMT.    #Bladder mass  -did not see cytopathology results from bx, per speaking with nephlizzette, concern for bladder CA, only ammenable to immunotherapy given age, mental sttatus. Follow up with DR. Diallo.   -ToV outpatient with DR. Diallo.     accepted to rehab,awaiting authorization, d/c when approved.   d/c planning back to rehab once accepted and authorization given.

## 2023-05-04 NOTE — PROGRESS NOTE ADULT - PROBLEM SELECTOR PLAN 2
Pyuria could be normal given rodriguez. Given AMS, will cover empirically for now  -IV Ceftriaxone (4/29 -5/1), now discontinued given urine cx NGTD  -F/u UCx -> NGTD Pyuria could be normal given rodriguez. Given AMS, will cover empirically for now  -IV Ceftriaxone (4/29 -5/1), now discontinued given urine cx NGTD  -F/u UCx -> NGTD  -managing suprapubic pain w/ oxybutynin i/s/o recent TURBT

## 2023-05-04 NOTE — PROGRESS NOTE ADULT - PROBLEM SELECTOR PLAN 7
-Trend BP  -Cont. atenolol -Trend BP  -Cont. atenolol  -Pt to follow up w/ PCP regarding transition to metoprolol/carvedilol for HTN management + GDMT for heart failure

## 2023-05-04 NOTE — PROGRESS NOTE ADULT - SUBJECTIVE AND OBJECTIVE BOX
Noman Rose MD  PGY 1 Department of Internal Medicine        Patient is a 78y old  Male who presents with a chief complaint of AMS and COPD exacerbation? (03 May 2023 06:55)      SUBJECTIVE / OVERNIGHT EVENTS: Pt seen and examined. No acute overnight events. Denies fevers, chills, CP, SOB, Abdominal pain, N/V, Constipation, Diarrhea        MEDICATIONS  (STANDING):  atenolol  Tablet 25 milliGRAM(s) Oral daily  atorvastatin 40 milliGRAM(s) Oral at bedtime  bisacodyl Suppository 10 milliGRAM(s) Rectal once  chlorhexidine 4% Liquid 1 Application(s) Topical daily  diazepam    Tablet 5 milliGRAM(s) Oral at bedtime  FLUoxetine 40 milliGRAM(s) Oral daily  fluticasone furoate/umeclidinium/vilanterol 100-62.5-25 MICROgram(s) Inhaler 1 Puff(s) Inhalation daily  tamsulosin 0.4 milliGRAM(s) Oral at bedtime  tiotropium 2.5 MICROgram(s) Inhaler 2 Puff(s) Inhalation daily    MEDICATIONS  (PRN):  acetaminophen     Tablet .. 650 milliGRAM(s) Oral every 6 hours PRN Temp greater or equal to 38C (100.4F), Mild Pain (1 - 3)  magnesium hydroxide Suspension 30 milliLiter(s) Oral daily PRN Constipation  melatonin 3 milliGRAM(s) Oral at bedtime PRN Insomnia  ondansetron Injectable 4 milliGRAM(s) IV Push every 8 hours PRN Nausea and/or Vomiting  oxybutynin 5 milliGRAM(s) Oral three times a day PRN Bladder spasms  polyethylene glycol 3350 17 Gram(s) Oral daily PRN Constipation  senna 2 Tablet(s) Oral at bedtime PRN Constipation      I&O's Summary    02 May 2023 07:01  -  03 May 2023 07:00  --------------------------------------------------------  IN: 480 mL / OUT: 1700 mL / NET: -1220 mL    03 May 2023 07:01  -  04 May 2023 06:07  --------------------------------------------------------  IN: 720 mL / OUT: 1425 mL / NET: -705 mL        Vital Signs Last 24 Hrs  T(C): 36.4 (04 May 2023 05:28), Max: 36.9 (03 May 2023 20:49)  T(F): 97.6 (04 May 2023 05:28), Max: 98.5 (03 May 2023 20:49)  HR: 74 (04 May 2023 05:28) (60 - 78)  BP: 128/68 (04 May 2023 05:28) (95/58 - 138/-)  BP(mean): --  RR: 18 (04 May 2023 05:28) (18 - 18)  SpO2: 97% (04 May 2023 05:28) (97% - 97%)    Parameters below as of 04 May 2023 05:28  Patient On (Oxygen Delivery Method): room air        CAPILLARY BLOOD GLUCOSE          PHYSICAL EXAM:  GENERAL: NAD,   HEAD:  Atraumatic, Normocephalic  EYES: EOMI, PERRL, conjunctiva and sclera clear  NECK: No JVD  CHEST/LUNG: Clear to auscultation bilaterally; No wheeze  HEART: Regular rate and rhythm; No murmurs, rubs, or gallops  ABDOMEN: Soft, Nontender, Nondistended; Bowel sounds present  EXTREMITIES:  2+ Peripheral Pulses, No clubbing, cyanosis, or edema  PSYCH: AAOx3  NEUROLOGY: non-focal  SKIN: No rashes or lesions       LABS:                        10.0   13.11 )-----------( 284      ( 02 May 2023 17:45 )             31.6     Auto Eosinophil # x     / Auto Eosinophil % x     / Auto Neutrophil # x     / Auto Neutrophil % x     / BANDS % x        05-02    140  |  101  |  35<H>  ----------------------------<  107<H>  4.3   |  25  |  1.86<H>    Ca    9.9      02 May 2023 17:45                  RADIOLOGY & ADDITIONAL TESTS:    Imaging Personally Reviewed:    Consultant(s) Notes Reviewed:      Care Discussed with Consultants/Other Providers:   Noman Rose MD  PGY 1 Department of Internal Medicine        Patient is a 78y old  Male who presents with a chief complaint of AMS and COPD exacerbation? (03 May 2023 06:55)      SUBJECTIVE / OVERNIGHT EVENTS: Pt seen and examined. No acute overnight events. Denies fevers, chills, CP, SOB, Abdominal pain, N/V, Constipation, Diarrhea        MEDICATIONS  (STANDING):  atenolol  Tablet 25 milliGRAM(s) Oral daily  atorvastatin 40 milliGRAM(s) Oral at bedtime  bisacodyl Suppository 10 milliGRAM(s) Rectal once  chlorhexidine 4% Liquid 1 Application(s) Topical daily  diazepam    Tablet 5 milliGRAM(s) Oral at bedtime  FLUoxetine 40 milliGRAM(s) Oral daily  fluticasone furoate/umeclidinium/vilanterol 100-62.5-25 MICROgram(s) Inhaler 1 Puff(s) Inhalation daily  tamsulosin 0.4 milliGRAM(s) Oral at bedtime  tiotropium 2.5 MICROgram(s) Inhaler 2 Puff(s) Inhalation daily    MEDICATIONS  (PRN):  acetaminophen     Tablet .. 650 milliGRAM(s) Oral every 6 hours PRN Temp greater or equal to 38C (100.4F), Mild Pain (1 - 3)  magnesium hydroxide Suspension 30 milliLiter(s) Oral daily PRN Constipation  melatonin 3 milliGRAM(s) Oral at bedtime PRN Insomnia  ondansetron Injectable 4 milliGRAM(s) IV Push every 8 hours PRN Nausea and/or Vomiting  oxybutynin 5 milliGRAM(s) Oral three times a day PRN Bladder spasms  polyethylene glycol 3350 17 Gram(s) Oral daily PRN Constipation  senna 2 Tablet(s) Oral at bedtime PRN Constipation      I&O's Summary    02 May 2023 07:01  -  03 May 2023 07:00  --------------------------------------------------------  IN: 480 mL / OUT: 1700 mL / NET: -1220 mL    03 May 2023 07:01  -  04 May 2023 06:07  --------------------------------------------------------  IN: 720 mL / OUT: 1425 mL / NET: -705 mL        Vital Signs Last 24 Hrs  T(C): 36.4 (04 May 2023 05:28), Max: 36.9 (03 May 2023 20:49)  T(F): 97.6 (04 May 2023 05:28), Max: 98.5 (03 May 2023 20:49)  HR: 74 (04 May 2023 05:28) (60 - 78)  BP: 128/68 (04 May 2023 05:28) (95/58 - 138/-)  BP(mean): --  RR: 18 (04 May 2023 05:28) (18 - 18)  SpO2: 97% (04 May 2023 05:28) (97% - 97%)    Parameters below as of 04 May 2023 05:28  Patient On (Oxygen Delivery Method): room air        CAPILLARY BLOOD GLUCOSE          PHYSICAL EXAM:  GENERAL: NAD,   HEAD:  Atraumatic, Normocephalic  EYES: EOMI, PERRL, conjunctiva and sclera clear  NECK: No JVD  CHEST/LUNG: Clear to auscultation bilaterally; No wheeze  HEART: Regular rate and rhythm; No murmurs, rubs, or gallops  ABDOMEN: Soft, Nontender, Nondistended; Bowel sounds present  EXTREMITIES:  2+ Peripheral Pulses, No clubbing, cyanosis, or edema  PSYCH: AAOx2  NEUROLOGY: non-focal  SKIN: No rashes or lesions       LABS:                        10.0   13.11 )-----------( 284      ( 02 May 2023 17:45 )             31.6     Auto Eosinophil # x     / Auto Eosinophil % x     / Auto Neutrophil # x     / Auto Neutrophil % x     / BANDS % x        05-02    140  |  101  |  35<H>  ----------------------------<  107<H>  4.3   |  25  |  1.86<H>    Ca    9.9      02 May 2023 17:45                  Consultant(s) Notes Reviewed:  Yes    Care Discussed with Consultants/Other Providers: Yes

## 2023-05-04 NOTE — PROGRESS NOTE ADULT - ATTENDING COMMENTS
Patient seen and evaluated. No complaints of weakness of hand .PAtient states had prior right shoulder surgery, always unable to lift arm. No abdominal pain, n/v/d/c. ToV today.    #Acute metabolic encephalopathy superimposed on chronic dementia with delusions  -at baseline per nephew, possible in setting of severe constipation after proecture and rehab.   -bowel regimen.  -leukocytosis downtending, I suspect in setting of steroids given in ED.    #HARLEEN on CKD III  -patient with clear urine  -ToV 5/4.    #?COPD exacerbation  -no wheezes on exam, continue with inhaler therapy.       #Delirium with delusions  -at baseline mental status per dani.   -continue with remeron, melatonin  -patient requesting home valium, will continue 5mg QHS with 5 prn if needs more.    #chronic HFrEF  -EF 41%  -not on diuretic, appears euvolemic on exam.   -hold off diuretics.   -as outaptient, can discuss with his cardiologist re: changing atenolol to toprol vs. carvedilol for GDMT.    #Bladder mass  -did not see cytopathology results from bx, per speaking with nephlizzette, concern for bladder CA, only ammenable to immunotherapy given age, mental sttatus. Follow up with DR. Diallo.   -ToV outpatient with DR. Diallo.     accepted to rehab,awaiting authorization, d/c when approved.   d/c planning back to rehab once accepted and authorization given.

## 2023-05-04 NOTE — PROGRESS NOTE ADULT - PROBLEM SELECTOR PLAN 1
As per nephew, pt has some baseline delusions regarding being a  worsening over past month. Mental status deteriorated after wife passed away 2 years ago. Yesterday, pt seemed more disoriented than usual and endorsed severe paranoia, also being on TV. Has been using marijuana and valium daily at home. During urology admission, pt was documented as oriented to month and year which he currently is not.   -UTox positive for benzos and THC. Not clear if patient was somehow able to take THC in hospital  -Cont. IV Ceftriaxone for possible UTI given suprapubic symptoms, though may be related to recent TURBT.   -Monitoring off of home dose diazepam 10mg daily PRN for now. Will resume vs taper if showing signs of withdrawal  -Falls and aspiration precautions  -Behavioral health consult to consider valium taper, recs appreciated  >5mg valium nightly  >altered mentation more likely attributable to dementia As per nephew, pt has some baseline delusions regarding being a  worsening over past month. Mental status deteriorated after wife passed away 2 years ago. Yesterday, pt seemed more disoriented than usual and endorsed severe paranoia, also being on TV. Has been using marijuana and valium daily at home. During urology admission, pt was documented as oriented to month and year which he currently is not.   -UTox positive for benzos and THC. Not clear if patient was somehow able to take THC in hospital  -Cont. IV Ceftriaxone for possible UTI given suprapubic symptoms, though may be related to recent TURBT.   -Monitoring off of home dose diazepam 10mg daily PRN for now. Will resume vs taper if showing signs of withdrawal  -Falls and aspiration precautions  -Behavioral health consult to consider valium taper, recs appreciated  >5mg valium nightly, additional 5mg PRN if with continued anxiety  >altered mentation more likely attributable to dementia + delirium As per nephew, pt has some baseline delusions regarding being a  worsening over past month. Mental status deteriorated after wife passed away 2 years ago. Yesterday, pt seemed more disoriented than usual and endorsed severe paranoia, also being on TV. Has been using marijuana and valium daily at home. During urology admission, pt was documented as oriented to month and year which he currently is not.   -UTox positive for benzos and THC. Not clear if patient was somehow able to take THC in hospital  -IV Ceftriaxone (4/29 -5/1), now discontinued given urine cx NGTD  -Monitoring off of home dose diazepam 10mg daily PRN for now. Will resume vs taper if showing signs of withdrawal  -Falls and aspiration precautions  -Behavioral health consult to consider valium taper, recs appreciated  >5mg valium nightly, additional 5mg PRN if with continued anxiety  >altered mentation more likely attributable to dementia + delirium

## 2023-05-04 NOTE — PROGRESS NOTE ADULT - PROBLEM SELECTOR PLAN 3
S/p recent admission with TURBT and hematuria. Reportedly concern for bladder cancer. Post op course was complicated by urinary obstruction so rodriguez was placed  -Cont. rodriguez catheter  -flomax 0.4mg daily S/p recent admission with TURBT and hematuria. Reportedly concern for bladder cancer. Post op course was complicated by urinary obstruction so rodriguez was placed  -DC rodriguez catheter, trial of void  -flomax 0.4mg daily

## 2023-05-05 NOTE — PROGRESS NOTE ADULT - PROBLEM SELECTOR PROBLEM 4
HARLEEN (acute kidney injury)
BPH with urinary obstruction
BPH with urinary obstruction

## 2023-05-05 NOTE — DISCHARGE NOTE NURSING/CASE MANAGEMENT/SOCIAL WORK - PATIENT PORTAL LINK FT
You can access the FollowMyHealth Patient Portal offered by [facility] by registering at the following website: [email or web address]. By joining [facility]’s FollowMyHealth portal, you will also be able to view your health information using other applications (apps) compatible with our system.

## 2023-05-05 NOTE — PROGRESS NOTE ADULT - PROVIDER SPECIALTY LIST ADULT
Urology
Internal Medicine

## 2023-05-05 NOTE — PROGRESS NOTE ADULT - PROBLEM SELECTOR PLAN 7
-Trend BP  -Cont. atenolol  -Pt to follow up w/ PCP regarding transition to metoprolol/carvedilol for HTN management + GDMT for heart failure

## 2023-05-05 NOTE — PROGRESS NOTE ADULT - PROBLEM SELECTOR PLAN 1
As per nephew, pt has some baseline delusions regarding being a  worsening over past month. Mental status deteriorated after wife passed away 2 years ago. Yesterday, pt seemed more disoriented than usual and endorsed severe paranoia, also being on TV. Has been using marijuana and valium daily at home. During urology admission, pt was documented as oriented to month and year which he currently is not.   -UTox positive for benzos and THC. Not clear if patient was somehow able to take THC in hospital  -IV Ceftriaxone (4/29 -5/1), now discontinued given urine cx NGTD  -Monitoring off of home dose diazepam 10mg daily PRN for now. Will resume vs taper if showing signs of withdrawal  -Falls and aspiration precautions  -Behavioral health consult to consider valium taper, recs appreciated  >5mg valium nightly, additional 5mg PRN if with continued anxiety  >altered mentation more likely attributable to dementia + delirium

## 2023-05-05 NOTE — PROGRESS NOTE ADULT - ATTENDING COMMENTS
Patient evaluatd. No complaints. No wheezing on exam. PAssed ToV yesterday. No abdominal pain. Patient stable for discharge to rehab with outpatient urology follow up. Patient to take inhaler therapy, discharged with aggressive bowel regimen.   d/c planning 33 minutes.

## 2023-05-05 NOTE — PROGRESS NOTE ADULT - PROBLEM SELECTOR PROBLEM 6
CAD (coronary artery disease)
Chronic HFrEF (heart failure with reduced ejection fraction)
CAD (coronary artery disease)
Chronic HFrEF (heart failure with reduced ejection fraction)

## 2023-05-05 NOTE — DISCHARGE NOTE NURSING/CASE MANAGEMENT/SOCIAL WORK - NSDCFUADDAPPT_GEN_ALL_CORE_FT
APPTS ARE READY TO BE MADE: [x ] YES    Best Family or Patient Contact (if needed):  Aren Black   [phone]    Additional Information about above appointments (if needed):    1: Need follow up with urology for trial of Void, Dr. Tony Diallo (end of this week/next week)   2:   3:     Other comments or requests:     Patient is being discharged to rehab. Caregiver will arrange follow up.

## 2023-05-05 NOTE — PROGRESS NOTE ADULT - PROBLEM SELECTOR PROBLEM 5
Chronic HFrEF (heart failure with reduced ejection fraction)
Chronic HFrEF (heart failure with reduced ejection fraction)
HARLEEN (acute kidney injury)
Chronic HFrEF (heart failure with reduced ejection fraction)
HARLEEN (acute kidney injury)
Chronic HFrEF (heart failure with reduced ejection fraction)

## 2023-05-05 NOTE — PROGRESS NOTE ADULT - SUBJECTIVE AND OBJECTIVE BOX
Noman Rose MD  PGY 1 Department of Internal Medicine        Patient is a 78y old  Male who presents with a chief complaint of AMS and COPD exacerbation? (04 May 2023 06:07)      SUBJECTIVE / OVERNIGHT EVENTS: Pt seen and examined. No acute overnight events. Denies fevers, chills, CP, SOB, Abdominal pain, N/V, Constipation, Diarrhea        MEDICATIONS  (STANDING):  atenolol  Tablet 25 milliGRAM(s) Oral daily  atorvastatin 40 milliGRAM(s) Oral at bedtime  bisacodyl Suppository 10 milliGRAM(s) Rectal once  chlorhexidine 4% Liquid 1 Application(s) Topical daily  diazepam    Tablet 5 milliGRAM(s) Oral at bedtime  FLUoxetine 40 milliGRAM(s) Oral daily  fluticasone furoate/umeclidinium/vilanterol 100-62.5-25 MICROgram(s) Inhaler 1 Puff(s) Inhalation daily  tamsulosin 0.4 milliGRAM(s) Oral at bedtime  tiotropium 2.5 MICROgram(s) Inhaler 2 Puff(s) Inhalation daily    MEDICATIONS  (PRN):  acetaminophen     Tablet .. 650 milliGRAM(s) Oral every 6 hours PRN Temp greater or equal to 38C (100.4F), Mild Pain (1 - 3)  diazepam    Tablet 5 milliGRAM(s) Oral daily PRN for overnight anxiety not addressed by standing valium  magnesium hydroxide Suspension 30 milliLiter(s) Oral daily PRN Constipation  melatonin 3 milliGRAM(s) Oral at bedtime PRN Insomnia  ondansetron Injectable 4 milliGRAM(s) IV Push every 8 hours PRN Nausea and/or Vomiting  oxybutynin 5 milliGRAM(s) Oral three times a day PRN Bladder spasms  polyethylene glycol 3350 17 Gram(s) Oral daily PRN Constipation  senna 2 Tablet(s) Oral at bedtime PRN Constipation      I&O's Summary    03 May 2023 07:01  -  04 May 2023 07:00  --------------------------------------------------------  IN: 720 mL / OUT: 1475 mL / NET: -755 mL    04 May 2023 07:01  -  05 May 2023 06:46  --------------------------------------------------------  IN: 600 mL / OUT: 0 mL / NET: 600 mL        Vital Signs Last 24 Hrs  T(C): 36.7 (05 May 2023 04:20), Max: 37 (04 May 2023 22:02)  T(F): 98.1 (05 May 2023 04:20), Max: 98.6 (04 May 2023 22:02)  HR: 61 (05 May 2023 04:20) (61 - 75)  BP: 118/65 (05 May 2023 04:20) (111/60 - 124/90)  BP(mean): --  RR: 18 (05 May 2023 04:20) (18 - 18)  SpO2: 95% (05 May 2023 04:20) (95% - 100%)    Parameters below as of 05 May 2023 04:20  Patient On (Oxygen Delivery Method): room air        CAPILLARY BLOOD GLUCOSE          PHYSICAL EXAM:  GENERAL: NAD,   HEAD:  Atraumatic, Normocephalic  EYES: EOMI, PERRL, conjunctiva and sclera clear  NECK: No JVD  CHEST/LUNG: Clear to auscultation bilaterally; No wheeze  HEART: Regular rate and rhythm; No murmurs, rubs, or gallops  ABDOMEN: Soft, Nontender, Nondistended; Bowel sounds present  EXTREMITIES:  2+ Peripheral Pulses, No clubbing, cyanosis, or edema  PSYCH: AAOx3  NEUROLOGY: non-focal  SKIN: No rashes or lesions       LABS:                      RADIOLOGY & ADDITIONAL TESTS:    Imaging Personally Reviewed:    Consultant(s) Notes Reviewed:      Care Discussed with Consultants/Other Providers:   Noman Rose MD  PGY 1 Department of Internal Medicine        Patient is a 78y old  Male who presents with a chief complaint of AMS and COPD exacerbation? (04 May 2023 06:07)      SUBJECTIVE / OVERNIGHT EVENTS: Pt seen and examined. No acute overnight events. Denies fevers, chills, CP, SOB, Abdominal pain, N/V, Constipation, Diarrhea        MEDICATIONS  (STANDING):  atenolol  Tablet 25 milliGRAM(s) Oral daily  atorvastatin 40 milliGRAM(s) Oral at bedtime  bisacodyl Suppository 10 milliGRAM(s) Rectal once  chlorhexidine 4% Liquid 1 Application(s) Topical daily  diazepam    Tablet 5 milliGRAM(s) Oral at bedtime  FLUoxetine 40 milliGRAM(s) Oral daily  fluticasone furoate/umeclidinium/vilanterol 100-62.5-25 MICROgram(s) Inhaler 1 Puff(s) Inhalation daily  tamsulosin 0.4 milliGRAM(s) Oral at bedtime  tiotropium 2.5 MICROgram(s) Inhaler 2 Puff(s) Inhalation daily    MEDICATIONS  (PRN):  acetaminophen     Tablet .. 650 milliGRAM(s) Oral every 6 hours PRN Temp greater or equal to 38C (100.4F), Mild Pain (1 - 3)  diazepam    Tablet 5 milliGRAM(s) Oral daily PRN for overnight anxiety not addressed by standing valium  magnesium hydroxide Suspension 30 milliLiter(s) Oral daily PRN Constipation  melatonin 3 milliGRAM(s) Oral at bedtime PRN Insomnia  ondansetron Injectable 4 milliGRAM(s) IV Push every 8 hours PRN Nausea and/or Vomiting  oxybutynin 5 milliGRAM(s) Oral three times a day PRN Bladder spasms  polyethylene glycol 3350 17 Gram(s) Oral daily PRN Constipation  senna 2 Tablet(s) Oral at bedtime PRN Constipation      I&O's Summary    03 May 2023 07:01  -  04 May 2023 07:00  --------------------------------------------------------  IN: 720 mL / OUT: 1475 mL / NET: -755 mL    04 May 2023 07:01  -  05 May 2023 06:46  --------------------------------------------------------  IN: 600 mL / OUT: 0 mL / NET: 600 mL        Vital Signs Last 24 Hrs  T(C): 36.7 (05 May 2023 04:20), Max: 37 (04 May 2023 22:02)  T(F): 98.1 (05 May 2023 04:20), Max: 98.6 (04 May 2023 22:02)  HR: 61 (05 May 2023 04:20) (61 - 75)  BP: 118/65 (05 May 2023 04:20) (111/60 - 124/90)  BP(mean): --  RR: 18 (05 May 2023 04:20) (18 - 18)  SpO2: 95% (05 May 2023 04:20) (95% - 100%)    Parameters below as of 05 May 2023 04:20  Patient On (Oxygen Delivery Method): room air        CAPILLARY BLOOD GLUCOSE          PHYSICAL EXAM:  GENERAL: NAD, lying in bed  HEAD:  Atraumatic, Normocephalic  EYES: EOMI, PERRL, conjunctiva and sclera clear  NECK: No JVD  CHEST/LUNG: Clear to auscultation bilaterally; No wheeze  HEART: Regular rate and rhythm; No murmurs, rubs, or gallops  ABDOMEN: Soft, Nontender, Nondistended; Bowel sounds present  EXTREMITIES:  2+ Peripheral Pulses, No clubbing, cyanosis, or edema  PSYCH: AAOx3  NEUROLOGY: non-focal  SKIN: No rashes or lesions       LABS:                      RADIOLOGY & ADDITIONAL TESTS:    Imaging Personally Reviewed:    Consultant(s) Notes Reviewed:      Care Discussed with Consultants/Other Providers:

## 2023-05-05 NOTE — PROGRESS NOTE ADULT - PROBLEM SELECTOR PLAN 5
S/p pacemaker placement 2011, Echo (2019) - showed EF - 30% - Device upgraded to BIV ICD (2019)  -Cont. to monitor fluid status
S/p pacemaker placement 2011, Echo (2019) - showed EF - 30% - Device upgraded to BIV ICD (2019)  -Cont. to monitor fluid status
Appears to be improving.  -Trend BMP  -Renal dose medications
S/p pacemaker placement 2011, Echo (2019) - showed EF - 30% - Device upgraded to BIV ICD (2019)  -Cont. to monitor fluid status
S/p pacemaker placement 2011, Echo (2019) - showed EF - 30% - Device upgraded to BIV ICD (2019)  -Cont. to monitor fluid status
Appears to be improving.  -Trend BMP  -Renal dose medications

## 2023-05-05 NOTE — PROGRESS NOTE ADULT - REASON FOR ADMISSION
AMS and COPD exacerbation?

## 2023-05-05 NOTE — DISCHARGE NOTE NURSING/CASE MANAGEMENT/SOCIAL WORK - NSDCPEFALRISK_GEN_ALL_CORE
For information on Fall & Injury Prevention, visit: [email or web address] OR  [email or web address] OR  https://www.cdc.gov/steadi/patient.html

## 2023-05-05 NOTE — PROGRESS NOTE ADULT - PROBLEM SELECTOR PROBLEM 3
COPD exacerbation
BPH with urinary obstruction
COPD exacerbation
BPH with urinary obstruction

## 2023-05-05 NOTE — PROGRESS NOTE ADULT - PROBLEM SELECTOR PLAN 2
Pyuria could be normal given rodriguez. Given AMS, will cover empirically for now  -IV Ceftriaxone (4/29 -5/1), now discontinued given urine cx NGTD  -F/u UCx -> NGTD  -managing suprapubic pain w/ oxybutynin i/s/o recent TURBT Pyuria could be normal given rodriguez. Given AMS, will cover empirically for now  -IV Ceftriaxone (4/29 -5/1), now discontinued given urine cx NGTD  -F/u UCx -> NGTD  -managing suprapubic pain w/ oxybutynin i/s/o recent TURBT, now resolved

## 2023-05-05 NOTE — PROGRESS NOTE ADULT - PROBLEM SELECTOR PROBLEM 7
Essential hypertension
Essential hypertension
CAD (coronary artery disease)
Essential hypertension
Essential hypertension
CAD (coronary artery disease)

## 2023-05-05 NOTE — PROGRESS NOTE ADULT - PROBLEM SELECTOR PLAN 6
S/p pacemaker placement 2011, Echo (2019) - showed EF - 30% - Device upgraded to BIV ICD (2019)  -Cont. to monitor fluid status
-Cont. simvastatin
S/p pacemaker placement 2011, Echo (2019) - showed EF - 30% - Device upgraded to BIV ICD (2019)  -Cont. to monitor fluid status
-Cont. simvastatin

## 2023-05-05 NOTE — PROGRESS NOTE ADULT - PROBLEM SELECTOR PROBLEM 8
Prophylactic measure
Essential hypertension
Essential hypertension
Declined

## 2023-05-05 NOTE — PROGRESS NOTE ADULT - PROBLEM SELECTOR PLAN 3
S/p recent admission with TURBT and hematuria. Reportedly concern for bladder cancer. Post op course was complicated by urinary obstruction so rodriguez was placed  -DC rodriguez catheter, trial of void  -flomax 0.4mg daily

## 2023-05-17 NOTE — CONSULT LETTER
[Dear  ___] : Dear  [unfilled], [Consult Letter:] : I had the pleasure of evaluating your patient, [unfilled]. [Please see my note below.] : Please see my note below. [Consult Closing:] : Thank you very much for allowing me to participate in the care of this patient.  If you have any questions, please do not hesitate to contact me. [Sincerely,] : Sincerely, [DrTraci  ___] : Dr. MURO

## 2023-05-26 PROBLEM — D72.829 LEUCOCYTOSIS: Status: ACTIVE | Noted: 2023-01-01

## 2023-05-26 NOTE — ASSESSMENT
[Palliative Care Plan] : not applicable at this time [FreeTextEntry1] : This patient is an 77yo gentleman with recent diagnosis of bladder cancer who is here with his nephew Aren for initial evaluation. \par Patient reports that his hematuria had resolved since his TURBT procedure. Today, he expresses anxiety about his diagnosis. His vital signs are WNL. His weight has dropped from 190 in Feb 2021 to 135 lbs today. He appears older than his stated age. On exam, lungs are clear to auscultation. Cardiac sounds are regular. His abdomen is soft and nontender to palpation. He has trace pedal edema. He is conversant, but becomes tearful and fixates on the loss of his wife and anxieties surrounding his diagnosis and requires redirection.\par  \par We reviewed the TUBRT pathology specimen from 4/24/23 which identified high grade invasive urothelial carcinoma, invasding muscularis propria, without lymphovascular invasion. We the patient's CT results and noted the presence of subcentimeter pulmonary nodules and retroperitoneal lymph nodes.  Based on results of CT chest, CT AP and TURBT biopsy results, it appears that patient has localized disease. \par We discussed management options. Radical cystectomy can be performed, however with this patient's comorbidities of COPD, CAD and CKD stage III, the patient may be at higher risk of complications.\par Bladder-sparing approach would likely be preferable. This would include using pembrolizumab, which is administered IV q3 weekly. \par We discussed with the patient that if he is identified to have disease outside the pelvis, then he would be considered to have stage IV disease. At that point, treatment would be non-curative but aim to control the extent of disease. Combination therapy of enfortumab vedotin and pembrolizumab would be a possible treatment regimen in that scenario. \par \par - We will obtain PET/CT to assess adenopathy and nodules to assess for possible metastatic disease.\par - We will send [facility] testing to assess SYED, mutational burden and for the presence of any targetable  mutations. . \par \par CBC found WBC 15.9, Hgb of 8.9, plt of 366, with neutrophil predominance on differential of 82%. CMP found elevated SCr of 1.57.  HCV AB is positive but HCV RNA is not detected.  \par \par Patient elects for his nephew Aren to be the primary point of contact. \par Patient is agreeable with current \par \par seen by Dr. Spivey and Daniella Preciado MD PGY5\par \par Attending note:\par I was present for the acquisition of the history and I participated in the physical examination.  Records were reviewed.  Images were also reviewed.  I spoke with Dr. Diallo at the time of the visit.\par \par The plan is to get a PET/CT to see if these lymph nodes in the retroperitoneum are PET avid.  Later on, the CBC returned with significant anemia and an elevated white blood cell count likely indicating a significant inflammatory state.  Tumor specimen was sent for evaluation by [facility].  We will notify his nephew of the results of the PET scan when reported and also when the results of the foundation evaluation return.  He is not a candidate for cisplatin chemotherapy.  Carboplatin is not available at this particular time, and the best option would be pembrolizumab if markers indicate potential significant benefit of treatment.\par Otherwise, the combination of Padcev and pembrolizumab, recently approved by the FDA, would be the agent of choice.\par I agree with the assessment and plan as stated above in the note from Dr. Preciado.  All questions were answered to the best of my ability and to their apparent satisfaction.\par Ryan Spivey MD

## 2023-05-26 NOTE — PHYSICAL EXAM
[Restricted in physically strenuous activity but ambulatory and able to carry out work of a light or sedentary nature] : Status 1- Restricted in physically strenuous activity but ambulatory and able to carry out work of a light or sedentary nature, e.g., light house work, office work [Normal] : well developed, well nourished, in no acute distress [Ulcers] : no ulcers [Mucositis] : no mucositis [de-identified] : [place] [de-identified] : mucous membranes moist  [de-identified] : no cervical adenopathy, supple [de-identified] : CTAB, no wheezing, no rhonchi [de-identified] : RRR, normal S1S2 [de-identified] : mild pedal edema [de-identified] : soft, nontender, nondistended [de-identified] : no palpable cervical or supraclavicular adenopathy noted  [de-identified] : ambulates without assistance, steady gait [de-identified] : dry, warm, no visible rash  [de-identified] : conversant, ambulatory  [de-identified] : tearful, gives lots of hugs, tangential speech requiring redirection

## 2023-05-26 NOTE — HISTORY OF PRESENT ILLNESS
[de-identified] : This patient is an 79yo gentleman with PMH of htn, CAD, MI s/p stent in 2001, TIA, COPD, bradycardia s/p ppm 2011, upgraded to bi-V AICD in 2019 due to low EF, CKD stage III, recent diagnosis of bladder cancer who presents for initial consultation.\par \par Patient reported having intermittent gross hematuria x 8 months. He reported abdominal pain and constipation as well. He denied any nausea or vomiting. He had lost about 40+ lbs unintentionally over this time. He was hospitalized at [place] for urinary retention and required rodriguez catheter placement. He thereafter was hospitalized at [facility] for the \par He saw urologist Dr. Tony Diallo who advised TURBT. \par \par 3/3/23- CT CAP with IVC Heterogeneous and partially enhancing enhancing mass measuring 5.2 x 3.0 cm along the right posterior lateral bladder wall concerning for neoplasm. Visualization with cystoscopy recommended.\par Mildly dilated main pancreatic duct without obstructive lesion. If clinically warranted, this can be further evaluated with an abdominal MRI.\par \par 3/31/23- Urine cytology was negative for high grade urothelial carcinoma.\par \par 4/5/23- CT chest non con found solid and ground glass nodules measuring up to 5mm on a background of emphysema. Partially imaged right hydroureter, increased since 3/3/2023. \par \par 4/24/23- TURBT performed, found high grade invasive urothelial carcinoma. No non-invasive component identified  \par Carcinoma invades muscularis propria (detrusor muscle). Lymphovascular invasion was not identified.\par Tumor cells are positive for p63 and ROXANNA-3, negative for PSA and NKX 3.1.\par \par 4/25-4/28/23- He was hospitalized at [facility] for abdominal spasm, found to have urinary retention with HARLEEN and drop in Hgb. Patient was trnasfused 1u pRBC  and had rodriguez catheter replaced. He was discharged to [facility]\par \par 4/19-5/5/23- Patient was rehospitalized for suspected COPD exacerbation and UTI. UCX were negative, thus antibiotics were discontinued. Suprapubic pain was treated with IV tylenol. Patient had rodriguez removed while inpatient. \par \par Patient was referred to medical oncology clinic. He denies fevers or chills. He reports fatigue.He has not had any CP, palpitations, dyspnea or cough. He still has mild abdominal pain. His hematuria has resolved since TURBT procedure. 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 \par He recently lost his wife after 40 years of marriage. His wife was under the care of Dr. Pond-Kellerman. \par Allergies: NKDA

## 2023-05-26 NOTE — REASON FOR VISIT
[Initial Consultation] : an initial consultation [FreeTextEntry2] : muscle invasive urothelial cancer of the bladder

## 2023-05-26 NOTE — RESULTS/DATA
[FreeTextEntry1] : Final Diagnosis\par Bladder, tumor, TUR\par      -Histologic Type:  Invasive urothelial carcinoma, see note\par      -Histologic Grade:   High grade  \par      -Pattern of growth of the non-invasive component:  No non-invasive component identified\par      -Local Invasion:  Carcinoma invades muscularis propria (detrusor muscle)  \par      -Muscularis Propria:  Muscularis propria (detrusor muscle) is involved  \par      -Lymphovascular Invasion:  Not identified \par \par Note: Immunohistochemical stains are performed. The tumor cells are positive for p63 and ROXANNA-3, while they are negative for PSA and NKX 3.1. The immunoprofile is supportive of the above diagnosis.\par \par Verified by: Jennifer Gilliland MD\par (Electronic Signature)\par Reported on: 05/03/23\par *********************************************\par

## 2023-05-26 NOTE — REVIEW OF SYSTEMS
[Fatigue] : fatigue [Recent Change In Weight] : ~T recent weight change [Abdominal Pain] : abdominal pain [Fever] : no fever [Chills] : no chills [Eye Pain] : no eye pain [Vision Problems] : no vision problems [Loss of Hearing] : no loss of hearing [Mucosal Pain] : no mucosal pain [Chest Pain] : no chest pain [Palpitations] : no palpitations [Shortness Of Breath] : no shortness of breath [Cough] : no cough [Vomiting] : no vomiting [Dysuria] : no dysuria [Incontinence] : no incontinence [Joint Pain] : no joint pain [Skin Rash] : no skin rash [Easy Bleeding] : no tendency for easy bleeding [Easy Bruising] : no tendency for easy bruising [FreeTextEntry8] : + hematuria

## 2023-06-07 NOTE — ED PROVIDER NOTE - PROGRESS NOTE DETAILS
pettet attending- Urology came by to evaluate patient agreed with CT scan and then will follow-up further recommendations, Patient updated with plan mara attending- patient re-evaluated and doing well.  No acute issues at  this time Pain improved. Updated patient with results thus far and pending remaining urology recommendations CT showing increased bladder tumor burden, now with pulmonary nodules concerning for mets. Patient urinating without difficulty.  Patient evaluated by urology who is recommending outpatient follow up.  Patient has follow up with his oncologist next week.  Patient with a hx of opioid abuse, patient and family would like to hold off on narcotic pain medications for now.  -Colton Mayberry PA-C

## 2023-06-07 NOTE — ED PROVIDER NOTE - NSFOLLOWUPINSTRUCTIONS_ED_ALL_ED_FT
1.  Stay well hydrated  2.  Take Tylenol 1000mgs every 6-8 hrs as needed for pain  3.  Follow up with your urologist, Dr. Diallo and your oncologist, Dr. Spivey within one week of discharge  4.  Return to the ER for difficulty urinating, persistent blood in the urine, fevers, worsening pain or any other concerning symptoms

## 2023-06-07 NOTE — ED ADULT NURSE NOTE - CAS EDN DISCHARGE ASSESSMENT
[facility] Short Visit Note    5110  Arrived for Port Flush    Visit Vitals  BP (!) 151/74   Pulse 62   Temp 98.1 °F (36.7 °C)   Resp 16   SpO2 99%     Patient denied having any symptoms of COVID-19, i.e. SOB, coughing, fever, or generally not feeling well. Also denies having been exposed to COVID-19 recently or having had any recent contact with family/friend that has a pending COVID test.    Port accessed with positive blood return noted. Port flushed and heparinized per protocol w/o difficulty. Zazueta needle removed. Site covered with gauze and paper tape. Pt tolerated well. Pt denies any acute problems/changes. 1030  Discharged from [facility] ambulatory. No distress. Next appt:    Future Appointments   Date Time Provider Cresencio Mckenzie   10/20/2022  9:20 AM MD MARIA LUZ Forman BS AMB   10/24/2022 11:00 AM RAD ONC THERAPY [facility] [address] HUI   11/3/2022  9:00 AM GARCIA [id number]    11/9/2022  8:30 AM MD GRISELDA Deluna BS AMB   12/1/2022 10:00 AM GARCIA MED7 [address] [facility] REG   12/29/2022 10:00 AM [address] [place] [address],[address] Alert and oriented to person, place and time/Awake

## 2023-06-07 NOTE — ED ADULT NURSE NOTE - OBJECTIVE STATEMENT
79 y/o M A&Ox3 w/ PMH of pacemaker with known bladder mass presents to the ED complaining of abdominal pain. Pt got cytoscopy one week ago and has a known bladder mass, PET scan scheduled for next week. Pt states he has been experiencing abdominal pain/suprapubic pain for 4 days. Pt states that he took ibuprofen yesterday and noticed a scant amount of blood in his urine. Pt states that was the first he noticed any blood in urine. Pt feels that presence of blood was due to Ibuprofen he took yesterday. Pt complaining of pain and rates it 8/10. Pt placed on CM, safety and comfort provided.

## 2023-06-07 NOTE — CONSULT NOTE ADULT - ASSESSMENT
78 year old male with high grade bladder cancer, hematuria   - hemturia mild, H/H stable; not in retention- no need for rodriguez or CBI  - R hydro had been noted previously on an US, Creat 1.5- no intervention at this time  - Patient has follow up with Dr. Spivey, who will review new findings and treat appropriately  - Patient can go be discharged home and follow up with Dr. Diallo as outpatient [phone]  - d/w Dr. Diallo

## 2023-06-07 NOTE — ED PROVIDER NOTE - PHYSICAL EXAMINATION
Well appearing and in NAD, head normal appearing atraumatic, trachea midline, no respiratory distress, lungs cta bilaterally, rrr no murmurs, soft Suprapubic tenderness palpation some distention no other abdominal tenderness palpation no [place]'s McBurney's no rebound tenderness no CVA tenderness palpation ND abdomen, no visible extremity deformities, Alert and oriented, non focal neuro exam, skin warm and dry, normal affect and mood, no leg swelling, calf ttp or jvd

## 2023-06-07 NOTE — ED PROVIDER NOTE - OBJECTIVE STATEMENT
donavant attending- 78-year-old male past medical hx HTN , CAD , MI - s/p stented CAD 2001, TIA (25 Yrs ago), COPD on Trelegy, Bradycardia - s/p pacemaker placement 2011, Echo (2019) - showed EF - 30% - Device upgraded to BIV ICD (2019) s/p recent admission for hematuria and TURBT in 4/23 now pw abdominal pain and cramping also hypotension this morning.  Also reports having hematuria after taking ibuprofen for cramping for the last few days denies clots questionable urinary retention patient reports some frequency and possibly some incomplete emptying he is not sure. Denies recent trauma, fevers, chills, headache, dizziness, nausea, vomiting, dysuria, freq, hematuria, diarrhea, constipation, chest pain, shortness of breath, cough.

## 2023-06-07 NOTE — CONSULT NOTE ADULT - SUBJECTIVE AND OBJECTIVE BOX
HPI:     · 78-year-old male past medical hx HTN , CAD , MI - s/p stented CAD 2001, TIA (25 Yrs ago), COPD on Trelegy, Bradycardia - s/p pacemaker placement 2011, Echo (2019) - showed EF - 30% - Device upgraded to BIV ICD (2019) s/p recent admission for hematuria and TURBT in 4/23 now pw abdominal pain and cramping also hypotension this morning.  Also reports having hematuria after taking ibuprofen for cramping for the last few days but denies clots; he feels like he is emptying his bladder, and the hematuria is intermittent.  He states his cramping/pain was severe but better after pain meds.       Denies recent trauma, fevers, chills, headache, dizziness, nausea, vomiting, dysuria, freq, hematuria, diarrhea, constipation, chest pain, shortness of breath, cough.    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  He is doing immunotherapy with Dr. Spivey at [facility].        PAST MEDICAL & SURGICAL HISTORY:  HTN (Hypertension)      Bradycardia      Dyslipidemia      CAD (Coronary Artery Disease)      S/P PTCA (Percutaneous Transluminal Coronary Angioplasty)  2001      TIA (Transient Ischemic Attack)  18 years ago  PT was on coumadin for short while      COPD (chronic obstructive pulmonary disease)      NSTEMI (non-ST elevation myocardial infarction)      H/O constipation      Anxiety and depression      LV dysfunction      Bladder mass      S/P Rotator Cuff Surgery      S/P drug eluting coronary stent placement      S/P inguinal hernia repair      S/P cardiac pacemaker procedure      Cardiac resynchronization therapy defibrillator (CRT-D) in place        MEDICATIONS  (STANDING):  fentaNYL    Injectable 25 MICROGram(s) IV Push Once     	bisacodyl 10 mg rectal suppository: 1 suppository(ies) rectal once  · 	diazePAM 5 mg oral tablet: 1 tab(s) orally once a day (at bedtime)  · 	mirtazapine 7.5 mg oral tablet: 1 tab(s) orally once a day (at bedtime)  · 	senna leaf extract oral tablet: 2 tab(s) orally once a day (at bedtime) As needed Constipation  · 	polyethylene glycol 3350 oral powder for reconstitution: 17 gram(s) orally once a day As needed Constipation  · 	melatonin 3 mg oral tablet: 1 tab(s) orally once a day (at bedtime) As needed Insomnia  · 	magnesium hydroxide 8% oral suspension: 30 milliliter(s) orally once a day As needed Constipation  · 	tamsulosin 0.4 mg oral capsule: 1 cap(s) orally once a day (at bedtime)  · 	atenolol 25 mg oral tablet: 1 tab(s) orally once a day  · 	FLUoxetine 40 mg oral capsule: 1 cap(s) orally once a day  · 	Folbic oral tablet: 1 tab(s) orally once a day  · 	acetaminophen 325 mg oral tablet: 2 orally every 6 hours  · 	simvastatin 40 mg oral tablet: 1 tab(s) orally once a day  · 	Trelegy Ellipta 100 mcg-62.5 mcg-25 mcg/inh inhalation powder: 1 puff(s) inhaled once a day    MEDICATIONS  (PRN):    FAMILY HISTORY:    Allergies    No Known Allergies    Intolerances      SOCIAL HISTORY:   Tobacco hx: unknown     REVIEW OF SYSTEMS: Pertinent positives and negatives as stated in HPI, otherwise negative    Vital signs  T(C): 36.8 (06-07-23 @ 09:16), Max: 36.8 (06-07-23 @ 09:16)  HR: 66 (06-07-23 @ 15:09)  BP: 121/68 (06-07-23 @ 15:09)  SpO2: 100% (06-07-23 @ 15:09)  Wt(kg): --    Output    UOP    Physical Exam  Gen: NAD  Pulm: No respiratory distress, no subcostal retractions  CV: RRR, no JVD  Abd: Soft, NT, ND, bladder not palpable; voided urine light pink no clots   MSK: No edema present    LABS:          06-07 @ 10:23    WBC 10.41 / Hct 29.6  / SCr 1.55     06-07    138  |  101  |  22  ----------------------------<  116<H>  4.6   |  22  |  1.55<H>    Ca    9.7      07 Jun 2023 10:23    TPro  6.9  /  Alb  3.3  /  TBili  0.3  /  DBili  x   /  AST  16  /  ALT  5<L>  /  AlkPhos  62  06-07      Urinalysis Basic - ( 07 Jun 2023 12:47 )    Color: DARK BROWN / Appearance: Turbid / SG: >1.050 / pH: x  Gluc: x / Ketone: Negative  / Bili: Negative / Urobili: Negative   Blood: x / Protein: 300 mg/dL / Nitrite: Negative   Leuk Esterase: Negative / RBC: 4476 /hpf / WBC 7 /HPF   Sq Epi: x / Non Sq Epi: x / Bacteria: Negative        Urine Cx:   Blood Cx:    RADIOLOGY:  < from: CT Abdomen and Pelvis w/ IV Cont (06.07.23 @ 11:41) >  ACC: [id number] EXAM:  CT CHEST IC   ORDERED BY: JEREMIE MOLINAT     ACC: [id number] EXAM:  CT ABDOMEN AND PELVIS IC   ORDERED BY: JEREMIE STOKES     PROCEDURE DATE:  06/07/2023          INTERPRETATION:  CLINICAL INFORMATION: History of HTN, CAD, MIs/p stent   in 2001, TIA, COPD, bradycardia s/p AICD, CKD stage III, and recent   diagnosis of bladder cancer s/p TURBT. Presenting with abdominal pain and   cramping as well as hematuria.    COMPARISON: CT abdomen and pelvis 3/3/2023, CT chest 4/5/2023    CONTRAST/COMPLICATIONS:  IV Contrast: Omnipaque 350  90 cc administered   10 cc discarded  Oral Contrast: NONE  Complications: None reported at time of study completion    PROCEDURE:  CT of the Chest, Abdomen and Pelvis was performed.  [place] coronal reformats were performed.    FINDINGS:  CHEST:  LUNGS AND LARGE AIRWAYS: Patent central airways. Centrilobular and   paraseptal emphysema. Several nodular opacities in the right upper and   lower lobe, new since prior chest CT. Reference 1.2 cm spiculated lesion   in the right lower lobe (2-57). Previously noted 5 mm nodule in the right   upper lobe on prior CT Chest is not visualized.  PLEURA: No pleural effusion.  VESSELS: Aortic calcifications. Coronary artery calcification.  HEART: Heart size is normal. No pericardial effusion.  MEDIASTINUM AND NIKI: No lymphadenopathy.  CHEST WALL AND LOWER NECK: Left chest wall AICD with leads terminating in   the right atrium, right ventricle, and coronary sinus.    ABDOMEN AND PELVIS:  LIVER: Nodular contour.  BILE DUCTS: Normal caliber.  GALLBLADDER: Within normal limits.  SPLEEN: Within normal limits.  PANCREAS: Within normal limits.  ADRENALS: Within normal limits.  KIDNEYS/URETERS: Moderate right hydroureteronephrosis, new since prior   study. Similar left renal cysts and bilateral subcentimeter hypodensities   which are too small to characterize. Right nonobstructing renal calculi.  BLADDER: 6.5 x 6.3 cm heterogeneous, rim-enhancing mass with irregular   borders, increased in size compared to prior CT 3/3/2023 when it measured   5.2 x 3.0 cm.  REPRODUCTIVE ORGANS: Enlarged prostate with calcifications.    BOWEL: Colonic diverticulosis without diverticulitis. No bowel   obstruction. Appendix is normal.  PERITONEUM: No ascites.  VESSELS: Atherosclerotic changes.  RETROPERITONEUM/LYMPH NODES: Redemonstration of scattered retroperitoneal   lymph nodes.  ABDOMINAL WALL: Within normal limits.  BONES: Degenerative changes.    IMPRESSION:  Several right-sided pulmonary nodules, new since prior CT Chest 4/5/2023,   concerning for metastatic disease.    Moderate right hydroureteronephrosis may be secondary to obstructive   bladder mass.    Interval increase in size of bladder mass compared to prior CTAP 3/3/2023.    --- End of Report ---    < end of copied text >

## 2023-06-07 NOTE — ED PROVIDER NOTE - CLINICAL SUMMARY MEDICAL DECISION MAKING FREE TEXT BOX
pettet attending- Patient presenting with abdominal cramping as well as hematuria with some hypotension will evaluate for intra-abdominal surgical process screening blood work CT chest abdomen pelvis evaluate for sepsis otherwise afebrile pain medication as needed urology evaluation for possible clot retention versus bladder mass disposition pending work-up and reevaluation likely anticipate admission for further management unlikely ACS PE pneumothorax dissection AAA pneumonia

## 2023-06-07 NOTE — ED PROVIDER NOTE - ATTENDING APP SHARED VISIT CONTRIBUTION OF CARE
I, Joshua Damon, performed a history and physical exam of the patient and discussed their management with the resident and/or advanced care provider. I reviewed the resident and/or advanced care provider's note and agree with the documented findings and plan of care. I was present and available for all procedures.    See my full note for details

## 2023-06-07 NOTE — ED PROVIDER NOTE - PATIENT PORTAL LINK FT
You can access the FollowMyHealth Patient Portal offered by [facility] by registering at the following website: [email or web address]. By joining [facility]’s FollowMyHealth portal, you will also be able to view your health information using other applications (apps) compatible with our system.

## 2023-06-07 NOTE — ED PROVIDER NOTE - NS ED ATTENDING STATEMENT MOD
This was a shared visit with the SELVIN. I reviewed and verified the documentation and independently performed the documented:

## 2023-06-12 NOTE — ED PROVIDER NOTE - OBJECTIVE STATEMENT
HTN , CAD , MI - s/p stented CAD 2001, TIA (25 Yrs ago), COPD on Trelegy, Bradycardia - s/p pacemaker placement 2011, Echo (2019) - showed EF - 30% - Device upgraded to BIV ICD (2019), s/p TURBT (4/24), presents to ED c/o suprapubic pain and hematuria x 5 day. Pt had similar visit last week, at that time CT showed increased bladder tumor burden and pulmonary nodules, concerning for mets. Pt currently denies CP, SOB, lightheadedness/dizziness, f/c, n/v, or any other symptoms at this time.

## 2023-06-12 NOTE — ED ADULT NURSE NOTE - PLAN OF CARE
Called and spoke with mother. Mother states that she has been speaking with patient about the research studies and would like further information Mother is asking for the consents and protocols for the two studies they may qualify for. Mother states that patient is watching her meals and the timing of them. They are also eating more at home. Mother scheduled a follow up visit with Lolly Bxo (dietician) and Dr. Watkins. Will send message to Dr. Watkins and if family decides to move forward with research they can cancel follow up appointment. Mother agrees.  Remedios Aparicio RN    
Left msg that patient should call for an appointment   
Terri Watkins MD Sigfrid-Fournier, Hilary, JAY Whittaker,   Can you reach out to this family about follow-up plan.  They did not make an appt when they left.  We talked about them possibly enrolling in one of our studies or just starting meds in clinic.  They were not sure.   I think they just need to come back to see me so we can further discuss if they are ok with that.   Thanks          
Call bell/Explanation of exam/test

## 2023-06-12 NOTE — ED ADULT NURSE NOTE - OBJECTIVE STATEMENT
78 year old male patient presents to ED c/o suprapubic pain and near syncopal episode in triage. Patient reports pain and hematuria x 2 days, and reports passing clots this morning. Patient reports recent procedure where they took a biopsy of his bladder, and was told her had cancerous cells and is scheduled for a PET scan. Patient reports 10/10 pain. Denies current CP, SOB, palpitations, n/v/d, fever, chills. Patient appears uncomfortable and unable to sit still in bed. 3 way rodriguez catheter placed by MD Deleon with continuous bladder irrigation initiated. Patient tolerated well. Patient aware of plan of care for monitoring. VSS

## 2023-06-12 NOTE — ED PROVIDER NOTE - PHYSICAL EXAMINATION
PHYSICAL EXAM:  GENERAL: uncomfortable appearing; in no respiratory distress  HEENT: Atraumatic, Normocephalic, PERRL, EOMs intact b/l w/out deficits, no conjunctival pallor, MMM  NECK: No JVD; FROM  CHEST/LUNG: mild expiratory wheezing  HEART: RRR no murmur/gallops/rubs  ABDOMEN: +BS, soft, ND, suprapubic ttp w/o rebound/guarding  EXTREMITIES: No LE edema, +2 radial pulses b/l, +2 DP/PT pulses b/l  MUSCULOSKELETAL: FROM of all 4 extremities  NERVOUS SYSTEM:  A&Ox3, No motor deficits or sensory deficits; no focal neurologic deficits  SKIN:  No new rashes

## 2023-06-12 NOTE — ED PROVIDER NOTE - PATIENT PORTAL LINK FT
You can access the FollowMyHealth Patient Portal offered by [facility] by registering at the following website: [email or web address]. By joining [facility]’s FollowMyHealth portal, you will also be able to view your health information using other applications (apps) compatible with our system.

## 2023-06-12 NOTE — CONSULT NOTE ADULT - SUBJECTIVE AND OBJECTIVE BOX
HPI: 78-year-old male past medical hx HTN , CAD , MI - s/p stented CAD 2001, TIA (25 Yrs ago), COPD on Trelegy, Bradycardia - s/p pacemaker placement 2011, Echo (2019) - showed EF - 30% - Device upgraded to BIV ICD (2019) s/p recent admission for hematuria and TURBT in 4/23 now pw abdominal pain and cramping in the setting of worsening hematuria with passage of clots for the past 2-3 days. Pt reports that he was still voiding, but with some difficulty. Some dysuria. A lot of lower abdominal pain. No fever/chills, nausea/vomiting, flank pain, or other acute complaints. Was taking ibuprofen for abdominal pain without significant improvement.  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  He is doing immunotherapy with Dr. Spivey at [facility].       In ED, afebrile, one episode of hypotension which resolved spontaneously. No tachycardia. Noted to be tachypneic. Leukocytosis to 12.5. H/H stable to bloodwork from 6/6. Cr 1.7 (around baseline). The ED performed bladder US demonstrating large clot burden and initiated CBI. After irrigation of clot pt was started on wide open CBI with clear output.           PAST MEDICAL & SURGICAL HISTORY:  HTN (Hypertension)      Bradycardia      Dyslipidemia      CAD (Coronary Artery Disease)      S/P PTCA (Percutaneous Transluminal Coronary Angioplasty)  2001      TIA (Transient Ischemic Attack)  18 years ago  PT was on coumadin for short while      COPD (chronic obstructive pulmonary disease)      NSTEMI (non-ST elevation myocardial infarction)      H/O constipation      Anxiety and depression      LV dysfunction      Bladder mass      S/P Rotator Cuff Surgery      S/P drug eluting coronary stent placement      S/P inguinal hernia repair      S/P cardiac pacemaker procedure      Cardiac resynchronization therapy defibrillator (CRT-D) in place          MEDICATIONS  (STANDING):  diazepam    Tablet 5 milliGRAM(s) Oral Once    MEDICATIONS  (PRN):      FAMILY HISTORY:      Allergies    No Known Allergies    Intolerances        SOCIAL HISTORY:    REVIEW OF SYSTEMS: Otherwise negative as stated in HPI    Physical Exam  Vital signs  T(C): 36.4 (06-12-23 @ 12:30), Max: 36.4 (06-12-23 @ 12:30)  HR: 65 (06-12-23 @ 13:33)  BP: 126/75 (06-12-23 @ 13:33)  SpO2: 100% (06-12-23 @ 13:33)  Wt(kg): --    Output    Gen: NAD   Resp: no resp distress   Abd: soft, mild TTP of suprapubic area, no distension. No rebound or guarding   : catheter in place on fast CBI with clear output.      Using aseptic technique, bladder was irrigated with NS without removal of significant clot. Catheter was hubbed and 30cc of sterile water placed in balloon. Secured catheter with stat lock. Resumed cbi on med gtt with light peach out.        LABS:                          9.5    12.85 )-----------( 443      ( 12 Jun 2023 13:54 )             30.0       06-12    136  |  99  |  25<H>  ----------------------------<  97  4.4   |  21<L>  |  1.70<H>    Ca    10.1      12 Jun 2023 13:54    TPro  7.4  /  Alb  3.7  /  TBili  0.3  /  DBili  x   /  AST  15  /  ALT  8<L>  /  AlkPhos  64  06-12    PT/INR - ( 12 Jun 2023 13:54 )   PT: 13.9 sec;   INR: 1.20 ratio         PTT - ( 12 Jun 2023 13:54 )  PTT:35.3 sec      Urine Cx: not sent by ED      RADIOLOGY:  < from: POCUS ED Abdomen Limited (06.12.23 @ 14:04) >  IMPRESSION:  Bladder with large clot burden    < end of copied text >    CT pending   HPI: 78-year-old male past medical hx HTN , CAD , MI - s/p stented CAD 2001, TIA (25 Yrs ago), COPD on Trelegy, Bradycardia - s/p pacemaker placement 2011, Echo (2019) - showed EF - 30% - Device upgraded to BIV ICD (2019) s/p recent admission for hematuria and TURBT in 4/23 now pw abdominal pain and cramping in the setting of worsening hematuria with passage of clots for the past 2-3 days. Pt reports that he was still voiding, but with some difficulty. Some dysuria. A lot of lower abdominal pain. No fever/chills, nausea/vomiting, flank pain, or other acute complaints. Was taking ibuprofen for abdominal pain without significant improvement.  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  He is doing immunotherapy with Dr. Spivey at [facility].       In ED, afebrile, one episode of hypotension which resolved spontaneously. No tachycardia. Noted to be tachypneic. Leukocytosis to 12.5. H/H stable to bloodwork from 6/6. Cr 1.7 (around baseline). The ED performed bladder US demonstrating large clot burden and initiated CBI. After irrigation of clot pt was started on wide open CBI with clear output.           PAST MEDICAL & SURGICAL HISTORY:  HTN (Hypertension)      Bradycardia      Dyslipidemia      CAD (Coronary Artery Disease)      S/P PTCA (Percutaneous Transluminal Coronary Angioplasty)  2001      TIA (Transient Ischemic Attack)  18 years ago  PT was on coumadin for short while      COPD (chronic obstructive pulmonary disease)      NSTEMI (non-ST elevation myocardial infarction)      H/O constipation      Anxiety and depression      LV dysfunction      Bladder mass      S/P Rotator Cuff Surgery      S/P drug eluting coronary stent placement      S/P inguinal hernia repair      S/P cardiac pacemaker procedure      Cardiac resynchronization therapy defibrillator (CRT-D) in place          MEDICATIONS  (STANDING):  diazepam    Tablet 5 milliGRAM(s) Oral Once    MEDICATIONS  (PRN):      FAMILY HISTORY:      Allergies    No Known Allergies    Intolerances        SOCIAL HISTORY:    REVIEW OF SYSTEMS: Otherwise negative as stated in HPI    Physical Exam  Vital signs  T(C): 36.4 (06-12-23 @ 12:30), Max: 36.4 (06-12-23 @ 12:30)  HR: 65 (06-12-23 @ 13:33)  BP: 126/75 (06-12-23 @ 13:33)  SpO2: 100% (06-12-23 @ 13:33)  Wt(kg): --    Output    Gen: NAD   Resp: no resp distress   Abd: soft, mild TTP of suprapubic area, no distension. No rebound or guarding   : catheter in place on fast CBI with clear output.      Using aseptic technique, bladder was irrigated with NS without removal of significant clot. Catheter was hubbed and 30cc of sterile water placed in balloon. Secured catheter with stat lock. Resumed cbi on med gtt with light peach out.      ADDENDUM 6/12 @ 7PM: CBI clamped at around 5 PM, color reassessed around 7 PM and remained translucent light orangish-red color without further clots      LABS:                          9.5    12.85 )-----------( 443      ( 12 Jun 2023 13:54 )             30.0       06-12    136  |  99  |  25<H>  ----------------------------<  97  4.4   |  21<L>  |  1.70<H>    Ca    10.1      12 Jun 2023 13:54    TPro  7.4  /  Alb  3.7  /  TBili  0.3  /  DBili  x   /  AST  15  /  ALT  8<L>  /  AlkPhos  64  06-12    PT/INR - ( 12 Jun 2023 13:54 )   PT: 13.9 sec;   INR: 1.20 ratio         PTT - ( 12 Jun 2023 13:54 )  PTT:35.3 sec      Urine Cx: not sent by ED      RADIOLOGY:  < from: POCUS ED Abdomen Limited (06.12.23 @ 14:04) >  IMPRESSION:  Bladder with large clot burden    < end of copied text >    < from: CT Angio Abdomen and Pelvis w/ IV Cont (06.12.23 @ 19:14) >  ACC: [id number] EXAM:  CT ANGIO ABD PELV (W)AW IC   ORDERED BY: YUE ACOSTA     PROCEDURE DATE:  06/12/2023          INTERPRETATION:  CLINICAL INFORMATION: Abdominal pain and hematuria.   Evaluate for mesenteric ischemia.    COMPARISON: CT abdomen and pelvis 6/7/2023    CONTRAST/COMPLICATIONS:  IV Contrast: Omnipaque 350  90 cc administered   10 cc discarded  Oral Contrast: NONE  Complications: None reported at time of study completion    PROCEDURE:  CT Angiography of the Abdomen and Pelviswas performed.  Arterial and venous phases were acquired.  Sagittal and coronal reformats were performed as well as 3D (MIP)   reconstructions.    FINDINGS:  LOWER CHEST: Emphysema. Multiple nodular and spiculated lesions in the   right lower lobe again seen. Partially visualized AICD leads.    LIVER: Cirrhosis.  BILE DUCTS: Normal caliber.  GALLBLADDER: Within normal limits.  SPLEEN: Within normal limits.  PANCREAS: Within normal limits.  ADRENALS: Within normal limits.  KIDNEYS/URETERS: Moderate right hydroureteronephrosis is again seen,   however now with high density fluid consistent with hemorrhage. Left   renal cysts and bilateral renal hypodensities too small characterize.   Punctate nonobstructing right renal stones.    BLADDER: Redemonstration of a heterogeneous rim-enhancing mass with   irregular borders measuring 6.9 x 5.7 cm, previously measuring 6.5 x 6.3   cm. Villarreal catheter within the bladder.  REPRODUCTIVE ORGANS: Prostate is enlarged.    BOWEL: No bowel obstruction. Colonic diverticulosis without   diverticulitis. No pneumatosis. Appendix is normal.  PERITONEUM: No ascites.  VESSELS: Atherosclerotic changes. The celiac artery, SMA, and GRACE are   patent. Short segment occlusion of the right common femoral artery with   calcified and noncalcified plaque with reconstitution distally. The   hepatic, mesenteric, and portal veins are patent. No portal venous gas.  RETROPERITONEUM/LYMPH NODES: No lymphadenopathy.  ABDOMINAL WALL: Within normal limits.  BONES: Degenerative changes.    IMPRESSION:  No CT evidence for mesenteric ischemia.    Moderate right hydroureteronephrosis is again seen, however now with high   density fluid consistent with hemorrhage throughout the right renal   collecting system and ureter.    Large bladder mass again seen.    Partially visualized right-sided pulmonary nodules again seen concerning   for metastatic disease.    --- End of Report ---    < end of copied text >

## 2023-06-12 NOTE — ED PROVIDER NOTE - NSFOLLOWUPINSTRUCTIONS_ED_ALL_ED_FT
Please follow up with your urologist within the next 2-3 days.     Return to the emergency department if:    Your catheter comes out    You have severe back or side pain that does not go away with treatment.    You are urinating very small amounts or not at all.    You feel like you cannot empty your bladder.    You have a fever that gets worse or does not go away with treatment.    You cannot keep liquids or medicines down.    Your urine gets darker, even after you drink extra liquids.    You have questions or concerns about your condition, treatment, or care.

## 2023-06-12 NOTE — CONSULT NOTE ADULT - ASSESSMENT
79 yo M with high grade bladder CA s/p TURBT in 4/2023 here with gross hematuria and clot retention, initiated on CBI by ED.     Recs:  Final plan pending CT results and urine color  wean CBI as tolerated   79 yo M with high grade bladder CA s/p TURBT in 4/2023 here with gross hematuria and clot retention, initiated on CBI by ED. Eventually weaned off of CBI and urine remained translucent orangish-red without further clots. H/H stable. Cr near pt's baseline.     Recs:  - no acute gu intervention  - should continue rodriguez  - hydration   - should follow up with Dr. Diallo outpatient for TOV  - return to ED precautions  - d/w Dr. Marin and Dr. Diallo    [facility]  [address] [place] [address], [place] [address]  [phone]

## 2023-06-12 NOTE — ED PROVIDER NOTE - NS ED ROS FT
Gen: Denies fever, weight loss  HEENT: Denies vision changes, ear pain, epistaxis, sore throat  CV: Denies chest pain, palpitations  Skin: Denies rash, erythema, color changes  Resp: Denies SOB, cough  Endo: Denies sensitivity to heat, cold, increased urination  GI: Denies abdominal pain, constipation, nausea, vomiting, diarrhea  Msk: Denies back pain, LE swelling, extremity pain  : Denies dysuria, increased frequency; +suprapubic pain, +hematuria  Neuro: Denies LOC, weakness, numbness, tingling  Psych: Denies hx of psych, hallucinations  ROS statement: all other ROS negative except as per HPI

## 2023-06-12 NOTE — ED ADULT NURSE NOTE - NSFALLUNIVINTERV_ED_ALL_ED
Bed/Stretcher in lowest position, wheels locked, appropriate side rails in place/Call bell, personal items and telephone in reach/Instruct patient to call for assistance before getting out of bed/chair/stretcher/Non-slip footwear applied when patient is off stretcher/[place] to call system/Physically safe environment - no spills, clutter or unnecessary equipment/Purposeful proactive rounding/Room/bathroom lighting operational, light cord in reach

## 2023-06-12 NOTE — ED STATDOCS - CLINICAL SUMMARY MEDICAL DECISION MAKING FREE TEXT BOX
rosa 77 yo male with lower abd pain and hematuria with clots and dec urine outopt denies cp and sob but wheezing on exam - pt now with worse pain no fever- on exam lower abd ttp -noprebound - pocus limnited but large clot [id number] - will place 3 ways w cbi if continued pain consider cta r/o mess ischemica  -for pain out of proportion

## 2023-06-12 NOTE — ED PROVIDER NOTE - CLINICAL SUMMARY MEDICAL DECISION MAKING FREE TEXT BOX
rosa 79 yo male with lower abd pain and hematuria with clots and dec urine outopt denies cp and sob but wheezing on exam - pt now with worse pain no fever- on exam lower abd ttp -noprebound - pocus limnited but large clot [id number] - will place 3 ways w cbi if continued pain consider cta r/o mess ischemica  -for pain out of proportion

## 2023-06-12 NOTE — ED PROVIDER NOTE - NSFOLLOWUPCLINICS_GEN_ALL_ED_FT
[facility] [place]  Urology  [address] [facility], [address] [address]  Phone: [phone]  Fax:     [facility]  Urology  [address] [place] [address]  Phone: [phone]  Fax:

## 2023-06-12 NOTE — ED STATDOCS - PROGRESS NOTE DETAILS
pocus abd limited ivc flat - rt renal cyst unable to eval for hydro - large clot burden - 3 way rodriguez passed with cbi

## 2023-06-12 NOTE — ED ADULT NURSE NOTE - CAS EDN DISCHARGE INTERVENTIONS
Called Pt  LVM  Pt advised due for Physical  Advised to call back to schedule visit in the office, provided call back number: [phone] 
Please REVIEW RED X
Arm band on/IV intact

## 2023-06-12 NOTE — ED PROVIDER NOTE - PROGRESS NOTE DETAILS
Rishabh, PGY-3  Patient signed out to me at 3pm. 77 y/o with abdominal pain and hematuria, U/S showed large clots, getting CBI, known bladder cancer, got ceftriaxone, known COPD with wheezing; got nebs. Pending urology recs and CTA abdomen/pelvis for low suspicion of mesenteric ischemia Keating, PGY-3  CTAP shows no evidence of mesenteric ischemia. Urology has dced CBI for >3 hours and patient has cleared blood from urine. Creatinine at baseline. Has outpatient f/u with urologist and will d/c with return precautions Keating, PGY-3  CTAP shows no evidence of mesenteric ischemia. Urology has dced CBI for >3 hours and patient has cleared blood from urine and Uro reports safe for outpatient care. Creatinine at baseline. Has outpatient f/u with urologist and will d/c with return precautions. No SOB/hypoxia and no increased WOB

## 2023-06-12 NOTE — ED ADULT NURSE REASSESSMENT NOTE - NS ED NURSE REASSESS COMMENT FT1
family called says pt can't take care of rodriguez by himself at home as per MD will contact urology to determine if rodriguez can come out if not pt is TBA

## 2023-06-12 NOTE — ED ADULT NURSE REASSESSMENT NOTE - NS ED NURSE REASSESS COMMENT FT1
patient states "my fingertips on both hands are tingling". MD Keating and MD Hair made aware. no further orders given at this time

## 2023-06-13 NOTE — PHYSICAL THERAPY INITIAL EVALUATION ADULT - PERTINENT HX OF CURRENT PROBLEM, REHAB EVAL
77yo male PMH HTN , CAD , MI - s/p stented CAD 2001, TIA (25 Yrs ago), COPD, Bradycardia - s/p PPM 2011 Device upgraded to BIV ICD (2019),  high grade bladder cancer invading the muscle, s/p recent admission for hematuria and TURBT in 4/23 now presenting with abdominal pain and cramping in the setting of worsening hematuria with passage of clots for the past 2-3 days.     In ED, afebrile, one episode of hypotension which resolved spontaneously. No tachycardia. Noted to be tachypneic. Leukocytosis to 12.5. H/H stable to bloodwork from 6/6. Cr 1.7 (around baseline). The ED performed bladder US demonstrating large clot burden and initiated CBI. After irrigation of clot pt was started on wide open CBI with clear output.

## 2023-06-13 NOTE — H&P ADULT - PROBLEM SELECTOR PLAN 2
- s/p diazepam 5mg PO x1 in the ED with minimal response  - will give duoneb x1 now  - continue  home fluoxetine 4mg daily  - continue home mirtazepine 7.5mg qHS - s/p diazepam 5mg PO x1 in the ED with minimal response  - will give duoneb x1 now  - continue  home fluoxetine 4mg daily  - continue home mirtazepine 7.5mg qHS  - continue home diazepam 5mg PO qHS (iSTOP reviewed  Reference #: [id number])

## 2023-06-13 NOTE — H&P ADULT - NSHPPHYSICALEXAM_GEN_ALL_CORE
Vital Signs Last 24 Hrs  T(C): 37 (13 Jun 2023 04:33), Max: 37 (13 Jun 2023 04:33)  T(F): 98.6 (13 Jun 2023 04:33), Max: 98.6 (13 Jun 2023 04:33)  HR: 64 (13 Jun 2023 04:33) (63 - 75)  BP: 173/88 (13 Jun 2023 04:33) (89/61 - 173/88)  BP(mean): 90 (12 Jun 2023 13:33) (89 - 90)  RR: 18 (13 Jun 2023 04:33) (16 - 26)  SpO2: 96% (13 Jun 2023 04:33) (96% - 100%)    Parameters below as of 13 Jun 2023 04:33  Patient On (Oxygen Delivery Method): room air

## 2023-06-13 NOTE — PROGRESS NOTE ADULT - SUBJECTIVE AND OBJECTIVE BOX
DAILY PROGRESS NOTE:         24 hr events:      Objective:    Vital Signs Last 24 Hrs  T(C): 36.4 (2023 12:40), Max: 37 (2023 04:33)  T(F): 97.6 (2023 12:40), Max: 98.6 (2023 04:33)  HR: 63 (2023 12:40) (63 - 75)  BP: 130/72 (2023 12:40) (113/89 - 173/88)  BP(mean): --  RR: 18 (2023 12:40) (16 - 20)  SpO2: 95% (2023 12:40) (92% - 98%)    Parameters below as of 2023 12:40  Patient On (Oxygen Delivery Method): room air        I&O's Detail      Physical Exam:    General: NAD, well-nourished  Resp: Breathing comfortably on RA  CV: regular rate   : urine examine off cbi, clear pink.       Laboratory Results:                          9.3    12.27 )-----------( 438      ( 2023 09:18 )             30.0     06-13    134<L>  |  99  |  27<H>  ----------------------------<  122<H>  4.7   |  21<L>  |  1.50<H>    Ca    9.6      2023 09:18  Phos  3.7     06-13  Mg     2.1     06-13    TPro  7.4  /  Alb  3.7  /  TBili  0.3  /  DBili  x   /  AST  15  /  ALT  8<L>  /  AlkPhos  64  06-12    PT/INR - ( 2023 13:54 )   PT: 13.9 sec;   INR: 1.20 ratio         PTT - ( 2023 13:54 )  PTT:35.3 sec  Urinalysis Basic - ( 2023 21:50 )    Color: Light Orange / Appearance: Slightly Turbid / S.054 / pH: x  Gluc: x / Ketone: Small  / Bili: Negative / Urobili: Negative   Blood: x / Protein: 100 mg/dl / Nitrite: Negative   Leuk Esterase: Negative / RBC: 1155 /hpf / WBC 19 /HPF   Sq Epi: x / Non Sq Epi: x / Bacteria: Negative    < from: CT Angio Abdomen and Pelvis w/ IV Cont (23 @ 19:14) >  IMPRESSION:  No CT evidence for mesenteric ischemia.    Moderate right hydroureteronephrosis is again seen, however now with high   density fluid consistent with hemorrhage throughout the right renal   collecting system and ureter.    Large bladder mass again seen.    Partially visualized right-sided pulmonary nodules again seen concerning   for metastatic disease.    --- End of Report ---           JACOB CHINCHILLA MD; Resident Radiologist  This document has been electronically signed.  KATIA RIVERA MD; Attending Radiologist  This document has been electronically signed. 2023  9:06PM    < end of copied text >

## 2023-06-13 NOTE — H&P ADULT - ASSESSMENT
78 year old male with a PMHx of HTN, CAD s/p stent in 2001, Bradycardia - s/p pacemaker placement 2011, HFrEF (EF 30% in 2019) s/p BIV ICD (2019), TIA (25 Yrs ago), CKD stage III, COPD on Trelegy, and recent admission for hematuria and TURBT (in 4/24/23), and high grade urothelial carcinoma (on immunotherapy, at [facility]) who presents with hematuria.

## 2023-06-13 NOTE — CHART NOTE - NSCHARTNOTEFT_GEN_A_CORE
feels ok. does not want to go home.  s/p CBI  physical exam remarkable for gross blood in rodriguez+  Hb stable   mild leukocytosis likely reactive  U/A without evidence UTI  crt at baseline, CKD stage III  CTA abd/pelvis noted Moderate right hydroureteronephrosis is again seen, however now with high   density fluid consistent with hemorrhage throughout the right renal collecting system and ureter.  Large bladder mass again seen.  Pt pending, urology follow up  updated karyn Black feels ok. does not want to go home.  s/p CBI  physical exam remarkable for gross blood in rodriguez+  Hb stable   mild leukocytosis likely reactive  U/A without evidence UTI  crt at baseline, CKD stage III  CTA abd/pelvis noted Moderate right hydroureteronephrosis is again seen, however now with high   density fluid consistent with hemorrhage throughout the right renal collecting system and ureter.  Large bladder mass again seen.  Pt pending, urology follow up  updated neph Health care proxy Aren Black- encouraged him bring in his paperwork

## 2023-06-13 NOTE — H&P ADULT - NSHPLABSRESULTS_GEN_ALL_CORE
LABS:                         9.5    12.85 )-----------( 443      ( 2023 13:54 )             30.0     06-12    136  |  99  |  25<H>  ----------------------------<  97  4.4   |  21<L>  |  1.70<H>    Ca    10.1      2023 13:54    TPro  7.4  /  Alb  3.7  /  TBili  0.3  /  DBili  x   /  AST  15  /  ALT  8<L>  /  AlkPhos  64  06-12    PT/INR - ( 2023 13:54 )   PT: 13.9 sec;   INR: 1.20 ratio         PTT - ( 2023 13:54 )  PTT:35.3 sec  Urinalysis Basic - ( 2023 21:50 )    Color: Light Orange / Appearance: Slightly Turbid / S.054 / pH: x  Gluc: x / Ketone: Small  / Bili: Negative / Urobili: Negative   Blood: x / Protein: 100 mg/dl / Nitrite: Negative   Leuk Esterase: Negative / RBC: 1155 /hpf / WBC 19 /HPF   Sq Epi: x / Non Sq Epi: x / Bacteria: Negative              Records reviewed from prior hospitalization.  Labs reviewed remarkable for - leukocytosis to 12.8. Hgb 9.5 (at baseline). BUN/Cr of 25/1.70 (baseline). U/a shows hematuria, proteinurea.  EKG personally reviewed   CXR personally reviewed - no focal consolidation. LABS:                         9.5    12.85 )-----------( 443      ( 2023 13:54 )             30.0     06-12    136  |  99  |  25<H>  ----------------------------<  97  4.4   |  21<L>  |  1.70<H>    Ca    10.1      2023 13:54    TPro  7.4  /  Alb  3.7  /  TBili  0.3  /  DBili  x   /  AST  15  /  ALT  8<L>  /  AlkPhos  64  06-12    PT/INR - ( 2023 13:54 )   PT: 13.9 sec;   INR: 1.20 ratio         PTT - ( 2023 13:54 )  PTT:35.3 sec  Urinalysis Basic - ( 2023 21:50 )    Color: Light Orange / Appearance: Slightly Turbid / S.054 / pH: x  Gluc: x / Ketone: Small  / Bili: Negative / Urobili: Negative   Blood: x / Protein: 100 mg/dl / Nitrite: Negative   Leuk Esterase: Negative / RBC: 1155 /hpf / WBC 19 /HPF   Sq Epi: x / Non Sq Epi: x / Bacteria: Negative              Records reviewed from prior hospitalization.  Labs reviewed remarkable for - leukocytosis to 12.8. Hgb 9.5 (at baseline). BUN/Cr of 25/1.70 (baseline). U/a shows hematuria, proteinuria.  EKG personally reviewed - AV paced  CXR personally reviewed - no focal consolidation.

## 2023-06-13 NOTE — H&P ADULT - PROBLEM SELECTOR PLAN 4
- not on aspirin d/t hx of hematuria  - continue home simvastatin  - continue home atenolol 25mg daily Renal function at baseline  Caution with nephrotoxic agents

## 2023-06-13 NOTE — H&P ADULT - HISTORY OF PRESENT ILLNESS
78 year old male with a PMHx of HTN, CAD s/p stent in 2001, Bradycardia - s/p pacemaker placement 2011, HFrEF (EF 30% in 2019) s/p BIV ICD (2019), TIA (25 Yrs ago), CKD stage III, COPD on Trelegy, and recent admission for hematuria and TURBT (in 4/24/23), and high grade urothelial carcinoma (on immunotherapy, at [facility]) who presents with       ED course: ED ultrasound showed large clots. Indwelling rodriguez placed and the patient was started on CBI. Urology consulted and CBI clamped at 6/13 at 5 PM; reassessed by urology at around 7 PM and urine remained translucent. CBI discontinued.    ED interventions: ceftriaxone, NS 1L bolus, solumedrol 125mg IV x1. 78 year old male with a PMHx of HTN, CAD s/p stent in 2001, Bradycardia - s/p pacemaker placement 2011, HFrEF (EF 30% in 2019) s/p BIV ICD (2019), TIA (25 Yrs ago), CKD stage III, COPD on Trelegy, and recent admission for hematuria and TURBT (in 4/24/23), and high grade urothelial carcinoma (on immunotherapy, at [facility]) who presents with hematuria.     History from the patient himself was limited d/t anxiety. Pt was assessed in [facility] holding area. Pt repeatedly asking to be brought back to the main ED area where he was initially, complaining of shortness of breath, hand tingling, and persistent abdominal pain. Pt was informed multiple times by myself and the pt's RN of the plan to move the patient to the main medicine unit once a bed becomes available. Pt rec'ed diazepam 5mg PO about two hours earlier and states it did not alleviate his symptoms at all.     ED course: ED ultrasound showed large clots. Indwelling rodriguez placed and the patient was started on CBI. Urology consulted and CBI clamped at 6/13 at 5 PM; reassessed by urology at around 7 PM and urine remained translucent. CBI discontinued.    ED interventions: ceftriaxone, NS 1L bolus, solumedrol 125mg IV x1.

## 2023-06-13 NOTE — PHYSICAL THERAPY INITIAL EVALUATION ADULT - ADDITIONAL COMMENTS
Patient lives in elevator apartment building, no stairs to enter. Recently discharged from [facility]. DME: Rolling walker, wheelchair, shower chair, grab bars in bathroom

## 2023-06-13 NOTE — H&P ADULT - PROBLEM SELECTOR PLAN 5
- DVT ppx: SCDs  - GI ppx: none  - Diet: regular  - Code status: not discussed - not on aspirin d/t hx of hematuria  - continue home simvastatin  - continue home atenolol 25mg daily

## 2023-06-13 NOTE — H&P ADULT - PROBLEM SELECTOR PLAN 3
- not clearly in exacerbation. S/p solumedrol 125mg IV x1 in the ED  - takes trelegy ellipta at home; while admitted convert to spirva  - duoneb q4hr PRN for SOB  - hold off on further steroids

## 2023-06-13 NOTE — H&P ADULT - PROBLEM SELECTOR PLAN 1
ED ultrasound showed large clots. Indwelling rodriguez placed and the patient was started on CBI. Urology consulted and CBI clamped at 6/13 at 5 PM; reassessed by urology at around 7 PM and urine remained translucent. CBI discontinued.  - urology recommended continuing indwelling rodriguez and f/u with urology outpatient however per ED conversation with the pt's family there was concern that the patient would be unable to manage the rodriguez on is own  - urology team was informed that the patient remains hospitalized; they will f/u  - urine appears darker colored than prior  - continue to monitor with rodriguez  - repeat CBC this morning  - outpatient f/u at [facility] for high grade urothelial cancer  - appreciate urology evaluation

## 2023-06-13 NOTE — H&P ADULT - MENTAL STATUS
alert and oriented to self and place. Partially oriented to time (was unsure of the current year but knew it was June)

## 2023-06-13 NOTE — ED ADULT NURSE REASSESSMENT NOTE - NS ED NURSE REASSESS COMMENT FT1
pt reports rodriguez is clogged- RN irrigated rodriguez, clot removed. rodriguez now draining. output blood tingued ACP cristopher recinos.

## 2023-06-14 NOTE — DISCHARGE NOTE NURSING/CASE MANAGEMENT/SOCIAL WORK - PATIENT PORTAL LINK FT
You can access the FollowMyHealth Patient Portal offered by [facility] by registering at the following website: [email or web address]. By joining [facility]’s FollowMyHealth portal, you will also be able to view your health information using other applications (apps) compatible with our system.

## 2023-06-14 NOTE — DISCHARGE NOTE PROVIDER - NSDCFUSCHEDAPPT_GEN_ALL_CORE_FT
[facility]  NUCMED  Lkv  Scheduled Appointment: 06/19/2023    [facility]  NUCMED  Lkv  Scheduled Appointment: 06/19/2023    [facility]  ELECTROPH [address]  Scheduled Appointment: 07/13/2023

## 2023-06-14 NOTE — CHART NOTE - NSCHARTNOTEFT_GEN_A_CORE
patient feels better. dark yellow urine in rodriguez catheter bag. appreciate urology follow up. needs f/u 1 week with urology opt for trial of void d/w patient and nephew over the phone. opt oncology f/u. plan for home pt. med ready for dc home today. DC time 45 min

## 2023-06-14 NOTE — DISCHARGE NOTE PROVIDER - NSDCCPCAREPLAN_GEN_ALL_CORE_FT
PRINCIPAL DISCHARGE DIAGNOSIS  Diagnosis: Gross hematuria  Assessment and Plan of Treatment: You were admitted to the hospital for blood in your urine. You were treated with continuous bladder irrigation until your urine was no longer bloody. You were seen by urology who monitored your urine until it was clear. Your urine was bloody likely because of your bladder tumor and recent surgery. You had a rodriguez catheter placed in the emergency room and you will be discharged with it in place to prevent any retention of your urine.   You should follow up with your urologist and oncologist upon discharge.  HOME CARE INSTRUCTIONS  Drink lots of fluid, 3–4 quarts a day.  Avoid caffeine, tea, and carbonated beverages because they tend to irritate the bladder.  Avoid alcohol because it may irritate the prostate.  Take all medicines as directed by your health care provider.   SEEK MEDICAL CARE IF:  You develop back pain.  You have a fever.  You have a feeling of sickness in your stomach (nausea) or vomiting.  Your symptoms are not better in 3 days. Return sooner if you are getting worse.   SEEK IMMEDIATE MEDICAL CARE IF:  You develop severe vomiting and are unable to keep the medicine down.  You develop severe back or abdominal pain despite taking your medicines.  You begin passing a large amount of blood or clots in your urine.  You feel extremely weak or faint, or you pass out.        SECONDARY DISCHARGE DIAGNOSES  Diagnosis: Abdominal pain  Assessment and Plan of Treatment:

## 2023-06-14 NOTE — DISCHARGE NOTE PROVIDER - PROVIDER TOKENS
PROVIDER:[TOKEN:[[id number]],FOLLOWUP:[1 week]],PROVIDER:[TOKEN:[[id number]],FOLLOWUP:[1 week]],FREE:[LAST:[Li,],FIRST:[Jaylen CHOW)],PHONE:[phone]],FAX:[(   )    -],ADDRESS:[Internal Medicine  [address] [place] [address]   [address] [place] [address]],FOLLOWUP:[1 week]]

## 2023-06-14 NOTE — DISCHARGE NOTE PROVIDER - HOSPITAL COURSE
78 yr old male with high grade bladder CA s/p TURBT in 4/2023  HTN , CAD , [place] - s/p stented CAD 2001, TIA (25 Yrs ago), COPD on Trelegy, Bradycardia - s/p pacemaker placement 2011, Echo (2019) - showed EF - 30% - Device upgraded to BIV ICD (2019), s/p TURBT (4/24), presents to ED c/o suprapubic pain and hematuria x 5 day. Pt had similar visit last week, at that time CT showed increased bladder tumor burden and pulmonary nodules, concerning for mets. Pt was treated with rodriguez placement and bladder irrigation w/ CBI in the ED. Urology was consulted and followed the patient until the urine was running clear. The patient is now stable and cleared for DC w/ rodriguez in place. No skilled PT needs for home. 78 yr old male with high grade bladder CA s/p TURBT in 4/2023  HTN , CAD , [place] - s/p stented CAD 2001, TIA (25 Yrs ago), COPD on Trelegy, Bradycardia - s/p pacemaker placement 2011, Echo (2019) - showed EF - 30% - Device upgraded to BIV ICD (2019), s/p TURBT (4/24), presents to ED c/o suprapubic pain and hematuria x 5 day. Pt had similar visit last week, at that time CT showed increased bladder tumor burden and pulmonary nodules, concerning for mets. Pt was treated with rodriguez placement and bladder irrigation w/ CBI in the ED. Urology was consulted and followed the patient until the urine was running clear. The patient is now stable and cleared for DC w/ rodriguez in place. Pt medically cleared for discharge to Rehab today per Dr Madrid.

## 2023-06-14 NOTE — DISCHARGE NOTE PROVIDER - NSDCMRMEDTOKEN_GEN_ALL_CORE_FT
atenolol 25 mg oral tablet: 1 tab(s) orally once a day  diazePAM 5 mg oral tablet: 1 tab(s) orally once a day (at bedtime) NOTE: As per Pharmacy, 1 tablet orally 3 times a day as needed for anxiety  FLUoxetine 40 mg oral capsule: 1 cap(s) orally once a day  Folbic oral tablet: 1 tab(s) orally once a day  mirtazapine 7.5 mg oral tablet: 1 tab(s) orally once a day (at bedtime)  simvastatin 40 mg oral tablet: 1 tab(s) orally once a day  tamsulosin 0.4 mg oral capsule: 1 cap(s) orally once a day (at bedtime)  Trelegy Ellipta 100 mcg-62.5 mcg-25 mcg/inh inhalation powder: 1 puff(s) inhaled once a day   atenolol 25 mg oral tablet: 1 tab(s) orally once a day  diazePAM 5 mg oral tablet: 1 tab(s) orally once a day (at bedtime) NOTE: As per Pharmacy, 1 tablet orally 3 times a day as needed for anxiety  FLUoxetine 40 mg oral capsule: 1 cap(s) orally once a day  Folbic oral tablet: 1 tab(s) orally once a day  mirtazapine 7.5 mg oral tablet: 1 tab(s) orally once a day (at bedtime)  polyethylene glycol 3350 oral powder for reconstitution: 17 gram(s) orally once a day As needed Constipation  simvastatin 40 mg oral tablet: 1 tab(s) orally once a day  tamsulosin 0.4 mg oral capsule: 1 cap(s) orally once a day (at bedtime)  Trelegy Ellipta 100 mcg-62.5 mcg-25 mcg/inh inhalation powder: 1 puff(s) inhaled once a day

## 2023-06-14 NOTE — DISCHARGE NOTE NURSING/CASE MANAGEMENT/SOCIAL WORK - NSDCPEFALRISK_GEN_ALL_CORE
For information on Fall & Injury Prevention, visit: [email or web address] OR  [email or web address] OR  https://www.cdc.gov/steadi/patient.html

## 2023-06-14 NOTE — DISCHARGE NOTE PROVIDER - CARE PROVIDER_API CALL
Ryan Spivey  Medical Oncology  [address] [place] [address]  Phone: [phone]  Fax: [phone]  Follow Up Time: 1 week    Tony Diallo  Urology  [address] [place] [address], [address] [address]  Phone: [phone]  Fax: [phone]  Follow Up Time: 1 week    Bhatia Jack (MD)  Internal Medicine  [address]   [address]  [place] [address]  Phone: [phone]  Fax: (   )    -  Follow Up Time: 1 week

## 2023-06-15 NOTE — PROGRESS NOTE ADULT - SUBJECTIVE AND OBJECTIVE BOX
Patient is a 78y old  Male who presents with a chief complaint of hematuria (14 Jun 2023 10:20)      SUBJECTIVE / OVERNIGHT EVENTS:    no complaints      ROS:  14 point ROS negative in detail except stated as above    MEDICATIONS  (STANDING):  albuterol/ipratropium for Nebulization 3 milliLiter(s) Nebulizer every 4 hours  atenolol  Tablet 25 milliGRAM(s) Oral daily  atorvastatin 40 milliGRAM(s) Oral at bedtime  budesonide  80 MICROgram(s)/formoterol 4.5 MICROgram(s) Inhaler 2 Puff(s) Inhalation two times a day  diazepam    Tablet 5 milliGRAM(s) Oral at bedtime  diazepam    Tablet 5 milliGRAM(s) Oral <User Schedule>  FLUoxetine 40 milliGRAM(s) Oral daily  mirtazapine 7.5 milliGRAM(s) Oral at bedtime  sodium chloride 0.9%. 750 milliLiter(s) (75 mL/Hr) IV Continuous <Continuous>  tamsulosin 0.4 milliGRAM(s) Oral at bedtime  tiotropium 2.5 MICROgram(s) Inhaler 2 Puff(s) Inhalation daily    MEDICATIONS  (PRN):  acetaminophen     Tablet .. 650 milliGRAM(s) Oral every 6 hours PRN Temp greater or equal to 38C (100.4F), Mild Pain (1 - 3)  albuterol    90 MICROgram(s) HFA Inhaler 2 Puff(s) Inhalation four times a day PRN Shortness of Breath and/or Wheezing  aluminum hydroxide/magnesium hydroxide/simethicone Suspension 30 milliLiter(s) Oral every 4 hours PRN Dyspepsia  melatonin 3 milliGRAM(s) Oral at bedtime PRN Insomnia  ondansetron Injectable 4 milliGRAM(s) IV Push every 8 hours PRN Nausea and/or Vomiting  polyethylene glycol 3350 17 Gram(s) Oral daily PRN Constipation      CAPILLARY BLOOD GLUCOSE        I&O's Summary    14 Jun 2023 07:01  -  15 [place] 2023 07:00  --------------------------------------------------------  IN: 500 mL / OUT: 1200 mL / NET: -700 mL        PHYSICAL EXAM:  Vital Signs Last 24 Hrs  T(C): 36.7 (15 Hamilton 2023 04:28), Max: 36.7 (14 Jun 2023 21:54)  T(F): 98 (15 Hamilton 2023 04:28), Max: 98 (14 Jun 2023 21:54)  HR: 71 (15 Hamilton 2023 04:28) (60 - 80)  BP: 146/82 (15 Hamilton 2023 04:28) (105/67 - 146/82)  BP(mean): --  RR: 18 (15 Hamilton 2023 04:28) (18 - 18)  SpO2: 96% (15 Hamilton 2023 04:28) (93% - 96%)    Parameters below as of 15 Hamilton 2023 04:28  Patient On (Oxygen Delivery Method): room air      HEENT; NC/AT PERRL  Chest: CTA b/l no w/r/r  Heart: s1 s2 no murmurs rubs or gallops  abd: soft nt nd bs+   ext: no edema cyanosis or clubbing  : rodriguez in place draining dark yellow urine     LABS:                        9.9    13.08 )-----------( 421      ( 14 Jun 2023 18:12 )             30.9     06-14    136  |  99  |  28<H>  ----------------------------<  129<H>  3.8   |  23  |  1.41<H>    Ca    10.1      14 Jun 2023 18:12  Phos  3.7     06-13  Mg     2.1     06-13                RADIOLOGY & ADDITIONAL TESTS:    Imaging Personally Reviewed:    Consultant(s) Notes Reviewed:      Care Discussed with Consultants/Other Providers:  med JACK Merrill

## 2023-06-15 NOTE — PROGRESS NOTE ADULT - PROBLEM SELECTOR PLAN 2
f/u opt with Dr. Spivey  CT here with possible lung mets   has PET scan opt on Monday previous discussion w patient and nephew that if he is still admitting needing auth will miss but CM assistance in finding facility that can transport no abrasions, no jaundice, no lesions, no pruritis, and no rashes.

## 2023-06-16 NOTE — PROGRESS NOTE ADULT - PROBLEM SELECTOR PLAN 7
- DVT ppx: SCDs  - GI ppx: none  - Diet: regular  - full code    plan for charito to ltc as pt does cannot care for his rodriguez at home  med ready for dc  total discharge planning time spent = 45minutes
- DVT ppx: SCDs  - GI ppx: none  - Diet: regular  - full code    plan for charito to ltc as pt does cannot care for his rodriguez at home- needs auth  med ready for dc

## 2023-06-16 NOTE — PROGRESS NOTE ADULT - PROBLEM SELECTOR PLAN 3
- severe anxiety;  - continue  home fluoxetine 4mg daily  - continue home mirtazepine 7.5mg qHS  - continue home diazepam 5mg PO qHS (iSTOP reviewed  Reference #: [id number])
- severe anxiety;  - continue  home fluoxetine 4mg daily  - continue home mirtazepine 7.5mg qHS  - continue home diazepam 5mg PO qHS (iSTOP reviewed  Reference #: [id number])

## 2023-06-16 NOTE — PROGRESS NOTE ADULT - PROBLEM SELECTOR PLAN 6
- not on aspirin d/t hx of hematuria  - continue home simvastatin  - continue home atenolol 25mg daily
- not on aspirin d/t hx of hematuria  - continue home simvastatin  - continue home atenolol 25mg daily

## 2023-06-16 NOTE — PROGRESS NOTE ADULT - PROBLEM SELECTOR PLAN 4
- not c in exacerbation.  - takes trelegy ellipta at home; while admitted convert to spirva  - duoneb q4hr PRN for SOB  - hold off on further steroids
- not c in exacerbation.  - takes trelegy ellipta at home; while admitted convert to spirva  - duoneb q4hr PRN for SOB  - hold off on further steroids

## 2023-06-16 NOTE — PROGRESS NOTE ADULT - PROBLEM SELECTOR PLAN 5
Renal function at baseline  Caution with nephrotoxic agents
Renal function at baseline  Caution with nephrotoxic agents

## 2023-06-16 NOTE — PROGRESS NOTE ADULT - ASSESSMENT
78 year old male with a PMHx of HTN, CAD s/p stent in 2001, Bradycardia - s/p pacemaker placement 2011, HFrEF (EF 30% in 2019) s/p BIV ICD (2019), TIA (25 Yrs ago), CKD stage III, COPD on Trelegy, and recent admission for hematuria and TURBT (in 4/24/23), and high grade urothelial carcinoma (on immunotherapy, at [facility]) who presents with hematuria.   
77 yo M with high grade bladder CA s/p TURBT in 4/2023 here with gross hematuria and clot retention, initiated on CBI by ED. Eventually weaned off of CBI and urine remained translucent orangish-red without further clots. H/H stable. Cr near pt's baseline.   --clear pink urine off cbi, continue w/ rodriguez w/o cbi for now  --CT findings reviewed. Large bladder mass and 2/2 hydro once again noted  --H/H reviewed, stable.   --No further evaluation needed inpatient from urology. Can dispo per medical team  --F/u w Dr Diallo  [facility]    [address] [place] [address], [place] [address]  Phone: [phone]       --Urology to signoff at this  --If any issues please page back. 
78 year old male with a PMHx of HTN, CAD s/p stent in 2001, Bradycardia - s/p pacemaker placement 2011, HFrEF (EF 30% in 2019) s/p BIV ICD (2019), TIA (25 Yrs ago), CKD stage III, COPD on Trelegy, and recent admission for hematuria and TURBT (in 4/24/23), and high grade urothelial carcinoma (on immunotherapy, at [facility]) who presents with hematuria.

## 2023-06-16 NOTE — PROGRESS NOTE ADULT - PROBLEM SELECTOR PLAN 2
f/u opt with Dr. Spivey  CT here with possible lung mets   has PET scan opt on Monday - per CM, this was confirmed w/ FIGUEROA

## 2023-06-16 NOTE — PROGRESS NOTE ADULT - PROBLEM SELECTOR PLAN 1
- needed CBI for a very short time in ED, off now and CBC stable  - rodriguez in place- TOV opt ayad Diallo  - cbc stable since admission  - outpatient f/u at [facility] for high grade urothelial cancer  - appreciate urology evaluation
- needed CBI for a very short time in ED, off now and CBC stable  - rodriguez in place- TOV opt with Dr. Diallo  - cbc stable since admission  - outpatient f/u at [facility] for high grade urothelial cancer  - appreciate urology evaluation

## 2023-06-16 NOTE — PROGRESS NOTE ADULT - SUBJECTIVE AND OBJECTIVE BOX
INCOMPLETE NOTE    Christiana Madrid M.D.  Division of Hospital Medicine  Available on [employer] TEAMS    SUBJECTIVE / ACUTE INTERVAL EVENTS: Patient seen and examined. No overnight events. No subjective complaints.     OBJECTIVE:   Vital Signs Last 24 Hrs  T(F): 98.1 (16 Jun 2023 05:13), Max: 98.4 (15 Hamilton 2023 21:45)  HR: 68 (16 Jun 2023 05:13) (68 - 81)  BP: 112/68 (16 Jun 2023 05:13) (105/70 - 112/68)  RR: 18 (16 Jun 2023 05:13) (18 - 18)  SpO2: 95% (16 Jun 2023 05:13) (94% - 95%)    Parameters below as of 16 Jun 2023 05:13  Patient On (Oxygen Delivery Method): room air    I&O's Summary  15 Hamilton 2023 07:01  -  16 Jun 2023 07:00  --------------------------------------------------------  IN: 480 mL / OUT: 1000 mL / NET: -520 mL    Physical Examination:  GEN: thin man, laying in stretcher in NAD  PSYCH: A&Ox3, mood and affect appear appropriate   SKIN: intact, no e/o rash  NEURO: no focal neurologic deficits appreciated; CN II-XII intact, sensation intact B/L, motor 5/5 in all mm groups  EYES: PERRL, anicteric, EOMI, no vertical/horizontal nystagmus  HEAD: NC, AT  NECK: supple  RESPI: no accessory muscle use, B/L air entry, CTAB   CARDIO: regular rate/rhythm, no LE edema B/L  ABD: soft, NT, ND, +BS  EXT: patient able to move all extremities spontaneously  VASC: peripheral pulses palpated    Labs:                      9.7    12.98 )-----------( 388      ( 15 Hamilton 2023 10:46 )             30.7     06-15  137  |  99  |  29<H>  ----------------------------<  122<H>  4.3   |  25  |  1.51<H>    Ca    10.2      15 Jun 2023 10:46    Consultant(s) Notes Reviewed:  Care Coordination  Care Discussed with Consultants/Other Providers: MURTAZA Beard    MEDICATIONS  (STANDING):  albuterol/ipratropium for Nebulization 3 milliLiter(s) Nebulizer every 4 hours  atenolol  Tablet 25 milliGRAM(s) Oral daily  atorvastatin 40 milliGRAM(s) Oral at bedtime  budesonide  80 MICROgram(s)/formoterol 4.5 MICROgram(s) Inhaler 2 Puff(s) Inhalation two times a day  diazepam    Tablet 5 milliGRAM(s) Oral at bedtime  diazepam    Tablet 5 milliGRAM(s) Oral <User Schedule>  FLUoxetine 40 milliGRAM(s) Oral daily  mirtazapine 7.5 milliGRAM(s) Oral at bedtime  sodium chloride 0.9%. 750 milliLiter(s) (75 mL/Hr) IV Continuous <Continuous>  tamsulosin 0.4 milliGRAM(s) Oral at bedtime  tiotropium 2.5 MICROgram(s) Inhaler 2 Puff(s) Inhalation daily    MEDICATIONS  (PRN):  acetaminophen     Tablet .. 650 milliGRAM(s) Oral every 6 hours PRN Temp greater or equal to 38C (100.4F), Mild Pain (1 - 3)  albuterol    90 MICROgram(s) HFA Inhaler 2 Puff(s) Inhalation four times a day PRN Shortness of Breath and/or Wheezing  aluminum hydroxide/magnesium hydroxide/simethicone Suspension 30 milliLiter(s) Oral every 4 hours PRN Dyspepsia  melatonin 3 milliGRAM(s) Oral at bedtime PRN Insomnia  ondansetron Injectable 4 milliGRAM(s) IV Push every 8 hours PRN Nausea and/or Vomiting  polyethylene glycol 3350 17 Gram(s) Oral daily PRN Constipation Christiana Madrid M.D.  Division of Hospital Medicine  Available on [employer] TEAMS    SUBJECTIVE / ACUTE INTERVAL EVENTS: Patient seen and examined. No overnight events. No subjective complaints. Plan for discharge to [facility] --> LTC today w/ Villarreal w/ O/P PET next week per CM; patient's nephew will drive patient's car back home while pt is transported via ambulette.     OBJECTIVE:   Vital Signs Last 24 Hrs  T(F): 98.1 (16 Jun 2023 05:13), Max: 98.4 (15 Hamilton 2023 21:45)  HR: 68 (16 Jun 2023 05:13) (68 - 81)  BP: 112/68 (16 Jun 2023 05:13) (105/70 - 112/68)  RR: 18 (16 Jun 2023 05:13) (18 - 18)  SpO2: 95% (16 Jun 2023 05:13) (94% - 95%)    Parameters below as of 16 Jun 2023 05:13  Patient On (Oxygen Delivery Method): room air    I&O's Summary  15 Hamilton 2023 07:01  -  16 Jun 2023 07:00  --------------------------------------------------------  IN: 480 mL / OUT: 1000 mL / NET: -520 mL    Physical Examination:  GEN: elderly man, sitting up in bed in NAD  PSYCH: A&Ox3, mood and affect appear appropriate   NEURO: no focal neurologic deficits appreciated  RESPI: no accessory muscle use, B/L air entry   CARDIO: regular rate/rhythm, no LE edema B/L  ABD: soft, NT, ND  EXT: patient able to move all extremities spontaneously  VASC: peripheral pulses palpated    Labs:                      9.7    12.98 )-----------( 388      ( 15 Hamilton 2023 10:46 )             30.7     06-15  137  |  99  |  29<H>  ----------------------------<  122<H>  4.3   |  25  |  1.51<H>    Ca    10.2      15 Hamilton 2023 10:46    Consultant(s) Notes Reviewed:  Care Coordination  Care Discussed with Consultants/Other Providers: MURTAZA Beard    MEDICATIONS  (STANDING):  albuterol/ipratropium for Nebulization 3 milliLiter(s) Nebulizer every 4 hours  atenolol  Tablet 25 milliGRAM(s) Oral daily  atorvastatin 40 milliGRAM(s) Oral at bedtime  budesonide  80 MICROgram(s)/formoterol 4.5 MICROgram(s) Inhaler 2 Puff(s) Inhalation two times a day  diazepam    Tablet 5 milliGRAM(s) Oral at bedtime  diazepam    Tablet 5 milliGRAM(s) Oral <User Schedule>  FLUoxetine 40 milliGRAM(s) Oral daily  mirtazapine 7.5 milliGRAM(s) Oral at bedtime  sodium chloride 0.9%. 750 milliLiter(s) (75 mL/Hr) IV Continuous <Continuous>  tamsulosin 0.4 milliGRAM(s) Oral at bedtime  tiotropium 2.5 MICROgram(s) Inhaler 2 Puff(s) Inhalation daily    MEDICATIONS  (PRN):  acetaminophen     Tablet .. 650 milliGRAM(s) Oral every 6 hours PRN Temp greater or equal to 38C (100.4F), Mild Pain (1 - 3)  albuterol    90 MICROgram(s) HFA Inhaler 2 Puff(s) Inhalation four times a day PRN Shortness of Breath and/or Wheezing  aluminum hydroxide/magnesium hydroxide/simethicone Suspension 30 milliLiter(s) Oral every 4 hours PRN Dyspepsia  melatonin 3 milliGRAM(s) Oral at bedtime PRN Insomnia  ondansetron Injectable 4 milliGRAM(s) IV Push every 8 hours PRN Nausea and/or Vomiting  polyethylene glycol 3350 17 Gram(s) Oral daily PRN Constipation

## 2023-06-26 NOTE — CONSULT LETTER
[Dear  ___] : Dear  [unfilled], [Courtesy Letter:] : I had the pleasure of seeing your patient, [unfilled], in my office today. [Please see my note below.] : Please see my note below. [Consult Closing:] : Thank you very much for allowing me to participate in the care of this patient.  If you have any questions, please do not hesitate to contact me. [Sincerely,] : Sincerely, [DrTraci  ___] : Dr. MURO

## 2023-07-02 NOTE — REVIEW OF SYSTEMS
[Fatigue] : fatigue [SOB on Exertion] : shortness of breath during exertion [Diarrhea: Grade 0] : Diarrhea: Grade 0 [Incontinence] : incontinence [Dizziness] : dizziness [Anxiety] : anxiety [Depression] : depression [Muscle Weakness] : muscle weakness [Easy Bruising] : a tendency for easy bruising [Negative] : ENT [Fever] : no fever [Chills] : no chills [Recent Change In Weight] : ~T no recent weight change [Chest Pain] : no chest pain [Palpitations] : no palpitations [Lower Ext Edema] : no lower extremity edema [Wheezing] : no wheezing [Cough] : no cough [Abdominal Pain] : no abdominal pain [Vomiting] : no vomiting [Dysuria] : no dysuria [Joint Pain] : no joint pain [Joint Stiffness] : no joint stiffness [Skin Rash] : no skin rash [Hot Flashes] : no hot flashes [Easy Bleeding] : no tendency for easy bleeding [FreeTextEntry8] : flow is fine, nocturia 0-1. No blood, no dysuria. Villarreal pulled one week ago.  [FreeTextEntry9] : no cramps [de-identified] : No HA, no paresthesias

## 2023-07-02 NOTE — PHYSICAL EXAM
[Ambulatory and capable of all self care but unable to carry out any work activities] : Status 2- Ambulatory and capable of all self care but unable to carry out any work activities. Up and about more than 50% of waking hours [Normal] : affect appropriate [de-identified] : no edema

## 2023-07-02 NOTE — RESULTS/DATA
[FreeTextEntry1] : EXAM: [id number] - PETCT SKUL-THI ONC FDG INIT - ORDERED BY: SAMY HELM\par PROCEDURE DATE: 06/19/2023\par INTERPRETATION: CLINICAL INFORMATION: Urinary bladder cancer, evaluate retroperitoneal lymphadenopathy.\par \par TREATMENT STRATEGY EVALUATION: Initial\par FASTING BLOOD SUGAR: 112 mg/dL\par RADIOPHARMACEUTICAL: 13.2 mCi F-18, FDG, I.V.\par UPTAKE PERIOD: 60 minutes\par SCANNER: ID [employer] 710\par ORAL CONTRAST: Patient drank OMNIPAQUE contrast during the uptake period.\par PHARMACOLOGIC INTERVENTION: None.\par \par TECHNIQUE: Following intravenous injection of radiopharmaceutical and above uptake period, PET/CT was obtained from base of skull to mid-thigh. CT protocol was optimized for PET attenuation correction and anatomic localization and was not designed to produce and cannot replace state-of-the-art diagnostic CT images with specific imaging protocols for different body parts and indications. Images were reconstructed and reviewed in axial, coronal and sagittal views and three-dimensional MIP.\par \par The standardized uptake values (SUV) are normalized to patient body weight and indicate the highest activity concentration (SUVmax) in a given site. All image numbers refer to axial image number.\par \par COMPARISON: No prior FDG-PET/CT\par \par OTHER STUDIES USED FOR CORRELATION: CT abdomen 6/12/2023, CT chest 6/7/2023\par \par FINDINGS:\par \par HEAD/NECK: Physiologic FDG activity in visualized brain. FDG-avid soft tissue in the posterior nasopharynx, approximately 2.5 x 2.4 cm, SUV 4.2, image 21.\par \par THORAX: No abnormal FDG activity. No lymphadenopathy.\par \par LUNGS: Within 10 FDG-avid right lung nodules, for example:\par Posterior right lower lobe, 1.1 x 0.7 cm, SUV 2.8, image 112; previously approximately 1.0 x 0.6 cm on CT 6/7/2023..\par Right upper lobe, 1.3 x 0.8 cm, SUV 2.4, image 96; previously approximately 0.7 x 0.3 cm on CT 6/7/2023.\par A previously 1.3 x 1.3 cm right lower lobe nodule, image 57 of prior exam, has decreased in size and now measures 0.9 x 0.7 cm, SUV 2.8 image 112.\par A previously 1.4 x 1.0 cm right upper lobe nodule, image 47 of prior exam, has slightly decreased in size and now measures 1.2 x 0.7 cm, SUV 1.9, image 104.\par Emphysema. AICD.\par \par PLEURA/PERICARDIUM: No abnormal FDG activity. No effusion.\par \par HEPATOBILIARY/PANCREAS: Physiologic FDG activity. For reference, normal liver demonstrates SUV mean 1.9.\par \par SPLEEN: Physiologic FDG activity. Normal in size.\par \par ADRENAL GLANDS: No abnormal FDG activity. No nodule.\par \par KIDNEYS/URINARY BLADDER: FDG-avid rim enhancing mass with irregular borders associated with the urinary bladder, SUV 19, image 206. Physiologic excreted FDG activity. Moderate right hydronephrosis with diminished excretion of radiotracer.\par \par REPRODUCTIVE ORGANS: No abnormal FDG activity.\par \par ABDOMINOPELVIC LYMPH NODES/RETROPERITONEUM: No enlarged and FDG-avid lymph node.\par \par ESOPHAGUS/STOMACH/BOWEL/PERITONEUM/MESENTERY: No abnormal FDG activity.\par \par VESSELS: Coronary and large vessel calcifications. No aneurysm.\par \par BONES/SOFT TISSUES: FDG-avid focus in the cutaneous/subcutaneous tissues of the left lower arm, SUV 1.3, image 120. FDG avid foci in the right lower arm is favored to be due to radiotracer injection.\par \par IMPRESSION:\par \par 1. Primary urinary bladder cancer with no FDG-avid lymphadenopathy.\par \par 2. Multiple FDG-avid right pulmonary nodules, some of which have decreased in size since prior exam, and some of which have increased in size since prior exam. Query infectious etiology. Recommend CT follow-up after treatment to evaluate for persistent nodules.\par \par 3. FDG-avid soft tissue in the posterior nasopharynx. This could be further evaluated with otolaryngology consultation.\par \par 4. Moderate right hydronephrosis with diminished excretion of radiotracer suggests parenchymal damage.\par \par 5. Possible FDG avid cutaneous/subcutaneous lesion in the left lower arm. Clinical inspection recommended for signs of infection or neoplasm.\par \par --- End of Report ---\par \par JAMIL CERVANTES MD; Attending Radiologist\par This document has been electronically signed. Jun 20 2023 9:44AM\par ******************************************************************

## 2023-07-02 NOTE — ASSESSMENT
[Palliative Care Plan] : not applicable at this time [FreeTextEntry1] : Jim is seen in the office today along with his cousin.  He had a PET/CT performed on June 19.  This revealed an avid lesion in the posterior nasopharynx measuring 2.5 x 2.4.  There were at least 10 FDG avid lung nodules present.  There was a rim-enhancing FDG avid lesion with irregular borders associated with the urinary bladder with an SUV of 19 with moderate right hydronephrosis.\par He reports that he feels "fine".  He says that his appetite is "great" and there is no weight loss.  There is no nausea vomiting or diarrhea but he does have some constipation at times and he takes MiraLAX on a daily basis.  He denies any headaches or dizziness.  He does have some balance issues and gets lightheaded at times.  He slid off the chair recently.  He came in a wheelchair.  He was discharged from rehabilitation this morning.  He walks slowly with a walker.  He does not use it when he is in the house.  There is no chest pain chest pressure or palpitations.  He does have some shortness of breath when he becomes anxious.  There is no cough.  Fatigue is present.  He is independent in his ADLs with the exception of the need for some minor assistance in showering.\par \par On physical examination, his performance status is 2.  His blood pressure was 99/67 and his weight was 57 kg, down 4.5 kg from May 24 despite him saying that he has a good appetite.  The HEENT examination is normal.  The lungs are clear and the heart examination is normal.  There is no edema of the extremities.  There is no palpable abnormality upon examination of the abdomen.  There is no spinal column or chest wall tenderness to palpation or percussion.\par \par No laboratory values were performed.  I do not think he went to the laboratory after the visit.\par The PET scan imaging studies were reviewed personally by me.  I contacted the office of his pulmonologist, Dr. Asher, however she is away at this time.  I received a telephone call back from one of her partners, Dr. Ritter, who revealed the films for me.  We will make sure that Dr. Asher is aware of the question at hand.  I arranged for an ENT evaluation for him through the [facility].\par \par Some of the pulmonary nodules had increased in size in comparison to the prior CT scan and some had decreased in size, which favors an infectious/inflammatory process.  The finding in the nasopharynx needs to be investigated.  He may still have clinically localized bladder cancer if there are alternate explanations for these 2 findings on the PET scan.  He is not cisplatin eligible.  Blood work from May revealed a creatinine of 1.57.\par \par Patients who are not eligible for cisplatin based chemotherapy for metastases can receive pembrolizumab and enfortumab vedotin in combination.  The results of his [facility] assessment revealed 33 mutations per megabase.  There was no microsatellite instability.\par \par These issues need to be sorted out before any plan can be made for some form of treatment.  We discussed how patients with nonmetastatic muscle invasive bladder cancer can have a cystectomy alone followed by adjuvant therapy with nivolumab.\par \par All questions were answered to the best of my ability and to their apparent satisfaction.  I have not given him a return appointment yet as we need to evaluate these issues rapidly and then I will have him return to the office.  Unfortunately, his cousin will be leaving on vacation shortly and he usually accompanies him.

## 2023-07-02 NOTE — HISTORY OF PRESENT ILLNESS
[Disease: _____________________] : Disease: [unfilled] [Date: ____________] : Patient's last distress assessment performed on [unfilled]. [8 - Distress Level] : Distress Level: 8 [Patient given social work contact information and resource sheet] : Patient was given social work contact information and resource sheet [de-identified] : This patient is an 77yo gentleman with PMH of htn, CAD, MI s/p stent in 2001, TIA, COPD, bradycardia s/p ppm 2011, upgraded to bi-V AICD in 2019 due to low EF, CKD stage III, recent diagnosis of bladder cancer who presents for initial consultation.\par \par Patient reported having intermittent gross hematuria x 8 months. He reported abdominal pain and constipation as well. He denied any nausea or vomiting. He had lost about 40+ lbs unintentionally over this time. He was hospitalized at [place] for urinary retention and required rodriguez catheter placement. He thereafter was hospitalized at [facility] for the \par He saw urologist Dr. Tony Diallo who advised TURBT. \par \par 3/3/23- CT CAP with IVC Heterogeneous and partially enhancing enhancing mass measuring 5.2 x 3.0 cm along the right posterior lateral bladder wall concerning for neoplasm. Visualization with cystoscopy recommended.\par Mildly dilated main pancreatic duct without obstructive lesion. If clinically warranted, this can be further evaluated with an abdominal MRI.\par \par 3/31/23- Urine cytology was negative for high grade urothelial carcinoma.\par \par 4/5/23- CT chest non con found solid and ground glass nodules measuring up to 5mm on a background of emphysema. Partially imaged right hydroureter, increased since 3/3/2023. \par \par 4/24/23- TURBT performed, found high grade invasive urothelial carcinoma. No non-invasive component identified  \par Carcinoma invades muscularis propria (detrusor muscle). Lymphovascular invasion was not identified.\par Tumor cells are positive for p63 and ROXANNA-3, negative for PSA and NKX 3.1.\par \par 4/25-4/28/23- He was hospitalized at [facility] for abdominal spasm, found to have urinary retention with HARLEEN and drop in Hgb. Patient was transfused 1u pRBC  and had Rodriguez catheter replaced. He was discharged to [facility]\par \par 4/19-5/5/23- Patient was rehospitalized for suspected COPD exacerbation and UTI. UCX were negative, thus antibiotics were discontinued. Suprapubic pain was treated with IV tylenol. Patient had rodriguez removed while inpatient. \par \par Patient was referred to medical oncology clinic. He denies fevers or chills. He reports fatigue.He has not had any CP, palpitations, dyspnea or cough. He still has mild abdominal pain. His hematuria has resolved since TURBT procedure. 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 \par He recently lost his wife after 40 years of marriage. His wife was under the care of Dr. Pond-Kellerman. \par Allergies: NKDA [de-identified] : Foundation One results, TMB 33 Muts/Mb, MS-stable [de-identified] : 6/30/23...A PET/CT was performed on June 19.  This revealed an FDG avid soft tissue lesion in the posterior nasopharynx measuring approximately 2.5 x 2.4, with an SUV of 4.2.  There were at least 10 FDG avid lung nodules.  There was an FDG avid rim-enhancing mass with irregular borders associated with the urinary bladder with an SUV of 19.  There was moderate right hydronephrosis.  There was an FDG avid focus in the cutaneous/subcutaneous tissues of the left lower arm.\par Some of the FDG avid right pulmonary nodules have decreased in size from the prior exam, and some had increased since the prior exam.  The radiologist queried an infectious etiology.  He will require an ENT evaluation. Foundation One results, TMB 33 Muts/Mb, MS-stable.\par Feels "fine". Appetite is "great", no weight loss. No N/V/D/, has constipation. taking MiraLAX daily. No headaches, no dizziness. Some balance issues, gets lightheaded, slid off the chair. Comes in a wheelchair. he was discharged from rehab this AM. Walks slowly, does not use it in the house. No chest pain/pressure/palpitations. No edema. No cough, Some SOB with anxiety.  fatigue is present, usually not.  Independent in ADLs, needs some help with showering. \par pulmonologist My[place]ha [place]\par needs ENT eval\par \par Hospital Course: \par Discharge Date	16-Jun-2023 \par Admission Date	13-Jun-2023 00:27 \par Reason for Admission	hematuria \par Hospital Course	 \par 78 yr old male with high grade bladder CA s/p TURBT in 4/2023  HTN , CAD , MI - s/p stented CAD 2001, TIA (25 Yrs ago), COPD on Trelegy, Bradycardia - s/p pacemaker placement 2011, Echo (2019) - showed EF - 30% - Device upgraded to BIV ICD (2019), s/p TURBT (4/24), presents to ED c/o suprapubic pain and \par hematuria x 5 day. Pt had similar visit last week, at that time CT showed increased bladder tumor burden and pulmonary nodules, concerning for mets. Pt \par was treated with rodriguez placement and bladder irrigation w/ CBI in the ED. Urology was consulted and followed the patient until the urine was running \par clear. The patient is now stable and cleared for DC w/ rodriguez in place. Pt medically cleared for discharge to Rehab today per Dr Madrid. \par

## 2023-07-03 PROBLEM — Z85.51 HISTORY OF MALIGNANT NEOPLASM OF BLADDER: Status: RESOLVED | Noted: 2023-01-01 | Resolved: 2023-01-01

## 2023-07-03 PROBLEM — R22.1 NECK MASS: Status: ACTIVE | Noted: 2023-01-01

## 2023-07-03 NOTE — ASSESSMENT
[FreeTextEntry1] : 78 year old male, initial evaluation for abnormal PET scan demonstrating FGD avid in the posterior Nasopharynx in the setting of bladder carcinoma.\par \par --No discrete mass seen on fiberoptic exam warranting biopsy\par --Likely non-specific FDG-avidity \par --Will obtain dedicated CT of the nasopharynx and neck to better define this area. \par --Unable to obtain MRI d/t pacemaker \par --Follow up 3-4 weeks to discuss CT results and next steps. \par --They are in agreement with this plan. \par \par

## 2023-07-03 NOTE — HISTORY OF PRESENT ILLNESS
[de-identified] : This patient is an 77yo gentleman with PMH of htn, CAD, MI s/p stent in , TIA, COPD, bradycardia s/p ppm , upgraded to bi-V AICD in 2019 due to low EF, CKD stage III, recent diagnosis of muscle invasive bladder cancer who presents for initial consultation for abnormal PET scan demonstrating FGD avid in the posterior Nasopharynx. Reports intermittent hoarseness and deeper voice for about 2 months. Reports  hearing bilaterally- patient states may be related to cerumen impaction. Reports intermittent SOB with ambulation- currently using Trelegy with relief. Denies dysphagia, odynophagia, aspirations, dyspnea, otalgia.\par As per Pt nephew, noticed a change in voice over past few months. \par Smoking history mostof life quit 15 years ago\par Smokes marijuana daily \par Social ETOH use \par \par \par 23:  HEAD/NECK: Physiologic FDG activity in visualized brain. FDG-avid soft tissue in the posterior nasopharynx, approximately 2.5 x 2.4 cm, SUV 4.2, image 21.\par IMPRESSION:\par \par 1. Primary urinary bladder cancer with no FDG-avid lymphadenopathy.\par 2. Multiple FDG-avid right pulmonary nodules, some of which have decreased in size since prior exam, and some of which have increased in size since prior exam. Query infectious etiology. Recommend CT follow-up after treatment to evaluate for persistent nodules.\par 3. FDG-avid soft tissue in the posterior nasopharynx. This could be further evaluated with otolaryngology consultation.\par 4. Moderate right hydronephrosis with diminished excretion of radiotracer suggests parenchymal damage.\par 5. Possible FDG avid cutaneous/subcutaneous lesion in the left lower arm. Clinical inspection recommended for signs of infection or neoplasm.\par

## 2023-07-05 NOTE — ED PROVIDER NOTE - OBJECTIVE STATEMENT
pettet attending- 78-year-old male past medical history of HTN , CAD , MI - s/p stented CAD 2001, TIA (25 Yrs ago), COPD on Trelegy, Bradycardia - s/p pacemaker placement 2011, Echo (2019) - showed EF - 30% - Device upgraded to BIV ICD (2019), s/p TURBT (4/24), presents to ED With EMS complaint of shortness of breath worsening this morning came to the ER for further evaluation arrives after getting 10 mg of Decadron by EMS as well as 3 DuoNebs tachypneic on nonrebreather saturation of 75 to 80% off nonrebreather now 95% on nonrebreather otherwise complain of shortness of breath denies chest pain fevers chills nausea vomiting diarrhea abdominal pain leg swelling recent travel. Denies cough productive sputum

## 2023-07-05 NOTE — ED ADULT NURSE NOTE - NSFALLRISKINTERV_ED_ALL_ED

## 2023-07-05 NOTE — ED ADULT NURSE NOTE - OBJECTIVE STATEMENT
78Y M AXO 2 PMH CAD s/p stents, COPD, HTN, bladder mass,  bradycardia and PSH of cardiac pacemaker presented to the ED via EMS c/o increased work of breathing worsening since this morning. EMS placed pt on NRB, gave 10 mg of decadrone, and 3 DUONEBS. EMS reports that pt's O2 sat was in the 70s-80s on room air. Pt endorses SOB and midsternal chest pain upon inspiration. Upon arrival to the ED, the pt is well appearing, has bilateral, even and labored chest rise, is tachypneic, increase work of breathing noted, O2 sat 75% pt placed on BiPAP, and airway is patent. Upon assessment, pt has even and bilateral peripheral pulses, ROM, PERRLA, even and bilateral strength and sensation of extremities, soft, non-tender, non-distended abdomen. Pt denies fevers, chills, recent sick contacts, recent travel, n/v/d, numbness and tingling of extremities, urinary symptoms, and black or bloody stools. bed in lowest position, side rails up, comfort and safety provided. 78Y M AXO 2 (oriented to person and place) PMH CAD s/p stents, COPD, HTN, bladder mass,  bradycardia and PSH of cardiac pacemaker presented to the ED via EMS c/o increased work of breathing worsening since this morning. EMS placed pt on NRB, gave 10 mg of decadrone, and 3 DUONEBS. EMS reports that pt's O2 sat was in the 70s-80s on room air. Pt endorses SOB and midsternal chest pain upon inspiration. Upon arrival to the ED, the pt is well appearing, has bilateral, even and labored chest rise, is tachypneic, increase work of breathing noted, O2 sat 75% pt placed on BiPAP, and airway is patent. Upon assessment, pt has even and bilateral peripheral pulses, ROM, PERRLA, even and bilateral strength and sensation of extremities, soft, non-tender, non-distended abdomen. Pt denies fevers, chills, recent sick contacts, recent travel, n/v/d, numbness and tingling of extremities, urinary symptoms, and black or bloody stools. bed in lowest position, side rails up, comfort and safety provided.

## 2023-07-05 NOTE — CONSULT NOTE ADULT - SUBJECTIVE AND OBJECTIVE BOX
Cardiology Consult Note   [Please check [email or web address] password: [email or web address]" for cardiology service schedule and contact information]    HPI:  78M w/ hx of HTN, HLD, CAD (s/p PCI in 2001), HFrEF, bradycardia (s/p PPM upgraded to BiV-ICD 2019), CKDIII, TIA, COPD, recent diagnosis of bladder cancer (possibly metastatic), presenting with dyspnea.    The pt has been following regularly with heme/onc and urology as outpt in our system and has been receiving work-up for bladder cancer. Of note, had a PET/CT on 6/19 that showed lesion in posterior nasopharynx as well as multiple lung nodules with uptake. Undergoing further work-up of these findings before cancer-directed treatment options are determined. Pt reports on day of presentation started having worsening dyspnea both on exertion and at rest prompting him to present to ED. In ED, noted to have pericardial effusion on bedside POCUS by ED team, cardiology consulted for assistance in care.      PAST MEDICAL & SURGICAL HISTORY:  HTN (Hypertension)      Bradycardia      Dyslipidemia      CAD (Coronary Artery Disease)      S/P PTCA (Percutaneous Transluminal Coronary Angioplasty)  2001      TIA (Transient Ischemic Attack)  18 years ago  PT was on coumadin for short while      COPD (chronic obstructive pulmonary disease)      NSTEMI (non-ST elevation myocardial infarction)      H/O constipation      Anxiety and depression      LV dysfunction      Bladder mass      S/P Rotator Cuff Surgery      S/P drug eluting coronary stent placement      S/P inguinal hernia repair      S/P cardiac pacemaker procedure      Cardiac resynchronization therapy defibrillator (CRT-D) in place        SOCIAL HISTORY:  unchanged      -------------------------------------------------------------------------------------------  PHYSICAL EXAM:  T(C): 36.6 (07-05-23 @ 19:10), Max: 37.1 (07-05-23 @ 18:22)  HR: 79 (07-05-23 @ 21:45) (79 - 104)  BP: 139/77 (07-05-23 @ 21:45) (98/75 - 139/77)  RR: 20 (07-05-23 @ 21:45) (20 - 27)  SpO2: 100% (07-05-23 @ 21:45) (95% - 100%)  Wt(kg): --  I&O's Summary      GENERAL: NAD on BiPAP  HEAD: Atraumatic, Normocephalic.  EYES: EOMI, PERRLA, conjunctiva and sclera clear.  ENT: Moist mucous membranes.  NECK: Supple, No JVD.  CHEST/LUNG: Decreased breath sounds at bases b/l.  HEART: Regular rate and rhythm; No murmurs, rubs, or gallops.  ABDOMEN: Bowel sounds present; Soft, Nontender, Nondistended.   EXTREMITIES:  2+ Peripheral Pulses, brisk capillary refill. No clubbing, cyanosis, or edema.    -------------------------------------------------------------------------------------------  LABS:                          8.9    13.20 )-----------( 436      ( 05 Jul 2023 18:29 )             29.0     07-05    142  |  104  |  26<H>  ----------------------------<  113<H>  5.2   |  22  |  1.54<H>    Ca    10.7<H>      05 Jul 2023 18:29    TPro  7.2  /  Alb  3.0<L>  /  TBili  0.2  /  DBili  x   /  AST  24  /  ALT  12  /  AlkPhos  74  07-05    PT/INR - ( 05 Jul 2023 18:29 )   PT: 14.3 sec;   INR: 1.23 ratio         PTT - ( 05 Jul 2023 18:29 )  PTT:33.6 sec  CARDIAC MARKERS ( 05 Jul 2023 18:29 )  26 ng/L / x     / x     / x     / x     / x                  -------------------------------------------------------------------------------------------  TTE 7/5/23:  CONCLUSIONS:   1. Technically difficult image quality.   2. Technically difficult, portable study performed to evaluate the pericardium.   3. The right ventricle is not well visualized. grossly normalright ventricular cavity size and normal right ventricular wall thicknessA device wire is seen in the rig heart.   4. Small-moderate pericardial effusion seen anterior to the right ventricle and lateral to the RVOT. The effusion is moderate in size adjacent to the RVOT/main PA and measures up to approximately 1.7 cm in its largest dimension. There is early diastolic collapse of the RVOT suggestive of early echocardiographic evidence of pericardial tamponade physiology. The IVC is dilated with greater than 50% respiratory variation in size consistent with right atrial pressure of approximately 8 mmHg.   5. Compared to the transthoracic echocardiogram performed on 5/1/2023 the pericardial effusion is increased in size.. Findings were discussedwith Dr. Bob Flores on 7/5/2023 at 8:20 pm.   6. Consider additional imaging of the pericardium with chest CT scan and repeat follow-up TTE imaging if clinically indicated.    -------------------------------------------------------------------------------------------                 Excision Depth: adipose tissue

## 2023-07-05 NOTE — ED PROVIDER NOTE - PROGRESS NOTE DETAILS
mara attending- Discussed with cardiology patient not in clinical tamponade otherwise no good window for pericardiocentesis by cardiology recommend IR consultation for pericardial drain not emergently otherwise to be admitted to telemetry pending CTA chest results otherwise patient stable off BiPAP now on nonrebreather repeat gases intact patient appears more comfortable relaxed we will keep off BiPAP for now discussed with patient appreciate updates agreed with plan Earlene Valenzuela DO PGY-3  Discussed with hospitalist Dr. Jimenez who accepted the patient.

## 2023-07-05 NOTE — CONSULT NOTE ADULT - ASSESSMENT
78M w/ hx of HTN, HLD, CAD (s/p PCI in 2001), HFrEF, bradycardia (s/p PPM upgraded to BiV-ICD 2019), CKDIII, TIA, COPD, recent diagnosis of bladder cancer (possibly metastatic), presenting with acute dyspnea, found to be hypoxic requiring BiPAP. In ED bedside POCUS showing pericardial effusion, cardiology consulted for assistance in care.    #Pericardial effusion:  - effusion on TTE now moderate in size compared to small size on prior TTE 5/2023  - pt currently without clinical evidence of tamponade: hemodynamically stable with stable BP, not tachycardic, warm on exam, without peripheral edema  - stat TTE obtained and reviewed with on-call echo attending, showing early echocardiographic evidence of tamponade with RVOT early diastolic collapse  - discussed with on-call interventional attending, given no clinical evidence of tamponade no plan for emergent pericardiocentesis at this time. Further, given location and poor window anterior to RV apex on TTE, would recommend IR consult for evaluation for image-guided pericardiocentesis    #HFrEF: LVEF 41% on last TTE in 5/2023  - currently euvolemic on exam, Cr at baseline  - c/w home atenolol and irbesartan  - repeat TTE as above    Bob Flores MD  Cardiology Fellow - PGY5    Please check [email or web address] password: [email or web address]" for cardiology service schedule and contact information.    78M w/ hx of HTN, HLD, CAD (s/p PCI in 2001), HFrEF, bradycardia (s/p PPM upgraded to BiV-ICD 2019), CKDIII, TIA, COPD, recent diagnosis of bladder cancer (possibly metastatic), presenting with acute dyspnea, found to be hypoxic requiring BiPAP. In ED bedside POCUS showing pericardial effusion, cardiology consulted for assistance in care.    #Pericardial effusion:  - effusion on TTE now small-moderate in size compared to small size on prior TTE 5/2023  - pt currently without clinical evidence of tamponade: hemodynamically stable with stable BP, not tachycardic, warm on exam, without peripheral edema  - stat TTE obtained and reviewed with on-call echo attending, showing early echocardiographic evidence of tamponade with RVOT early diastolic collapse  - discussed with on-call interventional attending, given no clinical evidence of tamponade no plan for emergent pericardiocentesis at this time. Further, given location and poor window anterior to RV apex on TTE, would recommend IR consult for evaluation for image-guided pericardiocentesis    #HFrEF: LVEF 41% on last TTE in 5/2023  - currently euvolemic on exam, Cr at baseline  - c/w home atenolol and irbesartan  - repeat TTE as above    Bob Flores MD  Cardiology Fellow - PGY5    Please check [email or web address] password: [email or web address]" for cardiology service schedule and contact information.

## 2023-07-05 NOTE — ED PROVIDER NOTE - CLINICAL SUMMARY MEDICAL DECISION MAKING FREE TEXT BOX
mara attending- Acute hypoxic respiratory failure likely in the setting wise will evaluate for CHF versus COPD received 3 nebs IV steroids BiPAP now much improved less tachypneic pulling good volumes low support will evaluate with screening labs chest x-ray point-of-care ultrasound anticipate admission to telemetry for further management unlikely ACS  pneumothorax dissection AAA pneumonia with patient's cancer history will evaluate for PE with CT angio

## 2023-07-05 NOTE — ED ADULT NURSE NOTE - CAS EDP DISCH DISPOSITION ADMI
Call placed to patient on home #  Male answered phone and stated that patient is at work  Male will leave message for patient to return call  Call placed to patient on mobile #  Unable to leave VM as patient does not have voicemail set up   Will attempt at a later time and await return call from patient   2DSU/Telemetry

## 2023-07-05 NOTE — ED PROCEDURE NOTE - PROCEDURE ADDITIONAL DETAILS
POCUS: Emergency Department Focused Ultrasound performed at patient's bedside.  The complete report may be available in PACS, see below for findings.
Ultrasound-guided cannulation of a peripheral vein in the upper extremity    Dynamic gray scale imaging of the target vessel was obtained using a high frequency linear transducer.   Both the target vein and surrounding arterial structures were visualized and identified.   The patency of the targeted vein was confirmed with compression and lack of internal echoes.   There was direct visualization of needle entry into the vein followed by successful catheter placement    18g piv geovany Damon D.O. -ED attending

## 2023-07-05 NOTE — ED PROVIDER NOTE - PHYSICAL EXAMINATION
Physical Exam:  Gen: ill appearing  Head: normal appearing  HEENT: normal conjunctiva, oral mucosa dry  Lung: severe respiratory distress, speaking in partial sentences, crackles throughout  CV: tachycardic  Abd: soft, ND, NT  MSK: no visible deformities no leg swelling   Neuro: No focal deficits  Skin: Warm  Psych: anxious  ~Joshua Damon D.O. -ED Attending

## 2023-07-06 NOTE — H&P ADULT - ASSESSMENT
Pt is an 79yo M w/ hx of HTN, HLD, CAD (s/p PCI in 2001), HFrEF, bradycardia (s/p PPM upgraded to BiV-ICD 2019), CKD3, COPD, recent diagnosis of bladder cancer, presenting with shortness of breath found to have pericardial effusion. Ddx for SOB includes pericardial effusion, HF exacerbation, COPD exacerbation.

## 2023-07-06 NOTE — H&P ADULT - NSHPREVIEWOFSYSTEMS_GEN_ALL_CORE
difficulty breathing REVIEW OF SYSTEMS:  CONSTITUTIONAL: No weakness, fevers or chills  EYES/ENT: No visual changes;  No vertigo or throat pain   NECK: No pain or stiffness  RESPIRATORY: +SOB/[place], better with laying on L side. No cough/sputum  CARDIOVASCULAR: No chest pain or palpitations  GASTROINTESTINAL: No abdominal or epigastric pain. No nausea, vomiting, or hematemesis; No diarrhea or constipation. No melena or hematochezia.  GENITOURINARY: +new enuresis  NEUROLOGICAL: No numbness or weakness  SKIN: No itching, rashes

## 2023-07-06 NOTE — PROGRESS NOTE ADULT - ASSESSMENT
Pt is an 77yo M w/ hx of HTN, HLD, CAD (s/p PCI in 2001), HFrEF, bradycardia (s/p PPM upgraded to BiV-ICD 2019), CKD3, COPD, recent diagnosis of bladder cancer, presenting with shortness of breath found to have pericardial effusion. Ddx for SOB includes pericardial effusion, HF exacerbation, COPD exacerbation.  Pt is an 77yo M w/ hx of HTN, HLD, CAD (s/p PCI in 2001), HFrEF, bradycardia (s/p PPM upgraded to BiV-ICD 2019), CKD3, COPD, recent diagnosis of bladder cancer on no current therapy yet, presented  with shortness of breath found to have pericardial effusion with no hemodynamic instability.  Patient was admitted for further evaluation in the hospital.

## 2023-07-06 NOTE — H&P ADULT - NSHPLABSRESULTS_GEN_ALL_CORE
8.9    13.20 )-----------( 436      ( 05 Jul 2023 18:29 )             29.0     07-05    142  |  104  |  26<H>  ----------------------------<  113<H>  5.2   |  22  |  1.54<H>    Ca    10.7<H>      05 Jul 2023 18:29    TPro  7.2  /  Alb  3.0<L>  /  TBili  0.2  /  DBili  x   /  AST  24  /  ALT  12  /  AlkPhos  74  07-05        LIVER FUNCTIONS - ( 05 Jul 2023 18:29 )  Alb: 3.0 g/dL / Pro: 7.2 g/dL / ALK PHOS: 74 U/L / ALT: 12 U/L / AST: 24 U/L / GGT: x           PT/INR - ( 05 Jul 2023 18:29 )   PT: 14.3 sec;   INR: 1.23 ratio         PTT - ( 05 Jul 2023 18:29 )  PTT:33.6 sec  Urinalysis Basic - ( 05 Jul 2023 18:29 )    Color: x / Appearance: x / SG: x / pH: x  Gluc: 113 mg/dL / Ketone: x  / Bili: x / Urobili: x   Blood: x / Protein: x / Nitrite: x   Leuk Esterase: x / RBC: x / WBC x   Sq Epi: x / Non Sq Epi: x / Bacteria: x                                    EKG:     RADIOLOGY STUDIES:

## 2023-07-06 NOTE — CONSULT NOTE ADULT - SUBJECTIVE AND OBJECTIVE BOX
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  The pt has been following regularly with heme/onc and urology as outpt receiving work-up for bladder cancer. He had a PET/CT on 6/19 that showed FDG avidity in posterior nasopharynx as well as multiple lung nodules with uptake. He saw ENT who visulized no discrete lesion. He says 3-4 days ago he suddenly developed dyspnea that is worse with exertion. He has not had any chest pain, palpitations, recent illness, fever, cough, dizziness. The only palliating measure is laying on his left side. No dysuria but reports nocturnal enuresis since PET scan for the first time ever. Denies GI or symptoms.     ED Course: VSS, cardiology consulted for pericardial effusion on POCUS; not recommending emergent pericardiocentesis. CTA, RVP neg. S/p xanax and iv fentanyl.     (06 Jul 2023 04:50)       ROS:  Negative except for:    PAST MEDICAL & SURGICAL HISTORY:  HTN (Hypertension)      Bradycardia      Dyslipidemia      CAD (Coronary Artery Disease)      S/P PTCA (Percutaneous Transluminal Coronary Angioplasty)  2001      TIA (Transient Ischemic Attack)  18 years ago  PT was on coumadin for short while      COPD (chronic obstructive pulmonary disease)      NSTEMI (non-ST elevation myocardial infarction)      H/O constipation      Anxiety and depression      LV dysfunction      Bladder mass      S/P Rotator Cuff Surgery      S/P drug eluting coronary stent placement      S/P inguinal hernia repair      S/P cardiac pacemaker procedure      Cardiac resynchronization therapy defibrillator (CRT-D) in place          SOCIAL HISTORY:    FAMILY HISTORY:      MEDICATIONS  (STANDING):  albuterol/ipratropium for Nebulization 3 milliLiter(s) Nebulizer every 6 hours  atenolol  Tablet 25 milliGRAM(s) Oral daily  atorvastatin 40 milliGRAM(s) Oral at bedtime  budesonide  80 MICROgram(s)/formoterol 4.5 MICROgram(s) Inhaler 2 Puff(s) Inhalation two times a day  diazepam    Tablet 5 milliGRAM(s) Oral daily  FLUoxetine 40 milliGRAM(s) Oral daily  heparin   Injectable 5000 Unit(s) SubCutaneous every 12 hours  mirtazapine 7.5 milliGRAM(s) Oral at bedtime  multivitamin 1 Tablet(s) Oral daily  polyethylene glycol 3350 17 Gram(s) Oral daily  tamsulosin 0.4 milliGRAM(s) Oral at bedtime  tiotropium 2.5 MICROgram(s) Inhaler 2 Puff(s) Inhalation daily    MEDICATIONS  (PRN):  melatonin 3 milliGRAM(s) Oral at bedtime PRN Insomnia      Allergies    No Known Allergies    Intolerances        Vital Signs Last 24 Hrs  T(C): 36.4 (06 Jul 2023 11:09), Max: 37.1 (05 Jul 2023 18:22)  T(F): 97.6 (06 Jul 2023 11:09), Max: 98.7 (05 Jul 2023 18:22)  HR: 123 (06 Jul 2023 11:09) (60 - 123)  BP: 149/79 (06 Jul 2023 14:42) (92/75 - 158/82)  BP(mean): 100 (06 Jul 2023 01:30) (80 - 100)  RR: 20 (06 Jul 2023 11:09) (18 - 27)  SpO2: 98% (06 Jul 2023 14:42) (92% - 100%)    Parameters below as of 06 Jul 2023 14:42  Patient On (Oxygen Delivery Method): nasal cannula  O2 Flow (L/min): 4      PHYSICAL EXAM  General: adult in NAD  HEENT: clear oropharynx, anicteric sclera, pink conjunctiva  Neck: supple  CV: normal S1/S2 with no murmur rubs or gallops  Lungs: positive air movement b/l ant lungs,clear to auscultation, no wheezes, no rales  Abdomen: soft non-tender non-distended, no hepatosplenomegaly  Ext: no clubbing cyanosis or edema  Skin: no rashes and no petechiae  Neuro: alert and oriented X 4, no focal deficits      07-06-23 @ 07:01  -  07-06-23 @ 17:25  --------------------------------------------------------  IN: 120 mL / OUT: 1 mL / NET: 119 mL      LABS:                          8.9    13.20 )-----------( 436      ( 05 Jul 2023 18:29 )             29.0         Mean Cell Volume : 94.8 fl  Mean Cell Hemoglobin : 29.1 pg  Mean Cell Hemoglobin Concentration : 30.7 gm/dL  Auto Neutrophil # : 10.08 K/uL  Auto Lymphocyte # : 1.98 K/uL  Auto Monocyte # : 0.92 K/uL  Auto Eosinophil # : 0.08 K/uL  Auto Basophil # : 0.04 K/uL  Auto Neutrophil % : 76.3 %  Auto Lymphocyte % : 15.0 %  Auto Monocyte % : 7.0 %  Auto Eosinophil % : 0.6 %  Auto Basophil % : 0.3 %      07-05    142  |  104  |  26<H>  ----------------------------<  113<H>  5.2   |  22  |  1.54<H>    Ca    10.7<H>      05 Jul 2023 18:29    TPro  7.2  /  Alb  3.0<L>  /  TBili  0.2  /  DBili  x   /  AST  24  /  ALT  12  /  AlkPhos  74  07-05      PT/INR - ( 05 Jul 2023 18:29 )   PT: 14.3 sec;   INR: 1.23 ratio         PTT - ( 05 Jul 2023 18:29 )  PTT:33.6 sec                BLOOD SMEAR INTERPRETATION:       RADIOLOGY & ADDITIONAL STUDIES:

## 2023-07-06 NOTE — CONSULT NOTE ADULT - ATTENDING COMMENTS
Mr. Olvera denies chest pain, dyspnea, or lightheadedness at the time of my evaluation.    TTE reviewed. No clinical evidence of tamponade.     His initial presentation of dyspnea/hypoxia is likely multifactorial and associated with COPD. Recommend pulmonary consultation.    Repeat TTE tomorrow to assess effusion.
78 year old male with a high grade urothelial cancer who presents with shortness of breath, found to have pleural effusions and pericardial effusion. Oncology consulted for Urothelial Carcinoma.  PE work up negative, pericardial effusion moderate on ECHO.  Recommend IR or cardiology consult for pericardial fluid drain and send fluid for cytology to r/o malignant effusion.  Plan was to start patient on IO as outpatient for metastatic bladder cancer.  Once patient d/c, advised patient to f/u with Dr. Spivey to start cancer directed therapy.    Dianna Sarah MD  [facility]  [phone]

## 2023-07-06 NOTE — H&P ADULT - PROBLEM SELECTOR PLAN 2
- VBG not consistent with exacerbation - minimal CO2 retention; if anything, CO2 may be elevated to compensate for lactic acidosis  - home regimen Trelegy elipta qd  - vikash while inpt - VBG not consistent with exacerbation - minimal CO2 retention; if anything, CO2 may be elevated to compensate for lactic acidosis  - home regimen Trelegy ellipta qd  - duonebs while inpt

## 2023-07-06 NOTE — H&P ADULT - PROBLEM SELECTOR PLAN 6
Cr 1.53, appears to be at baseline  - trend Cr for now and avoid nephrotoxins, ACE/ARB Cr 1.53, appears to be at baseline  - trend Cr for now and avoid nephrotoxins

## 2023-07-06 NOTE — PROGRESS NOTE ADULT - PROBLEM SELECTOR PLAN 2
- VBG not consistent with exacerbation - minimal CO2 retention; if anything, CO2 may be elevated to compensate for lactic acidosis  - home regimen Trelegy ellipta qd  - duonebs while inpt - VBG not consistent with exacerbation - minimal CO2 retention; if anything, CO2 may be elevated to compensate for lactic acidosis--> monitor SPO2.    - home regimen Trelegy ellipta qd  - duonebs while inpt

## 2023-07-06 NOTE — PHYSICAL THERAPY INITIAL EVALUATION ADULT - PERTINENT HX OF CURRENT PROBLEM, REHAB EVAL
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  The pt has been following regularly with heme/onc and urology as outpt receiving work-up for bladder cancer. He had a PET/CT on 6/19 that showed FDG avidity in posterior nasopharynx as well as multiple lung nodules with uptake. He saw ENT who visulized no discrete lesion. He says 3-4 days ago he suddenly developed dyspnea that is worse with exertion. He has not had any chest pain, palpitations, recent illness, fever, cough, dizziness. CT angio chest:No evidence of central, lobar, or segmental pulmonary embolus.   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  The pt has been following regularly with heme/onc and urology as outpt receiving work-up for bladder cancer. He had a PET/CT on 6/19 that showed FDG avidity in posterior nasopharynx as well as multiple lung nodules with uptake. He saw ENT who visulized no discrete lesion. He says 3-4 days ago he suddenly developed dyspnea that is worse with exertion. He has not had any chest pain, palpitations, recent illness, fever, cough, dizziness. CT angio chest:No evidence of central, lobar, or segmental pulmonary embolus.   Respiratory motion artifact.Interval increase in size and number of right-sided pulmonary nodules.Small amount of pericardial fluid similar to prior, CXR: Scattered nodular opacities in the right lower lung field, not significantly changed since chest radiograph performed on 6/12/2023

## 2023-07-06 NOTE — PROGRESS NOTE ADULT - PROBLEM SELECTOR PLAN 1
Seen on bedside POCUS. negative for Moreira's triad, HD stable  - evaluated by cardiology, no emergent pericardiocentesis but IR for image-guided pericardiocentesis  - small effusion noted on TTE may 1  - IR consult sent  - RVP negative, CTA PE negative  - could have malignant effusion from lung nodules seen on PET/CT if they are mets Seen on bedside POCUS. negative for Moreira's triad, HD stable  - evaluated by cardiology, no emergent pericardiocentesis but IR for image-guided pericardiocentesis  - small effusion noted on TTE may 1  - IR consult sent--> awaiting on rec   - RVP negative, CTA PE negative  - could have malignant effusion from lung nodules seen on PET/CT if they are mets Vs other etiologies   will cont to monitor on Tele and monitor hemodynamics .  Please avoid hypotension and Bradycardia

## 2023-07-06 NOTE — H&P ADULT - HISTORY OF PRESENT ILLNESS
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  The pt has been following regularly with heme/onc and urology as outpt receiving work-up for bladder cancer. He had a PET/CT on 6/19 that showed FDG avidity in posterior nasopharynx as well as multiple lung nodules with uptake. He saw ENT who visulized no discrete lesion. He says 3-4 days ago he suddenly developed dyspnea that is worse with exertion. He has not had any chest pain, palpitations, recent illness, fever, cough, dizziness. The only palliating measure is laying on his left side. No dysuria but reports nocturnal enuresis since PET scan for the first time ever. Denies GI or symptoms.     ED Course: VSS, cardiology consulted for pericardial effusion on POCUS; not recommending emergent pericardiocentesis. CTA, RVP neg. S/p xanax and iv fentanyl.

## 2023-07-06 NOTE — PROGRESS NOTE ADULT - SUBJECTIVE AND OBJECTIVE BOX
Patient is a 78y old  Male who presents with a chief complaint of shortness of breath (06 Jul 2023 09:05)      SUBJECTIVE / OVERNIGHT EVENTS: Pt denies any acute events since admission. States he is feeling well and back to his normal self. Denies chest pain, SOB, abdominal pain, fever/chills, N/V.    MEDICATIONS  (STANDING):  albuterol/ipratropium for Nebulization 3 milliLiter(s) Nebulizer every 6 hours  atenolol  Tablet 25 milliGRAM(s) Oral daily  atorvastatin 40 milliGRAM(s) Oral at bedtime  budesonide  80 MICROgram(s)/formoterol 4.5 MICROgram(s) Inhaler 2 Puff(s) Inhalation two times a day  diazepam    Tablet 5 milliGRAM(s) Oral daily  FLUoxetine 40 milliGRAM(s) Oral daily  heparin   Injectable 5000 Unit(s) SubCutaneous every 12 hours  mirtazapine 7.5 milliGRAM(s) Oral at bedtime  multivitamin 1 Tablet(s) Oral daily  polyethylene glycol 3350 17 Gram(s) Oral daily  tamsulosin 0.4 milliGRAM(s) Oral at bedtime  tiotropium 2.5 MICROgram(s) Inhaler 2 Puff(s) Inhalation daily    MEDICATIONS  (PRN):  melatonin 3 milliGRAM(s) Oral at bedtime PRN Insomnia      CAPILLARY BLOOD GLUCOSE        I&O's Summary    06 Jul 2023 07:01  -  06 Jul 2023 15:22  --------------------------------------------------------  IN: 120 mL / OUT: 1 mL / NET: 119 mL        Vital Signs Last 24 Hrs  T(C): 36.4 (06 Jul 2023 11:09), Max: 37.1 (05 Jul 2023 18:22)  T(F): 97.6 (06 Jul 2023 11:09), Max: 98.7 (05 Jul 2023 18:22)  HR: 123 (06 Jul 2023 11:09) (60 - 123)  BP: 149/79 (06 Jul 2023 14:42) (92/75 - 158/82)  BP(mean): 100 (06 Jul 2023 01:30) (80 - 100)  RR: 20 (06 Jul 2023 11:09) (18 - 27)  SpO2: 98% (06 Jul 2023 14:42) (92% - 100%)    Parameters below as of 06 Jul 2023 14:42  Patient On (Oxygen Delivery Method): nasal cannula  O2 Flow (L/min): 4      PHYSICAL EXAM:  GENERAL: NAD, well-developed, well-nourished  HEAD: Atraumatic, Normocephalic  EYES: conjunctiva and sclera clear  NECK: Supple  CHEST/LUNG: Clear to auscultation bilaterally; No wheezes or crackles  HEART: Normal S1/S2; Regular rate and rhythm; No murmurs, rubs, or gallops  ABDOMEN: Soft, Nontender, Nondistended; Bowel sounds present  PSYCH: A&Ox3  NEUROLOGY: no focal neurologic deficit      LABS:                        8.9    13.20 )-----------( 436      ( 05 Jul 2023 18:29 )             29.0      07-05    142  |  104  |  26<H>  ----------------------------<  113<H>  5.2   |  22  |  1.54<H>    Ca    10.7<H>      05 Jul 2023 18:29    TPro  7.2  /  Alb  3.0<L>  /  TBili  0.2  /  DBili  x   /  AST  24  /  ALT  12  /  AlkPhos  74  07-05    PT/INR - ( 05 Jul 2023 18:29 )   PT: 14.3 sec;   INR: 1.23 ratio         PTT - ( 05 Jul 2023 18:29 )  PTT:33.6 sec      Urinalysis Basic - ( 05 Jul 2023 18:29 )    Color: x / Appearance: x / SG: x / pH: x  Gluc: 113 mg/dL / Ketone: x  / Bili: x / Urobili: x   Blood: x / Protein: x / Nitrite: x   Leuk Esterase: x / RBC: x / WBC x   Sq Epi: x / Non Sq Epi: x / Bacteria: x        RADIOLOGY & ADDITIONAL TESTS:    Imaging Personally Reviewed:    Consultant(s) Notes Reviewed:      Care Discussed with Consultants/Other Providers:

## 2023-07-06 NOTE — H&P ADULT - NSHPPHYSICALEXAM_GEN_ALL_CORE
VITALS:   T(C): 36.6 (07-06-23 @ 04:34), Max: 37.1 (07-05-23 @ 18:22)  HR: 72 (07-06-23 @ 04:34) (60 - 104)  BP: 144/82 (07-06-23 @ 04:34) (92/75 - 158/82)  RR: 20 (07-06-23 @ 04:34) (18 - 27)  SpO2: 100% (07-06-23 @ 04:34) (95% - 100%)    GENERAL:  lying in bed slightly anxious   HEAD:  Atraumatic, normocephalic  EYES: EOMI, PERRLA, conjunctiva and sclera clear  ENT: Moist mucous membranes  NECK: Supple, no JVD  HEART: Regular rate and rhythm, no murmurs, rubs, or gallops  LUNGS: Unlabored respirations.  Mild wheezes throughout  ABDOMEN: Soft, nontender, nondistended, +BS  EXTREMITIES: 2+ peripheral pulses bilaterally. No clubbing, cyanosis, or edema  NERVOUS SYSTEM:  A&Ox3, no focal deficits   SKIN: No rashes or lesions

## 2023-07-06 NOTE — PROGRESS NOTE ADULT - ATTENDING COMMENTS
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 Of note, he had a PET/CT on 6/19 that showed FDG avidity in posterior nasopharynx as well as multiple lung nodules with uptake. He saw ENT who visulized no discrete lesion.    ED Course: VSS, cardiology consulted for pericardial effusion on POCUS and  no emergent pericardiocentesis indicated and recommended IR evaluation.  CTA, RVP neg. S/p xanax and iv fentanyl  in the ED   # Pericardial effusion       effusion on TTE now small-moderate in size compared to small size on prior TTE 5/2023      Patient remains hemodynamically stable       will cont to monitor on Tele and if there is any decompensation  ( Incr HR, Dec BP, change in Mentation , cool and clammy state --> please call     RRT)       Might need to repeat TTE for stability of pericardial effusion and for a possible window for diagnostic pericardial effusion      Unsure of the etiology of the Pericardial effusion      Cardio and IR rec appreciated     Will need onc in or oupt for further evaluation for known bladder CA         Ondina Brar   HOspitalist   Available on TEAMS

## 2023-07-06 NOTE — H&P ADULT - PROBLEM SELECTOR PLAN 7
- c/w home fluoxetine  - c/w home diazepam 5mg qd with a prn nighttime dose - c/w home fluoxetine  - c/w home diazepam 5mg qd with a prn nighttime dose  - regularly smokes marijuana for anxiety including at night to go to sleep

## 2023-07-06 NOTE — PATIENT PROFILE ADULT - FALL HARM RISK - HARM RISK INTERVENTIONS

## 2023-07-06 NOTE — H&P ADULT - PROBLEM SELECTOR PLAN 1
Seen on bedside POCUS. negative for Moreira's triad, HD stable  - evaluated by cardiology, no emergent pericardiocentesis but IR for image-guided pericardiocentesis  - small effusion noted on TTE may 1  - IR consult sent  - RVP negative, CTA PE negative  - could have malignant effusion from lung nodules seen on PET/CT if they are mets

## 2023-07-06 NOTE — H&P ADULT - ATTENDING COMMENTS
78 year old male with a PMHx as above but significant for high grade urothelial cancer who presents with shortness of breath. In the ED, the patient was found to have pericardial effusion, increased in size compared to prior studies, with associated diastolic collapse of the RVOT suggesting early tamponade physiology.      The patient was assessed at bedside. He was in no distress, breathing comfortably on 2L of NC and getting duoneb treatment. He has no overt wheezing on exam. No lower extremity edema.      #pericardial effusion with signs of early tamponade on echo  #high grade urothelial cancer  -ddx malignant pleural effusion   -obtain IR consult for evaluation of pericardial drain   -hold off on diuretics given findings of early tamponade   -maintain on telemetry   -cardiology is onboard, appreciate input     Rest of plan as per resident note.

## 2023-07-06 NOTE — CONSULT NOTE ADULT - ASSESSMENT
78 year old male with a high grade urothelial cancer who presents with shortness of breath, found to have pleural effusions and pericardial effusion. Oncology consulted for Urothelial Carcinoma    #Shortness of breath'  #Pulmonary Embolism  #Pericardial effusion  - Would appreciate IR, Pulm, and Cards input regarding pericardiocentesis and thoracentesis.  - If able to collect fluid, please send for cytology.    #Urothelial Carcinoma of Bladder  - Pt follows Dr. Spivey at [facility]  - Pet Scan from 6/19 showing FDG avid lesion in the posterior nasopharynx measuring 2.5 x 2.4. There were at least 10 FDG avid lung nodules present. There was a rim-enhancing FDG avid lesion with irregular borders associated with the urinary bladder with an SUV of 19 with moderate right hydronephrosis.  - Foundation One results, TMB 33 Muts/Mb, MS-stable  - Given findings in the nasopharynx, patient was evaluted by ENT and recommended to have a dedicated CT of the neck and nasopharynx.  - Plan was to start immunotherapy outpatient pending staging workup.    Case d/w Dr. Sarah.    Hannah Gurrola M.D.  Hematology and Medical Oncology Fellow  Pager: [phone]  For weekends and evenings (5 pm - 8 am), please page Heme/Onc fellow on call.

## 2023-07-06 NOTE — PHYSICAL THERAPY INITIAL EVALUATION ADULT - ADDITIONAL COMMENTS
pt states lives with significant other in apartment, no stairs. pt independent with mobility, owns rolling walker but did not utilize

## 2023-07-06 NOTE — H&P ADULT - PROBLEM SELECTOR PLAN 3
proBNP 4113  - euvolemic on exam  - TTE May 1: left ventricular systolic function is mildly decreased with an ejection fraction of 41 %;There is global left ventricular hypokinesis. Mild left ventricular diastolic dysfunction, normal right ventricular systolic function, small pericardial effusion  - c/w home atenalol proBNP 4113  - euvolemic on exam; lower suspicion for ADHF  - TTE May 1: left ventricular systolic function is mildly decreased with an ejection fraction of 41 %;There is global left ventricular hypokinesis. Mild left ventricular diastolic dysfunction, normal right ventricular systolic function, small pericardial effusion  - c/w home atenalol; unclear if he takes irbesartan 300mg- has been listed in some med lists but does not appear to have picked any up at pharmacy proBNP 4113  - euvolemic on exam; lower suspicion for ADHF as primary cause of SOB but can contribute  - TTE May 1: left ventricular systolic function is mildly decreased with an ejection fraction of 41 %;There is global left ventricular hypokinesis. Mild left ventricular diastolic dysfunction, normal right ventricular systolic function, small pericardial effusion  - if becomes volume overloaded, trial diuresis   - c/w home atenolol; unclear if he takes irbesartan 300mg- has been listed in some med lists but does not appear to have picked any up at pharmacy proBNP 4113, appears stable from previous  - euvolemic on exam; lower suspicion for ADHF as primary cause of SOB but can contribute  - TTE May 1: left ventricular systolic function is mildly decreased with an ejection fraction of 41 %;There is global left ventricular hypokinesis. Mild left ventricular diastolic dysfunction, normal right ventricular systolic function, small pericardial effusion  - avoid diuresis in setting of possible R heart strain from effusion   - c/w home atenolol; unclear if he takes irbesartan 300mg- has been listed in some med lists but does not appear to have picked any up at pharmacy proBNP 4113, appears stable from previous  - euvolemic on exam; lower suspicion for ADHF as primary cause of SOB but can contribute  - TTE May 1: left ventricular systolic function is mildly decreased with an ejection fraction of 41 %;There is global left ventricular hypokinesis. Mild left ventricular diastolic dysfunction, normal right ventricular systolic function, small pericardial effusion  - avoid diuresis in setting of possible early tamponade from effusion   - c/w home atenolol; unclear if he takes irbesartan 300mg- has been listed in some med lists but does not appear to have picked any up at pharmacy

## 2023-07-06 NOTE — MEDICAL STUDENT PROGRESS NOTE(EDUCATION) - ASSESSMENT
Patient is a 78 year old man with a PMH of HTN, HLD, CAD, stage 3 CKD, COPD a SMH of smoking, and a SurgHx of coronary stents and ICD implantation who is being evaluated for with subacute SOB after diagnosis of bladder cancer. The findings of muffled heart sounds, cachexia, weight loss, and urinary incontinence are most consistent with the diagnosis of pericardial effusion secondary to metastasis of bladder cancer.    PLAN:  Pericardial Effusion  COPD  Heart Failure  Bladder Cancer  Anemia  CKD stage 3  Anxiety  Hyperlipidemia  DVT ppx Patient is a 78 year old man with a PMH of HTN, HLD, CAD, stage 3 CKD, COPD a SMH of smoking, and a SurgHx of coronary stents and ICD implantation who is being evaluated for with subacute SOB after diagnosis of bladder cancer. The findings of muffled heart sounds, cachexia, weight loss, and urinary incontinence are most consistent with the diagnosis of pericardial effusion secondary to metastasis of bladder cancer.    PLAN:  Pericardial Effusion  ·	monitor for tello's triad  2.	COPD  ·	duoneb  3.	Heart Failure  ·	c/w at home atenolol  ·	avoid diuresis to prevent cardiac tamponade  4.	Bladder Cancer  ·	contact Dr. Spivey, his oncologist to inform  ·	c/w tamsulosin  ·	c/w mirtazepine  5.	Anemia  ·	possible ACD  ·	monitor hgb. Baseline is 9.8  ·	transfuse if hgb <7  6.	CKD stage 3  ·	monitor Cr. Baseline is 1.53  7.	Anxiety  ·	c/w at home flouxetine and daizepam  8.	Hyperlipidemia  ·	c/w at home statin  9.	DVT ppx  ·	SCD

## 2023-07-06 NOTE — MEDICAL STUDENT PROGRESS NOTE(EDUCATION) - SUBJECTIVE AND OBJECTIVE BOX
Patient is a 78 year old male s/p admission for chief concern of SOB, hospital day 1. No acute events overnight. Patient reports that their rest overnight was poor, due to the difficulty breathing. Patient reports experiencing no pain at this time. There is no discharge present at the IV access bandaging, and it is clean and dry at this time. The patient is able to move out of bed. Their diet is restricted, and the patient is tolerating clear liquids, denying any nausea or vomiting. The patient has experienced small bowel movements and flatus in the last 24 hours. Patient also reports poor control of urination as he has already urinated overnight twice.     PAST MEDICAL & SURGICAL HISTORY:  HTN (Hypertension)  Bradycardia  Dyslipidemia  CAD (Coronary Artery Disease)  S/P PTCA (Percutaneous Transluminal Coronary Angioplasty)   TIA (Transient Ischemic Attack). 18 years ago  PT was on coumadin for short while  COPD (chronic obstructive pulmonary disease)  NSTEMI (non-ST elevation myocardial infarction)  H/O constipation  Anxiety and depression  LV dysfunction  Bladder mass  S/P Rotator Cuff Surgery  S/P drug eluting coronary stent placement  S/P inguinal hernia repair  S/P cardiac pacemaker procedure  Cardiac resynchronization therapy defibrillator (CRT-D) in place    MEDICATIONS  (STANDING):  albuterol/ipratropium for Nebulization 3 milliLiter(s) Nebulizer every 6 hours  atenolol  Tablet 25 milliGRAM(s) Oral daily  atorvastatin 40 milliGRAM(s) Oral at bedtime  budesonide  80 MICROgram(s)/formoterol 4.5 MICROgram(s) Inhaler 2 Puff(s) Inhalation two times a day  diazepam    Tablet 5 milliGRAM(s) Oral daily  FLUoxetine 40 milliGRAM(s) Oral daily  mirtazapine 7.5 milliGRAM(s) Oral at bedtime  multivitamin 1 Tablet(s) Oral daily  tamsulosin 0.4 milliGRAM(s) Oral at bedtime  tiotropium 2.5 MICROgram(s) Inhaler 2 Puff(s) Inhalation daily    MEDICATIONS  (PRN):  melatonin 3 milliGRAM(s) Oral at bedtime PRN Insomnia    Vital Signs Last 24 Hrs  T(C): 36.6 (2023 04:34), Max: 37.1 (2023 18:22)  T(F): 97.8 (2023 04:34), Max: 98.7 (2023 18:22)  HR: 72 (2023 04:34) (60 - 104)  BP: 144/82 (2023 04:34) (92/75 - 158/82)  BP(mean): 100 (2023 01:30) (80 - 100)  RR: 20 (2023 04:34) (18 - 27)  SpO2: 100% (2023 04:34) (95% - 100%)    Parameters below as of 2023 07:25  Patient On (Oxygen Delivery Method): nasal cannula  O2 Flow (L/min): 4    PHYSICAL EXAMINATION:  General appearance: fatigued, cachetic. no apparent distress.  Neck: soft, supple without adenopathy. No JVD or thyromegaly. clearly defined SCM  Cardio: RRR. muffled S1,S2.  No murmurs, rubs, or gallops.   Resp: CTA b/l without rales, ronchi, or wheezes.   Abd: soft, NT/ND, no rebound/guarding, no masses palpated, BS on auscultation w/o HSP  Msk/Ext: +radial/DP/PT pulses. CR of 3 sec in b/l LE.  Neuro: AOx4, normal sensation, but difficulty hearing.  Psych: Appropriate mood and affect. Good judgment and insight.   Skin: WDI. No rashes or lesions. Appropriate color for ethnicity. No cyanosis/clubbing.                          8.9    13.20 )-----------( 436      ( 2023 18:29 )             29.0   07-    142  |  104  |  26<H>  ----------------------------<  113<H>  5.2   |  22  |  1.54<H>    Ca    10.7<H>      2023 18:29  TPro  7.2  /  Alb  3.0<L>  /  TBili  0.2  /  DBili  x   /  AST  24  /  ALT  12  /  AlkPhos  74  07-05      PT/INR - ( 2023 18:29 )   PT: 14.3 sec;   INR: 1.23 ratio    PTT - ( 2023 18:29 )  PTT:33.6 sec    IMAGIN23 POCUS was appreciated for pericardial effusion.  23 CXR was appreciated for nodular opacities in the RLL field.  23 CTA w/ IV contrast was appreciated for absence of PE and presence of lymphadenopathy in the RLL.  Both the CTA and CXR impressions were in correlation to the 23 PET scan findings of FDG avid pulmonary nodules in the RLL.

## 2023-07-06 NOTE — PROGRESS NOTE ADULT - PROBLEM SELECTOR PLAN 4
Follows with Dr Ryan Spivey  - not yet on pharmacologic therapy  - PET/CT with nodules in lungs and nasopharynx, unclear if mets  - continue with home mirtazepine for appetite  - c/w home tamsulosin 0.4mg Follows with Dr Ryan Spivey at outpatient   - not yet on pharmacologic therapy  - PET/CT with nodules in lungs and nasopharynx, unclear if mets  - continue with home mirtazepine for appetite  - c/w home tamsulosin 0.4mg

## 2023-07-06 NOTE — H&P ADULT - PROBLEM SELECTOR PLAN 5
Hb 8.9, appears to be near baseline  - history of brown hematuria 2/2 bladder cancer and pericardial fluid could be blood   - could have component of AOCD  - monitor Hb, transfuse <7

## 2023-07-06 NOTE — CONSULT NOTE ADULT - SUBJECTIVE AND OBJECTIVE BOX
Interventional Radiology  Evaluate for Procedure: pericardiocentesis    HPI: Pt is an 79yo M w/ hx of HTN, HLD, CAD (s/p PCI in 2001), HFrEF, bradycardia (s/p PPM upgraded to BiV-ICD 2019), CKD3, COPD, recent diagnosis of bladder cancer, presenting with shortness of breath found to have pericardial effusion. Ddx for SOB includes pericardial effusion, HF exacerbation, COPD exacerbation.     Allergies: No Known Allergies    Medications (Abx/Cardiac/Anticoagulation/Blood Products)  atenolol  Tablet: 25 milliGRAM(s) Oral (07-06 @ 05:48)    Data:  177.8  T(C): 36.6  HR: 72  BP: 144/82  RR: 20  SpO2: 100%    -WBC 13.20 / HgB 8.9 / Hct 29.0 / Plt 436  -Na 142 / Cl 104 / BUN 26 / Glucose 113  -K 5.2 / CO2 22 / Cr 1.54  -ALT 12 / Alk Phos 74 / T.Bili 0.2  -INR 1.23 / PTT 33.6    Radiology: reviewed    Assessment/Plan: Pt is an 79yo M w/ hx of HTN, HLD, CAD (s/p PCI in 2001), HFrEF, bradycardia (s/p PPM upgraded to BiV-ICD 2019), CKD3, COPD, recent diagnosis of bladder cancer, presenting with shortness of breath found to have pericardial effusion. Ddx for SOB includes pericardial effusion, HF exacerbation, COPD exacerbation. Pericardial effusion seen on bedside POCUS; CT demonstrating small fluid; IR consulted for drainage.     - case reviewed with IR attending Dr. Halaibeh  - CT demonstrating very little amount of fluid with difficult access and no safe percutaneous window  - defer drainage at this time  - recommend interventional cardiology   - d/w primary team

## 2023-07-06 NOTE — H&P ADULT - PROBLEM SELECTOR PLAN 4
Follows with Dr Ryan Spivey  - not yet on pharmacologic therapy  - PET/CT with nodules in lungs and nasopharynx, unclear if mets Follows with Dr Ryan Spivey  - not yet on pharmacologic therapy  - PET/CT with nodules in lungs and nasopharynx, unclear if mets  - continue with home mirtazepine for appetite  - c/w home tamsulosin 0.4mg

## 2023-07-06 NOTE — PROGRESS NOTE ADULT - PROBLEM SELECTOR PLAN 3
proBNP 4113, appears stable from previous  - euvolemic on exam; lower suspicion for ADHF as primary cause of SOB but can contribute  - TTE May 1: left ventricular systolic function is mildly decreased with an ejection fraction of 41 %;There is global left ventricular hypokinesis. Mild left ventricular diastolic dysfunction, normal right ventricular systolic function, small pericardial effusion  - avoid diuresis in setting of possible early tamponade from effusion   - c/w home atenolol; unclear if he takes irbesartan 300mg- has been listed in some med lists but does not appear to have picked any up at pharmacy proBNP 4113, appears stable from previous  - euvolemic on exam; lower suspicion for HFpEF as primary cause of SOB but can contribute  - TTE May 1: left ventricular systolic function is mildly decreased with an ejection fraction of 41 %;There is global left ventricular hypokinesis. Mild left ventricular diastolic dysfunction, normal right ventricular systolic function, small pericardial effusion--> current EF =50%  - avoid diuresis  - c/w home atenolol; unclear if he takes irbesartan 300mg- has been listed in some med lists but does not appear to have picked any up at pharmacy

## 2023-07-07 NOTE — DISCHARGE NOTE PROVIDER - NSDCCPCAREPLAN_GEN_ALL_CORE_FT
PRINCIPAL DISCHARGE DIAGNOSIS  Diagnosis: Pericardial effusion  Assessment and Plan of Treatment: Pericardial effusion is a buildup of fluid in the pericardium. The pericardium is a 2-layer sac that surrounds the heart. The sac normally contains a small amount of clear fluid between its layers. This allows the heart to move smoothly against other organs in the chest as it beats. The fluid buildup puts pressure on your heart. This makes it difficult for your heart to pump. Call 911 if you have squeezing, pressure, or pain in your chest, discomfort or pain in your back, neck, jaw, stomach, or arm, shortness of breath, nausea or vomiting, lightheadedness or a sudden cold sweat, swelling in your legs or feet. Call your doctor if you have a fever.      SECONDARY DISCHARGE DIAGNOSES  Diagnosis: Tamponade  Assessment and Plan of Treatment:      PRINCIPAL DISCHARGE DIAGNOSIS  Diagnosis: Pericardial effusion  Assessment and Plan of Treatment: Pericardial effusion is a buildup of fluid in the pericardium. The pericardium is a 2-layer sac that surrounds the heart. The sac normally contains a small amount of clear fluid between its layers. This allows the heart to move smoothly against other organs in the chest as it beats. The fluid buildup puts pressure on your heart. This makes it difficult for your heart to pump. Call 911 if you have squeezing, pressure, or pain in your chest, discomfort or pain in your back, neck, jaw, stomach, or arm, shortness of breath, nausea or vomiting, lightheadedness or a sudden cold sweat, swelling in your legs or feet. Call your doctor if you have a fever.      SECONDARY DISCHARGE DIAGNOSES  Diagnosis: Chronic obstructive pulmonary disease (COPD)  Assessment and Plan of Treatment: COPD is a lung disease that makes it hard to breathe. In people with COPD, the airways (the branching tubes that carry air within the lungs) become narrow and can be clogged with mucus. The air sacs can also become damaged. This makes people feel out of breath and tired.  COPD can be a serious illness. It cannot be cured and can get worse over time. But there are treatments that can help.  Call for an ambulance (in the [place] and [place], call 9-1-1) if:  ?You are having trouble breathing, even when you are resting.  ?You are coughing up blood.  ?You have signs of a heart attack, such as:  •Severe chest pain, pressure, or discomfort with:  -Trouble breathing, sweating, upset stomach, or cold clammy skin  -Pain in your arms, back, or jaw  -Worse pain with activity like walking up stairs  •Fast or irregular heartbeat  •Feeling dizzy, faint, or weak  Call your regular doctor for advice if:  ?You have a fever of 100.4°F (38°C) or higher or chills.  ?You are feeling weak or more short of breath than usual when doing your normal activities.  ?You have new or worsening cough, wheezing, sputum, or shortness o    Diagnosis: Bladder cancer  Assessment and Plan of Treatment: We talked to the oncology team about your care here, and they will be monitoring you.    Diagnosis: Tamponade  Assessment and Plan of Treatment:     Diagnosis: Bladder cancer  Assessment and Plan of Treatment:      PRINCIPAL DISCHARGE DIAGNOSIS  Diagnosis: Pericardial effusion  Assessment and Plan of Treatment: Pericardial effusion is a buildup of fluid in the pericardium. The pericardium is a 2-layer sac that surrounds the heart. The sac normally contains a small amount of clear fluid between its layers. This allows the heart to move smoothly against other organs in the chest as it beats. The fluid buildup puts pressure on your heart. This makes it difficult for your heart to pump. Call 911 if you have squeezing, pressure, or pain in your chest, discomfort or pain in your back, neck, jaw, stomach, or arm, shortness of breath, nausea or vomiting, lightheadedness or a sudden cold sweat, swelling in your legs or feet. Call your doctor if you have a fever.      SECONDARY DISCHARGE DIAGNOSES  Diagnosis: Tamponade  Assessment and Plan of Treatment:     Diagnosis: Chronic obstructive pulmonary disease (COPD)  Assessment and Plan of Treatment: COPD is a lung disease that makes it hard to breathe. In people with COPD, the airways (the branching tubes that carry air within the lungs) become narrow and can be clogged with mucus. The air sacs can also become damaged. This makes people feel out of breath and tired. Please continue to take steroid for 1 more day.   COPD can be a serious illness. It cannot be cured and can get worse over time. But there are treatments that can help.  Call for an ambulance (in the [place] and [place], call 9-1-1) if:  ?You are having trouble breathing, even when you are resting.  ?You are coughing up blood.  ?You have signs of a heart attack, such as:  •Severe chest pain, pressure, or discomfort with:  -Trouble breathing, sweating, upset stomach, or cold clammy skin  -Pain in your arms, back, or jaw  -Worse pain with activity like walking up stairs  •Fast or irregular heartbeat  •Feeling dizzy, faint, or weak  Call your regular doctor for advice if:  ?You have a fever of 100.4°F (38°C) or higher or chills.  ?You are feeling weak or more short of breath than usual when doing your normal activities.  ?You have new or worsening cough, wheezing, sputum, or shortness o    Diagnosis: Bladder cancer  Assessment and Plan of Treatment: We talked to the oncology team about your care here, and they will be monitoring you outpatient. You were found to have multiple pulmonary nodules.  You have known bladder cancer which you will need immunotherapy outpatient for.    Diagnosis: Bladder cancer  Assessment and Plan of Treatment:

## 2023-07-07 NOTE — DISCHARGE NOTE PROVIDER - NSDCFUADDAPPT_GEN_ALL_CORE_FT
Myesha Asher  [facility] [address], [place], [place] [id number] [phone]  Appointment scheduled for Tuesday    Jaylen Bhatia  [facility] at [address] [place] [address] [place] [address] [id number] [place] [address]  [phone] Myesha Asher  [facility] [address], [place], [place] [id number] [phone]  Appointment scheduled for Tuesday           Myesha Asher  [facility] [address], [place], [place] [id number] [phone]  Appointment scheduled for Tuesday

## 2023-07-07 NOTE — DISCHARGE NOTE PROVIDER - NSDCCPTREATMENT_GEN_ALL_CORE_FT
PRINCIPAL PROCEDURE  Procedure: Transthoracic echocardiography (TTE)  Findings and Treatment: 1. Technically difficult image quality.   2. Technically difficult, portable study performed to evaluate the pericardium.   3. The right ventricle is not well visualized. grossly normalright ventricular cavity size and normal right ventricular wall thicknessA device wire is seen in the righ heart.   4. Small-moderate pericardial effusion seen anterior to the right ventricle and lateral to the RVOT. The effusion is moderate in size adjacent to the RVOT/main PA and measures up to approximately 1.7 cm in its largest dimension. There is early diastolic collapse of the RVOT suggestive of early echocardiographic evidence of pericardial tamponade physiology. The IVC is dilated with greater than 50% respiratory variation in size consistent with right atrial pressure of approximately 8 mmHg.   5. Compared to the transthoracic echocardiogram performed on 5/1/2023 the pericardial effusion is increased in size.. Findings were discussedwith Dr. Bob Flores on 7/5/2023 at 8:20 pm.   6. Consider additional imaging of the pericardium with chest CT scan and repeat follow-up TTE imaging if clinically indicated.        SECONDARY PROCEDURE  Procedure: Transthoracic echocardiography (TTE)  Findings and Treatment: 1. Compared to the transthoracic echocardiogram performed on 7/5/2023.   2. Small pericardial effusion and the pericardial effusion appears smaller on today's study compared to study on07/05/23 with no evidence of hemodynamic compromise.   3. Technically difficult image quality.

## 2023-07-07 NOTE — PROGRESS NOTE ADULT - SUBJECTIVE AND OBJECTIVE BOX
Patient is a 78y old  Male who presents with a chief complaint of shortness of breath (06 Jul 2023 17:24)      SUBJECTIVE / OVERNIGHT EVENTS: Pt states he is feeling back to his normal. Denies chest pain, SOB, fever/chills. N/V, abdominal pain.    MEDICATIONS  (STANDING):  albuterol/ipratropium for Nebulization 3 milliLiter(s) Nebulizer every 6 hours  atenolol  Tablet 25 milliGRAM(s) Oral daily  atorvastatin 40 milliGRAM(s) Oral at bedtime  budesonide  80 MICROgram(s)/formoterol 4.5 MICROgram(s) Inhaler 2 Puff(s) Inhalation two times a day  diazepam    Tablet 5 milliGRAM(s) Oral daily  FLUoxetine 40 milliGRAM(s) Oral daily  heparin   Injectable 5000 Unit(s) SubCutaneous every 12 hours  mirtazapine 7.5 milliGRAM(s) Oral at bedtime  multivitamin 1 Tablet(s) Oral daily  polyethylene glycol 3350 17 Gram(s) Oral daily  tamsulosin 0.4 milliGRAM(s) Oral at bedtime  tiotropium 2.5 MICROgram(s) Inhaler 2 Puff(s) Inhalation daily    MEDICATIONS  (PRN):  acetaminophen     Tablet .. 650 milliGRAM(s) Oral every 6 hours PRN Mild Pain (1 - 3)  melatonin 3 milliGRAM(s) Oral at bedtime PRN Insomnia      CAPILLARY BLOOD GLUCOSE        I&O's Summary    06 Jul 2023 07:01  -  07 Jul 2023 07:00  --------------------------------------------------------  IN: 120 mL / OUT: 1 mL / NET: 119 mL    07 Jul 2023 07:01  -  07 Jul 2023 10:21  --------------------------------------------------------  IN: 120 mL / OUT: 300 mL / NET: -180 mL        Vital Signs Last 24 Hrs  T(C): 36.6 (07 Jul 2023 04:43), Max: 36.6 (07 Jul 2023 04:43)  T(F): 97.8 (07 Jul 2023 04:43), Max: 97.8 (07 Jul 2023 04:43)  HR: 70 (07 Jul 2023 04:43) (64 - 123)  BP: 133/74 (07 Jul 2023 04:43) (103/63 - 151/77)  BP(mean): --  RR: 18 (07 Jul 2023 04:43) (18 - 20)  SpO2: 99% (07 Jul 2023 04:43) (92% - 99%)    Parameters below as of 07 Jul 2023 07:25  Patient On (Oxygen Delivery Method): nasal cannula  O2 Flow (L/min): 3      PHYSICAL EXAM:  GENERAL: NAD, well-developed, well-nourished  HEAD: Atraumatic, Normocephalic  EYES: conjunctiva and sclera clear  NECK: Supple  CHEST/LUNG: Clear to auscultation bilaterally; No wheezes or crackles  HEART: Normal S1/S2; Regular rate and rhythm; No murmurs, rubs, or gallops  ABDOMEN: Soft, Nontender, Nondistended; Bowel sounds present  PSYCH: A&Ox3    LABS:                        8.9    13.20 )-----------( 436      ( 05 Jul 2023 18:29 )             29.0      07-05    142  |  104  |  26<H>  ----------------------------<  113<H>  5.2   |  22  |  1.54<H>    Ca    10.7<H>      05 Jul 2023 18:29    TPro  7.2  /  Alb  3.0<L>  /  TBili  0.2  /  DBili  x   /  AST  24  /  ALT  12  /  AlkPhos  74  07-05    PT/INR - ( 05 Jul 2023 18:29 )   PT: 14.3 sec;   INR: 1.23 ratio         PTT - ( 05 Jul 2023 18:29 )  PTT:33.6 sec      Urinalysis Basic - ( 05 Jul 2023 18:29 )    Color: x / Appearance: x / SG: x / pH: x  Gluc: 113 mg/dL / Ketone: x  / Bili: x / Urobili: x   Blood: x / Protein: x / Nitrite: x   Leuk Esterase: x / RBC: x / WBC x   Sq Epi: x / Non Sq Epi: x / Bacteria: x        RADIOLOGY & ADDITIONAL TESTS:    Imaging Personally Reviewed:    Consultant(s) Notes Reviewed:      Care Discussed with Consultants/Other Providers:

## 2023-07-07 NOTE — MEDICAL STUDENT PROGRESS NOTE(EDUCATION) - SUBJECTIVE AND OBJECTIVE BOX
PAST MEDICAL & SURGICAL HISTORY:  HTN (Hypertension)  Bradycardia  Dyslipidemia  CAD (Coronary Artery Disease)  S/P PTCA (Percutaneous Transluminal Coronary Angioplasty) 2001  TIA (Transient Ischemic Attack) 18 years ago  PT was on coumadin for short while  COPD (chronic obstructive pulmonary disease)  NSTEMI (non-ST elevation myocardial infarction)  H/O constipation  Anxiety and depression  LV dysfunction  Bladder mass  S/P Rotator Cuff Surgery  S/P drug eluting coronary stent placement  S/P inguinal hernia repair  S/P cardiac pacemaker procedure  Cardiac resynchronization therapy defibrillator (CRT-D) in place    MEDICATIONS  (STANDING):  albuterol/ipratropium for Nebulization 3 milliLiter(s) Nebulizer every 6 hours  atenolol  Tablet 25 milliGRAM(s) Oral daily  atorvastatin 40 milliGRAM(s) Oral at bedtime  budesonide  80 MICROgram(s)/formoterol 4.5 MICROgram(s) Inhaler 2 Puff(s) Inhalation two times a day  diazepam    Tablet 5 milliGRAM(s) Oral daily  FLUoxetine 40 milliGRAM(s) Oral daily  heparin   Injectable 5000 Unit(s) SubCutaneous every 12 hours  mirtazapine 7.5 milliGRAM(s) Oral at bedtime  multivitamin 1 Tablet(s) Oral daily  polyethylene glycol 3350 17 Gram(s) Oral daily  tamsulosin 0.4 milliGRAM(s) Oral at bedtime  tiotropium 2.5 MICROgram(s) Inhaler 2 Puff(s) Inhalation daily    MEDICATIONS  (PRN):  acetaminophen     Tablet .. 650 milliGRAM(s) Oral every 6 hours PRN Mild Pain (1 - 3)  melatonin 3 milliGRAM(s) Oral at bedtime PRN Insomnia       Patient is a 78 year old male s/p admission for chief concern of SOB, hospital day 1. No acute events overnight. Patient reports that their rest overnight was poor, due to the difficulty breathing. Patient reports experiencing no pain at this time. There is no discharge present at the IV access bandaging, and it is clean and dry at this time. The patient is able to move out of bed. Their diet is restricted, and the patient is tolerating clear liquids, denying any nausea or vomiting. The patient has experienced small bowel movements and flatus in the last 24 hours. Patient also reports poor control of urination as he has already urinated overnight twice.       MEDICATIONS  (STANDING):  albuterol/ipratropium for Nebulization 3 milliLiter(s) Nebulizer every 6 hours  atenolol  Tablet 25 milliGRAM(s) Oral daily  atorvastatin 40 milliGRAM(s) Oral at bedtime  budesonide  80 MICROgram(s)/formoterol 4.5 MICROgram(s) Inhaler 2 Puff(s) Inhalation two times a day  diazepam    Tablet 5 milliGRAM(s) Oral daily  FLUoxetine 40 milliGRAM(s) Oral daily  heparin   Injectable 5000 Unit(s) SubCutaneous every 12 hours  mirtazapine 7.5 milliGRAM(s) Oral at bedtime  multivitamin 1 Tablet(s) Oral daily  polyethylene glycol 3350 17 Gram(s) Oral daily  tamsulosin 0.4 milliGRAM(s) Oral at bedtime  tiotropium 2.5 MICROgram(s) Inhaler 2 Puff(s) Inhalation daily    MEDICATIONS  (PRN):  acetaminophen     Tablet .. 650 milliGRAM(s) Oral every 6 hours PRN Mild Pain (1 - 3)  melatonin 3 milliGRAM(s) Oral at bedtime PRN Insomnia    I&O's Summary    06 Jul 2023 07:01  -  07 Jul 2023 07:00  --------------------------------------------------------  IN: 120 mL / OUT: 1 mL / NET: 119 mL    07 Jul 2023 07:01  -  07 Jul 2023 08:31  --------------------------------------------------------  IN: 0 mL / OUT: 300 mL / NET: -300 mL    Vital Signs Last 24 Hrs  T(C): 36.6 (07 Jul 2023 04:43), Max: 36.6 (07 Jul 2023 04:43)  T(F): 97.8 (07 Jul 2023 04:43), Max: 97.8 (07 Jul 2023 04:43)  HR: 70 (07 Jul 2023 04:43) (64 - 123)  BP: 133/74 (07 Jul 2023 04:43) (103/63 - 151/77)  BP(mean): --  RR: 18 (07 Jul 2023 04:43) (18 - 20)  SpO2: 99% (07 Jul 2023 04:43) (92% - 99%)    Parameters below as of 07 Jul 2023 04:43  Patient On (Oxygen Delivery Method): nasal cannula  O2 Flow (L/min): 3    PHYSICAL EXAMINATION:  General appearance: fatigued, cachetic. no apparent distress.  Neck: soft, supple without adenopathy. No JVD or thyromegaly. clearly defined SCM  Cardio: RRR. muffled S1,S2.  No murmurs, rubs, or gallops.   Resp: CTA b/l without rales, ronchi, or wheezes.   Abd: soft, NT/ND, no rebound/guarding, no masses palpated, BS on auscultation w/o HSP  Msk/Ext: +radial/DP/PT pulses. CR of 3 sec in b/l LE.  Neuro: AOx4, normal sensation, but difficulty hearing.  Psych: Appropriate mood and affect. Good judgment and insight.   Skin: WDI. No rashes or lesions. Appropriate color for ethnicity. No cyanosis/clubbing.             Patient is a 78 year old male s/p admission for chief concern of SOB, hospital day 2. No acute events overnight. Patient reports that their rest overnight was poor, due to the difficulty breathing. Patient reports experiencing no pain at this time. There is no discharge present at the IV access bandaging, and it is clean and dry at this time. The patient is able to move out of bed. Their diet is restricted, and the patient is tolerating clear liquids, denying any nausea or vomiting. The patient has experienced no bowel movements and little to no flatus in the last 24 hours. Patient continues to have poor urine control and has refused collection of morning labs.      MEDICATIONS  (STANDING):  albuterol/ipratropium for Nebulization 3 milliLiter(s) Nebulizer every 6 hours  atenolol  Tablet 25 milliGRAM(s) Oral daily  atorvastatin 40 milliGRAM(s) Oral at bedtime  budesonide  80 MICROgram(s)/formoterol 4.5 MICROgram(s) Inhaler 2 Puff(s) Inhalation two times a day  diazepam    Tablet 5 milliGRAM(s) Oral daily  FLUoxetine 40 milliGRAM(s) Oral daily  heparin   Injectable 5000 Unit(s) SubCutaneous every 12 hours  mirtazapine 7.5 milliGRAM(s) Oral at bedtime  multivitamin 1 Tablet(s) Oral daily  polyethylene glycol 3350 17 Gram(s) Oral daily  tamsulosin 0.4 milliGRAM(s) Oral at bedtime  tiotropium 2.5 MICROgram(s) Inhaler 2 Puff(s) Inhalation daily    MEDICATIONS  (PRN):  acetaminophen     Tablet .. 650 milliGRAM(s) Oral every 6 hours PRN Mild Pain (1 - 3)  melatonin 3 milliGRAM(s) Oral at bedtime PRN Insomnia    I&O's Summary    06 Jul 2023 07:01  -  07 Jul 2023 07:00  --------------------------------------------------------  IN: 120 mL / OUT: 1 mL / NET: 119 mL    07 Jul 2023 07:01  -  07 Jul 2023 08:31  --------------------------------------------------------  IN: 0 mL / OUT: 300 mL / NET: -300 mL    Vital Signs Last 24 Hrs  T(C): 36.6 (07 Jul 2023 04:43), Max: 36.6 (07 Jul 2023 04:43)  T(F): 97.8 (07 Jul 2023 04:43), Max: 97.8 (07 Jul 2023 04:43)  HR: 70 (07 Jul 2023 04:43) (64 - 123)  BP: 133/74 (07 Jul 2023 04:43) (103/63 - 151/77)  BP(mean): --  RR: 18 (07 Jul 2023 04:43) (18 - 20)  SpO2: 99% (07 Jul 2023 04:43) (92% - 99%)    Parameters below as of 07 Jul 2023 04:43  Patient On (Oxygen Delivery Method): nasal cannula  O2 Flow (L/min): 3    PHYSICAL EXAMINATION:  General appearance: fatigued, cachetic. no apparent distress.  Neck: soft, supple without adenopathy. No JVD or thyromegaly. clearly defined SCM  Cardio: RRR. muffled S1,S2.  No murmurs, rubs, or gallops.   Resp: CTA b/l without rales, ronchi, or wheezes.   Abd: soft, NT/ND, no rebound/guarding, no masses palpated, BS on auscultation w/o HSP  Msk/Ext: +radial/DP/PT pulses. CR of 3 sec in b/l LE.  Neuro: AOx4, normal sensation, but difficulty hearing.  Psych: Appropriate mood and affect. Good judgment and insight.   Skin: WDI. No rashes or lesions. Appropriate color for ethnicity. No cyanosis/clubbing.             Patient is a 78 year old male s/p admission for chief concern of SOB, hospital day 2. No acute events overnight. Patient reports that their rest overnight was poor, due to the difficulty breathing. Patient reports experiencing no pain at this time. There is no discharge present at the IV access bandaging, and it is clean and dry at this time. The patient is able to move out of bed. Their diet is unrestricted, and the patient is tolerating clear liquids, denying any nausea or vomiting. The patient has experienced no bowel movements and little to no flatus in the last 24 hours. Patient continues to have poor urine control and has refused collection of morning labs.      MEDICATIONS  (STANDING):  albuterol/ipratropium for Nebulization 3 milliLiter(s) Nebulizer every 6 hours  atenolol  Tablet 25 milliGRAM(s) Oral daily  atorvastatin 40 milliGRAM(s) Oral at bedtime  budesonide  80 MICROgram(s)/formoterol 4.5 MICROgram(s) Inhaler 2 Puff(s) Inhalation two times a day  diazepam    Tablet 5 milliGRAM(s) Oral daily  FLUoxetine 40 milliGRAM(s) Oral daily  heparin   Injectable 5000 Unit(s) SubCutaneous every 12 hours  mirtazapine 7.5 milliGRAM(s) Oral at bedtime  multivitamin 1 Tablet(s) Oral daily  polyethylene glycol 3350 17 Gram(s) Oral daily  tamsulosin 0.4 milliGRAM(s) Oral at bedtime  tiotropium 2.5 MICROgram(s) Inhaler 2 Puff(s) Inhalation daily    MEDICATIONS  (PRN):  acetaminophen     Tablet .. 650 milliGRAM(s) Oral every 6 hours PRN Mild Pain (1 - 3)  melatonin 3 milliGRAM(s) Oral at bedtime PRN Insomnia    I&O's Summary    06 Jul 2023 07:01  -  07 Jul 2023 07:00  --------------------------------------------------------  IN: 120 mL / OUT: 1 mL / NET: 119 mL    07 Jul 2023 07:01  -  07 Jul 2023 08:31  --------------------------------------------------------  IN: 0 mL / OUT: 300 mL / NET: -300 mL    Vital Signs Last 24 Hrs  T(C): 36.6 (07 Jul 2023 04:43), Max: 36.6 (07 Jul 2023 04:43)  T(F): 97.8 (07 Jul 2023 04:43), Max: 97.8 (07 Jul 2023 04:43)  HR: 70 (07 Jul 2023 04:43) (64 - 123)  BP: 133/74 (07 Jul 2023 04:43) (103/63 - 151/77)  BP(mean): --  RR: 18 (07 Jul 2023 04:43) (18 - 20)  SpO2: 99% (07 Jul 2023 04:43) (92% - 99%)    Parameters below as of 07 Jul 2023 04:43  Patient On (Oxygen Delivery Method): nasal cannula  O2 Flow (L/min): 3    PHYSICAL EXAMINATION:  General appearance: fatigued, cachetic. no apparent distress.  Neck: soft, supple without adenopathy. No JVD or thyromegaly. clearly defined SCM  Cardio: RRR. muffled S1,S2.  No murmurs, rubs, or gallops.   Resp: CTA b/l without rales, ronchi, or wheezes.   Abd: soft, NT/ND, no rebound/guarding, no masses palpated, BS on auscultation w/o HSP  Msk/Ext: +radial/DP/PT pulses. CR of 3 sec in b/l LE.  Neuro: AOx4, normal sensation, but difficulty hearing.  Psych: Appropriate mood and affect. Good judgment and insight.   Skin: WDI. No rashes or lesions. Appropriate color for ethnicity. No cyanosis/clubbing.

## 2023-07-07 NOTE — PROGRESS NOTE ADULT - PROBLEM SELECTOR PLAN 4
Follows with Dr Ryan Spivey at outpatient   - not yet on pharmacologic therapy  - PET/CT with nodules in lungs and nasopharynx, unclear if mets  - continue with home mirtazepine for appetite  - c/w home tamsulosin 0.4mg Follows with Dr Ryan Spivey at outpatient   - not yet on pharmacologic therapy  - PET/CT with nodules in lungs and nasopharynx, unclear if mets  - continue with home mirtazepine for appetite  - c/w home tamsulosin 0.4mg  onc follow up

## 2023-07-07 NOTE — PROGRESS NOTE ADULT - SUBJECTIVE AND OBJECTIVE BOX
Mr. Olvera denies chest pain, dyspnea, and lightheadedness.  He reports constipation.    Repeat TTE performed today – effusion does not appear to be significantly changed in size.    The patient remains normotensive to hypertensive.    Physical exam:  T(C): 36.6 (07 Jul 2023 04:43), Max: 36.6 (07 Jul 2023 04:43)  T(F): 97.8 (07 Jul 2023 04:43), Max: 97.8 (07 Jul 2023 04:43)  HR: 70 (07 Jul 2023 04:43) (64 - 70)  BP: 133/74 (07 Jul 2023 04:43) (133/74 - 151/77)  RR: 18 (07 Jul 2023 04:43) (18 - 18)  SpO2: 99% (07 Jul 2023 04:43) (98% - 99%)  Patient On (Oxygen Delivery Method): nasal cannula  O2 Flow (L/min): 3    General – awake, alert, NAD  CV – RRR, no MRG  Lungs – mild end expiratory wheezing  Abdomen – S/NT/ND  Extremities – no DARRIAN    Tele  SR with V pacing and A-V pacing    MEDICATIONS  (STANDING):  albuterol/ipratropium for Nebulization 3 milliLiter(s) Nebulizer every 6 hours  atenolol  Tablet 25 milliGRAM(s) Oral daily  atorvastatin 40 milliGRAM(s) Oral at bedtime  budesonide  80 MICROgram(s)/formoterol 4.5 MICROgram(s) Inhaler 2 Puff(s) Inhalation two times a day  diazepam    Tablet 5 milliGRAM(s) Oral daily  FLUoxetine 40 milliGRAM(s) Oral daily  heparin   Injectable 5000 Unit(s) SubCutaneous every 12 hours  mirtazapine 7.5 milliGRAM(s) Oral at bedtime  multivitamin 1 Tablet(s) Oral daily  polyethylene glycol 3350 17 Gram(s) Oral daily  tamsulosin 0.4 milliGRAM(s) Oral at bedtime  tiotropium 2.5 MICROgram(s) Inhaler 2 Puff(s) Inhalation daily    Impression:  78M  HTN  DLD  CAD s/p PCI 2001  HFrEF s/p BiV-ICD 2019  CKD  TIA  COPD    Admitted with dyspnea  Pericardial effusion without clinical tamponade – stable effusion  No central, lobar, or segmental PE    Recommendations:  Continue tele  Official TTE result  Repeat CBC, BMP  Consider pulmonary evaluation for dyspnea - likely multifactorial and includes COPD  Consider changing atenolol to metoprolol given HFrEF    A total of 50 minutes was spent on this patient encounter.

## 2023-07-07 NOTE — DISCHARGE NOTE PROVIDER - CARE PROVIDER_API CALL
Ryan Spivey  Medical Oncology  [address] [place] [address]  Phone: [phone]  Fax: [phone]  Established Patient  Follow Up Time: 1 week   Ryan Spivey  Medical Oncology  [address] [place] [address]  Phone: [phone]  Fax: [phone]  Established Patient  Follow Up Time: 1 week    Rafael Zaragoza  Cardiology  [facility] [address]  [place] [address]  Phone: [phone]  Fax: [phone]  Established Patient  Follow Up Time: 2 weeks   Ryan Spivey  Medical Oncology  [address] [place] [address]  Phone: [phone]  Fax: [phone]  Established Patient  Follow Up Time: 1 week    Rafael Zaragoza  Cardiology  [facility] [address]  [place] [address]  Phone: [phone]  Fax: [phone]  Established Patient  Scheduled Appointment: 07/17/2023 02:00 PM

## 2023-07-07 NOTE — PROGRESS NOTE ADULT - ASSESSMENT
Pt is an 77yo M w/ hx of HTN, HLD, CAD (s/p PCI in 2001), HFrEF, bradycardia (s/p PPM upgraded to BiV-ICD 2019), CKD3, COPD, recent diagnosis of bladder cancer on no current therapy yet, presented  with shortness of breath found to have pericardial effusion with no hemodynamic instability.  Patient was admitted for further evaluation in the hospital.

## 2023-07-07 NOTE — DISCHARGE NOTE PROVIDER - PROVIDER TOKENS
PROVIDER:[TOKEN:[[id number]],FOLLOWUP:[1 week],ESTABLISHEDPATIENT:[T]] PROVIDER:[TOKEN:[[id number]],FOLLOWUP:[1 week],ESTABLISHEDPATIENT:[T]],PROVIDER:[TOKEN:[[id number]],FOLLOWUP:[2 weeks],ESTABLISHEDPATIENT:[T]] PROVIDER:[TOKEN:[[id number]],FOLLOWUP:[1 week],ESTABLISHEDPATIENT:[T]],PROVIDER:[TOKEN:[[id number]],SCHEDULEDAPPT:[07/17/2023],SCHEDULEDAPPTTIME:[02:00 PM],ESTABLISHEDPATIENT:[T]]

## 2023-07-07 NOTE — MEDICAL STUDENT PROGRESS NOTE(EDUCATION) - ASSESSMENT
Patient is a 78 year old man with a PMH of HTN, HLD, CAD, stage 3 CKD, COPD a SMH of smoking, and a SurgHx of coronary stents and ICD implantation who is being evaluated for with subacute SOB after diagnosis of bladder cancer. The findings of muffled heart sounds, cachexia, weight loss, and urinary incontinence are most consistent with the diagnosis of pericardial effusion secondary to metastasis of bladder cancer.      Problem/Plan - 1:  ·  Problem: Pericardial effusion.   ·  Plan: Seen on bedside POCUS. negative for Moreira's triad, HD stable  - evaluated by cardiology, no emergent pericardiocentesis but IR for image-guided pericardiocentesis  - small effusion noted on TTE may 1  - IR consult sent--> awaiting on rec   - RVP negative, CTA PE negative  - could have malignant effusion from lung nodules seen on PET/CT if they are mets Vs other etiologies   will cont to monitor on Tele and monitor hemodynamics .  Please avoid hypotension and Bradycardia.     Problem/Plan - 2:  ·  Problem: Chronic obstructive pulmonary disease (COPD).   ·  Plan: - VBG not consistent with exacerbation - minimal CO2 retention; if anything, CO2 may be elevated to compensate for lactic acidosis--> monitor SPO2.    - home regimen Trelegy ellipta qd  - duonebs while inpt.     Problem/Plan - 3:  ·  Problem: Heart failure.   ·  Plan: proBNP 4113, appears stable from previous  - euvolemic on exam; lower suspicion for HFpEF as primary cause of SOB but can contribute  - TTE May 1: left ventricular systolic function is mildly decreased with an ejection fraction of 41 %;There is global left ventricular hypokinesis. Mild left ventricular diastolic dysfunction, normal right ventricular systolic function, small pericardial effusion--> current EF =50%  - avoid diuresis  - c/w home atenolol; unclear if he takes irbesartan 300mg- has been listed in some med lists but does not appear to have picked any up at pharmacy.     Problem/Plan - 4:  ·  Problem: Bladder cancer.   ·  Plan: Follows with Dr Ryan Spivey at outpatient   - not yet on pharmacologic therapy  - PET/CT with nodules in lungs and nasopharynx, unclear if mets  - continue with home mirtazepine for appetite  - c/w home tamsulosin 0.4mg.     Problem/Plan - 5:  ·  Problem: Anemia.   ·  Plan: Hb 8.9, appears to be near baseline  - history of brown hematuria 2/2 bladder cancer and pericardial fluid could be blood   - could have component of ACD  - monitor Hb, transfuse <7.     Problem/Plan - 6:  ·  Problem: Stage 3 chronic kidney disease.   ·  Plan: Cr 1.53, appears to be at baseline  - trend Cr for now and avoid nephrotoxins.     Problem/Plan - 7:  ·  Problem: Anxiety.   ·  Plan: - c/w home fluoxetine  - c/w home diazepam 5mg qd with a prn nighttime dose  - regularly smokes marijuana for anxiety including at night to go to sleep.     Problem/Plan - 8:  ·  Problem: Hyperlipidemia.   ·  Plan: - c/w home statin.     Problem/Plan - 9:  ·  Problem: Need for prophylactic measure.   ·  Plan: Diet: regular  DVT: SCDs until IR procedure then heparin subq  Dispo: pending course.   Patient is a 78 year old man with a PMH of HTN, HLD, CAD, stage 3 CKD, COPD a SMH of smoking, and a SurgHx of coronary stents and ICD implantation who is being evaluated for with subacute SOB after diagnosis of bladder cancer. The findings of muffled heart sounds, cachexia, weight loss, and urinary incontinence are most consistent with the diagnosis of pericardial effusion secondary to metastasis of bladder cancer.      Problem/Plan - 1:  ·  Problem: Pericardial effusion.   ·  Plan: Seen on bedside POCUS. negative for Moreira's triad, HD stable  - evaluated by cardiology, no emergent pericardiocentesis but IR for image-guided pericardiocentesis  - small effusion noted on TTE may 1  - IR consult sent--> awaiting on rec   - RVP negative, CTA PE negative  - could have malignant effusion from lung nodules seen on PET/CT if they are mets Vs other etiologies   will cont to monitor on Tele and monitor hemodynamics .  Please avoid hypotension and Bradycardia.     Problem/Plan - 2:  ·  Problem: Chronic obstructive pulmonary disease (COPD).   ·  Plan: - VBG not consistent with exacerbation - minimal CO2 retention; if anything, CO2 may be elevated to compensate for lactic acidosis--> monitor SPO2.    - home regimen Trelegy ellipta qd  - duonebs while inpt.     Problem/Plan - 3:  ·  Problem: Heart failure.   ·  Plan: proBNP 4113, appears stable from previous  - euvolemic on exam; lower suspicion for HFpEF as primary cause of SOB but can contribute  - TTE May 1: left ventricular systolic function is mildly decreased with an ejection fraction of 41 %;There is global left ventricular hypokinesis. Mild left ventricular diastolic dysfunction, normal right ventricular systolic function, small pericardial effusion--> current EF =50%  - avoid diuresis  - c/w home atenolol; unclear if he takes irbesartan 300mg- has been listed in some med lists but does not appear to have picked any up at pharmacy.     Problem/Plan - 4:  ·  Problem: Bladder cancer.   ·  Plan: Follows with Dr Ryan Spivey at outpatient   - not yet on pharmacologic therapy  - PET/CT with nodules in lungs and nasopharynx, unclear if mets  - continue with home mirtazepine for appetite  - c/w home tamsulosin 0.4mg.     Problem/Plan - 5:  ·  Problem: Anemia.   ·  Plan: Hb 8.9, appears to be near baseline  - history of brown hematuria 2/2 bladder cancer and pericardial fluid could be blood   - could have component of ACD  - monitor Hb, transfuse <8.     Problem/Plan - 6:  ·  Problem: Stage 3 chronic kidney disease.   ·  Plan: Cr 1.53, appears to be at baseline  - trend Cr for now and avoid nephrotoxins.     Problem/Plan - 7:  ·  Problem: Anxiety.   ·  Plan: - c/w home fluoxetine  - c/w home diazepam 5mg qd with a prn nighttime dose  - regularly smokes marijuana for anxiety including at night to go to sleep.     Problem/Plan - 8:  ·  Problem: Hyperlipidemia.   ·  Plan: - c/w home statin.     Problem/Plan - 9:  ·  Problem: Need for prophylactic measure.   ·  Plan: Diet: regular  DVT: SCDs until IR procedure then heparin subq  Dispo: pending course.

## 2023-07-07 NOTE — DISCHARGE NOTE PROVIDER - HOSPITAL COURSE
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 In the ED, POCUS showed pericardial effusion, not requiring emergent pericardiocentesis. CTA, RVP negative. Pt was monitored for signs of tamponade. Repeat TTE showed smaller pericardial effusion. Oncology was consulted for concern of metastases of bladder cancer. 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 In the ED, POCUS showed pericardial effusion, not requiring emergent pericardiocentesis. CTA, RVP negative. Pt was monitored for signs of tamponade. Repeat TTE showed pericardial effusion smaller compared to before. Oncology was consulted for concern of metastases of bladder cancer. 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 In the ED, POCUS showed pericardial effusion, not requiring emergent pericardiocentesis. CTA, RVP negative. Pt was monitored for signs of tamponade. Repeat TTE showed pericardial effusion smaller compared to before. Oncology was consulted for concern of metastases of bladder cancer. 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 In the ED, POCUS showed pericardial effusion, not requiring emergent pericardiocentesis. CTA, RVP negative. Pt was monitored for signs of tamponade. Repeat TTE showed pericardial effusion smaller compared to before. Oncology was consulted for concern of metastases of bladder cancer. On discharge, pt was weaned to room air, although needed oxygen for ambulation and anxiety. He was seen by pulm and SOB likely 2/2 worsening COPD. He was discharged on Trelegy, albuterol nebs and rescue albuterol inhaler. Will be completing prednisone course on 7/12.    Patient deemed medically stable for discharge to home. 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 In the ED, POCUS showed pericardial effusion, not requiring emergent pericardiocentesis. CTA, RVP negative. Pt was monitored for signs of tamponade. Repeat TTE showed pericardial effusion smaller compared to before. Oncology was consulted for concern of metastases of bladder cancer. On discharge, pt was weaned to room air, although needed oxygen for ambulation and anxiety. He was seen by pulm and SOB likely 2/2 worsening COPD. He was discharged on Trelegy, albuterol nebs and rescue albuterol inhaler. Will be completing prednisone course on 7/12.    Patient deemed medically stable for discharge to home with oxygen delivered to bedside. 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     In the ED, POCUS showed pericardial effusion, not requiring emergent pericardiocentesis. CTA, RVP negative. Pt was monitored for signs of tamponade. Repeat TTE showed pericardial effusion smaller compared to before.     Oncology was consulted for concern of metastases of bladder cancer, recommended outpatient f/u. Given a course of steroids for COPD exacerbation.     Pt remained anxious during hospitalization and repeatedly requested to leave. Declined many interventions. Agreed to stay after niece visited.     Remained hypoxic off oxygen, home oxygen arranged. He was seen by pulm- discharged on Trelegy, albuterol nebs and rescue albuterol inhaler. Will be completing prednisone course on 7/12.    Discharged w f/u oxygen delivered to bedside.

## 2023-07-07 NOTE — PROGRESS NOTE ADULT - ATTENDING COMMENTS
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 Of note, he had a PET/CT on 6/19 that showed FDG avidity in posterior nasopharynx as well as multiple lung nodules with uptake. He saw ENT who visualized no discrete lesion.    ED Course: VSS, cardiology consulted for pericardial effusion on POCUS and  no emergent pericardiocentesis indicated and recommended IR evaluation.  CTA, RVP neg. S/p xanax and iv fentanyl  in the ED   # Pericardial effusion       effusion on TTE now small-moderate in size compared to small size on prior TTE 5/2023, repeat TTE ( 7/7 )with small pericardial effusion (    decreased from prio)       Patient remains hemodynamically stable       will cont to monitor on Tele  and will prepare for DC if patient remains hemodynamically stable      Cardio and IR rec appreciated    # HFrEF ( EF 41% on 5/2023)      Change Atenolol --> to Toprol , cont with Atorvastatin       Might need diuretic at some point , now euvolemic, will need to f/p with cardio for GDMT once able       Cardio rec is appreciated       Monitor SPO2   #  Dyspnea / could be sec to COPD       Cont Duoneb / Symbicort and if unable to titrate off of O2 , will start short course of Prednisone    #  known bladder CA        onc F/up     discussed with medical resident   rest is as per above     Ondina Brar   HOspitalist   Available on TEAMS . 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 Of note, he had a PET/CT on 6/19 that showed FDG avidity in posterior nasopharynx as well as multiple lung nodules with uptake. He saw ENT who visualized no discrete lesion.    ED Course: VSS, cardiology consulted for pericardial effusion on POCUS and  no emergent pericardiocentesis indicated and recommended IR evaluation.  CTA, RVP neg. S/p xanax and iv fentanyl  in the ED   # Pericardial effusion       Admission effusion on TTE now small-moderate in size compared to small size on prior TTE 5/2023, repeat TTE ( 7/7 )with small pericardial     effusion ( decreased from prior)       Patient remains hemodynamically stable       will cont to monitor on Tele  and will prepare for DC if patient remains hemodynamically stable      Cardio and IR rec appreciated    # HFrEF ( EF 41% on 5/2023)      Change Atenolol --> to Toprol , cont with Atorvastatin       Might need diuretic at some point , now euvolemic, will need to f/p with cardio for GDMT once able       Cardio rec is appreciated       Monitor SPO2   #  Dyspnea / could be sec to COPD       Cont Duoneb / Symbicort and if unable to titrate off of O2 , will start short course of Prednisone    #  known bladder CA        onc F/up     discussed with medical resident   rest is as per above     Ondina Brar   HOspitalist   Available on TEAMS .

## 2023-07-07 NOTE — DISCHARGE NOTE PROVIDER - NSDCMRMEDTOKEN_GEN_ALL_CORE_FT
atenolol 25 mg oral tablet: 1 tab(s) orally once a day  diazePAM 5 mg oral tablet: 1 tab(s) orally once a day (at bedtime) NOTE: As per Pharmacy, 1 tablet orally 3 times a day as needed for anxiety  FLUoxetine 40 mg oral capsule: 1 cap(s) orally once a day  Folbic oral tablet: 1 tab(s) orally once a day  mirtazapine 7.5 mg oral tablet: 1 tab(s) orally once a day (at bedtime)  polyethylene glycol 3350 oral powder for reconstitution: 17 gram(s) orally once a day As needed Constipation  simvastatin 40 mg oral tablet: 1 tab(s) orally once a day  tamsulosin 0.4 mg oral capsule: 1 cap(s) orally once a day (at bedtime)  Trelegy Ellipta 100 mcg-62.5 mcg-25 mcg/inh inhalation powder: 1 puff(s) inhaled once a day   atenolol 25 mg oral tablet: 1 tab(s) orally once a day  diazePAM 5 mg oral tablet: 1 tab(s) orally once a day (at bedtime) NOTE: As per Pharmacy, 1 tablet orally 3 times a day as needed for anxiety  FLUoxetine 40 mg oral capsule: 1 cap(s) orally once a day  Folbic oral tablet: 1 tab(s) orally once a day  mirtazapine 7.5 mg oral tablet: 1 tab(s) orally once a day (at bedtime)  polyethylene glycol 3350 oral powder for reconstitution: 17 gram(s) orally once a day As needed Constipation  Portable inogen: at 2L  simvastatin 40 mg oral tablet: 1 tab(s) orally once a day  tamsulosin 0.4 mg oral capsule: 1 cap(s) orally once a day (at bedtime)  Trelegy Ellipta 100 mcg-62.5 mcg-25 mcg/inh inhalation powder: 1 puff(s) inhaled once a day   Albuterol (Eqv-ProAir HFA) 90 mcg/inh inhalation aerosol: 2 puff(s) inhaled every 6 hours as needed for  shortness of breath and/or wheezing  diazePAM 5 mg oral tablet: 1 tab(s) orally once a day (at bedtime) NOTE: As per Pharmacy, 1 tablet orally 3 times a day as needed for anxiety  FLUoxetine 40 mg oral capsule: 1 cap(s) orally once a day  Folbic oral tablet: 1 tab(s) orally once a day  metoprolol succinate 25 mg oral tablet, extended release: 1 tab(s) orally once a day  mirtazapine 7.5 mg oral tablet: 1 tab(s) orally once a day (at bedtime)  polyethylene glycol 3350 oral powder for reconstitution: 17 gram(s) orally once a day As needed Constipation  predniSONE 20 mg oral tablet: 2 tab(s) orally once a day  simvastatin 40 mg oral tablet: 1 tab(s) orally once a day  tamsulosin 0.4 mg oral capsule: 1 cap(s) orally once a day (at bedtime)  Trelegy Ellipta 100 mcg-62.5 mcg-25 mcg/inh inhalation powder: 1 puff(s) inhaled once a day

## 2023-07-07 NOTE — PROGRESS NOTE ADULT - PROBLEM SELECTOR PLAN 2
- VBG not consistent with exacerbation - minimal CO2 retention; if anything, CO2 may be elevated to compensate for lactic acidosis--> monitor SPO2.    - home regimen Trelegy ellipta qd  - duonebs while inpt - VBG not consistent with exacerbation - minimal CO2 retention; if anything, CO2 may be elevated to compensate for lactic acidosis--> monitor SPO2.    - home regimen Trelegy ellipta qd  - duonebs while inpt  Titrate of O2 supplement

## 2023-07-07 NOTE — PROGRESS NOTE ADULT - PROBLEM SELECTOR PLAN 3
proBNP 4113, appears stable from previous  - euvolemic on exam; lower suspicion for HFpEF as primary cause of SOB but can contribute  - TTE May 1: left ventricular systolic function is mildly decreased with an ejection fraction of 41 %;There is global left ventricular hypokinesis. Mild left ventricular diastolic dysfunction, normal right ventricular systolic function, small pericardial effusion--> current EF =50%  - avoid diuresis  - c/w home atenolol; unclear if he takes irbesartan 300mg- has been listed in some med lists but does not appear to have picked any up at pharmacy

## 2023-07-07 NOTE — DISCHARGE NOTE PROVIDER - NSDCFUSCHEDAPPT_GEN_ALL_CORE_FT
[facility]  ELECTROPH [address]  Scheduled Appointment: 07/13/2023    [facility]  CATSCAN [address]  Scheduled Appointment: 07/13/2023    [facility] [address]  Scheduled App[facility]ment: 07/13/2023    [facility]  OTOLARYNG [address] [facility]  Scheduled Appointment: 07/24/2023    Ryan Spivey  [facility]  [facility]  Scheduled Appointment: 08/10/2023     [facility]  ELECTROPH [address]  Scheduled Appointment: 07/13/2023    [facility]  CATSCAN [address]  Scheduled Appointment: 07/13/2023    [facility] [address]  Scheduled Appointment: 07/13/2023    Rafael Zaragoza  [facility]  CARDIOLOGY [address] [facility]   Scheduled Appointment: 07/17/2023    [facility]  OTOLARYNG [address] [facility]  Scheduled Appointment: 07/24/2023    Ryan Spivey  [facility]  [facility]  Scheduled Appointment: 08/10/2023     [facility]  ELECTROPH [address]  Scheduled Appointment: 07/13/2023    [facility]  CATSCAN [address]  Scheduled Appointment: 07/13/2023    [facility] [address]  Scheduled Appointment: 07/13/2023    Rafael Zaragoza  [facility]  CARDIOLOGY [address] [facility]   Scheduled Appointment: 07/19/2023    [facility]  OTOLARYNG [address] [facility]  Scheduled Appointment: 07/24/2023    Ryan Spivey  [facility]  [facility]  Scheduled Appointment: 08/10/2023

## 2023-07-07 NOTE — PROGRESS NOTE ADULT - PROBLEM SELECTOR PLAN 1
Seen on bedside POCUS. negative for Moreira's triad, HD stable  - evaluated by cardiology, no emergent pericardiocentesis but IR for image-guided pericardiocentesis  - small effusion noted on TTE may 1  - IR consult sent--> awaiting on rec   - RVP negative, CTA PE negative  - could have malignant effusion from lung nodules seen on PET/CT if they are mets Vs other etiologies   will cont to monitor on Tele and monitor hemodynamics .  Please avoid hypotension and Bradycardia Seen on bedside POCUS. negative for Moreira's triad, HD stable  - evaluated by cardiology, no emergent pericardiocentesis but IR for image-guided pericardiocentesis  - small effusion noted on TTE may 1  - IR consult sent--> seen patient and no plan for pericardiocentesis   - RVP negative, CTA PE negative  - could have malignant effusion from lung nodules seen on PET/CT if they are mets Vs other etiologies   will cont to monitor on Tele and monitor hemodynamics .  Please avoid hypotension and Bradycardia    F/p repeat TTE on 7/7   cont Tele monitoring

## 2023-07-08 NOTE — PROGRESS NOTE ADULT - PROBLEM SELECTOR PLAN 4
Follows with Dr Ryan Spivey at outpatient   - not yet on pharmacologic therapy  - PET/CT with nodules in lungs and nasopharynx, unclear if mets  - continue with home mirtazepine for appetite  - c/w home tamsulosin 0.4mg  onc follow up  - oncology recommended CT neck w/ contrast due to out pt PET scan

## 2023-07-08 NOTE — PROGRESS NOTE ADULT - SUBJECTIVE AND OBJECTIVE BOX
Patient is a 78y old  Male who presents with a chief complaint of shortness of breath (08 Jul 2023 10:29)      SUBJECTIVE / OVERNIGHT EVENTS: Pt states he is okay. Denies chest pain, abdominal pain, current fever/chills. Endorses constipation for the past four days.    MEDICATIONS  (STANDING):  atorvastatin 40 milliGRAM(s) Oral at bedtime  budesonide  80 MICROgram(s)/formoterol 4.5 MICROgram(s) Inhaler 2 Puff(s) Inhalation two times a day  diazepam    Tablet 5 milliGRAM(s) Oral daily  FLUoxetine 40 milliGRAM(s) Oral daily  heparin   Injectable 5000 Unit(s) SubCutaneous every 12 hours  metoprolol succinate ER 25 milliGRAM(s) Oral daily  mirtazapine 7.5 milliGRAM(s) Oral at bedtime  multivitamin 1 Tablet(s) Oral daily  pantoprazole    Tablet 40 milliGRAM(s) Oral before breakfast  polyethylene glycol 3350 17 Gram(s) Oral two times a day  predniSONE   Tablet 40 milliGRAM(s) Oral daily  senna 2 Tablet(s) Oral at bedtime  tamsulosin 0.4 milliGRAM(s) Oral at bedtime  tiotropium 2.5 MICROgram(s) Inhaler 2 Puff(s) Inhalation daily    MEDICATIONS  (PRN):  acetaminophen     Tablet .. 650 milliGRAM(s) Oral every 6 hours PRN Mild Pain (1 - 3)  albuterol/ipratropium for Nebulization 3 milliLiter(s) Nebulizer every 6 hours PRN Wheezing  diazepam    Tablet 5 milliGRAM(s) Oral daily PRN Anxiety  melatonin 3 milliGRAM(s) Oral at bedtime PRN Insomnia      CAPILLARY BLOOD GLUCOSE        I&O's Summary    07 Jul 2023 07:01  -  08 Jul 2023 07:00  --------------------------------------------------------  IN: 120 mL / OUT: 300 mL / NET: -180 mL    08 Jul 2023 07:01  -  08 Jul 2023 17:44  --------------------------------------------------------  IN: 480 mL / OUT: 0 mL / NET: 480 mL        Vital Signs Last 24 Hrs  T(C): 37 (08 Jul 2023 11:34), Max: 37.3 (08 Jul 2023 04:30)  T(F): 98.6 (08 Jul 2023 11:34), Max: 99.2 (08 Jul 2023 04:30)  HR: 84 (08 Jul 2023 11:34) (66 - 84)  BP: 129/72 (08 Jul 2023 11:34) (107/64 - 129/72)  BP(mean): --  RR: 91 (08 Jul 2023 11:34) (18 - 92)  SpO2: 95% (08 Jul 2023 09:17) (91% - 97%)    Parameters below as of 08 Jul 2023 11:34  Patient On (Oxygen Delivery Method): nasal cannula  O2 Flow (L/min): 3      PHYSICAL EXAM:  GENERAL: NAD, well-developed, well-nourished  HEAD: Atraumatic, Normocephalic  EYES: conjunctiva and sclera clear  NECK: Supple  CHEST/LUNG: Clear to auscultation bilaterally; No wheezes or crackles  HEART: Normal S1/S2; Regular rate and rhythm; No murmurs, rubs, or gallops  ABDOMEN: Soft, Nontender, Nondistended; Bowel sounds present  PSYCH: A&Ox3    LABS:                     RADIOLOGY & ADDITIONAL TESTS:    Imaging Personally Reviewed:    Consultant(s) Notes Reviewed:      Care Discussed with Consultants/Other Providers:   Patient is a 78y old  Male who presents with a chief complaint of shortness of breath (08 Jul 2023 10:29)      SUBJECTIVE / OVERNIGHT EVENTS: Pt states NAEO. States he is feeling well and back to his normal self. Denies chest pain, abdominal pain, current fever/chills. Endorses constipation for the past four days.    MEDICATIONS  (STANDING):  atorvastatin 40 milliGRAM(s) Oral at bedtime  budesonide  80 MICROgram(s)/formoterol 4.5 MICROgram(s) Inhaler 2 Puff(s) Inhalation two times a day  diazepam    Tablet 5 milliGRAM(s) Oral daily  FLUoxetine 40 milliGRAM(s) Oral daily  heparin   Injectable 5000 Unit(s) SubCutaneous every 12 hours  metoprolol succinate ER 25 milliGRAM(s) Oral daily  mirtazapine 7.5 milliGRAM(s) Oral at bedtime  multivitamin 1 Tablet(s) Oral daily  pantoprazole    Tablet 40 milliGRAM(s) Oral before breakfast  polyethylene glycol 3350 17 Gram(s) Oral two times a day  predniSONE   Tablet 40 milliGRAM(s) Oral daily  senna 2 Tablet(s) Oral at bedtime  tamsulosin 0.4 milliGRAM(s) Oral at bedtime  tiotropium 2.5 MICROgram(s) Inhaler 2 Puff(s) Inhalation daily    MEDICATIONS  (PRN):  acetaminophen     Tablet .. 650 milliGRAM(s) Oral every 6 hours PRN Mild Pain (1 - 3)  albuterol/ipratropium for Nebulization 3 milliLiter(s) Nebulizer every 6 hours PRN Wheezing  diazepam    Tablet 5 milliGRAM(s) Oral daily PRN Anxiety  melatonin 3 milliGRAM(s) Oral at bedtime PRN Insomnia      CAPILLARY BLOOD GLUCOSE        I&O's Summary    07 Jul 2023 07:01  -  08 Jul 2023 07:00  --------------------------------------------------------  IN: 120 mL / OUT: 300 mL / NET: -180 mL    08 Jul 2023 07:01  -  08 Jul 2023 17:44  --------------------------------------------------------  IN: 480 mL / OUT: 0 mL / NET: 480 mL        Vital Signs Last 24 Hrs  T(C): 37 (08 Jul 2023 11:34), Max: 37.3 (08 Jul 2023 04:30)  T(F): 98.6 (08 Jul 2023 11:34), Max: 99.2 (08 Jul 2023 04:30)  HR: 84 (08 Jul 2023 11:34) (66 - 84)  BP: 129/72 (08 Jul 2023 11:34) (107/64 - 129/72)  BP(mean): --  RR: 91 (08 Jul 2023 11:34) (18 - 92)  SpO2: 95% (08 Jul 2023 09:17) (91% - 97%)    Parameters below as of 08 Jul 2023 11:34  Patient On (Oxygen Delivery Method): nasal cannula  O2 Flow (L/min): 3      PHYSICAL EXAM:  GENERAL: NAD, well-developed, well-nourished  HEAD: Atraumatic, Normocephalic  EYES: conjunctiva and sclera clear  NECK: Supple  CHEST/LUNG: Clear to auscultation bilaterally; No wheezes or crackles  HEART: Normal S1/S2; Regular rate and rhythm; No murmurs, rubs, or gallops  ABDOMEN: Soft, Nontender, Nondistended; Bowel sounds present  PSYCH: A&Ox3    LABS:                     RADIOLOGY & ADDITIONAL TESTS:    Imaging Personally Reviewed:    Consultant(s) Notes Reviewed:      Care Discussed with Consultants/Other Providers:

## 2023-07-08 NOTE — PROGRESS NOTE ADULT - PROBLEM SELECTOR PLAN 1
Seen on bedside POCUS. negative for Moreira's triad, HD stable  - evaluated by cardiology, no emergent pericardiocentesis but IR for image-guided pericardiocentesis  - small effusion noted on TTE may 1; repeat TTE 7/7 showed smaller pericardial effusion  - IR consult sent--> seen patient and no plan for pericardiocentesis   - RVP negative, CTA PE negative  - could have malignant effusion from lung nodules seen on PET/CT if they are mets Vs other etiologies   will cont to monitor on Tele and monitor hemodynamics .  Please avoid hypotension and Bradycardia

## 2023-07-08 NOTE — PROGRESS NOTE ADULT - PROBLEM SELECTOR PLAN 2
- VBG not consistent with exacerbation - minimal CO2 retention; if anything, CO2 may be elevated to compensate for lactic acidosis--> monitor SPO2.    - home regimen Trelegy ellipta qd  - duonebs while inpt  Titrate of O2 supplement - VBG not consistent with exacerbation - minimal CO2 retention; if anything, CO2 may be elevated to compensate for lactic acidosis--> monitor SPO2.    - home regimen Trelegy ellipta qd  - duonebs while inpt  Titrate of O2 supplement  - ordered CXR

## 2023-07-08 NOTE — PROGRESS NOTE ADULT - ATTENDING COMMENTS
Alicia Gotti MD  Division of Hospital Medicine  [facility]   Available on [employer] Teams - messages preferred prior to calls.    Patient seen and examined today with Team 1 Residents. 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    Active Issues:  #Pericardial Effusion  #Hypoxia  #HFrEF    Plan:  - repeat TTE on 7/7 with decrease in pericardial effusion compared to prior studies  - states dyspnea improved but with significant desats with ambulation  - c/w supplemental O2 and daily weaning trials  - c/w GDMT for HFrEF with atorva, Toprol  - check pro-BNP in AM. though appears he has been refusing labs. we discussed importance of labs and he was amenable  - Cardiology consult appreciated  - check repeat CXR to evaluate for infiltrate or edema given difficulty weaning off supplemental O2  - c/w duonebs, symbicort as ordered but no wheeze on exam   - pending CT neck for abnormal findings on PET as outpatient    Rest as detailed in note above.    Plan discussed with patient and Team 1 Resident Dr. Guzman.

## 2023-07-09 NOTE — PROGRESS NOTE ADULT - SUBJECTIVE AND OBJECTIVE BOX
INTERVAL: patient had episode of SOB with O2 Sat 90%, responded well to duonebs.  SUBJECTIVE: Patient examined bedside this AM. States that his breathing feels better, is wondering when he can leave the hospital. Otherwise no concerns. Denies Fevers, chills, chest pain, abdominal pain, headache, dizziness, nausea, vomiting, dysuria, changes in urination, changes in BM     OBJECTIVE:  ICU Vital Signs Last 24 Hrs  T(C): 36.7 (09 Jul 2023 04:25), Max: 37.5 (08 Jul 2023 23:53)  T(F): 98 (09 Jul 2023 04:25), Max: 99.5 (08 Jul 2023 23:53)  HR: 73 (09 Jul 2023 04:25) (73 - 90)  BP: 106/68 (09 Jul 2023 04:25) (105/64 - 129/72)  BP(mean): --  ABP: --  ABP(mean): --  RR: 16 (09 Jul 2023 04:25) (16 - 91)  SpO2: 90% (09 Jul 2023 04:25) (90% - 97%)    O2 Parameters below as of 09 Jul 2023 07:25  Patient On (Oxygen Delivery Method): nasal cannula  O2 Flow (L/min): 3            07-08 @ 07:01  -  07-09 @ 07:00  --------------------------------------------------------  IN: 600 mL / OUT: 0 mL / NET: 600 mL      CAPILLARY BLOOD GLUCOSE          PHYSICAL EXAM:  General: Well-groomed, NAD, laying in bed, on 2L NC  HEENT: PERRLA, EOMI, non-icteric  Neck:  symmetric,  JVD absent  Respiratory: Clear to ascultation bilaterally, no crackles/rales, no Resp distress; no accessory muscle use  Cardiovascular: Distant heart sounds; RRR, no murmurs/rubs/gallops  Abdomen: Soft, NT, ND  Extremities: No edema noted  Skin: No rashes or lesions noted  Neurological: Sensation grossly intact  Psychiatry: AOx3, appropriate insight/judgement, appropriate affect, recent/remote memory intact    PRN Meds:  acetaminophen     Tablet .. 650 milliGRAM(s) Oral every 6 hours PRN  albuterol/ipratropium for Nebulization 3 milliLiter(s) Nebulizer every 6 hours PRN  diazepam    Tablet 5 milliGRAM(s) Oral daily PRN  melatonin 3 milliGRAM(s) Oral at bedtime PRN      LABS:                        8.3    13.89 )-----------( 394      ( 08 Jul 2023 19:03 )             27.0     Hgb Trend: 8.3<--, 8.9<--  07-08    139  |  99  |  30<H>  ----------------------------<  112<H>  4.3   |  28  |  1.80<H>    Ca    10.5      08 Jul 2023 19:03    TPro  6.7  /  Alb  3.1<L>  /  TBili  0.2  /  DBili  x   /  AST  14  /  ALT  10  /  AlkPhos  77  07-08    Creatinine Trend: 1.80<--, 1.54<--, 1.51<--, 1.41<--, 1.50<--, 1.70<--  PT/INR - ( 09 Jul 2023 06:36 )   PT: 14.4 sec;   INR: 1.25 ratio         PTT - ( 09 Jul 2023 06:36 )  PTT:35.2 sec  Urinalysis Basic - ( 08 Jul 2023 19:03 )    Color: x / Appearance: x / SG: x / pH: x  Gluc: 112 mg/dL / Ketone: x  / Bili: x / Urobili: x   Blood: x / Protein: x / Nitrite: x   Leuk Esterase: x / RBC: x / WBC x   Sq Epi: x / Non Sq Epi: x / Bacteria: x            MICROBIOLOGY:       RADIOLOGY:  [ ] Reviewed and interpreted by me    EKG:

## 2023-07-09 NOTE — PROGRESS NOTE ADULT - PROBLEM SELECTOR PLAN 1
Seen on bedside POCUS. negative for Moreira's triad, HD stable  - evaluated by cardiology, no emergent pericardiocentesis but IR for image-guided pericardiocentesis  - small effusion noted on TTE may 1; repeat TTE 7/7 showed smaller pericardial effusion  - IR consult sent--> seen patient and no plan for pericardiocentesis   - RVP negative, CTA PE negative  - could have malignant effusion from lung nodules seen on PET/CT if they are mets Vs other etiologies   will cont to monitor on Tele and monitor hemodynamics .  Please avoid hypotension and Bradycardia Seen on bedside POCUS. negative for Moreira's triad, HD stable  -evaluated by cardiology, no emergent pericardiocentesis but IR for image-guided pericardiocentesis  - small effusion noted on TTE may 1; repeat TTE 7/7 showed smaller pericardial effusion  - IR consult sent--> seen patient and no plan for pericardiocentesis   - RVP negative, CTA PE negative  - could have malignant effusion from lung nodules seen on PET/CT if they are mets Vs other etiologies   will cont to monitor on Tele and monitor hemodynamics .  Please avoid hypotension and Bradycardia Seen on bedside POCUS. negative for Moreira's triad, HD stable  -evaluated by cardiology, no emergent pericardiocentesis but IR for image-guided pericardiocentesis     -Per IR no plan for pericardiocentesis  - small effusion noted on TTE may 1; repeat TTE 7/7 showed smaller pericardial effusion     -No intevention at this time per cards  - RVP negative, CTA PE negative  - could have malignant effusion from lung nodules seen on PET/CT if they are mets Vs other etiologies   will cont to monitor on Tele and monitor hemodynamics

## 2023-07-09 NOTE — PROGRESS NOTE ADULT - ATTENDING COMMENTS
Alicia Gotti MD  Division of Hospital Medicine  [facility]   Available on Microsoft Teams - messages preferred prior to calls.    Patient seen and examined today with Team 1 Resident. 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    Active Issues:  #Pericardial Effusion  #Hypoxia  #HFrEF    Plan:  - repeat TTE on 7/7 with decrease in pericardial effusion compared to prior studies  - states dyspnea improved but with significant desats with ambulation on 7/8  - c/w supplemental O2 and daily weaning trials - at time of my exam today, he was on RA. per RN, he gets very anxious when the NC is not in his nose, so he had the NC in his nares with no oxygen on with SpO2>92%  - asked RN to ambulate him on RA and document  - c/w GDMT for HFrEF with atorva, Toprol  - pro-BNP downtrending and appears euvolemic on exam today  - he had been refusing labs and finally allowed yesterday after we discussed importance  - Cr mildly elevated but would avoid IVF. encouraged increasing his PO intake of fluids today. repeat Cr in AM  - Cardiology consult appreciated  - repeat CXR grossly unchanged  - c/w duonebs, symbicort, short course of PO prednisone for possibly COPD exacerbation as ordered, but no wheeze on exam   - pending CT neck for abnormal findings on PET as outpatient  - c/w daily weaning trials, but also informed patient may need home O2 on d/c if team continues to have difficulty weaning off supplemental O2    Rest as detailed in note above.    Plan discussed with patient and Team 1 Resident Dr. Dhillon. Alicia Gotti MD  Division of Hospital Medicine  [facility]   Available on [employer] Teams - messages preferred prior to calls.    Patient seen and examined today with Team 1 Resident. 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    Active Issues:  #Pericardial Effusion  #Hypoxia  #HFrEF  #COPD    Plan:  - repeat TTE on 7/7 with decrease in pericardial effusion compared to prior studies  - states dyspnea improved but with significant desats with ambulation on 7/8  - c/w supplemental O2 and daily weaning trials - at time of my exam today, he was on RA. per RN, he gets very anxious when the NC is not in his nose, so he had the NC in his nares with no oxygen on with SpO2>92%  - asked RN to ambulate him on RA and document  - c/w GDMT for HFrEF with atorva, Toprol  - pro-BNP downtrending and appears euvolemic on exam today  - he had been refusing labs and finally allowed yesterday after we discussed importance  - Cr mildly elevated but would avoid IVF. encouraged increasing his PO intake of fluids today. repeat Cr in AM  - Cardiology consult appreciated  - repeat CXR grossly unchanged  - c/w duonebs, symbicort, short course of PO prednisone for possibly COPD exacerbation as ordered, but no wheeze on exam   - pending CT neck for abnormal findings on PET as outpatient  - c/w daily weaning trials, but also informed patient may need home O2 on d/c if team continues to have difficulty weaning off supplemental O2    Rest as detailed in note above.    Plan discussed with patient and Team 1 Resident Dr. Dhillon.

## 2023-07-09 NOTE — PROGRESS NOTE ADULT - PROBLEM SELECTOR PLAN 2
- VBG not consistent with exacerbation - minimal CO2 retention; if anything, CO2 may be elevated to compensate for lactic acidosis--> monitor SPO2.    - home regimen Trelegy ellipta qd  - duonebs while inpt  Titrate of O2 supplement  - ordered CXR - VBG not consistent with exacerbation - minimal CO2 retention; if anything, CO2 may be elevated to compensate for lactic acidosis--> monitor SPO2.    - home regimen Trelegy ellipta qd --> Symbicort and albuterol inpatient equivalent  - duonebs while inpt  -Rpt CXR unchanged  -Wean O2 as needed

## 2023-07-10 NOTE — PROGRESS NOTE ADULT - PROBLEM SELECTOR PLAN 1
Seen on bedside POCUS. negative for Moreira's triad, HD stable  -evaluated by cardiology, no emergent pericardiocentesis but IR for image-guided pericardiocentesis     -Per IR no plan for pericardiocentesis  - small effusion noted on TTE may 1; repeat TTE 7/7 showed smaller pericardial effusion     -No intevention at this time per cards  - RVP negative, CTA PE negative  - could have malignant effusion from lung nodules seen on PET/CT if they are mets Vs other etiologies   will cont to monitor on Tele and monitor hemodynamics

## 2023-07-10 NOTE — PROVIDER CONTACT NOTE (OTHER) - RECOMMENDATIONS
Reassess pt's breathing after PRN albuterol tx.
Okay for pt to refuse AM labs. Possible d/c today. Cont to monitor.
Provider will talk with patient about plan and d/c.
Provider will talk with patient about plan and d/c. Administer Valium as ordered. Cont to monitor.

## 2023-07-10 NOTE — PROVIDER CONTACT NOTE (OTHER) - ASSESSMENT
Patient is A&Ox4, no distress, no dizziness, SOB visible on exertion, VSS, see in flow sheets.
Patient is A&Ox4, no distress, no dizziness, SOB visible on exertion, VSS, see in flow sheets.
Patient is A&Ox4, no distress, no dizziness, no SOB, VSS, see in flow sheets.
Patient is A&Ox4, reports dyspnea, no distress, RR is 24, all other VSS, see in flow sheets.

## 2023-07-10 NOTE — PROGRESS NOTE ADULT - ATTENDING COMMENTS
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    Active Issues:  #Pericardial Effusion  #Hypoxia  #HFrEF  #COPD    Plan:  - repeat TTE on 7/7 with decrease in pericardial effusion compared to prior studies  - states dyspnea improved but with significant desats with ambulation- home O2 being arranged.   - c/w GDMT for HFrEF with atorva, Toprol  - c/w duonebs, symbicort, short course of PO prednisone for possible COPD exacerbation   - Pt has been refusing many interventions    Pt insistent on leaving AMA today- multiple discussions had. Finally agreed to stay after niece visited. D/c planning once home O2 arranged.   Remains at high risk for readmission as multiple medical issues and poor social support.

## 2023-07-10 NOTE — PROGRESS NOTE ADULT - PROBLEM SELECTOR PLAN 10
Diet: regular  DVT: SCDs until ?IR procedure then heparin subq  Dispo: pending course

## 2023-07-10 NOTE — PROGRESS NOTE ADULT - PROBLEM SELECTOR PLAN 2
- VBG not consistent with exacerbation - minimal CO2 retention; if anything, CO2 may be elevated to compensate for lactic acidosis--> monitor SPO2.    - increased Symbicort, continuing Duonebs per Pulm Recs. Spiriva also on board.  - duonebs while inpt  -Rpt CXR unchanged  -Wean O2 as needed

## 2023-07-10 NOTE — PROGRESS NOTE ADULT - PROBLEM SELECTOR PLAN 1
Seen on bedside POCUS. negative for Moreira's triad, HD stable, no drain placed  - per cardiology, will just need outpatient follow-up and repeat TTE's periodically  - could have malignant effusion from lung nodules seen on PET/CT if they are mets Vs other etiologies   will cont to monitor on Tele and monitor hemodynamics

## 2023-07-10 NOTE — PROGRESS NOTE ADULT - SUBJECTIVE AND OBJECTIVE BOX
PATIENT: SAUNDRA REMY, MRN: [id number]    CHIEF COMPLAINT: Patient is a 78y old  Male who presents with a chief complaint of shortness of breath (09 Jul 2023 09:41)      INTERVAL HISTORY/OVERNIGHT EVENTS: No overnight events.       MEDICATIONS:  MEDICATIONS  (STANDING):  atorvastatin 40 milliGRAM(s) Oral at bedtime  budesonide  80 MICROgram(s)/formoterol 4.5 MICROgram(s) Inhaler 2 Puff(s) Inhalation two times a day  diazepam    Tablet 5 milliGRAM(s) Oral daily  FLUoxetine 40 milliGRAM(s) Oral daily  heparin   Injectable 5000 Unit(s) SubCutaneous every 12 hours  metoprolol succinate ER 25 milliGRAM(s) Oral daily  mirtazapine 7.5 milliGRAM(s) Oral at bedtime  multivitamin 1 Tablet(s) Oral daily  pantoprazole    Tablet 40 milliGRAM(s) Oral before breakfast  polyethylene glycol 3350 17 Gram(s) Oral two times a day  predniSONE   Tablet 40 milliGRAM(s) Oral daily  senna 2 Tablet(s) Oral at bedtime  tamsulosin 0.4 milliGRAM(s) Oral at bedtime  tiotropium 2.5 MICROgram(s) Inhaler 2 Puff(s) Inhalation daily    MEDICATIONS  (PRN):  acetaminophen     Tablet .. 650 milliGRAM(s) Oral every 6 hours PRN Mild Pain (1 - 3)  albuterol/ipratropium for Nebulization 3 milliLiter(s) Nebulizer every 6 hours PRN Wheezing  diazepam    Tablet 5 milliGRAM(s) Oral daily PRN Anxiety  melatonin 3 milliGRAM(s) Oral at bedtime PRN Insomnia      ALLERGIES: Allergies    No Known Allergies    Intolerances        OBJECTIVE:  ICU Vital Signs Last 24 Hrs  T(C): 36.9 (10 Jul 2023 05:12), Max: 36.9 (10 Jul 2023 05:12)  T(F): 98.4 (10 Jul 2023 05:12), Max: 98.4 (10 Jul 2023 05:12)  HR: 68 (10 Jul 2023 05:12) (68 - 80)  BP: 106/63 (10 Jul 2023 05:12) (94/58 - 124/80)  BP(mean): --  ABP: --  ABP(mean): --  RR: 18 (10 Jul 2023 05:12) (18 - 18)  SpO2: 93% (10 Jul 2023 05:12) (84% - 99%)    O2 Parameters below as of 10 Jul 2023 05:12  Patient On (Oxygen Delivery Method): nasal cannula  O2 Flow (L/min): 2          CAPILLARY BLOOD GLUCOSE        CAPILLARY BLOOD GLUCOSE        I&O's Summary    08 Jul 2023 07:01  -  09 Jul 2023 07:00  --------------------------------------------------------  IN: 600 mL / OUT: 0 mL / NET: 600 mL    09 Jul 2023 07:01  -  10 Jul 2023 06:42  --------------------------------------------------------  IN: 840 mL / OUT: 0 mL / NET: 840 mL      Daily     Daily     PHYSICAL EXAMINATION:  General: Comfortable, no acute distress, cooperative with exam.  HEENT: PERRLA  Respiratory: CTAB, normal respiratory effort, no coughing, wheezes, crackles, or rales.  CV: RRR, S1S2, no murmurs, rubs or gallops. No JVD. Distal pulses intact.  Abdominal: Soft, nontender, nondistended, no rebound or guarding, normal bowel sounds.  Neurology: AOx3, no focal neuro defects, KEE x 4.  Extremities: No pitting edema, + Peripheral pulses.      LABS:                          8.3    13.89 )-----------( 394      ( 08 Jul 2023 19:03 )             27.0     07-08    139  |  99  |  30<H>  ----------------------------<  112<H>  4.3   |  28  |  1.80<H>    Ca    10.5      08 Jul 2023 19:03    TPro  6.7  /  Alb  3.1<L>  /  TBili  0.2  /  DBili  x   /  AST  14  /  ALT  10  /  AlkPhos  77  07-08    LIVER FUNCTIONS - ( 08 Jul 2023 19:03 )  Alb: 3.1 g/dL / Pro: 6.7 g/dL / ALK PHOS: 77 U/L / ALT: 10 U/L / AST: 14 U/L / GGT: x           PT/INR - ( 09 Jul 2023 06:36 )   PT: 14.4 sec;   INR: 1.25 ratio         PTT - ( 09 Jul 2023 06:36 )  PTT:35.2 sec        Urinalysis Basic - ( 08 Jul 2023 19:03 )    Color: x / Appearance: x / SG: x / pH: x  Gluc: 112 mg/dL / Ketone: x  / Bili: x / Urobili: x   Blood: x / Protein: x / Nitrite: x   Leuk Esterase: x / RBC: x / WBC x   Sq Epi: x / Non Sq Epi: x / Bacteria: x       PATIENT: SAUNDRA REMY, MRN: [id number]    CHIEF COMPLAINT: Patient is a 78y old  Male who presents with a chief complaint of shortness of breath (09 Jul 2023 09:41)      INTERVAL HISTORY/OVERNIGHT EVENTS: No overnight events. He was not having any SOB or chest pain and wanted to leave the hospital after acquiring his own oxygen. He was talked out of this by his family when they arrived      MEDICATIONS:  MEDICATIONS  (STANDING):  atorvastatin 40 milliGRAM(s) Oral at bedtime  budesonide  80 MICROgram(s)/formoterol 4.5 MICROgram(s) Inhaler 2 Puff(s) Inhalation two times a day  diazepam    Tablet 5 milliGRAM(s) Oral daily  FLUoxetine 40 milliGRAM(s) Oral daily  heparin   Injectable 5000 Unit(s) SubCutaneous every 12 hours  metoprolol succinate ER 25 milliGRAM(s) Oral daily  mirtazapine 7.5 milliGRAM(s) Oral at bedtime  multivitamin 1 Tablet(s) Oral daily  pantoprazole    Tablet 40 milliGRAM(s) Oral before breakfast  polyethylene glycol 3350 17 Gram(s) Oral two times a day  predniSONE   Tablet 40 milliGRAM(s) Oral daily  senna 2 Tablet(s) Oral at bedtime  tamsulosin 0.4 milliGRAM(s) Oral at bedtime  tiotropium 2.5 MICROgram(s) Inhaler 2 Puff(s) Inhalation daily    MEDICATIONS  (PRN):  acetaminophen     Tablet .. 650 milliGRAM(s) Oral every 6 hours PRN Mild Pain (1 - 3)  albuterol/ipratropium for Nebulization 3 milliLiter(s) Nebulizer every 6 hours PRN Wheezing  diazepam    Tablet 5 milliGRAM(s) Oral daily PRN Anxiety  melatonin 3 milliGRAM(s) Oral at bedtime PRN Insomnia      ALLERGIES: Allergies    No Known Allergies    Intolerances        OBJECTIVE:  ICU Vital Signs Last 24 Hrs  T(C): 36.9 (10 Jul 2023 05:12), Max: 36.9 (10 Jul 2023 05:12)  T(F): 98.4 (10 Jul 2023 05:12), Max: 98.4 (10 Jul 2023 05:12)  HR: 68 (10 Jul 2023 05:12) (68 - 80)  BP: 106/63 (10 Jul 2023 05:12) (94/58 - 124/80)  BP(mean): --  ABP: --  ABP(mean): --  RR: 18 (10 Jul 2023 05:12) (18 - 18)  SpO2: 93% (10 Jul 2023 05:12) (84% - 99%)    O2 Parameters below as of 10 Jul 2023 05:12  Patient On (Oxygen Delivery Method): nasal cannula  O2 Flow (L/min): 2          CAPILLARY BLOOD GLUCOSE        CAPILLARY BLOOD GLUCOSE        I&O's Summary    08 Jul 2023 07:01  -  09 Jul 2023 07:00  --------------------------------------------------------  IN: 600 mL / OUT: 0 mL / NET: 600 mL    09 Jul 2023 07:01  -  10 Jul 2023 06:42  --------------------------------------------------------  IN: 840 mL / OUT: 0 mL / NET: 840 mL      Daily     Daily     PHYSICAL EXAMINATION:  General: Comfortable, no acute distress, but not cooperative with exam  HEENT: PERRLA  Respiratory: CTAB, normal respiratory effort, no coughing, wheezes, crackles, or rales.  CV: RRR, S1S2, no murmurs, rubs or gallops. No JVD.  Abdominal: Soft, nontender, nondistended  Neurology: AOx3, no focal neuro defects  Extremities: No pitting edema, + Peripheral pulses.      LABS:                          8.3    13.89 )-----------( 394      ( 08 Jul 2023 19:03 )             27.0     07-08    139  |  99  |  30<H>  ----------------------------<  112<H>  4.3   |  28  |  1.80<H>    Ca    10.5      08 Jul 2023 19:03    TPro  6.7  /  Alb  3.1<L>  /  TBili  0.2  /  DBili  x   /  AST  14  /  ALT  10  /  AlkPhos  77  07-08    LIVER FUNCTIONS - ( 08 Jul 2023 19:03 )  Alb: 3.1 g/dL / Pro: 6.7 g/dL / ALK PHOS: 77 U/L / ALT: 10 U/L / AST: 14 U/L / GGT: x           PT/INR - ( 09 Jul 2023 06:36 )   PT: 14.4 sec;   INR: 1.25 ratio         PTT - ( 09 Jul 2023 06:36 )  PTT:35.2 sec        Urinalysis Basic - ( 08 Jul 2023 19:03 )    Color: x / Appearance: x / SG: x / pH: x  Gluc: 112 mg/dL / Ketone: x  / Bili: x / Urobili: x   Blood: x / Protein: x / Nitrite: x   Leuk Esterase: x / RBC: x / WBC x   Sq Epi: x / Non Sq Epi: x / Bacteria: x

## 2023-07-10 NOTE — PROGRESS NOTE ADULT - PROBLEM SELECTOR PLAN 3
proBNP decreased during admission  - euvolemic on exam; low suspicion that SOB is contributing  - avoid diuresis  - On Toprol

## 2023-07-10 NOTE — PROVIDER CONTACT NOTE (OTHER) - ACTION/TREATMENT ORDERED:
Provider will talk with patient about plan and d/c.
Reassess pt's breathing after PRN albuterol tx.
Okay for pt to refuse AM labs. Possible d/c today. Cont to monitor.
Provider will talk with patient about plan and d/c. Administer Valium as ordered. Cont to monitor.

## 2023-07-10 NOTE — PROGRESS NOTE ADULT - ASSESSMENT
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  Patient was admitted for further evaluation in the hospital.

## 2023-07-10 NOTE — PROGRESS NOTE ADULT - PROBLEM SELECTOR PLAN 3
proBNP 4113, appears stable from previous  - euvolemic on exam; lower suspicion for HFpEF as primary cause of SOB but can contribute  - TTE May 1: left ventricular systolic function is mildly decreased with an ejection fraction of 41 %;There is global left ventricular hypokinesis. Mild left ventricular diastolic dysfunction, normal right ventricular systolic function, small pericardial effusion--> current EF =50%  - avoid diuresis  - c/w home atenolol; unclear if he takes irbesartan 300mg- has been listed in some med lists but does not appear to have picked any up at pharmacy proBNP decreased  - euvolemic on exam; lower suspicion for HFpEF as primary cause of SOB but can contribute  - TTE May 1: left ventricular systolic function is mildly decreased with an ejection fraction of 41 %;There is global left ventricular hypokinesis. Mild left ventricular diastolic dysfunction, normal right ventricular systolic function, small pericardial effusion--> current EF =50%  - avoid diuresis  - c/w home atenolol; unclear if he takes irbesartan 300mg- has been listed in some med lists but does not appear to have picked any up at pharmacy

## 2023-07-10 NOTE — CHART NOTE - NSCHARTNOTEFT_GEN_A_CORE
Pt tried to AMA twice. Was frustrated with delays with medications. Calmed patient down and mentioned would be hard to supply home O2 if left in the middle of the night.

## 2023-07-10 NOTE — PROGRESS NOTE ADULT - SUBJECTIVE AND OBJECTIVE BOX
PATIENT: SAUNDRA REMY, MRN: [id number]    CHIEF COMPLAINT: Patient is a 78y old  Male who presents with a chief complaint of shortness of breath (09 Jul 2023 09:41)      INTERVAL HISTORY/OVERNIGHT EVENTS: No overnight events. No SOB no CP. States that he was walking around without oxygen, but on flowsheets, he walked around without oxygen and dropped to mid 80s requiring 2L. Now 2.5L. Spoke with patient and family, and he was amenable to staying as we acquire his home oxygen.      MEDICATIONS:  MEDICATIONS  (STANDING):  atorvastatin 40 milliGRAM(s) Oral at bedtime  budesonide  80 MICROgram(s)/formoterol 4.5 MICROgram(s) Inhaler 2 Puff(s) Inhalation two times a day  diazepam    Tablet 5 milliGRAM(s) Oral daily  FLUoxetine 40 milliGRAM(s) Oral daily  heparin   Injectable 5000 Unit(s) SubCutaneous every 12 hours  metoprolol succinate ER 25 milliGRAM(s) Oral daily  mirtazapine 7.5 milliGRAM(s) Oral at bedtime  multivitamin 1 Tablet(s) Oral daily  pantoprazole    Tablet 40 milliGRAM(s) Oral before breakfast  polyethylene glycol 3350 17 Gram(s) Oral two times a day  predniSONE   Tablet 40 milliGRAM(s) Oral daily  senna 2 Tablet(s) Oral at bedtime  tamsulosin 0.4 milliGRAM(s) Oral at bedtime  tiotropium 2.5 MICROgram(s) Inhaler 2 Puff(s) Inhalation daily    MEDICATIONS  (PRN):  acetaminophen     Tablet .. 650 milliGRAM(s) Oral every 6 hours PRN Mild Pain (1 - 3)  albuterol/ipratropium for Nebulization 3 milliLiter(s) Nebulizer every 6 hours PRN Wheezing  diazepam    Tablet 5 milliGRAM(s) Oral daily PRN Anxiety  melatonin 3 milliGRAM(s) Oral at bedtime PRN Insomnia      ALLERGIES: Allergies    No Known Allergies    Intolerances        OBJECTIVE:  ICU Vital Signs Last 24 Hrs  T(C): 36.9 (10 Jul 2023 05:12), Max: 36.9 (10 Jul 2023 05:12)  T(F): 98.4 (10 Jul 2023 05:12), Max: 98.4 (10 Jul 2023 05:12)  HR: 68 (10 Jul 2023 05:12) (68 - 80)  BP: 106/63 (10 Jul 2023 05:12) (94/58 - 124/80)  BP(mean): --  ABP: --  ABP(mean): --  RR: 18 (10 Jul 2023 05:12) (18 - 18)  SpO2: 93% (10 Jul 2023 05:12) (84% - 99%)    O2 Parameters below as of 10 Jul 2023 05:12  Patient On (Oxygen Delivery Method): nasal cannula  O2 Flow (L/min): 2          CAPILLARY BLOOD GLUCOSE        CAPILLARY BLOOD GLUCOSE        I&O's Summary    08 Jul 2023 07:01  -  09 Jul 2023 07:00  --------------------------------------------------------  IN: 600 mL / OUT: 0 mL / NET: 600 mL    09 Jul 2023 07:01  -  10 Jul 2023 06:42  --------------------------------------------------------  IN: 840 mL / OUT: 0 mL / NET: 840 mL      Daily     Daily     PHYSICAL EXAMINATION:  General: Comfortable, no acute distress, cooperative with exam.  HEENT: PERRLA  Respiratory: CTAB, normal respiratory effort, no coughing, wheezes, crackles, or rales.  CV: RRR, S1S2, no murmurs, rubs or gallops. No JVD.  Abdominal: Soft, nontender, nondistended, no rebound or guarding, normal bowel sounds.  Neurology: AOx3, no focal neuro defects, KEE x 4.  Extremities: No pitting edema      LABS:                          8.3    13.89 )-----------( 394      ( 08 Jul 2023 19:03 )             27.0     07-08    139  |  99  |  30<H>  ----------------------------<  112<H>  4.3   |  28  |  1.80<H>    Ca    10.5      08 Jul 2023 19:03    TPro  6.7  /  Alb  3.1<L>  /  TBili  0.2  /  DBili  x   /  AST  14  /  ALT  10  /  AlkPhos  77  07-08    LIVER FUNCTIONS - ( 08 Jul 2023 19:03 )  Alb: 3.1 g/dL / Pro: 6.7 g/dL / ALK PHOS: 77 U/L / ALT: 10 U/L / AST: 14 U/L / GGT: x           PT/INR - ( 09 Jul 2023 06:36 )   PT: 14.4 sec;   INR: 1.25 ratio         PTT - ( 09 Jul 2023 06:36 )  PTT:35.2 sec        Urinalysis Basic - ( 08 Jul 2023 19:03 )    Color: x / Appearance: x / SG: x / pH: x  Gluc: 112 mg/dL / Ketone: x  / Bili: x / Urobili: x   Blood: x / Protein: x / Nitrite: x   Leuk Esterase: x / RBC: x / WBC x   Sq Epi: x / Non Sq Epi: x / Bacteria: x

## 2023-07-10 NOTE — CONSULT NOTE ADULT - ASSESSMENT
Mr. 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    #COPD  #Pulmonary nodules, RLL  #Pericardial effusion  #Acute hypoxic respiratory failure  #Bladder cancer    Recommend:   Mr. 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  Dyspnea has improved however patient with acute hypoxemic respiratory failure on exertion.  He also has been found to have a new RLL nodule on CTA measuring 0.9x0.6cm and a nodule increased from 0.8 to 1.3cm.  Patient follows with his a [facility] Pulmonologist by the name of Dr. Dowell (sp?) ([place]).    #COPD  #Pulmonary nodules, RLL  #Pericardial effusion  #Acute hypoxic respiratory failure  #Bladder cancer    Recommend:  - Supplemental O2 for O2Sat 84% on RA with ambulation (2L req)  - Continue Symbicort while inpatient (if patient does not leave AMA) and increase to 160/4.5 2puffs BID (rinse mouth after use)  - Spiriva respimat 2puffs daily if not on standing duonebs  - Should resume Trelegy inhaler once home (no Symbicort/Respimat for discharge)  - Albuterol neb q4h prn SOB/wheezing  - Should received Albuterol rescue inhaler upon discharge (does not have at home)  - Heme/Onc will need to determine etiology of Pulmonary nodules to select most appropriate chemo regimen for bladder cancer, unclear if plan in place, would benefit from referral to Interventional Pulmonologist David Wilson ([address]) unless plan already in place with his outside Pulm physician  - Complete 5 day course of Prednisone  - Prompt f/u within 1 week with his Outside Pulmonary MD     All of above discussed with patient.    Bonnie Rapp MD

## 2023-07-10 NOTE — CHART NOTE - NSCHARTNOTEFT_GEN_A_CORE
Dx : COPD     - Room air pulse ox. at rest:  84%   - At rest, O2 sat is 96% on 2L NC     Patient will require home O2 for discharge.  Patient is aware and agreeable to home O2.  Patient is in a chronic stable state of COPD.

## 2023-07-10 NOTE — CONSULT NOTE ADULT - SUBJECTIVE AND OBJECTIVE BOX
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  The pt has been following regularly with heme/onc and urology as outpt receiving work-up for bladder cancer. He had a PET/CT on 6/19 that showed FDG avidity in posterior nasopharynx as well as multiple lung nodules with uptake. He saw ENT who visulized no discrete lesion. He says 3-4 days ago he suddenly developed dyspnea that is worse with exertion. He has not had any chest pain, palpitations, recent illness, fever, cough, dizziness. The only palliating measure is laying on his left side. No dysuria but reports nocturnal enuresis since PET scan for the first time ever. Denies GI or symptoms.     ED Course: VSS, cardiology consulted for pericardial effusion on POCUS; not recommending emergent pericardiocentesis. CTA, RVP neg. S/p xanax and iv fentanyl.           Review of Systems:  Review of Systems: REVIEW OF SYSTEMS:  CONSTITUTIONAL: No weakness, fevers or chills  EYES/ENT: No visual changes;  No vertigo or throat pain   NECK: No pain or stiffness  RESPIRATORY: SOB improved. No cough/sputum  CARDIOVASCULAR: No chest pain or palpitations  GASTROINTESTINAL: No abdominal or epigastric pain. No nausea, vomiting, or hematemesis; No diarrhea or constipation. No melena or hematochezia.  GENITOURINARY: +new enuresis  NEUROLOGICAL: No numbness or weakness  SKIN: No itching, rashes      Allergies and Intolerances:        Allergies:  	No Known Allergies:     Home Medications:   * Patient Currently Takes Medications as of 16-Jun-2023 14:42 documented in Structured Notes  · 	polyethylene glycol 3350 oral powder for reconstitution: 17 gram(s) orally once a day As needed Constipation  · 	mirtazapine 7.5 mg oral tablet: 1 tab(s) orally once a day (at bedtime)  · 	tamsulosin 0.4 mg oral capsule: 1 cap(s) orally once a day (at bedtime)  · 	atenolol 25 mg oral tablet: 1 tab(s) orally once a day  · 	FLUoxetine 40 mg oral capsule: 1 cap(s) orally once a day  · 	Trelegy Ellipta 100 mcg-62.5 mcg-25 mcg/inh inhalation powder: 1 puff(s) inhaled once a day  · 	simvastatin 40 mg oral tablet: 1 tab(s) orally once a day  · 	Folbic oral tablet: 1 tab(s) orally once a day  · 	diazePAM 5 mg oral tablet: 1 tab(s) orally once a day (at bedtime) NOTE: As per Pharmacy, 1 tablet orally 3 times a day as needed for anxiety    .    Patient History:    Past Medical, Past Surgical, and Family History:  PAST MEDICAL HISTORY:  Anxiety and depression     Bladder mass     Bradycardia     CAD (Coronary Artery Disease)     COPD (chronic obstructive pulmonary disease)     Dyslipidemia     H/O constipation     HTN (Hypertension)     LV dysfunction     NSTEMI (non-ST elevation myocardial infarction)     S/P PTCA (Percutaneous Transluminal Coronary Angioplasty) 2001    TIA (Transient Ischemic Attack) 18 years ago  PT was on coumadin for short while.     PAST SURGICAL HISTORY:  Cardiac resynchronization therapy defibrillator (CRT-D) in place     S/P cardiac pacemaker procedure     S/P drug eluting coronary stent placement     S/P inguinal hernia repair     S/P Rotator Cuff Surgery.     Social History:  · Substance use	Yes  · Substance use	frequent marijuana  · Tobacco use	smoked most of life  · Social History (marital status, living situation, occupation, and sexual history)	lives alone; niece and nephew live nearby     Tobacco Screening:  · Core Measure Site	No    Risk Assessment:    Present on Admission:  Deep Venous Thrombosis	no  Pulmonary Embolus	no     HIV Screening:  · In accordance with [place] State law, we offer every patient who comes to our ED an HIV test. Would you like to be tested today?	Opt out    Physical Exam:   Physical Exam: VITALS:   T(C): 36.6 (07-06-23 @ 04:34), Max: 37.1 (07-05-23 @ 18:22)  HR: 72 (07-06-23 @ 04:34) (60 - 104)  BP: 144/82 (07-06-23 @ 04:34) (92/75 - 158/82)  RR: 20 (07-06-23 @ 04:34) (18 - 27)  SpO2: 100% (07-06-23 @ 04:34) (95% - 100%)    GENERAL:  lying in bed slightly anxious   HEAD:  Atraumatic, normocephalic  EYES: EOMI, PERRLA, conjunctiva and sclera clear  ENT: Moist mucous membranes  NECK: Supple, no JVD  HEART: Regular rate and rhythm, no murmurs, rubs, or gallops  LUNGS: Unlabored respirations.  Mild wheezes throughout  ABDOMEN: Soft, nontender, nondistended, +BS  EXTREMITIES: 2+ peripheral pulses bilaterally. No clubbing, cyanosis, or edema  NERVOUS SYSTEM:  A&Ox3, no focal deficits   SKIN: No rashes or lesions       Labs and Results:  Labs, Radiology, Cardiology, and Other Results: 8.9    13.20 )-----------( 436      ( 05 Jul 2023 18:29 )             29.0     07-05    142  |  104  |  26<H>  ----------------------------<  113<H>  5.2   |  22  |  1.54<H>    Ca    10.7<H>      05 Jul 2023 18:29    TPro  7.2  /  Alb  3.0<L>  /  TBili  0.2  /  DBili  x   /  AST  24  /  ALT  12  /  AlkPhos  74  07-05        LIVER FUNCTIONS - ( 05 Jul 2023 18:29 )  Alb: 3.0 g/dL / Pro: 7.2 g/dL / ALK PHOS: 74 U/L / ALT: 12 U/L / AST: 24 U/L / GGT: x           PT/INR - ( 05 Jul 2023 18:29 )   PT: 14.3 sec;   INR: 1.23 ratio         PTT - ( 05 Jul 2023 18:29 )  PTT:33.6 sec  Urinalysis Basic - ( 05 Jul 2023 18:29 )    Color: x / Appearance: x / SG: x / pH: x  Gluc: 113 mg/dL / Ketone: x  / Bili: x / Urobili: x   Blood: x / Protein: x / Nitrite: x   Leuk Esterase: x / RBC: x / WBC x   Sq Epi: x / Non Sq Epi: x / Bacteria: x        EKG:     RADIOLOGY STUDIES:             Pt is an 77yo M w/ hx of HTN, HLD, CAD (s/p PCI in 2001), HFrEF, 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 The pt has been following regularly with heme/onc and urology as outpt receiving work-up for bladder cancer. He had a PET/CT on 6/19 that showed FDG avidity in posterior nasopharynx as well as multiple lung nodules with uptake. He saw ENT who visulized no discrete lesion. He says 3-4 days ago he suddenly developed dyspnea that is worse with exertion. He has not had any chest pain, palpitations, recent illness, fever, cough, dizziness. The only palliating measure is laying on his left side. No dysuria but reports nocturnal enuresis since PET scan for the first time ever. Denies GI or symptoms. Since admission he was started on Prednisone 40mg with improvement in symptoms.  No longer complaining of chest discomfort.  Complaining of having anxiety attacks because he has remained in hospital for so long.  Trying to leave AMA.  Niece and nephew at bedside at time of visit trying to convince him to stay until he can be set up with home oxygen.      ED Course: VSS, cardiology consulted for pericardial effusion on POCUS; not recommending emergent pericardiocentesis. CTA, RVP neg. S/p xanax and iv fentanyl.           Review of Systems:  Review of Systems: REVIEW OF SYSTEMS:  CONSTITUTIONAL: No weakness, fevers or chills  EYES/ENT: No visual changes;  No vertigo or throat pain   NECK: No pain or stiffness  RESPIRATORY: SOB improved. No cough/sputum  CARDIOVASCULAR: No chest pain or palpitations  GASTROINTESTINAL: No abdominal or epigastric pain. No nausea, vomiting, or hematemesis; No diarrhea or constipation. No melena or hematochezia.  GENITOURINARY: +new enuresis  NEUROLOGICAL: No numbness or weakness  SKIN: No itching, rashes      Allergies and Intolerances:        Allergies:  	No Known Allergies:     Home Medications:   * Patient Currently Takes Medications as of 16-Jun-2023 14:42 documented in Structured Notes  · 	polyethylene glycol 3350 oral powder for reconstitution: 17 gram(s) orally once a day As needed Constipation  · 	mirtazapine 7.5 mg oral tablet: 1 tab(s) orally once a day (at bedtime)  · 	tamsulosin 0.4 mg oral capsule: 1 cap(s) orally once a day (at bedtime)  · 	atenolol 25 mg oral tablet: 1 tab(s) orally once a day  · 	FLUoxetine 40 mg oral capsule: 1 cap(s) orally once a day  · 	Trelegy Ellipta 100 mcg-62.5 mcg-25 mcg/inh inhalation powder: 1 puff(s) inhaled once a day  · 	simvastatin 40 mg oral tablet: 1 tab(s) orally once a day  · 	Folbic oral tablet: 1 tab(s) orally once a day  · 	diazePAM 5 mg oral tablet: 1 tab(s) orally once a day (at bedtime) NOTE: As per Pharmacy, 1 tablet orally 3 times a day as needed for anxiety    .    Patient History:    Past Medical, Past Surgical, and Family History:  PAST MEDICAL HISTORY:  Anxiety and depression     Bladder mass     Bradycardia     CAD (Coronary Artery Disease)     COPD (chronic obstructive pulmonary disease)     Dyslipidemia     H/O constipation     HTN (Hypertension)     LV dysfunction     NSTEMI (non-ST elevation myocardial infarction)     S/P PTCA (Percutaneous Transluminal Coronary Angioplasty) 2001    TIA (Transient Ischemic Attack) 18 years ago  PT was on coumadin for short while.     PAST SURGICAL HISTORY:  Cardiac resynchronization therapy defibrillator (CRT-D) in place     S/P cardiac pacemaker procedure     S/P drug eluting coronary stent placement     S/P inguinal hernia repair     S/P Rotator Cuff Surgery.     Social History:  · Substance use	Yes  · Substance use	frequent marijuana  · Tobacco use	smoked most of life  · Social History (marital status, living situation, occupation, and sexual history)	lives alone; niece and nephew live nearby     Tobacco Screening:  · Core Measure Site	No    Risk Assessment:    Present on Admission:  Deep Venous Thrombosis	no  Pulmonary Embolus	no     HIV Screening:  · In accordance with [place] State law, we offer every patient who comes to our ED an HIV test. Would you like to be tested today?	Opt out    Physical Exam:   Physical Exam: VITALS:   T(C): 36.6 (07-06-23 @ 04:34), Max: 37.1 (07-05-23 @ 18:22)  HR: 72 (07-06-23 @ 04:34) (60 - 104)  BP: 144/82 (07-06-23 @ 04:34) (92/75 - 158/82)  RR: 20 (07-06-23 @ 04:34) (18 - 27)  SpO2: 100% (07-06-23 @ 04:34) (95% - 100%)    GENERAL:  lying in bed slightly anxious   HEAD:  Atraumatic, normocephalic  EYES: EOMI, PERRLA, conjunctiva and sclera clear  ENT: Moist mucous membranes  NECK: Supple, no JVD  HEART: Regular rate and rhythm, no murmurs, rubs, or gallops  LUNGS: Unlabored respirations.  light exp wheeze bilaterally that cleared with repeated auscultation  ABDOMEN: Soft, nontender, nondistended, +BS  EXTREMITIES: 2+ peripheral pulses bilaterally. No clubbing, cyanosis, or edema  NERVOUS SYSTEM:  A&Ox3, no focal deficits   SKIN: No rashes or lesions       Labs and Results:  Labs, Radiology, Cardiology, and Other Results: 7.7    15 )-----------( 436      ( 05 Jul 2023 18:29 )             29.0         138  |  104  |  37  ----------------------------<    4.2   |  26  |  2.01          LIVER FUNCTIONS - ( 05 Jul 2023 18:29 )  Alb: 3.0 g/dL / Pro: 7.2 g/dL / ALK PHOS: 74 U/L / ALT: 12 U/L / AST: 24 U/L / GGT: x           PT/INR - ( 05 Jul 2023 18:29 )   PT: 14.3 sec;   INR: 1.23 ratio         PTT - ( 05 Jul 2023 18:29 )  PTT:33.6 sec  Urinalysis Basic - ( 05 Jul 2023 18:29 )    Color: x / Appearance: x / SG: x / pH: x  Gluc: 113 mg/dL / Ketone: x  / Bili: x / Urobili: x   Blood: x / Protein: x / Nitrite: x   Leuk Esterase: x / RBC: x / WBC x   Sq Epi: x / Non Sq Epi: x / Bacteria: x        EKG:     RADIOLOGY STUDIES:    CTA (7/5/23)  LUNGS AND AIRWAYS: Central airways are patent. Intralobular and paraseptal emphysema. Interval increase in size and number of right-sided pulmonary nodules. For reference, a 0.9 x 0.6 cm right lower lobe pulmonary nodule new when compared to prior exam (2, 100), and a 1.3 cm right lower lobe nodule previously measuring 0.8 cm (2, 87).  PLEURA: No pleural effusion.  MEDIASTINUM AND NIKI: No lymphadenopathy.  VESSELS: No evidence of central, lobar, or segmental pulmonary embolus. Evaluation of the more distal subsegmental branches is limited secondary to respiratory motion. Coronary artery and aortic calcifications.  HEART: Heart size is normal. Fluid density anterior to the heart which likely small loculated pericardial effusion (2, 107).  CHEST WALL AND LOWER NECK: Left chest wall AICD.  VISUALIZED UPPER ABDOMEN: Largely unchanged, partially visualized, right-sided hydroureteronephrosis.  BONES: Degenerative changes.    IMPRESSION:  No evidence of central, lobar, or segmental pulmonary embolus. Respiratory motion artifact.    Interval increase in size and number of right-sided pulmonary nodules.    Small amount of pericardial fluid similar to prior.      Echo:

## 2023-07-10 NOTE — PROGRESS NOTE ADULT - PROBLEM SELECTOR PLAN 2
- VBG not consistent with exacerbation - minimal CO2 retention; if anything, CO2 may be elevated to compensate for lactic acidosis--> monitor SPO2.    - home regimen Trelegy ellipta qd --> Symbicort and albuterol inpatient equivalent  - duonebs while inpt  -Rpt CXR unchanged  -Wean O2 as needed - increasing Symbicort  - Pulm onboard to help optimize  - Home oxygen  - ambulatory sats

## 2023-07-11 NOTE — DISCHARGE NOTE NURSING/CASE MANAGEMENT/SOCIAL WORK - PATIENT PORTAL LINK FT
You can access the FollowMyHealth Patient Portal offered by [facility] by registering at the following website: [email or web address]. By joining [facility]’s FollowMyHealth portal, you will also be able to view your health information using other applications (apps) compatible with our system.

## 2023-07-11 NOTE — PROGRESS NOTE ADULT - ATTENDING COMMENTS
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    Course as documented. D/c home today w f/u. Plan d/w niece at bedside.

## 2023-07-11 NOTE — PROGRESS NOTE ADULT - PROBLEM SELECTOR PLAN 7
- c/w home fluoxetine  - c/w home diazepam 5mg qd with a prn nighttime dose  - regularly smokes marijuana for anxiety including at night to go to sleep

## 2023-07-11 NOTE — PROGRESS NOTE ADULT - PROBLEM SELECTOR PLAN 9
Diet: regular  DVT: SCDs until ?IR procedure then heparin subq  Dispo: pending course
- pt reports not having bowel movement for past four days  - increased Miralax from once daily to BID  - ordered senna 2 tabs at night  - pt refused suppository today, but will consider tomorrow if still constipated
- increased Miralax from once daily to BID  - ordered senna 2 tabs at night
- pt reports not having bowel movement for past four days  - increased Miralax from once daily to BID  - ordered senna 2 tabs at night  - pt refused suppository today, but will consider tomorrow if still constipated
Diet: regular  DVT: SCDs until ?IR procedure then heparin subq  Dispo: pending course
- pt reports not having bowel movement for past four days  - increased Miralax from once daily to BID  - ordered senna 2 tabs at night  - pt refused suppository today, but will consider tomorrow if still constipated
- increased Miralax from once daily to BID  - ordered senna 2 tabs at night

## 2023-07-11 NOTE — PROGRESS NOTE ADULT - PROBLEM SELECTOR PLAN 6
Cr 1.53, appears to be at baseline  - trend Cr for now and avoid nephrotoxins
Appears to be near baseline. No BMP today
Cr 1.53, appears to be at baseline  - trend Cr for now and avoid nephrotoxins
Appears to be near baseline. No BMP today
Cr 1.53, appears to be at baseline  - trend Cr for now and avoid nephrotoxins

## 2023-07-11 NOTE — PROGRESS NOTE ADULT - SUBJECTIVE AND OBJECTIVE BOX
PATIENT: SAUNDRA REMY, MRN: [id number]    CHIEF COMPLAINT: Patient is a 78y old  Male who presents with a chief complaint of shortness of breath (10 Jul 2023 17:52)      INTERVAL HISTORY/OVERNIGHT EVENTS: No overnight events. Patient breathing on oxygen and comfortable.       MEDICATIONS:  MEDICATIONS  (STANDING):  atorvastatin 40 milliGRAM(s) Oral at bedtime  budesonide 160 MICROgram(s)/formoterol 4.5 MICROgram(s) Inhaler 2 Puff(s) Inhalation two times a day  diazepam    Tablet 5 milliGRAM(s) Oral daily  FLUoxetine 40 milliGRAM(s) Oral daily  heparin   Injectable 5000 Unit(s) SubCutaneous every 12 hours  metoprolol succinate ER 25 milliGRAM(s) Oral daily  mirtazapine 7.5 milliGRAM(s) Oral at bedtime  multivitamin 1 Tablet(s) Oral daily  pantoprazole    Tablet 40 milliGRAM(s) Oral before breakfast  polyethylene glycol 3350 17 Gram(s) Oral two times a day  predniSONE   Tablet 40 milliGRAM(s) Oral daily  senna 2 Tablet(s) Oral at bedtime  tamsulosin 0.4 milliGRAM(s) Oral at bedtime  tiotropium 2.5 MICROgram(s) Inhaler 2 Puff(s) Inhalation daily    MEDICATIONS  (PRN):  acetaminophen     Tablet .. 650 milliGRAM(s) Oral every 6 hours PRN Mild Pain (1 - 3)  albuterol    90 MICROgram(s) HFA Inhaler 2 Puff(s) Inhalation every 6 hours PRN Shortness of Breath  aluminum hydroxide/magnesium hydroxide/simethicone Suspension 30 milliLiter(s) Oral every 4 hours PRN Dyspepsia  diazepam    Tablet 5 milliGRAM(s) Oral daily PRN Anxiety  melatonin 3 milliGRAM(s) Oral at bedtime PRN Insomnia      ALLERGIES: Allergies    No Known Allergies    Intolerances        OBJECTIVE:  ICU Vital Signs Last 24 Hrs  T(C): 36.7 (11 Jul 2023 11:04), Max: 36.7 (11 Jul 2023 11:04)  T(F): 98.1 (11 Jul 2023 11:04), Max: 98.1 (11 Jul 2023 11:04)  HR: 76 (11 Jul 2023 11:04) (66 - 80)  BP: 120/69 (11 Jul 2023 11:04) (118/60 - 127/75)  BP(mean): --  ABP: --  ABP(mean): --  RR: 18 (11 Jul 2023 11:04) (18 - 18)  SpO2: 95% (11 Jul 2023 11:04) (94% - 96%)    O2 Parameters below as of 11 Jul 2023 11:04  Patient On (Oxygen Delivery Method): nasal cannula            CAPILLARY BLOOD GLUCOSE        CAPILLARY BLOOD GLUCOSE        I&O's Summary    10 Jul 2023 07:01  -  11 Jul 2023 07:00  --------------------------------------------------------  IN: 480 mL / OUT: 0 mL / NET: 480 mL    11 Jul 2023 07:01  -  11 Jul 2023 18:04  --------------------------------------------------------  IN: 120 mL / OUT: 0 mL / NET: 120 mL      Daily     Daily     PHYSICAL EXAMINATION:  General: Comfortable, no acute distress, cooperative with exam.  HEENT: PERRLA  Respiratory: CTAB, normal respiratory effort, no coughing, wheezes, crackles, or rales.  CV: RRR, S1S2, no murmurs, rubs or gallops. No JVD. Distal pulses intact.  Abdominal: Soft, nontender, nondistended, no rebound or guarding, normal bowel sounds.  Neurology: AOx3, no focal neuro defects, KEE x 4.  Extremities: No pitting edema, + Peripheral pulses.      LABS:                          7.7    14.94 )-----------( 399      ( 10 Jul 2023 12:36 )             24.9     07-10    138  |  99  |  37<H>  ----------------------------<  121<H>  4.2   |  26  |  2.01<H>    Ca    9.9      10 Jul 2023 12:36  Phos  3.0     07-10  Mg     2.1     07-10    TPro  6.6  /  Alb  3.0<L>  /  TBili  0.2  /  DBili  x   /  AST  17  /  ALT  12  /  AlkPhos  62  07-10    LIVER FUNCTIONS - ( 10 Jul 2023 12:36 )  Alb: 3.0 g/dL / Pro: 6.6 g/dL / ALK PHOS: 62 U/L / ALT: 12 U/L / AST: 17 U/L / GGT: x                   Urinalysis Basic - ( 10 Jul 2023 12:36 )    Color: x / Appearance: x / SG: x / pH: x  Gluc: 121 mg/dL / Ketone: x  / Bili: x / Urobili: x   Blood: x / Protein: x / Nitrite: x   Leuk Esterase: x / RBC: x / WBC x   Sq Epi: x / Non Sq Epi: x / Bacteria: x

## 2023-07-11 NOTE — PROGRESS NOTE ADULT - PROBLEM SELECTOR PROBLEM 9
Constipation
Need for prophylactic measure
Constipation
Constipation
Need for prophylactic measure

## 2023-07-11 NOTE — PROGRESS NOTE ADULT - REASON FOR ADMISSION
Shortness of breath

## 2023-07-11 NOTE — DISCHARGE NOTE NURSING/CASE MANAGEMENT/SOCIAL WORK - NSDCPEFALRISK_GEN_ALL_CORE
For information on Fall & Injury Prevention, visit: [email or web address] OR  [email or web address] OR  https://www.cdc.gov/steadi/patient.html

## 2023-07-11 NOTE — PROGRESS NOTE ADULT - PROBLEM SELECTOR PLAN 1
Seen on bedside POCUS. negative for Moreira's triad, HD stable, no drain placed  - per cardiology, will just need outpatient follow-up and repeat TTE's periodically  - could have malignant effusion from lung nodules seen on PET/CT if they are mets Vs other etiologies

## 2023-07-11 NOTE — DISCHARGE NOTE NURSING/CASE MANAGEMENT/SOCIAL WORK - NSDCFUADDAPPT_GEN_ALL_CORE_FT
Myesha Asher  [facility] [id number], [place], [place] [address] [phone]  Appointment scheduled for Tuesday    Jaylen Bhatia  [facility] at [address] [place] [address] [place] [address] [id number] [place] [address]  [phone]

## 2023-07-11 NOTE — PROGRESS NOTE ADULT - PROBLEM SELECTOR PLAN 8
- c/w home statin

## 2023-07-11 NOTE — PROGRESS NOTE ADULT - PROBLEM SELECTOR PROBLEM 2
Chronic obstructive pulmonary disease (COPD)

## 2023-07-12 NOTE — PROGRESS NOTE ADULT - PROBLEM SELECTOR PLAN 2
..1.  Do not eat or drink anything after 12 midnight prior to surgery. This includes no water, chewing gum or mints, except for bowel prep complete per MD.  2.  Take the following pills with a small sip of water on the morning of surgery 07/13/2023.  3.  Aspirin, Ibuprofen, Advil, Naproxen, Vitamin E and other Anti-inflammatory products should be stopped for 5 days before surgery or as directed by your physician. 4.  Check with your doctor regarding stopping Plavix, Coumadin, Lovenox, Fragmin or other blood thinners. 5.  Do not smoke and do not drink alcoholic beverages 24 hours prior to surgery. This includes NA Beer. 6.  You may brush your teeth and gargle the morning of surgery. DO NOT SWALLOW WATER.  7.  You MUST make arrangements for a responsible adult to take you home after your surgery. You will not be allowed to leave alone or drive yourself home. It is strongly suggested someone stay with you the first 24 hours. Your surgery will be cancelled if you do not have a ride home. 8.  A parent/legal guardian must accompany a child scheduled for surgery and plan to stay at the hospital until the child is discharged. Please do not bring other children with you. 9.  Please wear simple, loose fitting clothing to the hospital.  Eulas Chol not bring valuables ( money, credit cards, checkbooks, etc.)  Do not wear any makeup (including no eye makeup) or nail polish on your fingers or toes. 10.  Do not wear any jewelry or piercing on the day of surgery. All body piercing jewelry must be removed. 11.  If you have dentures, they will be removed before going to the OR; we will provide you a container. If you wear contact lenses or glasses, they will be removed; please bring a case for them.   15.  Notify your Surgeon if you develop any illness between now and surgery time; cough, cold, fever, sore throat, nausea, vomiting, etc.  Please notify your surgeon if you experience dizziness, shortness of breath or blurred Pyuria could be normal given rodriguez. Given AMS, will cover empirically for now  -IV Ceftriaxone (4/29 -5/1), now discontinued given urine cx NGTD  -F/u UCx -> NGTD

## 2023-07-13 PROBLEM — R31.9 HEMATURIA: Status: ACTIVE | Noted: 2023-01-01

## 2023-07-17 NOTE — CHART NOTE - NSCHARTNOTEFT_GEN_A_CORE
Attempted x 2  to call patient nephew and emergency contact Aren Sofia  at about 430pm to update with patient clinical status per Mr Olvera request  Call goes straight to  , no msg left .

## 2023-07-17 NOTE — H&P ADULT - PROBLEM SELECTOR PLAN 8
Hold chemical ppx as pt may need IR procedure  NPO for now    [facility], [phone].

## 2023-07-17 NOTE — H&P ADULT - PROBLEM SELECTOR PLAN 2
2/2 bladder ca  -urology consulted  -Hb stable 7's 2/2 bladder ca  -pt reports red urine, no clots, unable to see any samples in room   -urology consulted  -Hb stable 7's, transfuse as needed  -C/w Zosyn for now

## 2023-07-17 NOTE — CONSULT NOTE ADULT - SUBJECTIVE AND OBJECTIVE BOX
Patient is a 78y old  Male who presents with a chief complaint of hematuria (17 Jul 2023 08:59)      HPI:       Oncologic History:      ROS: as above     PAST MEDICAL & SURGICAL HISTORY:  HTN (Hypertension)      Bradycardia      Dyslipidemia      CAD (Coronary Artery Disease)      S/P PTCA (Percutaneous Transluminal Coronary Angioplasty)  2001      TIA (Transient Ischemic Attack)  18 years ago  PT was on coumadin for short while      COPD (chronic obstructive pulmonary disease)      NSTEMI (non-ST elevation myocardial infarction)      H/O constipation      Anxiety and depression      LV dysfunction      Bladder mass      S/P Rotator Cuff Surgery      S/P drug eluting coronary stent placement      S/P inguinal hernia repair      S/P cardiac pacemaker procedure      Cardiac resynchronization therapy defibrillator (CRT-D) in place          SOCIAL HISTORY:    FAMILY HISTORY:      MEDICATIONS  (STANDING):    MEDICATIONS  (PRN):  acetaminophen     Tablet .. 650 milliGRAM(s) Oral every 6 hours PRN Temp greater or equal to 38C (100.4F), Mild Pain (1 - 3)  aluminum hydroxide/magnesium hydroxide/simethicone Suspension 30 milliLiter(s) Oral every 4 hours PRN Dyspepsia  melatonin 3 milliGRAM(s) Oral at bedtime PRN Insomnia  ondansetron Injectable 4 milliGRAM(s) IV Push every 8 hours PRN Nausea and/or Vomiting      Allergies    No Known Allergies    Intolerances        Vital Signs Last 24 Hrs  T(C): 36.4 (17 Jul 2023 07:22), Max: 36.4 (17 Jul 2023 07:22)  T(F): 97.5 (17 Jul 2023 07:22), Max: 97.5 (17 Jul 2023 07:22)  HR: 67 (17 Jul 2023 07:22) (67 - 67)  BP: 108/70 (17 Jul 2023 07:22) (108/70 - 108/70)  BP(mean): --  RR: 17 (17 Jul 2023 07:22) (17 - 17)  SpO2: 100% (17 Jul 2023 07:22) (100% - 100%)    Parameters below as of 17 Jul 2023 07:22  Patient On (Oxygen Delivery Method): nasal cannula  O2 Flow (L/min): 2      PHYSICAL EXAM  General: adult in NAD  HEENT: clear oropharynx, anicteric sclera, pink conjunctiva  Neck: supple  CV: normal S1/S2 with no murmur rubs or gallops  Lungs: positive air movement b/l ant lungs, clear to auscultation, no wheezes, no rales  Abdomen: soft non-tender non-distended, no hepatosplenomegaly  Ext: no clubbing cyanosis or edema  Skin: no rashes and no petechiae  Neuro: alert and oriented X 3, none focal    LABS:                                        RADIOLOGY & ADDITIONAL STUDIES:     Patient is a 78y old  Male who presents with a chief complaint of hematuria (17 Jul 2023 08:59)      HPI:       Oncologic History:  This patient is an 77yo gentleman with PMH of htn, CAD, MI s/p stent in 2001, TIA, COPD, bradycardia s/p ppm 2011, upgraded to bi-V AICD in 2019 due to low EF, CKD stage III, recent diagnosis of bladder cancer who presents for initial consultation.    Patient reported having intermittent gross hematuria x 8 months. He reported abdominal pain and constipation as well. He denied any nausea or vomiting. He had lost about 40+ lbs unintentionally over this time. He was hospitalized at [place] for urinary retention and required rodriguez catheter placement. He thereafter was hospitalized at [facility] for the   He saw urologist Dr. Tony Diallo who advised TURBT.     3/3/23- CT CAP with IVC Heterogeneous and partially enhancing enhancing mass measuring 5.2 x 3.0 cm along the right posterior lateral bladder wall concerning for neoplasm. Visualization with cystoscopy recommended.  Mildly dilated main pancreatic duct without obstructive lesion. If clinically warranted, this can be further evaluated with an abdominal MRI.    3/31/23- Urine cytology was negative for high grade urothelial carcinoma.    4/5/23- CT chest non con found solid and ground glass nodules measuring up to 5mm on a background of emphysema. Partially imaged right hydroureter, increased since 3/3/2023.     4/24/23- TURBT performed, found high grade invasive urothelial carcinoma. No non-invasive component identified   Carcinoma invades muscularis propria (detrusor muscle). Lymphovascular invasion was not identified.  Tumor cells are positive for p63 and ROXANNA-3, negative for PSA and NKX 3.1.    4/25-4/28/23- He was hospitalized at [facility] for abdominal spasm, found to have urinary retention with HARLEEN and drop in Hgb. Patient was transfused 1u pRBC and had Rodriguez catheter replaced. He was discharged to [facility]    4/19-5/5/23- Patient was rehospitalized for suspected COPD exacerbation and UTI. UCX were negative, thus antibiotics were discontinued. Suprapubic pain was treated with IV tylenol. Patient had rodriguez removed while inpatient.     Patient was referred to medical oncology clinic. He denies fevers or chills. He reports fatigue.He has not had any CP, palpitations, dyspnea or cough. He still has mild abdominal pain. His hematuria has resolved since TURBT procedure.     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   He recently lost his wife after 40 years of marriage. His wife was under the care of Dr. Pond-Kellerman.   Allergies: NKDA.     Disease: urothelial cancer     [employer] results, TMB 33 Muts/Mb, MS-stable     ROS: as above     PAST MEDICAL & SURGICAL HISTORY:  HTN (Hypertension)      Bradycardia      Dyslipidemia      CAD (Coronary Artery Disease)      S/P PTCA (Percutaneous Transluminal Coronary Angioplasty)  2001      TIA (Transient Ischemic Attack)  18 years ago  PT was on coumadin for short while      COPD (chronic obstructive pulmonary disease)      NSTEMI (non-ST elevation myocardial infarction)      H/O constipation      Anxiety and depression      LV dysfunction      Bladder mass      S/P Rotator Cuff Surgery      S/P drug eluting coronary stent placement      S/P inguinal hernia repair      S/P cardiac pacemaker procedure      Cardiac resynchronization therapy defibrillator (CRT-D) in place          SOCIAL HISTORY:    FAMILY HISTORY:      MEDICATIONS  (STANDING):    MEDICATIONS  (PRN):  acetaminophen     Tablet .. 650 milliGRAM(s) Oral every 6 hours PRN Temp greater or equal to 38C (100.4F), Mild Pain (1 - 3)  aluminum hydroxide/magnesium hydroxide/simethicone Suspension 30 milliLiter(s) Oral every 4 hours PRN Dyspepsia  melatonin 3 milliGRAM(s) Oral at bedtime PRN Insomnia  ondansetron Injectable 4 milliGRAM(s) IV Push every 8 hours PRN Nausea and/or Vomiting      Allergies    No Known Allergies    Intolerances        Vital Signs Last 24 Hrs  T(C): 36.4 (17 Jul 2023 07:22), Max: 36.4 (17 Jul 2023 07:22)  T(F): 97.5 (17 Jul 2023 07:22), Max: 97.5 (17 Jul 2023 07:22)  HR: 67 (17 Jul 2023 07:22) (67 - 67)  BP: 108/70 (17 Jul 2023 07:22) (108/70 - 108/70)  BP(mean): --  RR: 17 (17 Jul 2023 07:22) (17 - 17)  SpO2: 100% (17 Jul 2023 07:22) (100% - 100%)    Parameters below as of 17 Jul 2023 07:22  Patient On (Oxygen Delivery Method): nasal cannula  O2 Flow (L/min): 2      PHYSICAL EXAM  General: adult in NAD  HEENT: clear oropharynx, anicteric sclera, pink conjunctiva  Neck: supple  CV: normal S1/S2 with no murmur rubs or gallops  Lungs: positive air movement b/l ant lungs, clear to auscultation, no wheezes, no rales  Abdomen: soft non-tender non-distended, no hepatosplenomegaly  Ext: no clubbing cyanosis or edema  Skin: no rashes and no petechiae  Neuro: alert and oriented X 3, none focal    LABS:                                        RADIOLOGY & ADDITIONAL STUDIES:     Patient is a 78y old  Male who presents with a chief complaint of hematuria (17 Jul 2023 13:10)      HPI:  77yo M w/ hx of HTN, HLD, CAD (s/p PCI in 2001), HFrEF c/b pericardial effusion, bradycardia (s/p PPM upgraded to BiV-ICD 2019), CKD3, COPD, recent diagnosis of bladder cancer transferred from [place] for hematuria iso newly diagnosed muscular invasive bladder cancer, HARLEEN, symptomatic anemia.   Pt was hospitalized at [place] from 7/13 for gross hematuria. Per hospital course, he had symptomatic anemia to 7.5 from baseline of 11, in 3/2023. [facility] records show 7.7 on 7/10/2023.   Course c/b HARLEEN, Cr of 5.0 from baseline 2.01 on 7/10/2023. At [place], pt evaluated by urology and given low PVR ~11cc and bladder mostly filled with mass, rodriguez would have increased pain and not but of utility to patient so deferred. Renal was consulted and pt was on IVF and low K diet, however Cr kept uptrending and plan was to consult IR for nephrostomies today if no improvement.      Imaging/Labs from [facility] in chart  Bld cx x2 (7/13) NGTD  NM Lung V/Q Scan (7/14) : low prob of PE  CT C/A/P noncon (7/13): diffuse hyperattenuation of R kidney and ureter are favored to represent hemorrhage, primary ddx would be malignancy. Bladder hyperdensity favored to represent active hemorrhage into urinary bladder subacute clot. Numerous pulm nodules, several cavitating. Hepatic cirrhosis. Severe coronary PAD,   TTE (7/13): EF 35-40%, normal RV, mild MR, no AS, mild AR, mod TR, small pericardial effusion. PPM/AICD wire in RV       Pt transferred to N at [facility] on 7/17. Pt reports feeling well, had BM this morning in toilet, urinated a little then. Continues to complain of painless blood in urine.  Denies abdominal pain, fever chills, dizziness or excessive fatigue   (17 Jul 2023 08:59)       Oncologic History:  This patient is an 77yo gentleman with PMH of htn, CAD, MI s/p stent in 2001, TIA, COPD, bradycardia s/p ppm 2011, upgraded to bi-V AICD in 2019 due to low EF, CKD stage III, recent diagnosis of bladder cancer who presents for initial consultation.    Patient reported having intermittent gross hematuria x 8 months. He reported abdominal pain and constipation as well. He denied any nausea or vomiting. He had lost about 40+ lbs unintentionally over this time. He was hospitalized at [place] for urinary retention and required rodriguez catheter placement. He thereafter was hospitalized at [facility] for the  He saw urologist Dr. Tony Diallo who advised TURBT.    3/3/23- CT CAP with IVC Heterogeneous and partially enhancing enhancing mass measuring 5.2 x 3.0 cm along the right posterior lateral bladder wall concerning for neoplasm. Visualization with cystoscopy recommended.  Mildly dilated main pancreatic duct without obstructive lesion. If clinically warranted, this can be further evaluated with an abdominal MRI.    3/31/23- Urine cytology was negative for high grade urothelial carcinoma.    4/5/23- CT chest non con found solid and ground glass nodules measuring up to 5mm on a background of emphysema. Partially imaged right hydroureter, increased since 3/3/2023.    4/24/23- TURBT performed, found high grade invasive urothelial carcinoma. No non-invasive component identified  Carcinoma invades muscularis propria (detrusor muscle). Lymphovascular invasion was not identified.  Tumor cells are positive for p63 and ROXANNA-3, negative for PSA and NKX 3.1.    4/25-4/28/23- He was hospitalized at [facility] for abdominal spasm, found to have urinary retention with HARLEEN and drop in Hgb. Patient was transfused 1u pRBC and had Rodriguez catheter replaced. He was discharged to [facility]    4/19-5/5/23- Patient was rehospitalized for suspected COPD exacerbation and UTI. UCX were negative, thus antibiotics were discontinued. Suprapubic pain was treated with IV tylenol. Patient had rodriguez removed while inpatient.    Patient was referred to medical oncology clinic. He denies fevers or chills. He reports fatigue.He has not had any CP, palpitations, dyspnea or cough. He still has mild abdominal pain. His hematuria has resolved since TURBT procedure.    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  He recently lost his wife after 40 years of marriage. His wife was under the care of Dr. Pond-Kellerman.  Allergies: NKDA.    Disease: urothelial cancer    [employer] results, TMB 33 Muts/Mb, MS-stable    ROS: as above     PAST MEDICAL & SURGICAL HISTORY:  HTN (Hypertension)      Bradycardia      Dyslipidemia      CAD (Coronary Artery Disease)      S/P PTCA (Percutaneous Transluminal Coronary Angioplasty)  2001      TIA (Transient Ischemic Attack)  18 years ago  PT was on coumadin for short while      COPD (chronic obstructive pulmonary disease)      NSTEMI (non-ST elevation myocardial infarction)      H/O constipation      Anxiety and depression      LV dysfunction      Bladder mass      S/P Rotator Cuff Surgery      S/P drug eluting coronary stent placement      S/P inguinal hernia repair      S/P cardiac pacemaker procedure      Cardiac resynchronization therapy defibrillator (CRT-D) in place          SOCIAL HISTORY:    FAMILY HISTORY:      MEDICATIONS  (STANDING):  atorvastatin 40 milliGRAM(s) Oral at bedtime  budesonide  80 MICROgram(s)/formoterol 4.5 MICROgram(s) Inhaler 2 Puff(s) Inhalation two times a day  FLUoxetine 40 milliGRAM(s) Oral daily  piperacillin/tazobactam IVPB.- 3.375 Gram(s) IV Intermittent once  tamsulosin 0.4 milliGRAM(s) Oral at bedtime  tiotropium 2.5 MICROgram(s) Inhaler 2 Puff(s) Inhalation daily    MEDICATIONS  (PRN):  acetaminophen     Tablet .. 650 milliGRAM(s) Oral every 6 hours PRN Temp greater or equal to 38C (100.4F), Mild Pain (1 - 3)  aluminum hydroxide/magnesium hydroxide/simethicone Suspension 30 milliLiter(s) Oral every 4 hours PRN Dyspepsia  diazepam    Tablet 5 milliGRAM(s) Oral daily PRN anxiety  melatonin 3 milliGRAM(s) Oral at bedtime PRN Insomnia  ondansetron Injectable 4 milliGRAM(s) IV Push every 8 hours PRN Nausea and/or Vomiting      Allergies    azithromycin (Nausea)    Intolerances        Vital Signs Last 24 Hrs  T(C): 36.6 (17 Jul 2023 13:44), Max: 36.6 (17 Jul 2023 13:44)  T(F): 97.9 (17 Jul 2023 13:44), Max: 97.9 (17 Jul 2023 13:44)  HR: 65 (17 Jul 2023 13:44) (65 - 67)  BP: 114/92 (17 Jul 2023 13:44) (108/70 - 114/92)  BP(mean): --  RR: 18 (17 Jul 2023 13:44) (17 - 18)  SpO2: 98% (17 Jul 2023 13:44) (98% - 100%)    Parameters below as of 17 Jul 2023 13:44  Patient On (Oxygen Delivery Method): nasal cannula        PHYSICAL EXAM  General: adult in NAD  HEENT: clear oropharynx, anicteric sclera, pink conjunctiva  Neck: supple  CV: normal S1/S2 with no murmur rubs or gallops  Lungs: positive air movement b/l ant lungs, clear to auscultation, no wheezes, no rales  Abdomen: soft non-tender non-distended, no hepatosplenomegaly  Ext: no clubbing cyanosis or edema  Skin: no rashes and no petechiae  Neuro: alert and oriented X 3, none focal    LABS:                          9.2    15.06 )-----------( 209      ( 17 Jul 2023 09:36 )             29.3         Mean Cell Volume : 91.3 fL  Mean Cell Hemoglobin : 28.7 pg  Mean Cell Hemoglobin Concentration : 31.4 gm/dL  Auto Neutrophil # : 13.01 K/uL  Auto Lymphocyte # : 0.86 K/uL  Auto Monocyte # : 0.97 K/uL  Auto Eosinophil # : 0.09 K/uL  Auto Basophil # : 0.02 K/uL  Auto Neutrophil % : 86.5 %  Auto Lymphocyte % : 5.7 %  Auto Monocyte % : 6.4 %  Auto Eosinophil % : 0.6 %  Auto Basophil % : 0.1 %      Serial CBC's  07-17 @ 09:36  Hct-29.3 / Hgb-9.2 / Plat-209 / RBC-3.21 / WBC-15.06      07-17    135  |  99  |  89<H>  ----------------------------<  99  4.6   |  19<L>  |  8.68<H>    Ca    9.5      17 Jul 2023 09:36  Phos  6.0     07-17  Mg     2.40     07-17    TPro  6.5  /  Alb  2.9<L>  /  TBili  0.2  /  DBili  x   /  AST  12  /  ALT  10  /  AlkPhos  60  07-17      PT/INR - ( 17 Jul 2023 09:36 )   PT: 13.5 sec;   INR: 1.16 ratio         PTT - ( 17 Jul 2023 09:36 )  PTT:35.6 sec                RADIOLOGY & ADDITIONAL STUDIES:     Patient is an 77 yo Male who presents with a chief complaint of hematuria (17 Jul 2023 13:10)      HPI:  77 yo M w/ hx of HTN, HLD, CAD (s/p PCI in 2001), HFrEF c/b pericardial effusion, bradycardia (s/p PPM upgraded to BiV-ICD 2019), CKD3, COPD, recent diagnosis of bladder cancer transferred from [place] for hematuria iso newly diagnosed muscular invasive bladder cancer, HARLEEN, symptomatic anemia.   Pt was hospitalized at [place] from 7/13 for gross hematuria. Per hospital course, he had symptomatic anemia to 7.5 from baseline of 11, in 3/2023. [facility] records show 7.7 on 7/10/2023.   Course c/b HARLEEN, Cr of 5.0 from baseline 2.01 on 7/10/2023. At [place], pt evaluated by urology and given low PVR ~11cc and bladder mostly filled with mass, rodriguez would have increased pain and not but of utility to patient so deferred. Renal was consulted and pt was on IVF and low K diet, however Cr kept uptrending and plan was to consult IR for nephrostomies today if no improvement.      Imaging/Labs from [place] in chart  Bld cx x2 (7/13) NGTD  NM Lung V/Q Scan (7/14) : low prob of PE  CT C/A/P noncon (7/13): diffuse hyperattenuation of R kidney and ureter are favored to represent hemorrhage, primary ddx would be malignancy. Bladder hyperdensity favored to represent active hemorrhage into urinary bladder subacute clot. Numerous pulm nodules, several cavitating. Hepatic cirrhosis. Severe coronary PAD,   TTE (7/13): EF 35-40%, normal RV, mild MR, no AS, mild AR, mod TR, small pericardial effusion. PPM/AICD wire in RV       Pt transferred to 8N at [facility] on 7/17. Pt reports feeling well, had BM this morning in toilet, urinated a little then. Continues to complain of painless blood in urine.  Denies abdominal pain, fever chills, dizziness or excessive fatigue   (17 Jul 2023 08:59)       Oncologic History:  This patient is an 77 yo male with PMH of htn, CAD, MI s/p stent in 2001, TIA, COPD, bradycardia s/p ppm 2011, upgraded to bi-V AICD in 2019 due to low EF, CKD stage III, recent diagnosis of bladder cancer who presents for initial consultation.    Patient reported having intermittent gross hematuria x 8 months. He reported abdominal pain and constipation as well. He denied any nausea or vomiting. He had lost about 40+ lbs unintentionally over this time. He was hospitalized at [place] for urinary retention and required rodriguez catheter placement. He thereafter was hospitalized at [facility] for further evaluation and management.  He saw urologist Dr. Tony Diallo who advised TURBT.    3/3/23- CT CAP with IVC Heterogeneous and partially enhancing enhancing mass measuring 5.2 x 3.0 cm along the right posterior lateral bladder wall concerning for neoplasm. Visualization with cystoscopy recommended.  Mildly dilated main pancreatic duct without obstructive lesion. If clinically warranted, this can be further evaluated with an abdominal MRI.    3/31/23- Urine cytology was negative for high grade urothelial carcinoma.    4/5/23- CT chest non con found solid and ground glass nodules measuring up to 5mm on a background of emphysema. Partially imaged right hydroureter, increased since 3/3/2023.    4/24/23- TURBT performed, found high grade invasive urothelial carcinoma. No non-invasive component identified  Carcinoma invades muscularis propria (detrusor muscle). Lymphovascular invasion was not identified.  Tumor cells are positive for p63 and ROXANNA-3, negative for PSA and NKX 3.1.    4/25-4/28/23- He was hospitalized at [facility] for abdominal spasm, found to have urinary retention with HARLEEN and drop in Hgb. Patient was transfused 1u pRBC and had Rodriguez catheter replaced. He was discharged to [facility]    4/19-5/5/23- Patient was rehospitalized for suspected COPD exacerbation and UTI. UCX were negative, thus antibiotics were discontinued. Suprapubic pain was treated with IV tylenol. Patient had rodriguez removed while inpatient.    Patient was referred to medical oncology clinic. He denies fevers or chills. He reports fatigue.He has not had any CP, palpitations, dyspnea or cough. He still has mild abdominal pain. His hematuria has resolved since TURBT procedure.    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  He recently lost his wife after 40 years of marriage. His wife was under the care of Dr. Pond-Kellerman.  Allergies: NKDA.    Disease: urothelial cancer    [employer] results, TMB 33 Muts/Mb, MS-stable    ROS: as above     PAST MEDICAL & SURGICAL HISTORY:  HTN (Hypertension)      Bradycardia      Dyslipidemia      CAD (Coronary Artery Disease)      S/P PTCA (Percutaneous Transluminal Coronary Angioplasty)  2001      TIA (Transient Ischemic Attack)  18 years ago  PT was on coumadin for short while      COPD (chronic obstructive pulmonary disease)      NSTEMI (non-ST elevation myocardial infarction)      H/O constipation      Anxiety and depression      LV dysfunction      Bladder mass      S/P Rotator Cuff Surgery      S/P drug eluting coronary stent placement      S/P inguinal hernia repair      S/P cardiac pacemaker procedure      Cardiac resynchronization therapy defibrillator (CRT-D) in place          SOCIAL HISTORY:    FAMILY HISTORY:      MEDICATIONS  (STANDING):  atorvastatin 40 milliGRAM(s) Oral at bedtime  budesonide  80 MICROgram(s)/formoterol 4.5 MICROgram(s) Inhaler 2 Puff(s) Inhalation two times a day  FLUoxetine 40 milliGRAM(s) Oral daily  piperacillin/tazobactam IVPB.- 3.375 Gram(s) IV Intermittent once  tamsulosin 0.4 milliGRAM(s) Oral at bedtime  tiotropium 2.5 MICROgram(s) Inhaler 2 Puff(s) Inhalation daily    MEDICATIONS  (PRN):  acetaminophen     Tablet .. 650 milliGRAM(s) Oral every 6 hours PRN Temp greater or equal to 38C (100.4F), Mild Pain (1 - 3)  aluminum hydroxide/magnesium hydroxide/simethicone Suspension 30 milliLiter(s) Oral every 4 hours PRN Dyspepsia  diazepam    Tablet 5 milliGRAM(s) Oral daily PRN anxiety  melatonin 3 milliGRAM(s) Oral at bedtime PRN Insomnia  ondansetron Injectable 4 milliGRAM(s) IV Push every 8 hours PRN Nausea and/or Vomiting      Allergies    azithromycin (Nausea)    Intolerances        Vital Signs Last 24 Hrs  T(C): 36.6 (17 Jul 2023 13:44), Max: 36.6 (17 Jul 2023 13:44)  T(F): 97.9 (17 Jul 2023 13:44), Max: 97.9 (17 Jul 2023 13:44)  HR: 65 (17 Jul 2023 13:44) (65 - 67)  BP: 114/92 (17 Jul 2023 13:44) (108/70 - 114/92)  BP(mean): --  RR: 18 (17 Jul 2023 13:44) (17 - 18)  SpO2: 98% (17 Jul 2023 13:44) (98% - 100%)    Parameters below as of 17 Jul 2023 13:44  Patient On (Oxygen Delivery Method): nasal cannula        PHYSICAL EXAM  General: adult in NAD  HEENT: clear oropharynx, anicteric sclera, pink conjunctiva  Neck: supple  CV: normal S1/S2 with no murmur rubs or gallops  Lungs: positive air movement b/l ant lungs, clear to auscultation, no wheezes, no rales  Abdomen: soft non-tender non-distended, no hepatosplenomegaly  Ext: no clubbing cyanosis or edema  Skin: no rashes and no petechiae  Neuro: alert and oriented X 3, none focal    LABS:                          9.2    15.06 )-----------( 209      ( 17 Jul 2023 09:36 )             29.3         Mean Cell Volume : 91.3 fL  Mean Cell Hemoglobin : 28.7 pg  Mean Cell Hemoglobin Concentration : 31.4 gm/dL  Auto Neutrophil # : 13.01 K/uL  Auto Lymphocyte # : 0.86 K/uL  Auto Monocyte # : 0.97 K/uL  Auto Eosinophil # : 0.09 K/uL  Auto Basophil # : 0.02 K/uL  Auto Neutrophil % : 86.5 %  Auto Lymphocyte % : 5.7 %  Auto Monocyte % : 6.4 %  Auto Eosinophil % : 0.6 %  Auto Basophil % : 0.1 %      Serial CBC's  07-17 @ 09:36  Hct-29.3 / Hgb-9.2 / Plat-209 / RBC-3.21 / WBC-15.06      07-17    135  |  99  |  89<H>  ----------------------------<  99  4.6   |  19<L>  |  8.68<H>    Ca    9.5      17 Jul 2023 09:36  Phos  6.0     07-17  Mg     2.40     07-17    TPro  6.5  /  Alb  2.9<L>  /  TBili  0.2  /  DBili  x   /  AST  12  /  ALT  10  /  AlkPhos  60  07-17      PT/INR - ( 17 Jul 2023 09:36 )   PT: 13.5 sec;   INR: 1.16 ratio         PTT - ( 17 Jul 2023 09:36 )  PTT:35.6 sec                RADIOLOGY & ADDITIONAL STUDIES:

## 2023-07-17 NOTE — H&P ADULT - PROBLEM SELECTOR PLAN 1
With AGMA  Pt with known invasive bladder cancer p/w hematuria at [place] on 7/13, was receiving CTX. (unable to locate UCx)   Labs show uptrending Cr 2 --> 3.6 --> 5.06 on (7/15)   -BUN Cr --> 89/8.6 on 7/17  -Check UA, Ulytes   -Renal US ordered   -s/p CTX at [place], will place on renally dosed Zosyn for now, UA/UCx pending   -Per RN, bladder scan unable to properly read residual as pt with renal mass.   -Pt voiding, but minimal amounts, strict I&Os ordered. Of note, urology did not place rodriguez at [place] d/t low PVR and more discomfort to pt.   -Urology and Renal consulted at [facility], f/u recs With AGMA  Pt with known invasive bladder cancer p/w hematuria at [place] on 7/13, was receiving CTX. (unable to locate UCx)   Labs show uptrending Cr 2 --> 3.6 --> 5.06 --> 6.38 on (7/16)   -BUN Cr --> 89/8.6 on 7/17  -Check UA, Ulytes   -Renal US ordered   -s/p CTX at [place], will place on renally dosed Zosyn for now, UA/UCx pending   -Per RN, bladder scan unable to properly read residual as pt with renal mass.   -Pt voiding, but minimal amounts, strict I&Os ordered. Of note, urology did not place rodriguez at [place] d/t low PVR and more discomfort to pt.   -Urology and Renal consulted at [facility], f/u recs  -IR consulted for nephrostomies

## 2023-07-17 NOTE — H&P ADULT - HISTORY OF PRESENT ILLNESS
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   Pt was hospitalized at [place] from 7/13 for gross hematuria. Per hospital course, he had symptomatic anemia to 7.5 from baseline of 11, in 3/2023. [facility] records show 7.7 on 7/10/2023.   Course c/b HARLEEN, Cr of 5.0 from baseline 2.01 on 7/10/2023. At [place], pt evaluated by urology and given low PVR ~11cc and bladder mostly filled with mass, rodriguez would have increased pain and not but of utility to patient so deferred. Renal was consulted and pt was on IVF and low K diet.     Imaging/Labs from [facility] in chart  Bld cx x2 (7/13) NGTD  NM Lung V/Q Scan (7/14) : low prob of PE  CT C/A/P noncon (7/13): diffuse hyperattenuation of R kidney and ureter are favored to represent hemorrhage, primary ddx would be malignancy. Bladder hyperdensity favored to represent active hemorrhage into urinary bladder subacute clot. Numerous pulm nodules, several cavitating. Hepatic cirrhosis. Severe coronary PAD,   TTE (7/13): EF 35-40%, normal RV, mild MR, no AS, mild AR, mod TR, small pericardial effusion. PPM/AICD wire in RV       Pt transferred to N at [facility] on 7/17. Pt reports feeling well, had BM this morning in toilet, urinated a little then. Unclear if bloody or not.    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   Pt was hospitalized at [place] from 7/13 for gross hematuria. Per hospital course, he had symptomatic anemia to 7.5 from baseline of 11, in 3/2023. [facility] records show 7.7 on 7/10/2023.   Course c/b HARLEEN, Cr of 5.0 from baseline 2.01 on 7/10/2023. At [place], pt evaluated by urology and given low PVR ~11cc and bladder mostly filled with mass, rodriguez would have increased pain and not but of utility to patient so deferred. Renal was consulted and pt was on IVF and low K diet, however Cr kept uptrending and plan was to consult IR for nephrostomies today if no improvement.      Imaging/Labs from [facility] in chart  Bld cx x2 (7/13) NGTD  NM Lung V/Q Scan (7/14) : low prob of PE  CT C/A/P noncon (7/13): diffuse hyperattenuation of R kidney and ureter are favored to represent hemorrhage, primary ddx would be malignancy. Bladder hyperdensity favored to represent active hemorrhage into urinary bladder subacute clot. Numerous pulm nodules, several cavitating. Hepatic cirrhosis. Severe coronary PAD,   TTE (7/13): EF 35-40%, normal RV, mild MR, no AS, mild AR, mod TR, small pericardial effusion. PPM/AICD wire in RV       Pt transferred to 8N at [facility] on 7/17. Pt reports feeling well, had BM this morning in toilet, urinated a little then. Unclear if bloody or not.

## 2023-07-17 NOTE — PATIENT PROFILE ADULT - FALL HARM RISK - DEVICES
up-to-date.  screens normal: sent; pending. Social  The caregiver enjoys the child. Childcare is provided at child's home. The childcare provider is a parent. FAMILY HISTORY  History reviewed. No pertinent family history. No question data found. REVIEW OF CURRENT DEVELOPMENT  General behavior:  Normal for age  Lifts head:  Yes  Equal movement in all limbs:  Yes  Eyes fix on objects or lights:  Yes  Regards face:  Yes    VACCINES  Immunization History   Administered Date(s) Administered    Hepatitis B Ped/Adol (Recombivax HB) 2019       REVIEW OF SYSTEMS  Review of Systems   Constitutional: Negative for activity change, appetite change and fever. HENT: Negative for congestion and rhinorrhea. Eyes: Negative for discharge and redness. Respiratory: Negative for cough and wheezing. Cardiovascular: Negative for fatigue with feeds and sweating with feeds. Gastrointestinal: Negative for constipation, diarrhea and vomiting. Genitourinary: Negative for decreased urine volume. Skin: Negative for color change and rash. Allergic/Immunologic: Negative for food allergies. PHYSICAL EXAM  Vitals:    19 1140   Pulse: 144   Resp: 48   Temp: 97.9 °F (36.6 °C)   Weight: 7 lb 0.5 oz (3.189 kg)   Height: 20.6\" (52.3 cm)   HC: 34.6 cm (13.62\")      Physical Exam   Constitutional: She appears well-developed and well-nourished. She is active. She has a strong cry. No distress. HENT:   Head: Anterior fontanelle is flat. No cranial deformity or facial anomaly. Right Ear: Tympanic membrane normal.   Left Ear: Tympanic membrane normal.   Nose: Nose normal.   Mouth/Throat: Mucous membranes are moist. Oropharynx is clear. Eyes: Red reflex is present bilaterally. Pupils are equal, round, and reactive to light. Conjunctivae are normal. Right eye exhibits no discharge. Left eye exhibits no discharge. Neck: Neck supple.    Cardiovascular: Normal rate, regular rhythm, S1 normal and S2 normal.   No murmur heard. Pulmonary/Chest: Effort normal and breath sounds normal. No nasal flaring. No respiratory distress. She exhibits no retraction. Abdominal: Soft. Bowel sounds are normal. She exhibits no distension and no mass. There is no hepatosplenomegaly. Genitourinary:   Genitourinary Comments: Nl external female genitalia   Musculoskeletal: She exhibits no deformity. Neurological: She is alert. She has normal strength. She exhibits normal muscle tone. Suck normal. Symmetric Chriss. Skin: Skin is warm. Capillary refill takes less than 2 seconds. Turgor is normal. No rash noted. No jaundice. Nursing note and vitals reviewed. IMPRESSION  1. Encounter for well child check without abnormal findings          PLAN WITH ANTICIPATORY GUIDANCE    Next well child visit per routine at 1 month of age  Weight check follow upneeded? no  Immunizations given today: no    Anticipatory guidance discussed or covered in handout given to family:   Jaundice   Fever: Go to ER for any temp above 100.4 rectally. Feeding   Umbilical cordcare   Car seat rear facing until age 1   Crying/colic   Back to sleep and safe sleep patterns   Immunizations   CO monitor, smoke alarms, smoking   How and when to contact us   TdaP and Flu vaccines for all household contacts and caregivers    Orders:  No orders of the defined types were placed in this encounter. Medications:  No orders of the defined types were placed in this encounter.       Electronically signed by Gloria Cortez DO on 2019 [place]

## 2023-07-17 NOTE — H&P ADULT - PROBLEM SELECTOR PLAN 3
Hb stable ~7s   -Check iron studies  -Trend CBC if pt continues to have gross hematuria  -Transfuse if Hb <7 Hb stable ~7s   -s/p 2U pRBC at [place] on 7/13 per notes   -Check iron studies  -Trend CBC  -Transfuse if Hb <7, active T&S

## 2023-07-17 NOTE — PATIENT PROFILE ADULT - FALL HARM RISK - HARM RISK INTERVENTIONS

## 2023-07-17 NOTE — H&P ADULT - CONVERSATION DETAILS
Discussed new diagnosis, worsening renal failure and ongoing treatment plan with pt, pt overwhelmed, but states he would not want to be intubated. He is not sure about CPR.   He states his niece, Najma, is his HCP and would like her to be involved in conversations.

## 2023-07-17 NOTE — H&P ADULT - ASSESSMENT
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

## 2023-07-17 NOTE — CONSULT NOTE ADULT - SUBJECTIVE AND OBJECTIVE BOX
HPI: 78 year-old man with history of hypertension, coronary artery disease, COPD, and recently diagnosed bladder cancer a few weeks ago. She presented to [facility] 7/13/23 with acute-onset hematuria/clots for 1 night, as well as suprapubic pressure, and worsening dyspnea on exertion for a matter of weeks. My partner Dr. Onel Arambula was called to see her there on admission, as she had azotemia with BUN/creatinine of 43/2.12.     PAST MEDICAL & SURGICAL HISTORY:  HTN  HLD  TIA  CAD - PTCA  COPD  Bladder mass  Anxiety/depression  Rotator Cuff Surgery  Inguinal hernia repair  ICD    Allergies  azithromycin (Nausea)    SOCIAL HISTORY:  1ppd x 20years, quit 2002  current cannabis daily use    FAMILY HISTORY:  No CKD    REVIEW OF SYSTEMS:  CONSTITUTIONAL: No weakness, fevers or chills  EYES/ENT: No visual changes;  No vertigo or throat pain   NECK: No pain or stiffness  RESPIRATORY: (+)WELLINGTON  CARDIOVASCULAR: No chest pain or palpitations  GASTROINTESTINAL: No abdominal or epigastric pain. No nausea, vomiting, or hematemesis; No diarrhea or constipation. No melena or hematochezia.  GENITOURINARY: (+)gross hematuria, (+)clots  NEUROLOGICAL: No numbness or weakness  SKIN: No itching, burning, rashes, or lesions   All other review of systems is negative unless indicated above.    VITAL:  T(C): , Max: 36.4 (07-17-23 @ 07:22)  T(F): , Max: 97.5 (07-17-23 @ 07:22)  HR: 67 (07-17-23 @ 07:22)  BP: 108/70 (07-17-23 @ 07:22)  RR: 17 (07-17-23 @ 07:22)  SpO2: 100% (07-17-23 @ 07:22)    PHYSICAL EXAM:  Constitutional: NAD, Alert  HEENT: NCAT, MMM  Neck: Supple, No JVD  Respiratory: CTA-b/l  Cardiovascular: RRR s1s2, no m/r/g  Gastrointestinal: BS+, soft, NT/ND  Extremities: No peripheral edema b/l  Neurological: no focal deficits; strength grossly intact  Back: no CVAT b/l  Skin: No rashes, no nevi    LABS:                        9.2    15.06 )-----------( 209      ( 17 Jul 2023 09:36 )             29.3     Na(135)/K(4.6)/Cl(99)/HCO3(19)/BUN(89)/Cr(8.68)Glu(99)/Ca(9.5)/Mg(2.40)/PO4(6.0)    07-17 @ 09:36    Urinalysis Basic - ( 17 Jul 2023 09:36 )  Color: x / Appearance: x / SG: x / pH: x  Gluc: 99 mg/dL / Ketone: x  / Bili: x / Urobili: x   Blood: x / Protein: x / Nitrite: x   Leuk Esterase: x / RBC: x / WBC x   Sq Epi: x / Non Sq Epi: x / Bacteria: x      IMAGING:  < from: Xray Chest 1 View- PORTABLE-Urgent (Xray Chest 1 View- PORTABLE-Urgent .) (07.08.23 @ 19:05) >  No focal consolidation.  Small nodular densities noted in the right lung as on recent chest CT.    CT A/P I- (7/13/23)  - diffuse hyperattenuation of R kidney and ureter, c/w hemorrhage; bladder hyperdensity c/w hemmorhage; no L calc/L hydro; numerous pulmonary nodules, some of which are cavitating; hepatic cirrhosis.    TTE (7/13/23) - EF 35-40%, mod TR, small pericardial effusion    ASSESSMENT:      RECOMMEND:      Thank you for involving [facility] in this patient's care.    With warm regards,    Joseph Pires MD   [facility]  Office: [phone]  Cell: [phone]               HPI: 78 year-old man with history of hypertension, coronary artery disease, COPD, and recently diagnosed bladder cancer a few weeks ago. He presented to [facility] ([facility]) 7/13/23 with acute-onset hematuria/clots for 1 night, as well as suprapubic pressure, and worsening dyspnea on exertion for a matter of weeks. My partner Dr. Onel Arambula was called to see him there on 7/15/23 for oligoanuric HARLEEN; he had azotemia with BUN/creatinine of 43/2.12 on admission; his BUN/creatinine were up to 71/5.04 by 7/15/23. He was generally not agreeable to medical therapy while at [facility]; he was transferred to [facility] ([facility]) overnight. At this point, his creatinine is up to 8.68mg/dL. In light of the worsening HARLEEN, a renal consultation was requested.    I see no evidence that he received any IV contrast while at [facility]. I see that he was on IV Rocephin; I see no evidence of administration of nephrotoxic meds. I do not see evidence of hypotension.      PAST MEDICAL & SURGICAL HISTORY:  HTN  HLD  TIA  CAD - PTCA  COPD  Bladder mass  Anxiety/depression  Rotator Cuff Surgery  Inguinal hernia repair  ICD    Allergies  azithromycin (Nausea)    SOCIAL HISTORY:  1ppd x 20years, quit 2002  current cannabis daily use    FAMILY HISTORY:  No CKD    REVIEW OF SYSTEMS:  CONSTITUTIONAL: No weakness, fevers or chills  EYES/ENT: No visual changes;  No vertigo or throat pain   NECK: No pain or stiffness  RESPIRATORY: (+)WELLINGTON  CARDIOVASCULAR: No chest pain or palpitations  GASTROINTESTINAL: No abdominal or epigastric pain. No nausea, vomiting, or hematemesis; No diarrhea or constipation. No melena or hematochezia.  GENITOURINARY: (+)gross hematuria, (+)clots, low urine output  NEUROLOGICAL: No numbness or weakness  SKIN: No itching, burning, rashes, or lesions   All other review of systems is negative unless indicated above.    VITAL:  T(C): , Max: 36.4 (07-17-23 @ 07:22)  T(F): , Max: 97.5 (07-17-23 @ 07:22)  HR: 67 (07-17-23 @ 07:22)  BP: 108/70 (07-17-23 @ 07:22)  RR: 17 (07-17-23 @ 07:22)  SpO2: 100% (07-17-23 @ 07:22)    PHYSICAL EXAM:  Constitutional: NAD, Alert  HEENT: NCAT, MMM  Neck: Supple, No JVD  Respiratory: CTA-b/l  Cardiovascular: RRR s1s2, no m/r/g  Gastrointestinal: BS+, soft, NT/ND  Extremities: No peripheral edema b/l  Neurological: no focal deficits; strength grossly intact  Back: no CVAT b/l  Skin: No rashes, no nevi    LABS:                        9.2    15.06 )-----------( 209      ( 17 Jul 2023 09:36 )             29.3     Na(135)/K(4.6)/Cl(99)/HCO3(19)/BUN(89)/Cr(8.68)Glu(99)/Ca(9.5)/Mg(2.40)/PO4(6.0)    07-17 @ 09:36    Urinalysis Basic - ( 17 Jul 2023 09:36 )  Color: x / Appearance: x / SG: x / pH: x  Gluc: 99 mg/dL / Ketone: x  / Bili: x / Urobili: x   Blood: x / Protein: x / Nitrite: x   Leuk Esterase: x / RBC: x / WBC x   Sq Epi: x / Non Sq Epi: x / Bacteria: x    (7/15/23) - BUN/creatinine: 71/5.04  (7/13/23) - BUN/creatinine: 43/2.12  (7/10/23) - BUN/creatinine: 37/2.01      IMAGING:  < from: Xray Chest 1 View- PORTABLE-Urgent (Xray Chest 1 View- PORTABLE-Urgent .) (07.08.23 @ 19:05) >  No focal consolidation.  Small nodular densities noted in the right lung as on recent chest CT.    CT A/P I- (7/13/23)  - diffuse hyperattenuation of R kidney and ureter, c/w hemorrhage; bladder hyperdensity c/w hemmorhage; no L calc/L hydro; numerous pulmonary nodules, some of which are cavitating; hepatic cirrhosis.    TTE (7/13/23) - EF 35-40%, mod TR, small pericardial effusion    ASSESSMENT:  (1)CKD - base creatinine ~2, it appears - unclear exact etiology  (2)HARLEEN - unclear etiology. Even if there is hemorrhage/obstruction of the right kidney, the left kidney should be well-functioning enough that the creatinine would not rise anywhere near 8mg/dL. The fact that the creatinine was near his baseline on admission suggests that something happened the first 1-2 days at [facility] to prompt the HARLEEN. Tumor-lysis? Renal vein thrombosis? Bout of hypotension? Nephrotoxic medication administration?  (3)Hyperphosphatemia - renally mediated  (4)Onc - metastatic bladder CA    RECOMMEND:  (1)CPK and uric acid levels with next blood set  (2)Dose new meds for GFR<10  (3)Will d/w patient regarding dialysis    Thank you for involving [facility] in this patient's care.    With warm regards,    Joseph Pires MD   [facility]  Office: [phone]  Cell: [phone]               HPI: 78 year-old man with history of hypertension, coronary artery disease, COPD, and recently diagnosed bladder cancer a few weeks ago. He presented to [facility] ([facility]) 7/13/23 with acute-onset hematuria/clots for 1 night, as well as suprapubic pressure, and worsening dyspnea on exertion for a matter of weeks. My partner Dr. Onel Arambula was called to see him there on 7/15/23 for oligoanuric HARLEEN; he had azotemia with BUN/creatinine of 43/2.12 on admission; his BUN/creatinine were up to 71/5.04 by 7/15/23. He was generally not agreeable to medical therapy while at [facility]; he was transferred to [facility] ([facility]) overnight. At this point, his creatinine is up to 8.68mg/dL. In light of the worsening HARLEEN, a renal consultation was requested.    I see no evidence that he received any IV contrast while at [facility]. I see that he was on IV Rocephin; I see no evidence of administration of nephrotoxic meds. I see that his BP was 69-71/43-48 on admission; it was up to normotensive levels within a matter of hours with administration of IVF.     PAST MEDICAL & SURGICAL HISTORY:  HTN  HLD  TIA  CAD - PTCA  COPD  Bladder mass  Anxiety/depression  Rotator Cuff Surgery  Inguinal hernia repair  ICD    Allergies  azithromycin (Nausea)    SOCIAL HISTORY:  1ppd x 20years, quit 2002  current cannabis daily use    FAMILY HISTORY:  No CKD    REVIEW OF SYSTEMS:  CONSTITUTIONAL: No weakness, fevers or chills  EYES/ENT: No visual changes;  No vertigo or throat pain   NECK: No pain or stiffness  RESPIRATORY: (+)WELL[place]  CARDIOVASCULAR: No chest pain or palpitations  GASTROINTESTINAL: No abdominal or epigastric pain. No nausea, vomiting, or hematemesis; No diarrhea or constipation. No melena or hematochezia.  GENITOURINARY: (+)gross hematuria, (+)clots, low urine output  NEUROLOGICAL: No numbness or weakness  SKIN: No itching, burning, rashes, or lesions   All other review of systems is negative unless indicated above.    VITAL:  T(C): , Max: 36.4 (07-17-23 @ 07:22)  T(F): , Max: 97.5 (07-17-23 @ 07:22)  HR: 67 (07-17-23 @ 07:22)  BP: 108/70 (07-17-23 @ 07:22)  RR: 17 (07-17-23 @ 07:22)  SpO2: 100% (07-17-23 @ 07:22)    PHYSICAL EXAM:  Constitutional: NAD, Alert  HEENT: NCAT, MMM  Neck: Supple, No JVD  Respiratory: CTA-b/l  Cardiovascular: RRR s1s2, no m/r/g  Gastrointestinal: BS+, soft, NT/ND  Extremities: No peripheral edema b/l  Neurological: no focal deficits; strength grossly intact  Back: no CVAT b/l  Skin: No rashes, no nevi    LABS:                        9.2    15.06 )-----------( 209      ( 17 Jul 2023 09:36 )             29.3     Na(135)/K(4.6)/Cl(99)/HCO3(19)/BUN(89)/Cr(8.68)Glu(99)/Ca(9.5)/Mg(2.40)/PO4(6.0)    07-17 @ 09:36    Urinalysis Basic - ( 17 Jul 2023 09:36 )  Color: x / Appearance: x / SG: x / pH: x  Gluc: 99 mg/dL / Ketone: x  / Bili: x / Urobili: x   Blood: x / Protein: x / Nitrite: x   Leuk Esterase: x / RBC: x / WBC x   Sq Epi: x / Non Sq Epi: x / Bacteria: x    (7/15/23) - BUN/creatinine: 71/5.04  (7/13/23) - BUN/creatinine: 43/2.12  (7/10/23) - BUN/creatinine: 37/2.01      IMAGING:  < from: Xray Chest 1 View- PORTABLE-Urgent (Xray Chest 1 View- PORTABLE-Urgent .) (07.08.23 @ 19:05) >  No focal consolidation.  Small nodular densities noted in the right lung as on recent chest CT.    CT A/P I- (7/13/23)  - diffuse hyperattenuation of R kidney and ureter, c/w hemorrhage; bladder hyperdensity c/w hemmorhage; no L calc/L hydro; numerous pulmonary nodules, some of which are cavitating; hepatic cirrhosis.    TTE (7/13/23) - EF 35-40%, mod TR, small pericardial effusion    ASSESSMENT:  (1)CKD - base creatinine ~2, it appears - unclear exact etiology  (2)HARLEEN - Most likely ischemic ATN from hypotension on admission to [facility]. Tumor-lysis unlikely to account for this but should be ruled out. Renal vein thrombosis seems highly unlikely. I see no evidence of nephrotoxic medication administration.  (3)Hyperphosphatemia - renally mediated  (4)Onc - metastatic bladder CA    RECOMMEND:  (1)CPK and uric acid levels with next blood set  (2)Strict I/Os; if urine output is <30cc/h, and if patient is not agreeable to dialysis, I would opt for a Bumex 1mg/h gtt to convert from oliguric to nonoliguric  (3)Renal diet  (4)BMP+Mg+PO4 at least daily; Lokelma 10gm po prn K 5.3 or higher  (5)Dose new meds for GFR<10  (6)Will d/w patient regarding dialysis    Thank you for involving [facility] in this patient's care.    With warm regards,    Joseph Pires MD   [facility]  Office: [phone]  Cell: [phone]               HPI: 78 year-old man with history of hypertension, coronary artery disease, COPD, and recently diagnosed bladder cancer a few weeks ago. He presented to [facility] ([facility]) 7/13/23 with acute-onset hematuria/clots for 1 night, as well as suprapubic pressure, and worsening dyspnea on exertion for a matter of weeks. My partner Dr. Onel Arambula was called to see him there on 7/15/23 for oligoanuric HARLEEN; he had azotemia with BUN/creatinine of 43/2.12 on admission; his BUN/creatinine were up to 71/5.04 by 7/15/23. He was generally not agreeable to medical therapy while at [facility]; he was transferred to [facility] ([facility]) overnight. At this point, his creatinine is up to 8.68mg/dL. In light of the worsening HARLEEN, a renal consultation was requested.    I see no evidence that he received any IV contrast while at [facility]. I see that he was on IV Rocephin; I see no evidence of administration of nephrotoxic meds. I see that his BP was 69-71/43-48 on admission; it was up to normotensive levels within a matter of hours with administration of IVF.     Mr. Olvera denies known prior history of CKD. He recognizes that his urine output has been limited of late; he is not sure if his urine output has picked up, as he believes he is putting out a fair amount of urine while he defecates. He generally feels well; he denies nausea, vomiting, or shortness of breath at present.    Mr. Olvera expresses interest in avoiding dialysis if possible, as he has heard from others that dialysis can be difficult to tolerate. I counseled him on this issue and explained that if he were to require dialysis, it would likely be short-term rather than long term. I also explained that if we opt for dialysis, it is because he could die without it; in essence he would be choosing between dialysis and death. He expresses understanding and shares that he believes he would opt for HD over death.     PAST MEDICAL & SURGICAL HISTORY:  HTN  HLD  TIA  CAD - PTCA  COPD  Bladder mass  Anxiety/depression  Rotator Cuff Surgery  Inguinal hernia repair  ICD    Allergies  azithromycin (Nausea)    SOCIAL HISTORY:  1ppd x 20years, quit 2002  current cannabis daily use    FAMILY HISTORY:  No CKD    REVIEW OF SYSTEMS:  CONSTITUTIONAL: No weakness, fevers or chills  EYES/ENT: No visual changes;  No vertigo or throat pain   NECK: No pain or stiffness  RESPIRATORY: (+)WELLINGTON  CARDIOVASCULAR: No chest pain or palpitations  GASTROINTESTINAL: No abdominal or epigastric pain. No nausea, vomiting, or hematemesis; No diarrhea or constipation. No melena or hematochezia.  GENITOURINARY: (+)gross hematuria, (+)clots, low urine output  NEUROLOGICAL: No numbness or weakness  SKIN: No itching, burning, rashes, or lesions   All other review of systems is negative unless indicated above.    VITAL:  T(C): , Max: 36.4 (07-17-23 @ 07:22)  T(F): , Max: 97.5 (07-17-23 @ 07:22)  HR: 67 (07-17-23 @ 07:22)  BP: 108/70 (07-17-23 @ 07:22)  RR: 17 (07-17-23 @ 07:22)  SpO2: 100% (07-17-23 @ 07:22)    PHYSICAL EXAM:  Constitutional: NAD, Alert  HEENT: NCAT, MMM  Neck: Supple, No JVD  Respiratory: CTA-b/l  Cardiovascular: RRR s1s2, no m/r/g  Gastrointestinal: BS+, soft, NT/ND  Extremities: No peripheral edema b/l  Neurological: no focal deficits; strength grossly intact  Back: no CVAT b/l  Skin: No rashes, no nevi    LABS:                        9.2    15.06 )-----------( 209      ( 17 Jul 2023 09:36 )             29.3     Na(135)/K(4.6)/Cl(99)/HCO3(19)/BUN(89)/Cr(8.68)Glu(99)/Ca(9.5)/Mg(2.40)/PO4(6.0)    07-17 @ 09:36    Urinalysis Basic - ( 17 Jul 2023 09:36 )  Color: x / Appearance: x / SG: x / pH: x  Gluc: 99 mg/dL / Ketone: x  / Bili: x / Urobili: x   Blood: x / Protein: x / Nitrite: x   Leuk Esterase: x / RBC: x / WBC x   Sq Epi: x / Non Sq Epi: x / Bacteria: x    (7/15/23) - BUN/creatinine: 71/5.04  (7/13/23) - BUN/creatinine: 43/2.12  (7/10/23) - BUN/creatinine: 37/2.01      IMAGING:  < from: Xray Chest 1 View- PORTABLE-Urgent (Xray Chest 1 View- PORTABLE-Urgent .) (07.08.23 @ 19:05) >  No focal consolidation.  Small nodular densities noted in the right lung as on recent chest CT.    CT A/P I- (7/13/23)  - diffuse hyperattenuation of R kidney and ureter, c/w hemorrhage; bladder hyperdensity c/w hemmorhage; no L calc/L hydro; numerous pulmonary nodules, some of which are cavitating; hepatic cirrhosis.    TTE (7/13/23) - EF 35-40%, mod TR, small pericardial effusion    ASSESSMENT:  (1)CKD - base creatinine ~2, it appears - unclear exact etiology  (2)HARLEEN - Most likely ischemic ATN from hypotension on admission to [facility]. Tumor-lysis unlikely to account for this but should be ruled out. Renal vein thrombosis seems highly unlikely. I see no evidence of nephrotoxic medication administration.  (3)Hyperphosphatemia - renally mediated  (4)Onc - metastatic bladder CA    RECOMMEND:  (1)CPK and uric acid levels with next blood set  (2)Strict I/Os; if urine output is <30cc/h, I would opt for a Bumex 1mg/h gtt to convert from oliguric to nonoliguric  (3)Renal diet  (4)BMP+Mg+PO4 at least daily; Lokelma 10gm po prn K 5.3 or higher  (5)Dose new meds for GFR<10  (6)HBV/HCV studies (HBSAg/HBSAb/HBCAb/HCVAb)  (7)No HD just yet; depending on results of further bloodwork, I may look to set him up for HD as soon as tomorrow    Thank you for involving [facility] in this patient's care.    With warm regards,    Joseph Pires MD   [facility]  Office: [phone]  Cell: [phone]               HPI: 78 year-old man with history of hypertension, coronary artery disease, COPD, and recently diagnosed bladder cancer a few weeks ago. He presented to [facility] ([facility]) 7/13/23 with acute-onset hematuria/clots for 1 night, as well as suprapubic pressure, and worsening dyspnea on exertion for a matter of weeks. My partner Dr. Onel Arambula was called to see him there on 7/15/23 for oligoanuric HARLEEN; he had azotemia with BUN/creatinine of 43/2.12 on admission; his BUN/creatinine were up to 71/5.04 by 7/15/23. He was generally not agreeable to medical therapy while at [facility]; he was transferred to [facility] ([facility]) overnight. At this point, his creatinine is up to 8.68mg/dL. In light of the worsening HARLEEN, a renal consultation was requested.    I see no evidence that he received any IV contrast while at [facility]. I see that he was on IV Rocephin; I see no evidence of administration of nephrotoxic meds. I see that his BP was 69-71/43-48 on admission; it was up to normotensive levels within a matter of hours with administration of IVF.     Mr. Olvera denies known prior history of CKD. He recognizes that his urine output has been limited of late; he is not sure if his urine output has picked up, as he believes he is putting out a fair amount of urine while he defecates. He generally feels well; he denies nausea, vomiting, or shortness of breath at present.    Mr. Olvera expresses interest in avoiding dialysis if possible, as he has heard from others that dialysis can be difficult to tolerate. I counseled him on this issue and explained that if he were to require dialysis, it would likely be short-term rather than long term. I also explained that if we opt for dialysis, it is because he could die without it; in essence he would be choosing between dialysis and death. He expresses understanding and shares that he believes he would opt for HD over death.     PAST MEDICAL & SURGICAL HISTORY:  HTN  HLD  TIA  CAD - PTCA  COPD  Bladder mass  Anxiety/depression  Rotator Cuff Surgery  Inguinal hernia repair  ICD    Allergies  azithromycin (Nausea)    SOCIAL HISTORY:  1ppd x 20years, quit 2002  current cannabis daily use    FAMILY HISTORY:  No CKD    REVIEW OF SYSTEMS:  CONSTITUTIONAL: No weakness, fevers or chills  EYES/ENT: No visual changes;  No vertigo or throat pain   NECK: No pain or stiffness  RESPIRATORY: (+)WELLINGTON  CARDIOVASCULAR: No chest pain or palpitations  GASTROINTESTINAL: No abdominal or epigastric pain. No nausea, vomiting, or hematemesis; No diarrhea or constipation. No melena or hematochezia.  GENITOURINARY: (+)gross hematuria, (+)clots, low urine output  NEUROLOGICAL: No numbness or weakness  SKIN: No itching, burning, rashes, or lesions   All other review of systems is negative unless indicated above.    VITAL:  T(C): , Max: 36.4 (07-17-23 @ 07:22)  T(F): , Max: 97.5 (07-17-23 @ 07:22)  HR: 67 (07-17-23 @ 07:22)  BP: 108/70 (07-17-23 @ 07:22)  RR: 17 (07-17-23 @ 07:22)  SpO2: 100% (07-17-23 @ 07:22)    PHYSICAL EXAM:  Constitutional: NAD, Alert  HEENT: NCAT, MMM  Neck: Supple, No JVD  Respiratory: CTA-b/l  Cardiovascular: RRR s1s2, no m/r/g  Gastrointestinal: BS+, soft, NT/ND  Extremities: No peripheral edema b/l  Neurological: no focal deficits; strength grossly intact  Back: no CVAT b/l  Skin: No rashes, no nevi    LABS:                        9.2    15.06 )-----------( 209      ( 17 Jul 2023 09:36 )             29.3     Na(135)/K(4.6)/Cl(99)/HCO3(19)/BUN(89)/Cr(8.68)Glu(99)/Ca(9.5)/Mg(2.40)/PO4(6.0)    07-17 @ 09:36    Urinalysis Basic - ( 17 Jul 2023 09:36 )  Color: x / Appearance: x / SG: x / pH: x  Gluc: 99 mg/dL / Ketone: x  / Bili: x / Urobili: x   Blood: x / Protein: x / Nitrite: x   Leuk Esterase: x / RBC: x / WBC x   Sq Epi: x / Non Sq Epi: x / Bacteria: x    (7/15/23) - BUN/creatinine: 71/5.04  (7/13/23) - BUN/creatinine: 43/2.12  (7/10/23) - BUN/creatinine: 37/2.01      IMAGING:  < from: Xray Chest 1 View- PORTABLE-Urgent (Xray Chest 1 View- PORTABLE-Urgent .) (07.08.23 @ 19:05) >  No focal consolidation.  Small nodular densities noted in the right lung as on recent chest CT.    CT A/P I- (7/13/23)  - diffuse hyperattenuation of R kidney and ureter, c/w hemorrhage; bladder hyperdensity c/w hemmorhage; no L calc/L hydro; numerous pulmonary nodules, some of which are cavitating; hepatic cirrhosis.    TTE (7/13/23) - EF 35-40%, mod TR, small pericardial effusion    ASSESSMENT:  (1)CKD - base creatinine ~2, it appears - unclear exact etiology  (2)HARLEEN - Most likely ischemic ATN from hypotension on admission to [facility]. Tumor-lysis unlikely to account for this but should be ruled out. Renal vein thrombosis seems highly unlikely. L kidney not obstructed based on CT at [facility]; I doubt that he suddenly developed obstruction of the left kidney, after presentation to [facility]...and if his left kidney is not obstructed, then his HARLEEN should not be this severe. I see no evidence of nephrotoxic medication administration.  (3)Hyperphosphatemia - renally mediated  (4)Onc - metastatic bladder CA    RECOMMEND:  (1)CPK and uric acid levels with next blood set  (2)Renal US to rule out development of b/l hydro at this point  (3)Strict I/Os; if urine output is <30cc/h, I would opt for a Bumex 1mg/h gtt to convert from oliguric to nonoliguric  (4)Renal diet  (5)BMP+Mg+PO4 at least daily; Lokelma 10gm po prn K 5.3 or higher  (6)Dose new meds for GFR<10  (7)HBV/HCV studies (HBSAg/HBSAb/HBCAb/HCVAb)  (8)No HD just yet; depending on results of further bloodwork, I may look to set him up for HD as soon as tomorrow    Thank you for involving [facility] in this patient's care.    With warm regards,    Joseph Pires MD   [facility]  Office: [phone]  Cell: [phone]

## 2023-07-17 NOTE — H&P ADULT - PROBLEM SELECTOR PLAN 7
Known COPD, takes Trelegy at home.  -Imaging at [place] w/?mets to lung as CT Chest done at [place] with "numerous bilateral solid and ground glass pulm nodules, several w/central cavitation, largest on Left is GGO, [id number]. Lingular and b/l lower lobe subpleural reticular opacities.   -C/w Symbicort and Spiriva while inpt  -C/w home supplemental O2

## 2023-07-17 NOTE — H&P ADULT - NSHPLABSRESULTS_GEN_ALL_CORE
.  LABS:                         RADIOLOGY, EKG & ADDITIONAL TESTS: Reviewed. .  LABS:                         9.2    15.06 )-----------( 209      ( 17 Jul 2023 09:36 )             29.3     07-17    135  |  99  |  89<H>  ----------------------------<  99  4.6   |  19<L>  |  8.68<H>    Ca    9.5      17 Jul 2023 09:36  Phos  6.0     07-17  Mg     2.40     07-17    TPro  6.5  /  Alb  2.9<L>  /  TBili  0.2  /  DBili  x   /  AST  12  /  ALT  10  /  AlkPhos  60  07-17    PT/INR - ( 17 Jul 2023 09:36 )   PT: 13.5 sec;   INR: 1.16 ratio         PTT - ( 17 Jul 2023 09:36 )  PTT:35.6 sec  Urinalysis Basic - ( 17 Jul 2023 09:36 )    Color: x / Appearance: x / SG: x / pH: x  Gluc: 99 mg/dL / Ketone: x  / Bili: x / Urobili: x   Blood: x / Protein: x / Nitrite: x   Leuk Esterase: x / RBC: x / WBC x   Sq Epi: x / Non Sq Epi: x / Bacteria: x                RADIOLOGY, EKG & ADDITIONAL TESTS: Reviewed.  EKG Pending

## 2023-07-17 NOTE — H&P ADULT - PROBLEM SELECTOR PLAN 6
CAD s/p PCI, s/p Bi-V ICD   -EF 35-40% and small pericardial effusion on TTE at [place]   -c/w statin, unclear why pt not on bb  -Seen by cards at [place], will consult house cardiology if needed

## 2023-07-17 NOTE — CONSULT NOTE ADULT - ASSESSMENT
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 PResenting as transfer from [place] for ARF.     On admission to [facility], AVSS. WBC 15, Hgb 9.2. Cr 8.68 from 2.01 from 1 week agp. Renal bladder ultrasound ordered here, IR requesting CTAP w/wo IVC. No urine culture.     NO plans for cystectomy for bladder cancer, is following actively with Heme onc for outpatient management of bladder cancer  Nephrology consulted in the setting of acute renal failure, Most likely ischemic ATN from hypotension on admission to [facility]. Tumor-lysis unlikely to account for this but should be ruled out.   No CT scan from [facility], however documentation from [facility] not demonstrating new left hydro, per nephrology HARLEEN should not be that severe if L side is not obstructed, therefor likely medical renal.   IR deferring nephrostomy tube placement as no clear signs of acute obstruction per previous documentation, require CT AP imaging.     Plan  - Please ensure that patient is voiding well, obtain PVR and place rodriguez if in retention  - Primary team can consider placement of rodriguez catheter for strict I/O in the setting of ARF  - Avoid nephrotoxins and appreciate nephrology recs regarding dialysis  - F/u CT AP recommended by IR for evaluation of possible upper tract obstruction as etiology of ARF, although unlikely given records from [facility]  - No further urological intervention at this time.  - Please call urology with any questions or concerns.    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 PResenting as transfer from [place] for ARF.     On admission to [facility], AVSS. WBC 15, Hgb 9.2. Cr 8.68 from 2.01 from 1 week agp. Renal bladder ultrasound ordered here, IR requesting CTAP w/wo IVC. No urine culture.     NO plans for cystectomy for bladder cancer, is following actively with Heme onc for outpatient management of bladder cancer  Nephrology consulted in the setting of acute renal failure, Most likely ischemic ATN from hypotension on admission to [facility]. Tumor-lysis unlikely to account for this but should be ruled out.   No CT scan from [facility], however documentation from [facility] not demonstrating new left hydro, per nephrology HARLEEN should not be that severe if L side is not obstructed, therefor likely medical renal.   IR deferring nephrostomy tube placement as no clear signs of acute obstruction per previous documentation, require CT AP imaging.     Plan  - PVR was noted to be 15 at the bedside, rodriguez was placed by nursing 14 F, irrigating well. No urological indication for rodriguez, primary team decided to place for strict I/O, urine is punch color, instructed nurse to flush catheter as needed if it becomes obstructed. Patient not making much urine, and is difficult to dilute the urine so it will likely remain punch colored.   - Trend H/H, tnf as needed  - Call urology if patient goes into clot retention with rodriguez in place.    - Primary team can consider placement of rodriguez catheter for strict I/O in the setting of ARF  - Avoid nephrotoxins and appreciate nephrology recs regarding dialysis  - F/u CT AP recommended by IR for evaluation of possible upper tract obstruction as etiology of ARF, although unlikely given records from [facility]  - No further urological intervention at this time.  - Please call urology with any questions or concerns.    [facility]  [address] [place] [id number] [phone]

## 2023-07-17 NOTE — H&P ADULT - PROBLEM SELECTOR PLAN 4
newly diagnosed, ?mets to lung as CT Chest done at [place] with "numerous bilateral solid and ground glass pulm nodules, several w/central cavitation, largest on Left is GGO, [id number]. Lingular and b/l lower lobe subpleural reticular opacities.   -Heme/onc consulted, f/u recs newly diagnosed high grade invasive urothelial cancer  -?mets to lung as CT Chest done at [place] with "numerous bilateral solid and ground glass pulm nodules, several w/central cavitation, largest on Left is GGO, [id number]. Lingular and b/l lower lobe subpleural reticular opacities.   -Heme/onc consulted, f/u recs

## 2023-07-17 NOTE — CONSULT NOTE ADULT - SUBJECTIVE AND OBJECTIVE BOX
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   Pt was hospitalized at [place] from 7/13 for gross hematuria. Per hospital course, he had symptomatic anemia to 7.5 from baseline of 11, in 3/2023. [facility] records show 7.7 on 7/10/2023.   Course c/b HARLEEN, Cr of 5.0 from baseline 2.01 on 7/10/2023. At [place], pt evaluated by urology and given low PVR ~11cc and bladder mostly filled with mass, rodriguez would have increased pain and not but of utility to patient so deferred. Renal was consulted and pt was on IVF and low K diet, however Cr kept uptrending and plan was to consult IR for nephrostomies today if no improvement.      Imaging/Labs from [facility] in chart  Bld cx x2 (7/13) NGTD  NM Lung V/Q Scan (7/14) : low prob of PE  CT C/A/P noncon (7/13): diffuse hyperattenuation of R kidney and ureter are favored to represent hemorrhage, primary ddx would be malignancy. Bladder hyperdensity favored to represent active hemorrhage into urinary bladder subacute clot. Numerous pulm nodules, several cavitating. Hepatic cirrhosis. Severe coronary PAD,   TTE (7/13): EF 35-40%, normal RV, mild MR, no AS, mild AR, mod TR, small pericardial effusion. PPM/AICD wire in RV     Pt transferred to  at [facility] on 7/17. Pt reports feeling well, had BM this morning in toilet, urinated a little then. Unclear if bloody or not."    Urology consulted for bladder mass. On admission to [facility], AVSS. WBC 15, Hgb 9.2. Cr 8.68 from 2.01 from 1 week       PAST MEDICAL & SURGICAL HISTORY:  HTN (Hypertension)      Bradycardia      Dyslipidemia      CAD (Coronary Artery Disease)      S/P PTCA (Percutaneous Transluminal Coronary Angioplasty)  2001      TIA (Transient Ischemic Attack)  18 years ago  PT was on coumadin for short while      COPD (chronic obstructive pulmonary disease)      NSTEMI (non-ST elevation myocardial infarction)      H/O constipation      Anxiety and depression      LV dysfunction      Bladder mass      S/P Rotator Cuff Surgery      S/P drug eluting coronary stent placement      S/P inguinal hernia repair      S/P cardiac pacemaker procedure      Cardiac resynchronization therapy defibrillator (CRT-D) in place          MEDICATIONS  (STANDING):  atorvastatin 40 milliGRAM(s) Oral at bedtime  budesonide  80 MICROgram(s)/formoterol 4.5 MICROgram(s) Inhaler 2 Puff(s) Inhalation two times a day  FLUoxetine 40 milliGRAM(s) Oral daily  piperacillin/tazobactam IVPB.- 3.375 Gram(s) IV Intermittent once  tamsulosin 0.4 milliGRAM(s) Oral at bedtime  tiotropium 2.5 MICROgram(s) Inhaler 2 Puff(s) Inhalation daily    MEDICATIONS  (PRN):  acetaminophen     Tablet .. 650 milliGRAM(s) Oral every 6 hours PRN Temp greater or equal to 38C (100.4F), Mild Pain (1 - 3)  aluminum hydroxide/magnesium hydroxide/simethicone Suspension 30 milliLiter(s) Oral every 4 hours PRN Dyspepsia  diazepam    Tablet 5 milliGRAM(s) Oral daily PRN anxiety  melatonin 3 milliGRAM(s) Oral at bedtime PRN Insomnia  ondansetron Injectable 4 milliGRAM(s) IV Push every 8 hours PRN Nausea and/or Vomiting      FAMILY HISTORY:      Allergies    azithromycin (Nausea)    Intolerances        SOCIAL HISTORY:    REVIEW OF SYSTEMS: Otherwise negative as stated in HPI    Physical Exam  Vital signs  T(C): 36.4 (07-17-23 @ 07:22), Max: 36.4 (07-17-23 @ 07:22)  HR: 67 (07-17-23 @ 07:22)  BP: 108/70 (07-17-23 @ 07:22)  SpO2: 100% (07-17-23 @ 07:22)  Wt(kg): --    Output      Gen: NAD  Pulm: No respiratory distress	  CV: RRR  GI: S/ND/NT  :   MSK: moves all 4 limbs spontaneously    LABS:      07-17 @ 09:36    WBC 15.06 / Hct 29.3  / SCr 8.68     07-17    135  |  99  |  89<H>  ----------------------------<  99  4.6   |  19<L>  |  8.68<H>    Ca    9.5      17 Jul 2023 09:36  Phos  6.0     07-17  Mg     2.40     07-17    TPro  6.5  /  Alb  2.9<L>  /  TBili  0.2  /  DBili  x   /  AST  12  /  ALT  10  /  AlkPhos  60  07-17    PT/INR - ( 17 Jul 2023 09:36 )   PT: 13.5 sec;   INR: 1.16 ratio         PTT - ( 17 Jul 2023 09:36 )  PTT:35.6 sec  Urinalysis Basic - ( 17 Jul 2023 09:36 )    Color: x / Appearance: x / SG: x / pH: x  Gluc: 99 mg/dL / Ketone: x  / Bili: x / Urobili: x   Blood: x / Protein: x / Nitrite: x   Leuk Esterase: x / RBC: x / WBC x   Sq Epi: x / Non Sq Epi: x / Bacteria: x            Urine Cx:    Blood Cx:    RADIOLOGY:     HPI  "79yo M w/ hx of HTN, HLD, CAD (s/p PCI in 2001), HFrEF c/b pericardial effusion, 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   Pt was hospitalized at [place] from 7/13 for gross hematuria. Per hospital course, he had symptomatic anemia to 7.5 from baseline of 11, in 3/2023. [facility] records show 7.7 on 7/10/2023.   Course c/b HARLEEN, Cr of 5.0 from baseline 2.01 on 7/10/2023. At [place], pt evaluated by urology and given low PVR ~11cc and bladder mostly filled with mass, rodriguez would have increased pain and not but of utility to patient so deferred. Renal was consulted and pt was on IVF and low K diet, however Cr kept uptrending and plan was to consult IR for nephrostomies today if no improvement.      Imaging/Labs from [facility] in chart  Bld cx x2 (7/13) NGTD  NM Lung V/Q Scan (7/14) : low prob of PE  CT C/A/P noncon (7/13): diffuse hyperattenuation of R kidney and ureter are favored to represent hemorrhage, primary ddx would be malignancy. Bladder hyperdensity favored to represent active hemorrhage into urinary bladder subacute clot. Numerous pulm nodules, several cavitating. Hepatic cirrhosis. Severe coronary PAD,   TTE (7/13): EF 35-40%, normal RV, mild MR, no AS, mild AR, mod TR, small pericardial effusion. PPM/AICD wire in RV     Pt transferred to N at [facility] on 7/17. Pt reports feeling well, had BM this morning in toilet, urinated a little then. Unclear if bloody or not."    Urology consulted for bladder mass. Patient has noted intermittent episodes gross hematuria for past 6 months. Was seen at [facility] at one point in clot retention needing a rodriguez, seen more recently [facility] for same issue. Had a CT scan  noted >5cm bladder mass. s/p TURBT April, 2023, demonstrating muscle invasive bladder cancer and CT with RP lymphadenopathy, Recent PET done in July with pulmonary nodules by predominantly localized bladder cancer. Not a good candidate for cystectomy given comorbities and functional status. At this time, working with Dr. Spivey (oncologist) with discussion of outpatient chemo plans. Heme/onc consulted inpatient, decision for no inpatient chemo/radiation. Nephrology consulted in the setting of acute renal failure, Most likely ischemic ATN from hypotension on admission to . Tumor-lysis unlikely to account for this but should be ruled out. No CT scan from [facility], however documentation from  not demonstrating new left hydro, per nephrology HARLEEN should not be that severe if L side is not obstructed, therefor likely medical renal. IR deferring nephrostomy tube placement as no clear signs of acute obstruction per previous documentation, require CT AP imaging.     On admission to [facility], AVSS. WBC 15, Hgb 9.2. Cr 8.68 from 2.01 from 1 week agp. Renal bladder ultrasound ordered here, IR requesting CTAP w/wo IVC. No urine culture.       PAST MEDICAL & SURGICAL HISTORY:  HTN (Hypertension)      Bradycardia      Dyslipidemia      CAD (Coronary Artery Disease)      S/P PTCA (Percutaneous Transluminal Coronary Angioplasty)  2001      TIA (Transient Ischemic Attack)  18 years ago  PT was on coumadin for short while      COPD (chronic obstructive pulmonary disease)      NSTEMI (non-ST elevation myocardial infarction)      H/O constipation      Anxiety and depression      LV dysfunction      Bladder mass      S/P Rotator Cuff Surgery      S/P drug eluting coronary stent placement      S/P inguinal hernia repair      S/P cardiac pacemaker procedure      Cardiac resynchronization therapy defibrillator (CRT-D) in place          MEDICATIONS  (STANDING):  atorvastatin 40 milliGRAM(s) Oral at bedtime  budesonide  80 MICROgram(s)/formoterol 4.5 MICROgram(s) Inhaler 2 Puff(s) Inhalation two times a day  FLUoxetine 40 milliGRAM(s) Oral daily  piperacillin/tazobactam IVPB.- 3.375 Gram(s) IV Intermittent once  tamsulosin 0.4 milliGRAM(s) Oral at bedtime  tiotropium 2.5 MICROgram(s) Inhaler 2 Puff(s) Inhalation daily    MEDICATIONS  (PRN):  acetaminophen     Tablet .. 650 milliGRAM(s) Oral every 6 hours PRN Temp greater or equal to 38C (100.4F), Mild Pain (1 - 3)  aluminum hydroxide/magnesium hydroxide/simethicone Suspension 30 milliLiter(s) Oral every 4 hours PRN Dyspepsia  diazepam    Tablet 5 milliGRAM(s) Oral daily PRN anxiety  melatonin 3 milliGRAM(s) Oral at bedtime PRN Insomnia  ondansetron Injectable 4 milliGRAM(s) IV Push every 8 hours PRN Nausea and/or Vomiting      FAMILY HISTORY:      Allergies    azithromycin (Nausea)    Intolerances        SOCIAL HISTORY:    REVIEW OF SYSTEMS: Otherwise negative as stated in HPI    Physical Exam  Vital signs  T(C): 36.4 (07-17-23 @ 07:22), Max: 36.4 (07-17-23 @ 07:22)  HR: 67 (07-17-23 @ 07:22)  BP: 108/70 (07-17-23 @ 07:22)  SpO2: 100% (07-17-23 @ 07:22)  Wt(kg): --    Output      Gen: NAD  Pulm: No respiratory distress	  CV: RRR  GI: S/ND/NT    LABS:      07-17 @ 09:36    WBC 15.06 / Hct 29.3  / SCr 8.68     07-17    135  |  99  |  89<H>  ----------------------------<  99  4.6   |  19<L>  |  8.68<H>    Ca    9.5      17 Jul 2023 09:36  Phos  6.0     07-17  Mg     2.40     07-17    TPro  6.5  /  Alb  2.9<L>  /  TBili  0.2  /  DBili  x   /  AST  12  /  ALT  10  /  AlkPhos  60  07-17    PT/INR - ( 17 Jul 2023 09:36 )   PT: 13.5 sec;   INR: 1.16 ratio         PTT - ( 17 Jul 2023 09:36 )  PTT:35.6 sec  Urinalysis Basic - ( 17 Jul 2023 09:36 )    Color: x / Appearance: x / SG: x / pH: x  Gluc: 99 mg/dL / Ketone: x  / Bili: x / Urobili: x   Blood: x / Protein: x / Nitrite: x   Leuk Esterase: x / RBC: x / WBC x   Sq Epi: x / Non Sq Epi: x / Bacteria: x      RADIOLOGY:    Pending CT and US

## 2023-07-17 NOTE — H&P ADULT - PROBLEM SELECTOR PLAN 5
[place] imaging w/?mets to lung "numerous bilateral solid and ground glass pulm nodules, several w/central cavitation, largest on Left is GGO, [id number]. Lingular and b/l lower lobe subpleural reticular opacities.  -Previous imaging June 2023 at [facility]- Several right-sided pulmonary nodules, new since prior CT Chest 4/5/2023,   concerning for metastatic disease.

## 2023-07-17 NOTE — CONSULT NOTE ADULT - SUBJECTIVE AND OBJECTIVE BOX
Interventional Radiology    Evaluate for Procedure:     HPI: 78y Male with HTN, HLD, CAD, HFrEF, CKD3, COPD, recent diagnosis of bladder cancer transferred from [place] for hematuria iso newly diagnosed muscular invasive bladder cancer, HARLEEN, symptomatic anemia.   Pt was hospitalized at [place] from 7/13 for gross hematuria. Course c/b HARLEEN, Cr of 5.0 from baseline 2.01 on 7/10/2023. At [place], pt evaluated by urology and given low PVR ~11cc and bladder mostly filled with mass, rodriguez would have increased pain and not but of utility to patient so deferred.     Imaging/Labs from [facility] in chart  CT C/A/P noncon (7/13): diffuse hyperattenuation of R kidney and ureter are favored to represent hemorrhage, primary ddx would be malignancy. Bladder hyperdensity favored to represent active hemorrhage into urinary bladder subacute clot. Numerous pulm nodules, several cavitating. Hepatic cirrhosis. Severe coronary PAD,       Allergies: azithromycin (Nausea)    Medications (Abx/Cardiac/Anticoagulation/Blood Products)  piperacillin/tazobactam IVPB.: 200 mL/Hr IV Intermittent (07-17 @ 12:37)    Home Medications  Albuterol (Eqv-ProAir HFA) 90 mcg/inh inhalation aerosol: 2 puff(s) inhaled every 6 hours as needed for  shortness of breath and/or wheezing  diazePAM 5 mg oral tablet: 1 tab(s) orally once a day (at bedtime) NOTE: As per Pharmacy, 1 tablet orally 3 times a day as needed for anxiety  FLUoxetine 40 mg oral capsule: 1 cap(s) orally once a day  Folbic oral tablet: 1 tab(s) orally once a day  metoprolol succinate 25 mg oral tablet, extended release: 1 tab(s) orally once a day  mirtazapine 7.5 mg oral tablet: 1 tab(s) orally once a day (at bedtime)  simvastatin 40 mg oral tablet: 1 tab(s) orally once a day  tamsulosin 0.4 mg oral capsule: 1 cap(s) orally once a day (at bedtime)  Trelegy Ellipta 100 mcg-62.5 mcg-25 mcg/inh inhalation powder: 1 puff(s) inhaled once a day    Data:  175.3  58.2  T(C): 36.4  HR: 67  BP: 108/70  RR: 17  SpO2: 100%    -WBC 15.06 / HgB 9.2 / Hct 29.3 / Plt 209  -Na 135 / Cl 99 / BUN 89 / Glucose 99  -K 4.6 / CO2 19 / Cr 8.68  -ALT 10 / Alk Phos 60 / T.Bili 0.2  -INR 1.16 / PTT 35.6      Assessment/Plan:   -78y Male with HTN, HLD, CAD, HFrEF, CKD3, COPD, recent diagnosis of bladder cancer transferred from [place] for hematuria iso newly diagnosed muscular invasive bladder cancer, HARLEEN, symptomatic anemia. Developed worsening HARLEEN with Cr 8 from baseline of 2. Nephrology concerned for ATN. IR consulted for right nephrostomy tube placement.    Case discussed with Dr. Corley. While nephrostomy tube placement is technically doable, given the unclear etiology of patient's HARLEEN, IR will hold off on placing the nephrostomy given lack of consensus between consulting physicians. In the interim, would recommend CT abdomen/pelvis with IV contrast if possible to assess the hydronephrosis.        Rona Mckeon MD  PGY3, Interventional Radiology    -Available on [employer] TEAMS for all non-urgent questions  -Emergent issues: [facility]-p.[phone]; [facility]-p.[phone] ([phone])  -Non-emergent consults: Please place a [place] order "Consult-Interventional Radiology" with an appropriate callback number  -Scheduling questions: [facility]: [phone]; [facility]: [phone]  -Clinic/Outpatient booking: [facility]: [phone]; [facility]: [phone]           Interventional Radiology    Evaluate for Procedure:     HPI: 78y Male with HTN, HLD, CAD, HFrEF, CKD3, COPD, recent diagnosis of bladder cancer transferred from [place] for hematuria iso newly diagnosed muscular invasive bladder cancer, HARLEEN, symptomatic anemia.   Pt was hospitalized at [place] from 7/13 for gross hematuria. Course c/b HARLEEN, Cr of 5.0 from baseline 2.01 on 7/10/2023. At [place], pt evaluated by urology and given low PVR ~11cc and bladder mostly filled with mass, rodriguez would have increased pain and not but of utility to patient so deferred.     Imaging/Labs from [facility] in chart  CT C/A/P noncon (7/13): diffuse hyperattenuation of R kidney and ureter are favored to represent hemorrhage, primary ddx would be malignancy. Bladder hyperdensity favored to represent active hemorrhage into urinary bladder subacute clot. Numerous pulm nodules, several cavitating. Hepatic cirrhosis. Severe coronary PAD,       Allergies: azithromycin (Nausea)    Medications (Abx/Cardiac/Anticoagulation/Blood Products)  piperacillin/tazobactam IVPB.: 200 mL/Hr IV Intermittent (07-17 @ 12:37)    Home Medications  Albuterol (Eqv-ProAir HFA) 90 mcg/inh inhalation aerosol: 2 puff(s) inhaled every 6 hours as needed for  shortness of breath and/or wheezing  diazePAM 5 mg oral tablet: 1 tab(s) orally once a day (at bedtime) NOTE: As per Pharmacy, 1 tablet orally 3 times a day as needed for anxiety  FLUoxetine 40 mg oral capsule: 1 cap(s) orally once a day  Folbic oral tablet: 1 tab(s) orally once a day  metoprolol succinate 25 mg oral tablet, extended release: 1 tab(s) orally once a day  mirtazapine 7.5 mg oral tablet: 1 tab(s) orally once a day (at bedtime)  simvastatin 40 mg oral tablet: 1 tab(s) orally once a day  tamsulosin 0.4 mg oral capsule: 1 cap(s) orally once a day (at bedtime)  Trelegy Ellipta 100 mcg-62.5 mcg-25 mcg/inh inhalation powder: 1 puff(s) inhaled once a day    Data:  175.3  58.2  T(C): 36.4  HR: 67  BP: 108/70  RR: 17  SpO2: 100%    -WBC 15.06 / HgB 9.2 / Hct 29.3 / Plt 209  -Na 135 / Cl 99 / BUN 89 / Glucose 99  -K 4.6 / CO2 19 / Cr 8.68  -ALT 10 / Alk Phos 60 / T.Bili 0.2  -INR 1.16 / PTT 35.6      Assessment/Plan:   -78y Male with HTN, HLD, CAD, HFrEF, CKD3, COPD, recent diagnosis of bladder cancer transferred from [place] for hematuria iso newly diagnosed muscular invasive bladder cancer, HARLEEN, symptomatic anemia. Developed worsening HARLEEN with Cr 8 from baseline of 2. Nephrology concerned for ATN. IR consulted for right nephrostomy tube placement.    Case discussed with Dr. Corley. While nephrostomy tube placement is technically doable, given the unclear etiology of patient's HARLEEN, IR will hold off on placing the nephrostomy given lack of consensus between consulting physicians. In the interim, would recommend CT abdomen/pelvis with IV contrast if patient is expected to start dialysis on 7/18/23 to assess the hydronephrosis.    Recommendations:  - Please obtain CT abdomen/pelvis with IV contrast if patient is expected to start dialysis on 7/18/23      Rona Mckeon MD  PGY3, Interventional Radiology    -Available on [employer] TEAMS for all non-urgent questions  -Emergent issues: [facility]-p.[phone]; [facility]-p.[phone] ([phone])  -Non-emergent consults: Please place a [place] order "Consult-Interventional Radiology" with an appropriate callback number  -Scheduling questions: [facility]: [phone]; [facility]: [phone]  -Clinic/Outpatient booking: [facility]: [phone]; [facility]: [phone]           Interventional Radiology    Evaluate for Procedure:     HPI: 78y Male with HTN, HLD, CAD, HFrEF, CKD3, COPD, recent diagnosis of bladder cancer transferred from [place] for hematuria iso newly diagnosed muscular invasive bladder cancer, HARLEEN, symptomatic anemia.   Pt was hospitalized at [place] from 7/13 for gross hematuria. Course c/b HARLEEN, Cr of 5.0 from baseline 2.01 on 7/10/2023. At [place], pt evaluated by urology and given low PVR ~11cc and bladder mostly filled with mass, rodriguez would have increased pain and not but of utility to patient so deferred.     Imaging/Labs from [facility] in chart  CT C/A/P noncon (7/13): diffuse hyperattenuation of R kidney and ureter are favored to represent hemorrhage, primary ddx would be malignancy. Bladder hyperdensity favored to represent active hemorrhage into urinary bladder subacute clot. Numerous pulm nodules, several cavitating. Hepatic cirrhosis. Severe coronary PAD,       Allergies: azithromycin (Nausea)    Medications (Abx/Cardiac/Anticoagulation/Blood Products)  piperacillin/tazobactam IVPB.: 200 mL/Hr IV Intermittent (07-17 @ 12:37)    Home Medications  Albuterol (Eqv-ProAir HFA) 90 mcg/inh inhalation aerosol: 2 puff(s) inhaled every 6 hours as needed for  shortness of breath and/or wheezing  diazePAM 5 mg oral tablet: 1 tab(s) orally once a day (at bedtime) NOTE: As per Pharmacy, 1 tablet orally 3 times a day as needed for anxiety  FLUoxetine 40 mg oral capsule: 1 cap(s) orally once a day  Folbic oral tablet: 1 tab(s) orally once a day  metoprolol succinate 25 mg oral tablet, extended release: 1 tab(s) orally once a day  mirtazapine 7.5 mg oral tablet: 1 tab(s) orally once a day (at bedtime)  simvastatin 40 mg oral tablet: 1 tab(s) orally once a day  tamsulosin 0.4 mg oral capsule: 1 cap(s) orally once a day (at bedtime)  Trelegy Ellipta 100 mcg-62.5 mcg-25 mcg/inh inhalation powder: 1 puff(s) inhaled once a day    Data:  175.3  58.2  T(C): 36.4  HR: 67  BP: 108/70  RR: 17  SpO2: 100%    -WBC 15.06 / HgB 9.2 / Hct 29.3 / Plt 209  -Na 135 / Cl 99 / BUN 89 / Glucose 99  -K 4.6 / CO2 19 / Cr 8.68  -ALT 10 / Alk Phos 60 / T.Bili 0.2  -INR 1.16 / PTT 35.6      Assessment/Plan:   -78y Male with HTN, HLD, CAD, HFrEF, CKD3, COPD, recent diagnosis of bladder cancer transferred from [place] for hematuria iso newly diagnosed muscular invasive bladder cancer, HARLEEN, symptomatic anemia. Developed worsening HARLEEN with Cr 8 from baseline of 2. Nephrology concerned for ATN. IR consulted for right nephrostomy tube placement.    Case discussed with Dr. Corley. While nephrostomy tube placement is technically doable, given the unclear etiology of patient's HARLEEN, IR will hold off on placing the nephrostomy given lack of consensus between consulting physicians. In the interim, would recommend CT abdomen/pelvis without IV contrast to evaluate full extent of urinary tract.    Recommendations:  - Please obtain CT abdomen/pelvis   - Will hold off on PCN for now.  - Please reconsult when consensus is reached as to the etiology of the HARLEEN and IR services needed      Rona Mckeon MD  PGY3, Interventional Radiology    -Available on [place] TEAMS for all non-urgent questions  -Emergent issues: [facility]-p.[phone]; [facility]-p.[phone] ([phone])  -Non-emergent consults: Please place a [place] order "Consult-Interventional Radiology" with an appropriate callback number  -Scheduling questions: [facility]: [phone]; [facility]: [phone]  -Clinic/Outpatient booking: [facility]: [phone]; [facility]: [phone]

## 2023-07-17 NOTE — CONSULT NOTE ADULT - ASSESSMENT
78y Male  with PMH of ------presenting with  Currently/ Most recently on   Admitted for  Oncology consulted for further evaluation         -No plans for inpatient chemotherapy  -C/w Supportive care, pain control, Nutrition, PT, DVT ppx  -Rest of care as per primary team  -Patient to followup with  ([facility]) upon discharge  -Oncology will continue to follow with you    Case d/w oncology attending    Sweetie JURADO  Oncology Physician Assistant  [facility]    Pager [phone] also available on TEAMS as Sweetie JURADO    If before 8am/after 5pm or on weekends please page On-call Oncology Fellow   78y Male  with PMH of COPD, MI, HTN, CKSD and 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 Admitted for  Oncology consulted for further evaluation         -No plans for inpatient chemotherapy  -C/w Supportive care, pain control, Nutrition, PT, DVT ppx  -Rest of care as per primary team  -Patient to followup with  ([facility]) upon discharge  -Oncology will continue to follow with you    Case d/w oncology attending    Sweetie JURADO  Oncology Physician Assistant  [facility]    Pager [phone] also available on TEAMS as Sweetie JURADO    If before 8am/after 5pm or on weekends please page On-call Oncology Fellow   78y Male  with PMH of COPD, MI, HTN, CKSD and 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  Admitted for  HARLEEN on CKD 2/2 obstructive uropathy and acute blood loss anemia 2/2 hematuria.   Oncology consulted for further evaluation     -Renal consult- rec  Renal US to rule out development of b/l hydro at this point  -Urology consult  -IR consult now deferring nephrostomy tube placement as no clear signs of acute obstruction per previous documentation, would rec CT AP imaging if patient is expected to get HD.  Will F/u CT AP recommended by IR for evaluation of possible upper tract obstruction as etiology of ARF  -No plans for inpatient chemotherapy  -C/w Supportive care, pain control, Nutrition, PT, DVT ppx  -Rest of care as per primary team  -Patient to followup with Dr. 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  Admitted for  HARLEEN on CKD 2/2 obstructive uropathy and acute blood loss anemia 2/2 hematuria.   Oncology consulted for further evaluation     -Renal consult- rec  Renal US to rule out development of b/l hydro at this point  -Urology consult  -IR consult now deferring nephrostomy tube placement as no clear signs of acute obstruction per previous documentation, would rec CT AP imaging if patient is expected to get HD.  Will F/u CT AP recommended by IR for evaluation of possible upper tract obstruction as etiology of ARF  -No plans for inpatient chemotherapy  -C/w Supportive care, pain control, Nutrition, PT, DVT ppx  -Rest of care as per primary team  -Patient to followup with Dr. Ryan Spivey ([facility]) upon discharge  -Oncology will continue to follow with you    Case d/w oncology attending    RHIANNON Hardy  Oncology Physician Assistant  [facility]    Pager [phone] also available on TEAMS as Sweetie JURADO    If before 8am/after 5pm or on weekends please page On-call Oncology Fellow

## 2023-07-17 NOTE — H&P ADULT - NSHPPHYSICALEXAM_GEN_ALL_CORE
.  VITAL SIGNS:  T(C): 36.4 (07-17-23 @ 07:22), Max: 36.4 (07-17-23 @ 07:22)  T(F): 97.5 (07-17-23 @ 07:22), Max: 97.5 (07-17-23 @ 07:22)  HR: 67 (07-17-23 @ 07:22) (67 - 67)  BP: 108/70 (07-17-23 @ 07:22) (108/70 - 108/70)  BP(mean): --  RR: 17 (07-17-23 @ 07:22) (17 - 17)  SpO2: 100% (07-17-23 @ 07:22) (100% - 100%)  Wt(kg): --    PHYSICAL EXAM:    Constitutional: WDWN resting comfortably in bed; NAD  Head: NC/AT  Eyes: PERRL, EOMI, clear conjunctiva  ENT: no nasal discharge; uvula midline, no oropharyngeal erythema or exudates; MMM  Neck: supple; no JVD or thyromegaly  Respiratory: CTA B/L; no W/R/R, no retractions  Cardiac: +S1/S2; RRR; no M/R/G; PMI non-displaced  Gastrointestinal: soft, NT/ND; no rebound or guarding; +BSx4  Genitourinary: normal external genitalia  Back: spine midline, no bony tenderness or step-offs; no CVAT B/L  Extremities: WWP, no clubbing or cyanosis; no peripheral edema  Musculoskeletal: NROM x4; no joint swelling, tenderness or erythema  Vascular: 2+ radial, femoral, DP/PT pulses B/L  Dermatologic: skin warm, dry and intact; no rashes, wounds, or scars  Lymphatic: no submandibular or cervical LAD  Neurologic: AAOx3; CNII-XII grossly intact; no focal deficits  Psychiatric: affect and characteristics of appearance, verbalizations, behaviors are appropriate .  VITAL SIGNS:  T(C): 36.4 (07-17-23 @ 07:22), Max: 36.4 (07-17-23 @ 07:22)  T(F): 97.5 (07-17-23 @ 07:22), Max: 97.5 (07-17-23 @ 07:22)  HR: 67 (07-17-23 @ 07:22) (67 - 67)  BP: 108/70 (07-17-23 @ 07:22) (108/70 - 108/70)  BP(mean): --  RR: 17 (07-17-23 @ 07:22) (17 - 17)  SpO2: 100% (07-17-23 @ 07:22) (100% - 100%)  Wt(kg): --    PHYSICAL EXAM:    Constitutional: elderly, frail, comfortable in bed   Head: NC/AT  Eyes: PERRL, EOMI, clear conjunctiva  ENT: no nasal discharge; uvula midline, no oropharyngeal erythema or exudates; MMM  Neck: supple; no JVD or thyromegaly  Respiratory: decreased breath sounds; no W/R/R, no retractions, +NC  Cardiac: +S1/S2; RRR; no M/R/G  Gastrointestinal: soft, NT/ND; no rebound or guarding; +BSx4  Genitourinary: normal external genitalia  Back: spine midline, no bony tenderness or step-offs; no CVAT B/L  Extremities: WWP, no clubbing or cyanosis; no peripheral edema  Musculoskeletal: NROM x4; no joint swelling, tenderness or erythema  Vascular: 2+ radial, femoral, DP/PT pulses B/L  Neurologic: AAOx3; CNII-XII grossly intact; no focal deficits  Psychiatric: affect and characteristics of appearance, verbalizations, behaviors are appropriate .  VITAL SIGNS:  T(C): 36.4 (07-17-23 @ 07:22), Max: 36.4 (07-17-23 @ 07:22)  T(F): 97.5 (07-17-23 @ 07:22), Max: 97.5 (07-17-23 @ 07:22)  HR: 67 (07-17-23 @ 07:22) (67 - 67)  BP: 108/70 (07-17-23 @ 07:22) (108/70 - 108/70)  BP(mean): --  RR: 17 (07-17-23 @ 07:22) (17 - 17)  SpO2: 100% (07-17-23 @ 07:22) (100% - 100%)  Wt(kg): --    PHYSICAL EXAM:    Constitutional: elderly, frail, comfortable in bed   Head: NC/AT  Eyes: PERRL, EOMI, clear conjunctiva  ENT: no nasal discharge; uvula midline, no oropharyngeal erythema or exudates; MMM  Neck: supple; no JVD or thyromegaly  Respiratory: decreased breath sounds; no wheeze, no retractions, +NC  Cardiac: +S1/S2; RRR; no M/R/G  Gastrointestinal: soft, NT/ND; no rebound or guarding; +BSx4  Genitourinary: normal external genitalia  Back: spine midline, no bony tenderness or step-offs; no CVAT B/L  Extremities: WWP, no clubbing or cyanosis; no peripheral edema  Musculoskeletal: NROM x4; no joint swelling, tenderness or erythema  Vascular: 2+ radial, femoral, DP/PT pulses B/L  Neurologic: AAOx3; CNII-XII grossly intact; no focal deficits  Psychiatric: affect and characteristics of appearance, verbalizations, behaviors are appropriate

## 2023-07-18 NOTE — CHART NOTE - NSCHARTNOTEFT_GEN_A_CORE
Provider notified urology of new finding of left sided hydronephrosis. Appreciate further recommendations.

## 2023-07-18 NOTE — CHART NOTE - NSCHARTNOTEFT_GEN_A_CORE
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 Per discussion with medicine attending, will start treatment with antibiotics. Per ID pharmacist, Doxycycline 100mg PO BID ordered.

## 2023-07-18 NOTE — GOALS OF CARE CONVERSATION - ADVANCED CARE PLANNING - CONVERSATION DETAILS
Patient seen with RHIANNON stewart and HCP Aren Black at bedside .  Aren is HCP at [phone]  Patient is Ox2, year 2000 something. Aren is HCP.  Patient insistes he DOES NOT WANT HD  Aren is Ox3 and HCP at beside agrees with patient wishes not to have Dialysis and to be comfortable. 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        Problem/Plan - 1:  ·  Problem: Acute kidney injury superimposed on CKD.   ·  Plan: With [facility]  Pt with known invasive bladder cancer p/w hematuria at [place] on 7/13, was receiving CTX. (unable to locate UCx)   Labs show uptrending Cr 2 --> 3.6 --> 5.06 --> 6.38 on (7/16)   -BUN Cr --> 89/8.6 on 7/17  -Check UA, [place]   -Renal US ordered   -s/p CTX at [place], will place on renally dosed Zosyn for now, UA/UCx pending   -Per RN, bladder scan unable to properly read residual as pt with renal mass.   -Pt voiding, but minimal amounts, strict I&Os ordered. Of note, urology did not place rodriguez at [place] d/t low PVR and more discomfort to pt.   -Urology and Renal consulted at [facility], f/u recs  -IR consulted for nephrostomies.    Patient seen with RHIANNON stewart and HCP Aren Black at bedside .  Aren is HCP at [phone]  Patient is Ox2, year 2000 something. Aren is HCP.  Patient insistes he DOES NOT WANT HD  Aren is Ox3 and HCP at beside agrees with patient wishes not to have Dialysis and to be comfortable. Patient also wants to be DNR  Aren agrees and signed DNR / [place] form  Form placed in chart  Aren will bring in HCP form for chart as well

## 2023-07-18 NOTE — CHART NOTE - NSCHARTNOTEFT_GEN_A_CORE
At 416pm  Spoke to patient nephew, Aren Owens  @ [phone] per patient request . Nephew asked questions re prognosis  Patient currently deferring HD, patient with renal insufficiency Cr noted to be >9  Given the acuity severity of his renal dysfunction and new US finds of BL hydronephrosis patient would not be a candidate for immuno/chemotherapy . In  the absence of any interventions (such as PCN and/or HD) patient prognosis is grim and at risk of morbidity/ mortality. This was explained to patient nephew. All questions and concerns addressed. Encouraged continued goals of care discussions.  Would recommend supportive management and/or hospice services. Please consult Palliative Care for further GOC and symptom management. At 416pm  Spoke to patient nephew, Aren Owens  @ [phone] per patient request . Nephew asked questions re prognosis  Patient currently deferring HD, patient with renal insufficiency Cr noted to be >9  Given the acuity severity of his renal dysfunction and new US finds of BL hydronephrosis patient would not be a candidate for immuno/chemotherapy . In  the absence of any interventions (such as PCN and/or HD) patient prognosis is grim and at risk of morbidity/ mortality. This was explained to patient nephew. All questions and concerns addressed. Encouraged continued goals of care discussions.  Would recommend supportive management . Please consult Palliative Care for further GOC and symptom management.

## 2023-07-18 NOTE — PROGRESS NOTE ADULT - SUBJECTIVE AND OBJECTIVE BOX
new bladder maSS  anna  f/u renal and urology  stool studies for diarrhea Patient is a 78y old  Male who presents with a chief complaint of hematuria (18 Jul 2023 09:58)      SUBJECTIVE / OVERNIGHT EVENTS:  Patient seen with RHIANNON stewart and HCP Aren Black at bedside .  Aren is HCP at [phone]  Patient is Ox2, year 2000 something. Aren is HCP.  Patient insistes he DOES NOT WANT HD  Aren is Ox3 and HCP at beside agrees with patient wishes not to have Dialysis and to be comfortable. Patient also wants to be DNR  Aren agrees and signed DNR / Molsr form  Form placed in chart  Aren will bring in HCP form for chart as well    MEDICATIONS  (STANDING):  atorvastatin 40 milliGRAM(s) Oral at bedtime  budesonide  80 MICROgram(s)/formoterol 4.5 MICROgram(s) Inhaler 2 Puff(s) Inhalation two times a day  FLUoxetine 40 milliGRAM(s) Oral daily  piperacillin/tazobactam IVPB.. 3.375 Gram(s) IV Intermittent every 12 hours  tamsulosin 0.4 milliGRAM(s) Oral at bedtime  tiotropium 2.5 MICROgram(s) Inhaler 2 Puff(s) Inhalation daily    MEDICATIONS  (PRN):  acetaminophen     Tablet .. 650 milliGRAM(s) Oral every 6 hours PRN Temp greater or equal to 38C (100.4F), Mild Pain (1 - 3)  aluminum hydroxide/magnesium hydroxide/simethicone Suspension 30 milliLiter(s) Oral every 4 hours PRN Dyspepsia  diazepam    Tablet 5 milliGRAM(s) Oral daily PRN anxiety  melatonin 3 milliGRAM(s) Oral at bedtime PRN Insomnia  ondansetron Injectable 4 milliGRAM(s) IV Push every 8 hours PRN Nausea and/or Vomiting        CAPILLARY BLOOD GLUCOSE      POCT Blood Glucose.: 128 mg/dL (17 Jul 2023 23:35)  POCT Blood Glucose.: 103 mg/dL (17 Jul 2023 18:14)    I&O's Summary    18 Jul 2023 07:01  -  18 Jul 2023 13:29  --------------------------------------------------------  IN: 220 mL / OUT: 0 mL / NET: 220 mL        PHYSICAL EXAM:  GENERAL: NAD, well-developed  HEAD:  Atraumatic, Normocephalic  EYES: EOMI, PERRLA, conjunctiva and sclera clear  NECK: Supple, No JVD  CHEST/LUNG: Clear to auscultation bilaterally; No wheeze  HEART: Regular rate and rhythm; No murmurs, rubs, or gallops  ABDOMEN: Soft, Nontender, Nondistended; Bowel sounds present  EXTREMITIES:  2+ Peripheral Pulses, No clubbing, cyanosis, or edema  PSYCH: AAOx3  NEUROLOGY: non-focal  SKIN: No rashes or lesions    LABS:                        8.7    13.00 )-----------( 206      ( 18 Jul 2023 05:05 )             28.4     07-18    137  |  97<L>  |  93<H>  ----------------------------<  100<H>  4.5   |  20<L>  |  9.54<H>    Ca    9.5      18 Jul 2023 05:05  Phos  7.5     07-18  Mg     2.50     07-18    TPro  6.2  /  Alb  2.9<L>  /  TBili  <0.2  /  DBili  x   /  AST  13  /  ALT  10  /  AlkPhos  61  07-18    PT/INR - ( 17 Jul 2023 09:36 )   PT: 13.5 sec;   INR: 1.16 ratio         PTT - ( 17 Jul 2023 09:36 )  PTT:35.6 sec  CARDIAC MARKERS ( 18 Jul 2023 05:05 )  x     / x     / 23 U/L / x     / x          Urinalysis Basic - ( 18 Jul 2023 05:05 )    Color: x / Appearance: x / SG: x / pH: x  Gluc: 100 mg/dL / Ketone: x  / Bili: x / Urobili: x   Blood: x / Protein: x / Nitrite: x   Leuk Esterase: x / RBC: x / WBC x   Sq Epi: x / Non Sq Epi: x / Bacteria: x        RADIOLOGY & ADDITIONAL TESTS:    Imaging Personally Reviewed:    Consultant(s) Notes Reviewed:      Care Discussed with Consultants/Other Providers:        new bladder maSS  anna  f/u renal and urology  stool studies for diarrhea Patient is a 78y old  Male who presents with a chief complaint of hematuria (18 Jul 2023 09:58)      SUBJECTIVE / OVERNIGHT EVENTS:  Patient seen with RHIANNON stewart and HCP Aren Black at bedside .  Aren is HCP at [phone]  Patient is Ox2, year 2000 something. Aren is HCP.  Patient insistes he DOES NOT WANT HD  Aren is Ox3 and HCP at beside agrees with patient wishes not to have Dialysis and to be comfortable. Patient also wants to be DNR  Aren agrees and signed DNR / Molsr form  Form placed in chart  Aren will bring in HCP form for chart as well    MEDICATIONS  (STANDING):  atorvastatin 40 milliGRAM(s) Oral at bedtime  budesonide  80 MICROgram(s)/formoterol 4.5 MICROgram(s) Inhaler 2 Puff(s) Inhalation two times a day  FLUoxetine 40 milliGRAM(s) Oral daily  piperacillin/tazobactam IVPB.. 3.375 Gram(s) IV Intermittent every 12 hours  tamsulosin 0.4 milliGRAM(s) Oral at bedtime  tiotropium 2.5 MICROgram(s) Inhaler 2 Puff(s) Inhalation daily    MEDICATIONS  (PRN):  acetaminophen     Tablet .. 650 milliGRAM(s) Oral every 6 hours PRN Temp greater or equal to 38C (100.4F), Mild Pain (1 - 3)  aluminum hydroxide/magnesium hydroxide/simethicone Suspension 30 milliLiter(s) Oral every 4 hours PRN Dyspepsia  diazepam    Tablet 5 milliGRAM(s) Oral daily PRN anxiety  melatonin 3 milliGRAM(s) Oral at bedtime PRN Insomnia  ondansetron Injectable 4 milliGRAM(s) IV Push every 8 hours PRN Nausea and/or Vomiting        CAPILLARY BLOOD GLUCOSE      POCT Blood Glucose.: 128 mg/dL (17 Jul 2023 23:35)  POCT Blood Glucose.: 103 mg/dL (17 Jul 2023 18:14)    I&O's Summary    18 Jul 2023 07:01  -  18 Jul 2023 13:29  --------------------------------------------------------  IN: 220 mL / OUT: 0 mL / NET: 220 mL        PHYSICAL EXAM:  GENERAL: NAD, well-developed  HEAD:  Atraumatic, Normocephalic  EYES: EOMI, PERRLA, conjunctiva and sclera clear  NECK: Supple, No JVD  CHEST/LUNG: Clear to auscultation bilaterally; No wheeze  HEART: Regular rate and rhythm; No murmurs, rubs, or gallops  ABDOMEN: Soft, Nontender, Nondistended; Bowel sounds present  EXTREMITIES:  2+ Peripheral Pulses, No clubbing, cyanosis, or edema  PSYCH: AAOx2  NEUROLOGY: non-focal  Villarreal in place    LABS:                        8.7    13.00 )-----------( 206      ( 18 Jul 2023 05:05 )             28.4     07-18    137  |  97<L>  |  93<H>  ----------------------------<  100<H>  4.5   |  20<L>  |  9.54<H>    Ca    9.5      18 Jul 2023 05:05  Phos  7.5     07-18  Mg     2.50     07-18    TPro  6.2  /  Alb  2.9<L>  /  TBili  <0.2  /  DBili  x   /  AST  13  /  ALT  10  /  AlkPhos  61  07-18    PT/INR - ( 17 Jul 2023 09:36 )   PT: 13.5 sec;   INR: 1.16 ratio         PTT - ( 17 Jul 2023 09:36 )  PTT:35.6 sec  CARDIAC MARKERS ( 18 Jul 2023 05:05 )  x     / x     / 23 U/L / x     / x              Care Discussed with Consultants/Other Providers:    new bladder maSS  anna  f/u renal and urology  stool studies for diarrhea

## 2023-07-18 NOTE — PROGRESS NOTE ADULT - PROBLEM SELECTOR PLAN 4
newly diagnosed high grade invasive urothelial cancer  -?mets to lung as CT Chest done at [place] with "numerous bilateral solid and ground glass pulm nodules, several w/central cavitation, largest on Left is GGO, [id number]. Lingular and b/l lower lobe subpleural reticular opacities.   -Heme/onc consulted, f/u recs

## 2023-07-18 NOTE — PROGRESS NOTE ADULT - SUBJECTIVE AND OBJECTIVE BOX
Overnight events noted      VITAL:  T(C): , Max: 36.6 (07-17-23 @ 13:44)  T(F): , Max: 97.9 (07-17-23 @ 13:44)  HR: 58 (07-18-23 @ 07:24)  BP: 101/51 (07-18-23 @ 07:24)  RR: 16 (07-18-23 @ 07:24)  SpO2: 98% (07-18-23 @ 07:24)      PHYSICAL EXAM:  Constitutional: NAD, Alert  HEENT: NCAT, MMM  Neck: Supple, No JVD  Respiratory: CTA-b/l  Cardiovascular: leanna s1s2  Gastrointestinal: BS+, soft, NT/ND  : (+)rodriguez  Extremities: No peripheral edema b/l  Neurological: no focal deficits; strength grossly intact  Back: no CVAT b/l  Skin: No rashes, no nevi    LABS:                        8.7    13.00 )-----------( 206      ( 18 Jul 2023 05:05 )             28.4     Na(137)/K(4.5)/Cl(97)/HCO3(20)/BUN(93)/Cr(9.54)Glu(100)/Ca(9.5)/Mg(2.50)/PO4(7.5)    07-18 @ 05:05  Na(135)/K(4.6)/Cl(99)/HCO3(19)/BUN(89)/Cr(8.68)Glu(99)/Ca(9.5)/Mg(2.40)/PO4(6.0)    07-17 @ 09:36    Uric 10.0  CPK 23      IMPRESSION: 78M w/ HTN, CAD, COPD, and recently diagnosed bladder CA, 7/16/23 from [facility] with HARLEEN    (1)CKD - base creatinine ~2, it appears - unclear exact etiology    (2)HARLEEN - Most likely ischemic ATN from hypotension on admission to [facility]. Not tumor lysis based on bloodwork. Obstruction unlikely based on imaging at [facility]; awaiting confirmation of this with US here at [facility]. Numbers worsening to point where dialysis appears imminent, provided we will continue to be aggressive with management    (3)Hyperphosphatemia - renally mediated - worsening    (4)Onc - metastatic bladder CA    RECOMMEND:  (1)F/U renal US  (2)Provided no obstruction seen on US, would plan for shiley placement tomorrow with IR + 1st HD tomorrow  (3)HBV/HCV studies stat (HBSAg/HBSAb/HBCAb/HCVAb)  (4)BMP+Mg+PO4 daily  (5)strict I/Os; would maintain rodriguez for now  (6)Dose new meds for GFR<10          Joseph Pires MD  [facility]  Office/on call physician: [phone]  Cell (7a-7p): [phone]       Overnight events noted      VITAL:  T(C): , Max: 36.6 (07-17-23 @ 13:44)  T(F): , Max: 97.9 (07-17-23 @ 13:44)  HR: 58 (07-18-23 @ 07:24)  BP: 101/51 (07-18-23 @ 07:24)  RR: 16 (07-18-23 @ 07:24)  SpO2: 98% (07-18-23 @ 07:24)      PHYSICAL EXAM:  Constitutional: NAD, Alert  HEENT: NCAT, MMM  Neck: Supple, No JVD  Respiratory: CTA-b/l  Cardiovascular: leanna s1s2  Gastrointestinal: BS+, soft, NT/ND  : (+)rodriguez  Extremities: No peripheral edema b/l  Neurological: no focal deficits; strength grossly intact  Back: no CVAT b/l  Skin: No rashes, no nevi    LABS:                        8.7    13.00 )-----------( 206      ( 18 Jul 2023 05:05 )             28.4     Na(137)/K(4.5)/Cl(97)/HCO3(20)/BUN(93)/Cr(9.54)Glu(100)/Ca(9.5)/Mg(2.50)/PO4(7.5)    07-18 @ 05:05  Na(135)/K(4.6)/Cl(99)/HCO3(19)/BUN(89)/Cr(8.68)Glu(99)/Ca(9.5)/Mg(2.40)/PO4(6.0)    07-17 @ 09:36    Uric 10.0  CPK 23      IMPRESSION: 78M w/ HTN, CAD, COPD, and recently diagnosed bladder CA, 7/16/23 from [facility] with HARLEEN    (1)CKD - base creatinine ~2, it appears - unclear exact etiology    (2)HARLEEN - Most likely ischemic ATN from hypotension on admission to [facility]. Not tumor lysis based on bloodwork. Obstruction unlikely based on imaging at [facility]; awaiting confirmation of this with US here at [facility]. Numbers worsening to point where dialysis appears imminent, provided we will continue to be aggressive with management. Given the hope/expectation that HARLEEN will resolve within next several days, I would not move forward with the CT with IV contrast as requested by IR    (3)Hyperphosphatemia - renally mediated - worsening    (4)Onc - metastatic bladder CA    RECOMMEND:  (1)F/U renal US  (2)If for CT, no IV contrast please  (3)Provided no obstruction seen on US, would plan for shiley placement tomorrow with IR + 1st HD tomorrow  (4)HBV/HCV studies stat (HBSAg/HBSAb/HBCAb/HCVAb)  (5)BMP+Mg+PO4 daily  (6)strict I/Os; would maintain rodriguez for now  (7)Dose new meds for GFR<10          Joseph Pires MD  [facility]  Office/on call physician: [phone]  Cell (7a-7p): [phone]       Overnight events noted      VITAL:  T(C): , Max: 36.6 (07-17-23 @ 13:44)  T(F): , Max: 97.9 (07-17-23 @ 13:44)  HR: 58 (07-18-23 @ 07:24)  BP: 101/51 (07-18-23 @ 07:24)  RR: 16 (07-18-23 @ 07:24)  SpO2: 98% (07-18-23 @ 07:24)      PHYSICAL EXAM:  Constitutional: NAD, Alert  HEENT: NCAT, MMM  Neck: Supple, No JVD  Respiratory: CTA-b/l  Cardiovascular: leanna s1s2  Gastrointestinal: BS+, soft, NT/ND  : (+)rodriguez  Extremities: No peripheral edema b/l  Neurological: no focal deficits; strength grossly intact  Back: no CVAT b/l  Skin: No rashes, no nevi    LABS:                        8.7    13.00 )-----------( 206      ( 18 Jul 2023 05:05 )             28.4     Na(137)/K(4.5)/Cl(97)/HCO3(20)/BUN(93)/Cr(9.54)Glu(100)/Ca(9.5)/Mg(2.50)/PO4(7.5)    07-18 @ 05:05  Na(135)/K(4.6)/Cl(99)/HCO3(19)/BUN(89)/Cr(8.68)Glu(99)/Ca(9.5)/Mg(2.40)/PO4(6.0)    07-17 @ 09:36    Uric 10.0  CPK 23      IMPRESSION: 78M w/ HTN, CAD, COPD, and recently diagnosed bladder CA, 7/16/23 from [facility] with HARLEEN    (1)CKD - base creatinine ~2, it appears - unclear exact etiology    (2)HARLEEN - Most likely ischemic ATN from hypotension on admission to [facility]. Not tumor lysis based on bloodwork. Obstruction unlikely based on imaging at [facility]; awaiting confirmation of this with US here at [facility]. Numbers worsening to point where dialysis appears imminent, provided we will continue to be aggressive with management. Given the hope/expectation that HARLEEN will resolve within next several days, I would not move forward with the CT with IV contrast previously requested by IR    (3)Hyperphosphatemia - renally mediated - worsening    (4)Onc - metastatic bladder CA    RECOMMEND:  (1)F/U renal US  (2)If for CT, no IV contrast please  (3)Provided no obstruction seen on US, would plan for shiley placement tomorrow with IR + 1st HD tomorrow  (4)HBV/HCV studies stat (HBSAg/HBSAb/HBCAb/HCVAb)  (5)BMP+Mg+PO4 daily  (6)strict I/Os; would maintain rodriguez for now  (7)Dose new meds for GFR<10          Joseph Pires MD  [facility]  Office/on call physician: [phone]  Cell (7a-7p): [phone]       Overnight events noted - rodriguez in place - blood emanating from catheter/negligible urine output      VITAL:  T(C): , Max: 36.6 (07-17-23 @ 13:44)  T(F): , Max: 97.9 (07-17-23 @ 13:44)  HR: 58 (07-18-23 @ 07:24)  BP: 101/51 (07-18-23 @ 07:24)  RR: 16 (07-18-23 @ 07:24)  SpO2: 98% (07-18-23 @ 07:24)      PHYSICAL EXAM:  Constitutional: NAD, Alert  HEENT: NCAT, MMM  Neck: Supple, No JVD  Respiratory: CTA-b/l  Cardiovascular: leanna s1s2  Gastrointestinal: BS+, soft, NT/ND  : (+)rodriguez  Extremities: No peripheral edema b/l  Neurological: no focal deficits; strength grossly intact  Back: no CVAT b/l  Skin: No rashes, no nevi    LABS:                        8.7    13.00 )-----------( 206      ( 18 Jul 2023 05:05 )             28.4     Na(137)/K(4.5)/Cl(97)/HCO3(20)/BUN(93)/Cr(9.54)Glu(100)/Ca(9.5)/Mg(2.50)/PO4(7.5)    07-18 @ 05:05  Na(135)/K(4.6)/Cl(99)/HCO3(19)/BUN(89)/Cr(8.68)Glu(99)/Ca(9.5)/Mg(2.40)/PO4(6.0)    07-17 @ 09:36    Uric 10.0  CPK 23      IMPRESSION: 78M w/ HTN, CAD, COPD, and recently diagnosed bladder CA, 7/16/23 from [facility] with HARLEEN    (1)CKD - base creatinine ~2, it appears - unclear exact etiology    (2)HARLEEN - Most likely ischemic ATN from hypotension on admission to [facility]. Not tumor lysis based on bloodwork. Obstruction unlikely based on imaging at [facility]; awaiting confirmation of this with US here at [facility]. Numbers worsening to point where dialysis appears imminent, provided we will continue to be aggressive with management. Given the hope/expectation that HARLEEN will resolve within next several days, I would not move forward with the CT with IV contrast previously requested by IR    (3)Hyperphosphatemia - renally mediated - worsening    (4)Onc - metastatic bladder CA    (5)GOC - counseled patient extensively, again today, regarding what HD entails, and regarding the difference between short-term and long-term dialysis. Counseled patient regarding freestanding dialysis units and regarding transportation to and from dialysis. Counseled regarding dialysis catheter placement. He remains unsure whether he would consent to dialysis. He would like his nephew Aren Black to be involved in the decision - I will attempt to reach out to him (phone #?).     RECOMMEND:  (1)F/U renal US  (2)If for CT, no IV contrast please  (3)Provided no obstruction seen on US, would plan for shiley placement tomorrow with IR + 1st HD tomorrow  (4)HBV/HCV studies stat (HBSAg/HBSAb/HBCAb/HCVAb)  (5)BMP+Mg+PO4 daily  (6)strict I/Os; would maintain rodriguez for now  (7)Dose new meds for GFR<10  (8)Palliative eval?    Will attempt to reach out to karyn Pires MD  [facility]  Office/on call physician: [phone]  Cell (7a-7p): [phone]

## 2023-07-18 NOTE — CHART NOTE - NSCHARTNOTEFT_GEN_A_CORE
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 Presenting as transfer from [place] for ARF.     On admission to [facility], AVSS. WBC 15, Hgb 9.2. Cr 8.68 from 2.01 from 1 week agp. Renal bladder ultrasound ordered here, IR requesting CTAP w/wo IVC.    NO plans for cystectomy for bladder cancer, is following actively with Heme onc for outpatient management of bladder cancer  Nephrology consulted in the setting of acute renal failure, Most likely ischemic ATN from hypotension on admission to [facility]. Tumor-lysis unlikely to account for this but should be ruled out.   No CT scan from [facility], however documentation from [facility] not demonstrating new left hydro, per nephrology HARLEEN should not be that severe if L side is not obstructed, therefor likely medical renal.   IR deferring nephrostomy tube placement as no clear signs of acute obstruction per previous documentation, require CT AP imaging.     Plan  - Rodriguez placed by primary team, not making much urine, okay to discontinue and monitor serial PVRs for comfort provided patient is not in retention  - Trend H/H, tnf as needed  - Call urology if patient goes into clot retention with rodriguez in place.    - Avoid nephrotoxins and appreciate nephrology recs regarding dialysis  - IR recommended CT AP for evaluation of possible upper tract obstruction as etiology of ARF, although unlikely given records from [facility]. Discussed with primary team that nephrology would like to avoid CT scan.   - No further urological intervention at this time.  - Please call urology with any questions or concerns.    [facility]  [address] [place] [id number] [phone]

## 2023-07-18 NOTE — PROGRESS NOTE ADULT - PROBLEM SELECTOR PLAN 3
Hb stable ~7s   -s/p 2U pRBC at [place] on 7/13 per notes   -Check iron studies  -Trend CBC  -Transfuse if Hb <7, active T&S

## 2023-07-18 NOTE — PROGRESS NOTE ADULT - ASSESSMENT
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     7/18 Called by Renal to talk to patient REE HD.  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     7/18 Called by Renal to talk to patient REE HD.  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     7/18 Called by Renal to talk to patient REE HD.  Oncology Dr Jenkins notes patient is refusing HD and is appropriate in light of advance cancer NOT to give HD    called by radiology Dr Parker-- noted new hydro on US     GOC 7/18  Patient seen with RHIANNON stewart and HCP Aren Black at bedside .  Aren is HCP at [phone]  Patient is Ox2, year 2000 something. Aren is HCP.  Patient insistes he DOES NOT WANT HD  Aren is Ox3 and HCP at beside agrees with patient wishes not to have Dialysis and to be comfortable. Patient also wants to be DNR  Aren agrees and signed DNR / Molsr form  Form placed in chart  Aren will bring in HCP form for chart as well

## 2023-07-18 NOTE — PROGRESS NOTE ADULT - PROBLEM SELECTOR PLAN 8
Hold chemical ppx as pt may need IR procedure  NPO for now    [facility], [phone]. Hold chemical ppx as pt may need IR  GOC

## 2023-07-18 NOTE — PROGRESS NOTE ADULT - NSPROGADDITIONALINFOA_GEN_ALL_CORE
Patient seen with RHIANNON stewart and HCP Aern Black at bedside .  Aren is HCP at [phone]  Patient is Ox2, year 2000 something. Aren is HCP.  Patient insistes he DOES NOT WANT HD  Aren is Ox3 and HCP at beside agrees with patient wishes not to have Dialysis and to be comfortable. Patient also wants to be DNR  Aren agrees and signed DNR / Molsr form  Form placed in chart  Aren will bring in HCP form for chart as well Patient seen with RHIANNON stewart and HCP Aren Black at bedside .  Aren is HCP at [phone]  Patient is Ox2, year 2000 something. Aren is HCP.  Patient insistes he DOES NOT WANT HD  Aren is Ox3 and HCP at beside agrees with patient wishes not to have Dialysis and to be comfortable. Patient also wants to be DNR  Aren agrees and signed DNR / Molsr form  Form placed in chart  Aren will bring in HCP form for chart as well    4 pm  < from: US Kidney and Bladder (07.18.23 @ 11:57) >  IMPRESSION:  New moderate left hydronephrosis.  Redemonstration of moderate to severe right hydronephrosis.  Mass seen in the region of the bladder.  Findings were discussed with Dr. Caban at 7/18/2023 12:19 PM by Dr. Parker with read back confirmation.    d/w Renal and Pa. Will get Ir eval for Neph tube-- Ir consult req

## 2023-07-18 NOTE — PROGRESS NOTE ADULT - PROBLEM SELECTOR PLAN 2
2/2 bladder ca  -pt reports red urine, no clots, unable to see any samples in room   -urology consulted  -Hb stable 7's, transfuse as needed  -C/w Zosyn for now

## 2023-07-18 NOTE — PROGRESS NOTE ADULT - PROBLEM SELECTOR PLAN 1
With AGMA  Pt with known invasive bladder cancer p/w hematuria at [place] on 7/13, was receiving CTX. (unable to locate UCx)   Labs show uptrending Cr 2 --> 3.6 --> 5.06 --> 6.38 on (7/16)   -BUN Cr --> 89/8.6 on 7/17  -Check UA, Ulytes   -Renal US ordered   -s/p CTX at [place], will place on renally dosed Zosyn for now, UA/UCx pending   -Per RN, bladder scan unable to properly read residual as pt with renal mass.   -Pt voiding, but minimal amounts, strict I&Os ordered. Of note, urology did not place rodriguez at [place] d/t low PVR and more discomfort to pt.   -Urology and Renal consulted at [facility], f/u recs  -IR consulted for nephrostomies

## 2023-07-18 NOTE — CHART NOTE - NSCHARTNOTEFT_GEN_A_CORE
PRE-INTERVENTIONAL RADIOLOGY PROCEDURE NOTE    Patient Age: 78  Patient Gender: M    Procedure (including site / side if known): bilateral nephrostomy tube placement    Diagnosis / Indication: hydronephrosis, acute renal failure    Interventional Radiology Attending Physician: Dr. Olivera    Ordering Attending Physician: Dr. Caban    Pertinent medical history: bladder cancer    Pertinent labs:                       8.6    12.90 )-----------( 193      ( 18 Jul 2023 17:20 )             27.2   07-18    137  |  97<L>  |  93<H>  ----------------------------<  100<H>  4.5   |  20<L>  |  9.54<H>    Ca    9.5      18 Jul 2023 05:05  Phos  7.5     07-18  Mg     2.50     07-18    TPro  6.2  /  Alb  2.9<L>  /  TBili  <0.2  /  DBili  x   /  AST  13  /  ALT  10  /  AlkPhos  61  07-18  PT/INR - ( 17 Jul 2023 09:36 )   PT: 13.5 sec;   INR: 1.16 ratio         PTT - ( 17 Jul 2023 09:36 )  PTT:35.6 sec

## 2023-07-19 NOTE — PROGRESS NOTE ADULT - ASSESSMENT
79 yo M with PMH of COPD, MI, HTN, CKSD and recently dxed high grade invasive urothelial cancer presenting with hematuria and elevated Cr > 8 from OSH ([facility]). Admitted for  HARLEEN on CKD 2/2 obstructive uropathy and acute blood loss anemia 2/2 hematuria. The patient previously met with Dr. Spivey at [facility] but has not initiated any systemic therapy to date. Nephrology has been consulted and renal US ordered for further evaluation.    -Nephrology consulted and dialysis recommended, but after consideration the patient has decided he does not want HD.  -Sonogram shows persistent severe right hydronephrosis and new moderate left hydronephrosis.  -The patient has opted not to have nephrostomy tube placement  -GOC discussions with patient and he has opted for comfort care  -He is hospice appropriate as he has an aggressive cancer with poor prognosis  -Dr. Spivey will be informed of patient's decision to not pursue treatment  -Oncology will continue to follow with you.

## 2023-07-19 NOTE — PROGRESS NOTE ADULT - SUBJECTIVE AND OBJECTIVE BOX
INTERVAL HPI/OVERNIGHT EVENTS:  Patient S&E at bedside. He is comfortable and has decided against dialysis    VITAL SIGNS:  T(F): 97.3 (07-19-23 @ 10:48)  HR: 74 (07-19-23 @ 10:48)  BP: 106/73 (07-19-23 @ 10:48)  RR: 17 (07-19-23 @ 10:48)  SpO2: 100% (07-19-23 @ 10:48)  Wt(kg): --    PHYSICAL EXAM:    Constitutional: NAD  Eyes: EOMI, sclera non-icteric  Neck: supple, no masses, no JVD  Respiratory: CTA b/l, good air entry b/l  Cardiovascular: RRR, no M/R/G  Gastrointestinal: soft, NTND, no masses palpable, + BS, no hepatosplenomegaly  Extremities: no c/c/e  Neurological: AAOx3      MEDICATIONS  (STANDING):  atorvastatin 40 milliGRAM(s) Oral at bedtime  budesonide  80 MICROgram(s)/formoterol 4.5 MICROgram(s) Inhaler 2 Puff(s) Inhalation two times a day  chlorhexidine 2% Cloths 1 Application(s) Topical daily  doxycycline monohydrate Capsule 100 milliGRAM(s) Oral every 12 hours  FLUoxetine 40 milliGRAM(s) Oral daily  piperacillin/tazobactam IVPB.. 3.375 Gram(s) IV Intermittent every 12 hours  tamsulosin 0.4 milliGRAM(s) Oral at bedtime  tiotropium 2.5 MICROgram(s) Inhaler 2 Puff(s) Inhalation daily    MEDICATIONS  (PRN):  acetaminophen     Tablet .. 650 milliGRAM(s) Oral every 6 hours PRN Temp greater or equal to 38C (100.4F), Mild Pain (1 - 3)  aluminum hydroxide/magnesium hydroxide/simethicone Suspension 30 milliLiter(s) Oral every 4 hours PRN Dyspepsia  diazepam    Tablet 5 milliGRAM(s) Oral two times a day PRN anxiety  HYDROmorphone  Injectable 0.5 milliGRAM(s) IV Push every 4 hours PRN Moderate Pain (4 - 6)  HYDROmorphone  Injectable 1 milliGRAM(s) IV Push every 4 hours PRN Severe Pain (7 - 10)  melatonin 3 milliGRAM(s) Oral at bedtime PRN Insomnia  ondansetron Injectable 4 milliGRAM(s) IV Push every 8 hours PRN Nausea and/or Vomiting      Allergies    azithromycin (Nausea)    Intolerances        LABS:                        8.3    13.10 )-----------( 184      ( 19 Jul 2023 06:00 )             25.8     07-19    134<L>  |  97<L>  |  110<H>  ----------------------------<  110<H>  4.4   |  19<L>  |  11.38<H>    Ca    9.2      19 Jul 2023 06:00  Phos  6.9     07-19  Mg     2.40     07-19    TPro  5.9<L>  /  Alb  2.5<L>  /  TBili  0.2  /  DBili  x   /  AST  9   /  ALT  9   /  AlkPhos  64  07-19    PT/INR - ( 19 Jul 2023 06:00 )   PT: 13.8 sec;   INR: 1.19 ratio         PTT - ( 19 Jul 2023 06:00 )  PTT:35.3 sec  Urinalysis Basic - ( 19 Jul 2023 06:00 )    Color: x / Appearance: x / SG: x / pH: x  Gluc: 110 mg/dL / Ketone: x  / Bili: x / Urobili: x   Blood: x / Protein: x / Nitrite: x   Leuk Esterase: x / RBC: x / WBC x   Sq Epi: x / Non Sq Epi: x / Bacteria: x        RADIOLOGY & ADDITIONAL TESTS:  Studies reviewed.   INTERVAL HPI/OVERNIGHT EVENTS:  Patient S&E at bedside. He is comfortable and has decided against dialysis    VITAL SIGNS:  T(F): 97.3 (07-19-23 @ 10:48)  HR: 74 (07-19-23 @ 10:48)  BP: 106/73 (07-19-23 @ 10:48)  RR: 17 (07-19-23 @ 10:48)  SpO2: 100% (07-19-23 @ 10:48)  Wt(kg): --    PHYSICAL EXAM:    Constitutional: NAD  Eyes: EOMI, sclera non-icteric  Neck: supple, no masses, no JVD  Respiratory: CTA b/l, good air entry b/l  Cardiovascular: RRR, no M/R/G  Gastrointestinal: soft, NTND, no masses palpable, + BS, no hepatosplenomegaly  Extremities: no c/c/e  Neurological: AAOx3      MEDICATIONS  (STANDING):  atorvastatin 40 milliGRAM(s) Oral at bedtime  budesonide  80 MICROgram(s)/formoterol 4.5 MICROgram(s) Inhaler 2 Puff(s) Inhalation two times a day  chlorhexidine 2% Cloths 1 Application(s) Topical daily  doxycycline monohydrate Capsule 100 milliGRAM(s) Oral every 12 hours  FLUoxetine 40 milliGRAM(s) Oral daily  piperacillin/tazobactam IVPB.. 3.375 Gram(s) IV Intermittent every 12 hours  tamsulosin 0.4 milliGRAM(s) Oral at bedtime  tiotropium 2.5 MICROgram(s) Inhaler 2 Puff(s) Inhalation daily    MEDICATIONS  (PRN):  acetaminophen     Tablet .. 650 milliGRAM(s) Oral every 6 hours PRN Temp greater or equal to 38C (100.4F), Mild Pain (1 - 3)  aluminum hydroxide/magnesium hydroxide/simethicone Suspension 30 milliLiter(s) Oral every 4 hours PRN Dyspepsia  diazepam    Tablet 5 milliGRAM(s) Oral two times a day PRN anxiety  HYDROmorphone  Injectable 0.5 milliGRAM(s) IV Push every 4 hours PRN Moderate Pain (4 - 6)  HYDROmorphone  Injectable 1 milliGRAM(s) IV Push every 4 hours PRN Severe Pain (7 - 10)  melatonin 3 milliGRAM(s) Oral at bedtime PRN Insomnia  ondansetron Injectable 4 milliGRAM(s) IV Push every 8 hours PRN Nausea and/or Vomiting      Allergies    azithromycin (Nausea)    Intolerances        LABS:                        8.3    13.10 )-----------( 184      ( 19 Jul 2023 06:00 )             25.8     07-19    134<L>  |  97<L>  |  110<H>  ----------------------------<  110<H>  4.4   |  19<L>  |  11.38<H>    Ca    9.2      19 Jul 2023 06:00  Phos  6.9     07-19  Mg     2.40     07-19    TPro  5.9<L>  /  Alb  2.5<L>  /  TBili  0.2  /  DBili  x   /  AST  9   /  ALT  9   /  AlkPhos  64  07-19    PT/INR - ( 19 Jul 2023 06:00 )   PT: 13.8 sec;   INR: 1.19 ratio         PTT - ( 19 Jul 2023 06:00 )  PTT:35.3 sec  Urinalysis Basic - ( 19 Jul 2023 06:00 )    Color: x / Appearance: x / SG: x / pH: x  Gluc: 110 mg/dL / Ketone: x  / Bili: x / Urobili: x   Blood: x / Protein: x / Nitrite: x   Leuk Esterase: x / RBC: x / WBC x   Sq Epi: x / Non Sq Epi: x / Bacteria: x        RADIOLOGY & ADDITIONAL TESTS:    ACC: [id number] EXAM:  US KIDNEYS AND BLADDER   ORDERED BY: FILI LIPSCOMB     PROCEDURE DATE:  07/18/2023          INTERPRETATION:  CLINICAL INFORMATION: Worsening HARLEEN. History of bladder   cancer    COMPARISON: CT abdomen and pelvis 6/12/2023. PET/CTscan 6/19/2023.    TECHNIQUE: Sonography of the kidneys and bladder.    FINDINGS:  Right kidney: 10.8 cm. Moderate to severe right hydronephrosis    Left kidney: 10.3 cm. New moderate left hydronephrosis. Upper pole cyst   measuring 1.7 x 1.2 x 1.3 cm. Midpole cyst measuring 1.2 x 1.5 x 1.5 cm.    Urinary bladder: Decompressed with a Villarreal catheter. Mass seen in the   region of the bladder.    IMPRESSION:  New moderate left hydronephrosis.  Redemonstration of moderate to severe right hydronephrosis.  Mass seen in the region of the bladder.    Findings were discussed with Dr. Caban at 7/18/2023 12:19 PM by Dr. Parker with read back confirmation.    --- End of Report ---          SARAVANAN PARKER MD; Resident Radiologist  This document has been electronically signed.  CRISTINA COLMENARES MD; Attending Radiologist  This document has been electronically signed. Jul 18 2023  1:05PM

## 2023-07-19 NOTE — PROGRESS NOTE ADULT - PROBLEM SELECTOR PLAN 4
newly diagnosed high grade invasive urothelial cancer  -?mets to lung as CT Chest done at [place] with "numerous bilateral solid and ground glass pulm nodules, several w/central cavitation, largest on Left is GGO, [id number]. Lingular and b/l lower lobe subpleural reticular opacities.   -Heme/onc consulted, f/u recs newly diagnosed high grade invasive urothelial cancer  -?mets to lung as CT Chest done at [place] with "numerous bilateral solid and ground glass pulm nodules, several w/central cavitation, largest on Left is GGO, [id number]. Lingular and b/l lower lobe subpleural reticular opacities.   -Heme/onc consulted, f/u recs    #Diarrhea  -stool studies showed yersinia enterolytica. Started on doxy - will cont for now. NO new episodes of diarrhea.

## 2023-07-19 NOTE — PROGRESS NOTE ADULT - SUBJECTIVE AND OBJECTIVE BOX
[facility] Division of Hospital Medicine  Jakob Pelletier) MD Mikael  Pager [phone]    SUBJECTIVE:  Chief complaint: HARLEEN.    Pt seen and evaluated at bedside this AM. No o/n events. Denies any SOb/Cp/NV. Denies any pain. When asked about nephrostomy tube -- pt was undecided and states he wants to discuss w/ his nephew.      ROS: All systems negative except as noted.      Vital Signs Last 24 Hrs  T(C): 36.3 (19 Jul 2023 10:48), Max: 36.7 (18 Jul 2023 23:50)  T(F): 97.3 (19 Jul 2023 10:48), Max: 98.1 (18 Jul 2023 23:50)  HR: 74 (19 Jul 2023 10:48) (66 - 74)  BP: 106/73 (19 Jul 2023 10:48) (97/59 - 110/61)  BP(mean): --  RR: 17 (19 Jul 2023 10:48) (17 - 17)  SpO2: 100% (19 Jul 2023 10:48) (96% - 100%)    Parameters below as of 19 Jul 2023 10:48  Patient On (Oxygen Delivery Method): nasal cannula      PHYSICAL EXAM:  Gen- In bed, comfortable, NAD  Resp- CTAB, good effort.  CVS- RRR, S1S2, no g/r/m.  GI- Soft abd, NT, ND, +BSx4  Ext- No C/C.  - Villarreal in place - bloody discharge around catheter and into the bag.      MEDICATION:  MEDICATIONS  (STANDING):  atorvastatin 40 milliGRAM(s) Oral at bedtime  budesonide  80 MICROgram(s)/formoterol 4.5 MICROgram(s) Inhaler 2 Puff(s) Inhalation two times a day  chlorhexidine 2% Cloths 1 Application(s) Topical daily  doxycycline monohydrate Capsule 100 milliGRAM(s) Oral every 12 hours  FLUoxetine 40 milliGRAM(s) Oral daily  piperacillin/tazobactam IVPB.. 3.375 Gram(s) IV Intermittent every 12 hours  tamsulosin 0.4 milliGRAM(s) Oral at bedtime  tiotropium 2.5 MICROgram(s) Inhaler 2 Puff(s) Inhalation daily    MEDICATIONS  (PRN):  acetaminophen     Tablet .. 650 milliGRAM(s) Oral every 6 hours PRN Temp greater or equal to 38C (100.4F), Mild Pain (1 - 3)  aluminum hydroxide/magnesium hydroxide/simethicone Suspension 30 milliLiter(s) Oral every 4 hours PRN Dyspepsia  diazepam    Tablet 5 milliGRAM(s) Oral daily PRN anxiety  melatonin 3 milliGRAM(s) Oral at bedtime PRN Insomnia  ondansetron Injectable 4 milliGRAM(s) IV Push every 8 hours PRN Nausea and/or Vomiting            LABORATORY:                          8.3    13.10 )-----------( 184      ( 19 Jul 2023 06:00 )             25.8     07-19    134<L>  |  97<L>  |  110<H>  ----------------------------<  110<H>  4.4   |  19<L>  |  11.38<H>    Ca    9.2      19 Jul 2023 06:00  Phos  6.9     07-19  Mg     2.40     07-19    TPro  5.9<L>  /  Alb  2.5<L>  /  TBili  0.2  /  DBili  x   /  AST  9   /  ALT  9   /  AlkPhos  64  07-19    PT/INR - ( 19 Jul 2023 06:00 )   PT: 13.8 sec;   INR: 1.19 ratio         PTT - ( 19 Jul 2023 06:00 )  PTT:35.3 sec  Urinalysis Basic - ( 19 Jul 2023 06:00 )    Color: x / Appearance: x / SG: x / pH: x  Gluc: 110 mg/dL / Ketone: x  / Bili: x / Urobili: x   Blood: x / Protein: x / Nitrite: x   Leuk Esterase: x / RBC: x / WBC x   Sq Epi: x / Non Sq Epi: x / Bacteria: x      CARDIAC MARKERS ( 19 Jul 2023 06:00 )  x     / x     / 15 U/L / x     / x      CARDIAC MARKERS ( 18 Jul 2023 05:05 )  x     / x     / 23 U/L / x     / x            SARS-CoV-2: NotDetec (05 Jul 2023 18:28)  COVID-19 PCR: NotDetec (15 [place] 2023 17:39)  SARS-CoV-2: NotDetec (13 Jun 2023 02:13)  COVID-19 PCR: NotDetec (03 May 2023 16:33)  SARS-CoV-2: NotDetec (29 Apr 2023 20:37)  COVID-19 PCR: NotDetec (26 Apr 2023 15:45)

## 2023-07-19 NOTE — CHART NOTE - NSCHARTNOTEFT_GEN_A_CORE
Pt has gross hematuria and 16 f rodriguez in place that is clogged with clots  Procedure:  Rodriguez removed    T(C): 36.3 (07-19-23 @ 10:48), Max: 36.7 (07-18-23 @ 23:50)  HR: 64 (07-19-23 @ 17:00) (64 - 74)  BP: 107/64 (07-19-23 @ 17:00) (97/59 - 110/61)  RR: 20 (07-19-23 @ 17:00) (17 - 20)  SpO2: 100% (07-19-23 @ 17:00) (96% - 100%)  Wt(kg): --    PE:  GEN:-NAD  ABD: soft NT ND  : Rodriguez with Gr Hematuria;  Scrotum WNL    Procedure:  20f 6 eye rodriguez placed in aseptic fashion.  Manually irrigated with 2000 cc NS / Sterile H2O  About 160 CC clot removed  6 eye removed  22F 3 way rodriguez replaced and CBI initiated.  Pt's urine color is light red pink  pt tolerated well      Assessment/Plan  Continue rodriguez and CBI   to monitor

## 2023-07-19 NOTE — PROGRESS NOTE ADULT - PROBLEM SELECTOR PLAN 1
With AGMA  Pt with known invasive bladder cancer p/w hematuria at [place] on 7/13, was receiving CTX. (unable to locate UCx)   Labs show uptrending Cr 2 --> 3.6 --> 5.06 --> 6.38 on (7/16)   -BUN Cr --> 89/8.6 on 7/17  -Check UA, Ulytes   -Renal US ordered   -s/p CTX at [place], will place on renally dosed Zosyn for now, UA/UCx pending   -Per RN, bladder scan unable to properly read residual as pt with renal mass.   -Pt voiding, but minimal amounts, strict I&Os ordered. Of note, urology did not place rodriguez at [place] d/t low PVR and more discomfort to pt.   -Urology and Renal consulted at [facility], f/u recs  -IR consulted for nephrostomies - currently pending family/pt decision.

## 2023-07-19 NOTE — CHART NOTE - NSCHARTNOTEFT_GEN_A_CORE
Per discussion with patient's nephew, Aren, and nieceNajma, at bedside, plan is now to focus on comfort. Per family, no further labs at this time. Aren requesting palliative involvement for symptom management and hospice referral. Consults placed. Appreciate recommendations.

## 2023-07-19 NOTE — PROGRESS NOTE ADULT - ASSESSMENT
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     7/18 Called by Renal to talk to patient REE HD.  Oncology Dr Jenkins notes patient is refusing HD and is appropriate in light of advance cancer NOT to give HD    called by radiology Dr Parker-- noted new hydro on US     GOC 7/18  Patient seen with RHIANNON stewart and HCP Aren Black at bedside .  Aren is HCP at [phone]  Patient is Ox2, year 2000 something. Aren is HCP.  Patient insistes he DOES NOT WANT HD  Aren is Ox3 and HCP at beside agrees with patient wishes not to have Dialysis and to be comfortable. Patient also wants to be DNR  Aren agrees and signed DNR / Molsr form  Form placed in chart  Aren will bring in HCP form for chart as well    7/19 - discussed with pt re recommendation for nephrostomy tubes. IR order was placed and they wanted further clarification of GOC and whether pt is amendable to it or not. At this time, pt is undecided and needs to talk with Aren about this. States he is otherwise comfortable and not in pain. Discussed that his renal continues to worsen, although Cr may improve once obstruction is alleviated, it does not alter trajectory of his underlying advanced malignancy. Will await final decision from pt/family.

## 2023-07-19 NOTE — PROGRESS NOTE ADULT - SUBJECTIVE AND OBJECTIVE BOX
NEPHROLOGY     Patient seen and examined resting comfortably, denies pain, no sob, comfortable on room air, in no acute distress.     MEDICATIONS  (STANDING):  atorvastatin 40 milliGRAM(s) Oral at bedtime  budesonide  80 MICROgram(s)/formoterol 4.5 MICROgram(s) Inhaler 2 Puff(s) Inhalation two times a day  chlorhexidine 2% Cloths 1 Application(s) Topical daily  doxycycline monohydrate Capsule 100 milliGRAM(s) Oral every 12 hours  FLUoxetine 40 milliGRAM(s) Oral daily  piperacillin/tazobactam IVPB.. 3.375 Gram(s) IV Intermittent every 12 hours  tamsulosin 0.4 milliGRAM(s) Oral at bedtime  tiotropium 2.5 MICROgram(s) Inhaler 2 Puff(s) Inhalation daily    VITALS:  T(C): , Max: 36.7 (07-18-23 @ 23:50)  T(F): , Max: 98.1 (07-18-23 @ 23:50)  HR: 66 (07-19-23 @ 05:51)  BP: 110/61 (07-19-23 @ 05:51)  RR: 17 (07-19-23 @ 05:51)  SpO2: 100% (07-19-23 @ 05:51)    I and O's:    07-18 @ 07:01  -  07-19 @ 07:00  --------------------------------------------------------  IN: 220 mL / OUT: 0 mL / NET: 220 mL    PHYSICAL EXAM:  Constitutional: NAD, Alert  HEENT: NCAT, MMM  Neck: Supple, No JVD  Respiratory: CTA-b/l  Cardiovascular: RRR  Gastrointestinal: BS+, soft, NT/ND  : (+)rodriguez (hematuria)  Extremities: No peripheral edema b/l  Neurological: no focal deficits; strength grossly intact  Back: no CVAT b/l  Skin: No rashes, no nevi    LABS:                        8.3    13.10 )-----------( 184      ( 19 Jul 2023 06:00 )             25.8     07-19    134<L>  |  97<L>  |  110<H>  ----------------------------<  110<H>  4.4   |  19<L>  |  11.38<H>    Ca    9.2      19 Jul 2023 06:00  Phos  6.9     07-19  Mg     2.40     07-19    TPro  5.9<L>  /  Alb  2.5<L>  /  TBili  0.2  /  DBili  x   /  AST  9   /  ALT  9   /  AlkPhos  64  07-19    Urine Studies:  Urinalysis Basic - ( 19 Jul 2023 06:00 )    Color: x / Appearance: x / SG: x / pH: x  Gluc: 110 mg/dL / Ketone: x  / Bili: x / Urobili: x   Blood: x / Protein: x / Nitrite: x   Leuk Esterase: x / RBC: x / WBC x   Sq Epi: x / Non Sq Epi: x / Bacteria: x

## 2023-07-19 NOTE — PROGRESS NOTE ADULT - PROBLEM SELECTOR PLAN 3
Hb stable ~7s   -s/p 2U pRBC at [place] on 7/13 per notes   -Check iron studies  -Trend CBC  -Transfuse if Hb <7, active T&S

## 2023-07-19 NOTE — PROGRESS NOTE ADULT - ASSESSMENT
IMPRESSION: 78M w/ HTN, CAD, COPD, and recently diagnosed bladder CA, 7/16/23 from [facility] with HARLEEN    (1)CKD - base creatinine ~2, it appears - unclear exact etiology    (2)HARLEEN - Most likely ischemic ATN from hypotension on admission to [facility]. Not tumor lysis based on bloodwork. Obstruction unlikely based on imaging at [facility]; Bilateral hydronephrosis seen on renal US here at [facility] - indicated for nephrostomy tubes     (3)Hyperphosphatemia - renally mediated - worsening    (4)Onc - metastatic bladder CA    (5)GOC - patient now DNR/DNHD - HCP in agreement. Agree with plan to avoid HD here; will manage patient's HARLEEN medically from this point forward.    (6) IR planned for b/l nephrostomy tube placement likely today     RECOMMEND:     IMPRESSION: 78M w/ HTN, CAD, COPD, and recently diagnosed bladder CA, 7/16/23 from [facility] with HARLEEN    (1)CKD - base creatinine ~2, it appears - unclear exact etiology    (2)HARLEEN - Most likely ischemic ATN from hypotension on admission to [facility]. Not tumor lysis based on bloodwork. Obstruction unlikely based on imaging at [facility]; Bilateral hydronephrosis seen on renal US here at [facility] - indicated for nephrostomy tubes     (3)Hyperphosphatemia - renally mediated - slightly lower     (4)Onc - metastatic bladder CA    (5)GOC - patient now DNR/DNHD - HCP in agreement. Agree with plan to avoid HD here; will manage patient's HARLEEN medically from this point forward.    (6) IR - planned for b/l nephrostomy tube placement likely today     RECOMMEND:  (1)IR for b/l nephrostomy tube placement as planned   (2)BMP+Mg+PO4 daily  (3)strict I/Os; would maintain rodriguez for now  (4)Dose new meds for GFR<10    D/w MURTAZA and Dr. Pranay Urbina, [facility]  [phone]      IMPRESSION: 78M w/ HTN, CAD, COPD, and recently diagnosed bladder CA, 7/16/23 from [facility] with HARLEEN    (1)CKD - base creatinine ~2, it appears - unclear exact etiology    (2)HARLEEN - Most likely ischemic ATN from hypotension on admission to [facility]. Not tumor lysis based on bloodwork. Obstruction unlikely based on imaging at [facility]; Bilateral hydronephrosis seen on renal US here at [facility] - indicated for nephrostomy tubes     (3)Hyperphosphatemia - renally mediated - slightly lower     (4)Onc - metastatic bladder CA    (5)GOC - patient now DNR/DNHD - HCP in agreement. Agree with plan to avoid HD here; will manage patient's HARLEEN medically from this point forward.    (6) IR - planned for b/l nephrostomy tube placement likely today     RECOMMEND:  (1)IR for b/l nephrostomy tube placement as planned   (2)BMP+Mg+PO4 daily  (3)strict I/Os; would maintain rodriguez for now  (4)Dose new meds for GFR<10    D/w ACP and Dr. Pranay Urbina, [facility]  [facility]  [phone]       RENAL ATTENDING NOTE  Patient seen and examined with NP.  I spoke to nephew (next of kin) Aren by phone, and Aren's sister/patient's niece Conrado (bedside with her ). They conveyed to me that given the patient's poor overall prognosis, and the likelihood of suffering for the remainder of the patient's life if nephrostomy tubes are placed, they would favor forgoing nephrostomy tube placement and focusing on comfort measures only. I will inform the primary team of this and ask that we start planning for hospice care. Will look to forgo nephrostomy tube placement as well as dialysis.    Joseph Pires MD  [facility]  [phone]

## 2023-07-20 NOTE — CONSULT NOTE ADULT - PROBLEM SELECTOR RECOMMENDATION 6
- Patient is hospice appropriate in the setting of aggressive cancer with poor prognosis according to heme/onc  - Per team, plan to transition to a rehab setting and ultimately to hospice care  - Will continue to follow  - MOLST document in chart with Aren Black as decision-maker (who is the HCP), DNR, DNI, comfort care filled out on 7/18/23 Consulted for pain management    Patient is comfort care with AICD -> Consulted for pain management - History of COPD, HTN, bladder cancer  - Reportedly dependent on ADLs per care coordination notes, patient also requiring maximum assistance while in inpatient

## 2023-07-20 NOTE — PROGRESS NOTE ADULT - PROBLEM SELECTOR PLAN 3
Hb stable ~7s   -s/p 2U pRBC at [place] on 7/13 per notes   -Check iron studies  -Trend CBC  -Transfuse if Hb <7, active T&S Hb stable ~7s   -s/p 2U pRBC at [place] on 7/13 per notes   -hgb stable at 8.3

## 2023-07-20 NOTE — PROGRESS NOTE ADULT - SUBJECTIVE AND OBJECTIVE BOX
NEPHROLOGY     Patient seen and examined resting comfortably, reported mild lower abdominal discomfort earlier, but improved with pain medications, denies sob, comfortable on 2L NC, in no acute distress.     MEDICATIONS  (STANDING):  atorvastatin 40 milliGRAM(s) Oral at bedtime  budesonide  80 MICROgram(s)/formoterol 4.5 MICROgram(s) Inhaler 2 Puff(s) Inhalation two times a day  chlorhexidine 2% Cloths 1 Application(s) Topical daily  doxycycline monohydrate Capsule 100 milliGRAM(s) Oral every 12 hours  FLUoxetine 40 milliGRAM(s) Oral daily  lidocaine 2% Jelly 1 milliLiter(s) IntraUrethral every 8 hours  piperacillin/tazobactam IVPB.. 3.375 Gram(s) IV Intermittent every 12 hours  tamsulosin 0.4 milliGRAM(s) Oral at bedtime  tiotropium 2.5 MICROgram(s) Inhaler 2 Puff(s) Inhalation daily    VITALS:  T(C): , Max: 36.4 (07-20-23 @ 03:40)  T(F): , Max: 97.6 (07-20-23 @ 03:40)  HR: 67 (07-20-23 @ 03:40)  BP: 96/57 (07-20-23 @ 05:20)  RR: 18 (07-20-23 @ 05:20)  SpO2: 100% (07-20-23 @ 05:20)    I and O's:    07-19 @ 07:01  -  07-20 @ 07:00  --------------------------------------------------------  IN: 300 mL / OUT: 90 mL / NET: 210 mL    07-20 @ 07:01  -  07-20 @ 08:51  --------------------------------------------------------  IN: 0 mL / OUT: 2200 mL / NET: -2200 mL    PHYSICAL EXAM:  Constitutional: NAD, Alert  HEENT: NCAT, MMM  Neck: Supple, No JVD  Respiratory: CTA-b/l  Cardiovascular: RRR  Gastrointestinal: BS+, soft, NT/ND  : (+)rodriguez (hematuria)  Extremities: No peripheral edema b/l  Neurological: no focal deficits; strength grossly intact  Back: no CVAT b/l  Skin: No rashes, no nevi    LABS:                        8.3    13.10 )-----------( 184      ( 19 Jul 2023 06:00 )             25.8     07-19    134<L>  |  97<L>  |  110<H>  ----------------------------<  110<H>  4.4   |  19<L>  |  11.38<H>    Ca    9.2      19 Jul 2023 06:00  Phos  6.9     07-19  Mg     2.40     07-19    TPro  5.9<L>  /  Alb  2.5<L>  /  TBili  0.2  /  DBili  x   /  AST  9   /  ALT  9   /  AlkPhos  64  07-19    Urine Studies:  Urinalysis Basic - ( 19 Jul 2023 06:00 )    Color: x / Appearance: x / SG: x / pH: x  Gluc: 110 mg/dL / Ketone: x  / Bili: x / Urobili: x   Blood: x / Protein: x / Nitrite: x   Leuk Esterase: x / RBC: x / WBC x   Sq Epi: x / Non Sq Epi: x / Bacteria: x

## 2023-07-20 NOTE — CONSULT NOTE ADULT - PROBLEM SELECTOR RECOMMENDATION 5
- Patient is hospice appropriate in the setting of aggressive cancer with poor prognosis according to heme/onc  - Per team, plan to transition to a rehab setting and ultimately to hospice care  - Will continue to follow - Patient is hospice appropriate in the setting of aggressive cancer with poor prognosis according to heme/onc  - Per team, plan to transition to a rehab setting and ultimately to hospice care  - Will continue to follow  - MOLST document in chart with Aren Black as decision-maker (who is the HCP), DNR, DNI, comfort care filled out on 7/18/23 - History of COPD, HTN, bladder cancer  - Reportedly dependent on ADLs per care coordination notes, patient also requiring maximum assistance while in inpatient - Patient is hospice appropriate in the setting of aggressive cancer with poor prognosis according to heme/onc- MOLST document in chart with Aren Black as decision-maker (who is the HCP), DNR, DNI, comfort care filled out on 7/18/23.  Per team, plan to transition to a rehab setting and ultimately to hospice care  - Patient is hospice appropriate in the setting of aggressive cancer with poor prognosis according to heme/onc  - 7/20- Discussed with karyn Younger regarding AICD deactivation (not pacemaker); Nephew agreed - MOLST document in chart with Aren Black as decision-maker (who is the HCP), DNR, DNI, comfort care filled out on 7/18/23.  Per team, plan to transition to a rehab setting and ultimately to hospice care  - 7/20- Discussed with karyn Younger regarding AICD deactivation (not pacemaker); Nephew agreed

## 2023-07-20 NOTE — PROGRESS NOTE ADULT - PROBLEM SELECTOR PLAN 1
With AGMA  Pt with known invasive bladder cancer p/w hematuria at [facility] on 7/13, was receiving CTX. (unable to locate UCx)   Labs show uptrending Cr 2 --> 3.6 --> 5.06 --> 6.38 on (7/16)   -BUN Cr --> 89/8.6 on 7/17  -Check UA, U[place]tes   -Renal US ordered   -s/p CTX at [facility], will place on renally dosed Zosyn for now, UA/UCx pending   -Per RN, bladder scan unable to properly read residual as pt with renal mass.   -Pt voiding, but minimal amounts, strict I&Os ordered. Of note, urology did not place rodriguez at [facility] d/t low PVR and more discomfort to pt.   -Urology and Renal consulted at [facility], f/u recs  -IR consulted for nephrostomies - currently pending family/pt decision. With AGMA  Pt with known invasive bladder cancer p/w hematuria at [facility] on 7/13, was receiving CTX. (unable to locate UCx)   Labs show uptrending Cr 2 --> 3.6 --> 5.06 --> 6.38 on (7/16) --> 11.3  -Renal US as above  -s/p CTX at [facility], will place on renally dosed Zosyn for now, UA/UCx pending   -Per RN, bladder scan unable to properly read residual as pt with renal mass.   -Pt voiding, but minimal amounts, strict I&Os ordered. Of note, urology did not place rodriguez at [facility] d/t low PVR and more discomfort to pt.   -Urology and Renal appreciated  -IR consulted for nephrostomies was cancelled--> patient and family did not want--> comfort care With AGMA  Pt with known invasive bladder cancer p/w hematuria at [place] on 7/13, was receiving CTX. (unable to locate UCx)   Labs show uptrending Cr 2 --> 3.6 --> 5.06 --> 6.38 on (7/16) --> 11.3  -Renal US as above  -s/p CTX at [place], will place on renally dosed Zosyn for now, UA/UCx pending   -Per RN, bladder scan unable to properly read residual as pt with renal mass.   -Pt voiding, but minimal amounts, strict I&Os ordered. Of note, urology did not place rodriguez at [place] d/t low PVR and more discomfort to pt. no more blood draws  -Urology and Renal appreciated  -IR consulted for nephrostomies was cancelled--> patient and family did not want--> comfort care

## 2023-07-20 NOTE — PROGRESS NOTE ADULT - ASSESSMENT
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     7/18 Called by Renal to talk to patient REE HD.  Oncology Dr Jenkins notes patient is refusing HD and is appropriate in light of advance cancer NOT to give HD    called by radiology Dr Parker-- noted new hydro on US     GOC 7/18  Patient seen with RHIANNON stewart and HCP Aren Black at bedside .  Aren is HCP at [phone]  Patient is Ox2, year 2000 something. Aren is HCP.  Patient insistes he DOES NOT WANT HD  Aren is Ox3 and HCP at beside agrees with patient wishes not to have Dialysis and to be comfortable. Patient also wants to be DNR  Aren agrees and signed DNR / Molsr form  Form placed in chart  Aren will bring in HCP form for chart as well    7/19 - discussed with pt re recommendation for nephrostomy tubes. IR order was placed and they wanted further clarification of GOC and whether pt is amendable to it or not. At this time, pt is undecided and needs to talk with Aren about this. States he is otherwise comfortable and not in pain. Discussed that his renal continues to worsen, although Cr may improve once obstruction is alleviated, it does not alter trajectory of his underlying advanced malignancy. Will await final decision from pt/family.

## 2023-07-20 NOTE — CHART NOTE - NSCHARTNOTEFT_GEN_A_CORE
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 Admitted for  HARLEEN on CKD 2/2 obstructive uropathy and acute blood loss anemia 2/2 hematuria. The patient previously met with Dr. Spivey at [facility] but has not initiated any systemic therapy to date. Nephrology was consulted and renal US ordered for further evaluation.     Urology was consulted for management of gross hematuria with clots and ARF in the setting of bladder cancer.   Was to start HD, however patient has decided he does not want HD.  REnal bladder ultrasund shows persistent severe right hydronephrosis and new moderate left hydronephrosis.  IR was consulted for nephrostomy tubes, however the patient has opted not to have nephrostomy tube placement  s/p GOC discussions with patient and he has opted for comfort care  Per heme onc he is hospice appropriate as he has an aggressive cancer with poor prognosis  OVernight patient was started on CBI for clot retention, however as patient has requested comfort measures, can stop cbi and rodriguez and allow patient to remain comfortable.     [facility]  [address] [place] [address] [phone]

## 2023-07-20 NOTE — CONSULT NOTE ADULT - CONVERSATION DETAILS
Prior to palliative consult, patient's HCP/nephew Aren had decided for DNR/DNI and comfort care for patient. Patient also has an AICD. Recommended to Aren of AICD deactivation to prevent possible shocks as patient approaches end of life. Nephew was given time to discuss with his sister. Nephew later agreed to AICD deactivation. Per documentation, patient had PPM which was upgraded to an AICD. Explained PPM would not require deactivation. Prior to palliative consult, patient's HCP/nephew Aren had decided for DNR/DNI and comfort care for patient, no HD. Patient also has an AICD. Recommended to Aren of AICD deactivation to prevent possible shocks as patient approaches end of life. Nephew was given time to discuss with his sister. Nephew later agreed to AICD deactivation. Per past documentation, patient had PPM which was upgraded to an AICD. Explained PPM would not require deactivation.

## 2023-07-20 NOTE — DIETITIAN INITIAL EVALUATION ADULT - PROBLEM SELECTOR PLAN 6
CAD s/p PCI, s/p Bi-V ICD   -EF 35-40% and small pericardial effusion on TTE at [facility]   -c/w statin, unclear why pt not on bb  -Seen by cards at [facility], will consult house cardiology if needed

## 2023-07-20 NOTE — CONSULT NOTE ADULT - ASSESSMENT
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 Patient was subsequently transferred to medicine and admitted for treatment of HARLEEN on superimposed CKD. Urology and nephrology were consulted, in the setting of bilateral hydronephrosis, for which nephrostomy tubes were indicated. 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 After multiple GOC discussion with patient and family, decision was made to focus on comfort rather than aggressive treatment. Referral for hospice care was placed. Geriatrics and Palliative were consulted for symptom management.  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 Patient was subsequently transferred to medicine and admitted for treatment of HARLEEN on superimposed CKD. Urology and nephrology were consulted, in the setting of bilateral hydronephrosis, for which nephrostomy tubes were indicated. 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 After multiple GOC discussion with patient and family, decision was made to focus on comfort rather than aggressive treatment. Referral for hospice care was placed. Palliative consulted for symptom management.  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 Patient was subsequently transferred to medicine and admitted for treatment of HARLEEN on superimposed CKD. Urology and nephrology were consulted, in the setting of bilateral hydronephrosis, for which nephrostomy tubes were indicated. 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 After multiple GOC discussion with patient and family, decision was made to focus on comfort rather than aggressive treatment. Referral for hospice care was placed. Palliative consulted for symptom management.

## 2023-07-20 NOTE — DISCHARGE NOTE PROVIDER - NSDCMRMEDTOKEN_GEN_ALL_CORE_FT
Albuterol (Eqv-ProAir HFA) 90 mcg/inh inhalation aerosol: 2 puff(s) inhaled every 6 hours as needed for  shortness of breath and/or wheezing  diazePAM 5 mg oral tablet: 1 tab(s) orally once a day (at bedtime) NOTE: As per Pharmacy, 1 tablet orally 3 times a day as needed for anxiety  FLUoxetine 40 mg oral capsule: 1 cap(s) orally once a day  Folbic oral tablet: 1 tab(s) orally once a day  metoprolol succinate 25 mg oral tablet, extended release: 1 tab(s) orally once a day  mirtazapine 7.5 mg oral tablet: 1 tab(s) orally once a day (at bedtime)  simvastatin 40 mg oral tablet: 1 tab(s) orally once a day  tamsulosin 0.4 mg oral capsule: 1 cap(s) orally once a day (at bedtime)  Trelegy Ellipta 100 mcg-62.5 mcg-25 mcg/inh inhalation powder: 1 puff(s) inhaled once a day   Albuterol (Eqv-ProAir HFA) 90 mcg/inh inhalation aerosol: 2 puff(s) inhaled every 6 hours as needed for  shortness of breath and/or wheezing  diazePAM 5 mg oral tablet: 1 tab(s) orally once a day (at bedtime) NOTE: As per Pharmacy, 1 tablet orally 3 times a day as needed for anxiety  FLUoxetine 40 mg oral capsule: 1 cap(s) orally once a day  metoprolol succinate 25 mg oral tablet, extended release: 1 tab(s) orally once a day  mirtazapine 7.5 mg oral tablet: 1 tab(s) orally once a day (at bedtime)  simvastatin 40 mg oral tablet: 1 tab(s) orally once a day  tamsulosin 0.4 mg oral capsule: 1 cap(s) orally once a day (at bedtime)  Trelegy Ellipta 100 mcg-62.5 mcg-25 mcg/inh inhalation powder: 1 puff(s) inhaled once a day  Vitamin B Complex with Folate oral tablet, chewable: 1 tab(s) orally once a day   acetaminophen 325 mg oral tablet: 2 tab(s) orally every 6 hours As needed Temp greater or equal to 38C (100.4F), Mild Pain (1 - 3)  Albuterol (Eqv-ProAir HFA) 90 mcg/inh inhalation aerosol: 2 puff(s) inhaled every 6 hours as needed for  shortness of breath and/or wheezing  FLUoxetine 40 mg oral capsule: 1 cap(s) orally once a day  HYDROmorphone 2 mg oral tablet: 1 tab(s) orally every 4 hours As needed Moderate Pain (4 - 6)  HYDROmorphone 4 mg oral tablet: 1 tab(s) orally every 4 hours As needed Severe Pain (7 - 10)  LORazepam 1 mg oral tablet: 1 tab(s) orally every 4 hours As needed Anxiety  oxyBUTYnin 5 mg oral tablet: 1 tab(s) orally every 8 hours As needed Bladder spasms  simvastatin 40 mg oral tablet: 1 tab(s) orally once a day  tamsulosin 0.4 mg oral capsule: 1 cap(s) orally once a day (at bedtime)  Trelegy Ellipta 100 mcg-62.5 mcg-25 mcg/inh inhalation powder: 1 puff(s) inhaled once a day  Vitamin B Complex with Folate oral tablet, chewable: 1 tab(s) orally once a day

## 2023-07-20 NOTE — CONSULT NOTE ADULT - SUBJECTIVE AND OBJECTIVE BOX
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   Pt was hospitalized at [place] from 7/13 for gross hematuria. Per hospital course, he had symptomatic anemia to 7.5 from baseline of 11, in 3/2023. [facility] records show 7.7 on 7/10/2023.   Course c/b HARLEEN, Cr of 5.0 from baseline 2.01 on 7/10/2023. At [place], pt evaluated by urology and given low PVR ~11cc and bladder mostly filled with mass, rodriguez would have increased pain and not but of utility to patient so deferred. Renal was consulted and pt was on IVF and low K diet, however Cr kept uptrending and plan was to consult IR for nephrostomies today if no improvement.      Imaging/Labs from [facility] in chart  Bld cx x2 (7/13) NGTD  NM Lung V/Q Scan (7/14) : low prob of PE  CT C/A/P noncon (7/13): diffuse hyperattenuation of R kidney and ureter are favored to represent hemorrhage, primary ddx would be malignancy. Bladder hyperdensity favored to represent active hemorrhage into urinary bladder subacute clot. Numerous pulm nodules, several cavitating. Hepatic cirrhosis. Severe coronary PAD,   TTE (7/13): EF 35-40%, normal RV, mild MR, no AS, mild AR, mod TR, small pericardial effusion. PPM/AICD wire in RV       Pt transferred to  at [facility] on 7/17. Pt reports feeling well, had BM this morning in toilet, urinated a little then. Unclear if bloody or not.    (17 Jul 2023 08:59)      Patient was subsequently transferred to medicine and admitted for treatment of HARLEEN on superimposed CKD. Urology and nephrology were consulted, in the setting of bilateral hydronephrosis, for which nephrostomy tubes were indicated. 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 After multiple GOC discussion with patient and family, decision was made to focus on comfort rather than aggressive treatment. Referral for hospice care was placed. Geriatrics and Palliative were consulted for symptom management.     PERTINENT PM/SXH:   HTN (Hypertension)    Depression    Bradycardia    Dyslipidemia    CAD (Coronary Artery Disease)    S/P PTCA (Percutaneous Transluminal Coronary Angioplasty)    TIA (Transient Ischemic Attack)    COPD (chronic obstructive pulmonary disease)    NSTEMI (non-ST elevation myocardial infarction)    H/O constipation    Anxiety and depression    H/O CHF    LV dysfunction    Bladder mass      S/P Rotator Cuff Surgery    S/P drug eluting coronary stent placement    AICD (automatic cardioverter/defibrillator) present    S/P inguinal hernia repair    S/P cardiac pacemaker procedure    Cardiac resynchronization therapy defibrillator (CRT-D) in place      FAMILY HISTORY:    ------------------------------------------------------------------------------------------------------------  ITEMS NOT CHECKED ARE NOT PRESENT    SOCIAL HISTORY:   Living Situation: [ ]Home  [ ]Long term care  [ ]Rehab [ ]Other  Support:     Substance hx:  [ ]   Tobacco hx:  [ ]   Alcohol hx: [ ]   Family Hx substance abuse [ ]yes [ ]no    Shinto/Spirituality:  PCSSQ[Palliative Care Spiritual Screening Question]   Severity (0-10): 0  Score of 4 or > indicate consideration of Chaplaincy referral.  Chaplaincy Referral: [ ] yes [X ] refused [ ] following    Anticipatory Grief present?:  [ ] yes [X ] no  [ ] Deferred                      Other Referrals:    [ ]Hospice   [ ]Caregiver [place] Support  [ ]Child Life    [ ]Patient & Family Centered Care Referral  [ ]Holistic Therapy     ------------------------------------------------------------------------------------------------------------    PRESENT SYMPTOMS:    [ ] No     [ ] Unable to self-report      [ ] CPOT (ICU)     [ ] PAINADs     [ ] RDOS    [ ] Yes     Source if other than patient:  [ ]Family   [ ]Team     PAIN:   If blank, patient unable to specify   [ ]yes [ ]no  QOL impact-   Location -                    Aggravating factors -  Quality -  Radiation -  Timing-  Pain at most severe level (0-10 scale):  Pain at minimal acceptable level/Pain Goal (0-10 scale):     SYMPTOMS:   Dyspnea:                           [ ]Mild [ ]Moderate [ ]Severe  Anxiety:                             [ ]Mild [ ]Moderate [ ]Severe  Fatigue:                             [ ]Mild [ ]Moderate [ ]Severe  Nausea/Vomiting:              [ ]Mild [ ]Moderate [ ]Severe  Loss of appetite:                [ ]Mild [ ]Moderate [ ]Severe  Constipation:                     [ ]Mild [ ]Moderate [ ]Severe    Other Symptoms:  [X ]All other review of systems negative     Home Medications for symptoms if any:  I-Stop Reference No:    ------------------------------------------------------------------------------------------------------------    FUNCTIONAL STATUS:     Baseline ADL (prior to admission):  [ ] Independent  [ ] Moderate Assistance [ ] Dependent  Palliative Performance Score:     [ ]PPSV2 < or = to 30%     NUTRITIONAL STATUS:     Protein Calorie Malnutrition Present:   [ ]mild   [ ]moderate   [ ]severe   [ ]poor nutritional intake   [ ]artificial nutrition      Weight:   [ ]underweight (BMI 18.5 or less)   [ ]morbid obesity (BMI 30 or higher)   [ ]anasarca  [ ]significant weight loss     Height (cm): 175.3 (07-17-23 @ 07:22), 177.8 (07-05-23 @ 18:06), 177.8 (06-12-23 @ 12:30)  Weight (kg): 58.2 (07-17-23 @ 07:22), 55.6 (07-07-23 @ 04:43), 56.9893 (06-30-23 @ 11:00)  BMI (kg/m2): 18.9 (07-17-23 @ 07:22), 17.6 (07-07-23 @ 04:43), 18 (07-05-23 @ 18:06)    ------------------------------------------------------------------------------------------------------------    PRIOR ADVANCE DIRECTIVES:    [ ] DNR/MOLST    [ ] Living Will    [ ] Health Care Proxy(s)    DECISION MAKER(s):  [ ] Patient    [ ] Surrogate(s)  [ ] HCP   [ ] Guardian             ------------------------------------------------------------------------------------------------------------  PHYSICAL EXAM:  Vital Signs Last 24 Hrs  T(C): 36.3 (20 Jul 2023 05:20), Max: 36.4 (20 Jul 2023 03:40)  T(F): 97.4 (20 Jul 2023 05:20), Max: 97.6 (20 Jul 2023 03:40)  HR: 67 (20 Jul 2023 03:40) (64 - 74)  BP: 96/57 (20 Jul 2023 05:20) (96/57 - 107/64)  BP(mean): --  RR: 18 (20 Jul 2023 05:20) (17 - 20)  SpO2: 100% (20 Jul 2023 05:20) (100% - 100%)    Parameters below as of 20 Jul 2023 05:20  Patient On (Oxygen Delivery Method): nasal cannula  O2 Flow (L/min): 2   I&O's Summary    19 Jul 2023 07:01  -  20 Jul 2023 07:00  --------------------------------------------------------  IN: 300 mL / OUT: 90 mL / NET: 210 mL        GENERAL:  [ ]Cachexia  [ ] Frail  [ ]Awake  [ ]Oriented x   [ ]Lethargic  [ ]Unarousable  [ ]Verbal  [ ]Non-Verbal    BEHAVIORAL:   [ ] Anxiety  [ ] Delirium [ ] Agitation [ ] Other    HEENT:   [ ]Normal   [ ]Dry mouth   [ ]ET Tube/Trach  [ ]Oral lesions    PULMONARY:   [ ]Clear [ ]Tachypnea  [ ]Audible excessive secretions   [ ]Rhonchi        [ ]Right [ ]Left [ ]Bilateral  [ ]Crackles        [ ]Right [ ]Left [ ]Bilateral  [ ]Wheezing     [ ]Right [ ]Left [ ]Bilateral  [ ]Diminished breath sounds [ ]right [ ]left [ ]bilateral    CARDIOVASCULAR:    [ ]Regular [ ]Irregular [ ]Tachy  [ ]Tristan [ ]Murmur [ ]Other    GASTROINTESTINAL:  [ ]Soft  [ ]Distended   [ ]+BS  [ ]Non tender [ ]Tender  [ ]Other [ ]PEG [ ]OGT/ NGT      GENITOURINARY:  [ ]Normal [ ] Incontinent   [ ]Oliguria/Anuria   [ ]Rodriguez    MUSCULOSKELETAL:   [ ]Normal   [ ]Weakness  [ ]Bed/Wheelchair bound [ ]Edema    NEUROLOGIC:   [ ]No focal deficits  [ ]Cognitive impairment  [ ]Dysphagia [ ]Dysarthria [ ]Paresis [ ]Other     SKIN:   [ ]Normal  [ ]Rash  [ ]Other  [ ]Pressure ulcer(s)       Present on admission [ ]y [ ]n    ------------------------------------------------------------------------------------------------------------    LABS:                        8.3    13.10 )-----------( 184      ( 19 Jul 2023 06:00 )             25.8   07-19    134<L>  |  97<L>  |  110<H>  ----------------------------<  110<H>  4.4   |  19<L>  |  11.38<H>    Ca    9.2      19 Jul 2023 06:00  Phos  6.9     07-19  Mg     2.40     07-19    TPro  5.9<L>  /  Alb  2.5<L>  /  TBili  0.2  /  DBili  x   /  AST  9   /  ALT  9   /  AlkPhos  64  07-19  PT/INR - ( 19 Jul 2023 06:00 )   PT: 13.8 sec;   INR: 1.19 ratio         PTT - ( 19 Jul 2023 06:00 )  PTT:35.3 sec    Urinalysis Basic - ( 19 Jul 2023 06:00 )    Color: x / Appearance: x / SG: x / pH: x  Gluc: 110 mg/dL / Ketone: x  / Bili: x / Urobili: x   Blood: x / Protein: x / Nitrite: x   Leuk Esterase: x / RBC: x / WBC x   Sq Epi: x / Non Sq Epi: x / Bacteria: x        ------------------------------------------------------------------------------------------------------------    CRITICAL CARE:  [ ]Shock Present  [ ]Septic [ ]Cardiogenic [ ]Neurologic [ ]Hypovolemic [ ] Undifferentiated/Mixed   [ ]Vasopressors [ ]Inotropes     [ ]Respiratory failure present: [ ]Acute  [ ]Chronic [ ]Hypoxic  [ ]Hypercarbic   [ ]Mechanical ventilation   [ ]Trach collar   [ ]Non-invasive ventilatory support   [ ]High flow    [ ]Non-rebreather/Venti     [ ]Other organ failure     ------------------------------------------------------------------------------------------------------------    RADIOLOGY & ADDITIONAL STUDIES:      ------------------------------------------------------------------------------------------------------------    ALLERGIES:  Allergies    azithromycin (Nausea)    Intolerances        MEDICATIONS  (STANDING):  atorvastatin 40 milliGRAM(s) Oral at bedtime  budesonide  80 MICROgram(s)/formoterol 4.5 MICROgram(s) Inhaler 2 Puff(s) Inhalation two times a day  chlorhexidine 2% Cloths 1 Application(s) Topical daily  doxycycline monohydrate Capsule 100 milliGRAM(s) Oral every 12 hours  FLUoxetine 40 milliGRAM(s) Oral daily  lidocaine 2% Jelly 1 milliLiter(s) IntraUrethral every 8 hours  piperacillin/tazobactam IVPB.. 3.375 Gram(s) IV Intermittent every 12 hours  tamsulosin 0.4 milliGRAM(s) Oral at bedtime  tiotropium 2.5 MICROgram(s) Inhaler 2 Puff(s) Inhalation daily    MEDICATIONS  (PRN):  acetaminophen     Tablet .. 650 milliGRAM(s) Oral every 6 hours PRN Temp greater or equal to 38C (100.4F), Mild Pain (1 - 3)  aluminum hydroxide/magnesium hydroxide/simethicone Suspension 30 milliLiter(s) Oral every 4 hours PRN Dyspepsia  diazepam    Tablet 5 milliGRAM(s) Oral two times a day PRN anxiety  HYDROmorphone  Injectable 0.5 milliGRAM(s) IV Push every 4 hours PRN Moderate Pain (4 - 6)  HYDROmorphone  Injectable 1 milliGRAM(s) IV Push every 4 hours PRN Severe Pain (7 - 10)  melatonin 3 milliGRAM(s) Oral at bedtime PRN Insomnia  ondansetron Injectable 4 milliGRAM(s) IV Push every 8 hours PRN Nausea and/or Vomiting  oxybutynin 5 milliGRAM(s) Oral every 8 hours PRN Bladder spasms           HPI:  79yo M w/ hx of HTN, HLD, CAD (s/p PCI in ), HFrEF c/b pericardial effusion, bradycardia (s/p PPM upgraded to BiV-ICD ), CKD3, COPD, recent diagnosis of bladder cancer transferred from [place] for hematuria iso newly diagnosed muscular invasive bladder cancer, HARLEEN, symptomatic anemia.   Pt was hospitalized at [place] from  for gross hematuria. Per hospital course, he had symptomatic anemia to 7.5 from baseline of 11, in 3/2023. [facility] records show 7.7 on 7/10/2023.   Course c/b HARLEEN, Cr of 5.0 from baseline 2.01 on 7/10/2023. At [place], pt evaluated by urology and given low PVR ~11cc and bladder mostly filled with mass, rodriguez would have increased pain and not but of utility to patient so deferred. Renal was consulted and pt was on IVF and low K diet, however Cr kept uptrending and plan was to consult IR for nephrostomies today if no improvement.      Imaging/Labs from [facility] in chart  Bld cx x2 () NGTD  NM Lung V/Q Scan () : low prob of PE  CT C/A/P noncon (): diffuse hyperattenuation of R kidney and ureter are favored to represent hemorrhage, primary ddx would be malignancy. Bladder hyperdensity favored to represent active hemorrhage into urinary bladder subacute clot. Numerous pulm nodules, several cavitating. Hepatic cirrhosis. Severe coronary PAD,   TTE (): EF 35-40%, normal RV, mild MR, no AS, mild AR, mod TR, small pericardial effusion. PPM/AICD wire in RV       Pt transferred to  at [facility] on . Pt reports feeling well, had BM this morning in toilet, urinated a little then. Unclear if bloody or not.    (2023 08:59)      Patient was subsequently transferred to medicine and admitted for treatment of HARLEEN on superimposed CKD. Urology and nephrology were consulted, in the setting of bilateral hydronephrosis, for which nephrostomy tubes were indicated. 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 After multiple GOC discussion with patient and family, decision was made to focus on comfort rather than aggressive treatment. Referral for hospice care was placed. Geriatrics and Palliative were consulted for symptom management.     Patient seen at bedside this morning. Denied any complaints, though noted some abdominal pain that has resolved this morning. Denies nausea or vomiting. No chest pain as well and no shortness of breath. Patient was eating at bedside also. However seemed confused, was able to mention that he is in the hospital and say his name and  but was not able to elucidate where he lived, what date or day it is.    PERTINENT PM/SXH:   HTN (Hypertension)    Depression    Bradycardia    Dyslipidemia    CAD (Coronary Artery Disease)    S/P PTCA (Percutaneous Transluminal Coronary Angioplasty)    TIA (Transient Ischemic Attack)    COPD (chronic obstructive pulmonary disease)    NSTEMI (non-ST elevation myocardial infarction)    H/O constipation    Anxiety and depression    H/O CHF    LV dysfunction    Bladder mass      S/P Rotator Cuff Surgery    S/P drug eluting coronary stent placement    AICD (automatic cardioverter/defibrillator) present    S/P inguinal hernia repair    S/P cardiac pacemaker procedure    Cardiac resynchronization therapy defibrillator (CRT-D) in place      FAMILY HISTORY:    ------------------------------------------------------------------------------------------------------------  ITEMS NOT CHECKED ARE NOT PRESENT    SOCIAL HISTORY:   Living Situation: [ ]Home  [ ]Long term care  [ ]Rehab [ x]Other - reportedly lives in an apartment with elevator and reportedly dependent as per PT evaluation today  Support:     Substance hx:  [ ]   Tobacco hx:  [ ]   Alcohol hx: [ ]   Family Hx substance abuse [ ]yes [ ]no    Nondenominational/Spirituality:  PCSSQ[Palliative Care Spiritual Screening Question]   Severity (0-10): 0  Score of 4 or > indicate consideration of Chaplaincy referral.  Chaplaincy Referral: [ ] yes [X ] refused [ ] following    Anticipatory Grief present?:  [ ] yes [X ] no  [ ] Deferred                      Other Referrals:    [ ]Hospice   [ ]Caregiver [place] Support  [ ]Child Life    [ ]Patient & Family Centered Care Referral  [ ]Holistic Therapy     ------------------------------------------------------------------------------------------------------------    PRESENT SYMPTOMS:    [ ] No     [ ] Unable to self-report      [ ] CPOT (ICU)     [ ] PAINADs     [ ] RDOS    [ ] Yes     Source if other than patient:  [ ]Family   [ ]Team     PAIN:   If blank, patient unable to specify   [x ]yes [ ]no  QOL impact-   Location - abdomen                   Aggravating factors -   Quality - sharp  Radiation - denied  Timing- intermittent  Pain at most severe level (0-10 scale): 8/10  Pain at minimal acceptable level/Pain Goal (0-10 scale):     SYMPTOMS:   Dyspnea:                           [ ]Mild [ ]Moderate [ ]Severe  Anxiety:                             [ ]Mild [ ]Moderate [ ]Severe  Fatigue:                             [ ]Mild [ ]Moderate [ ]Severe  Nausea/Vomiting:              [ ]Mild [ ]Moderate [ ]Severe  Loss of appetite:                [ ]Mild [ ]Moderate [ ]Severe  Constipation:                     [ ]Mild [ ]Moderate [ ]Severe    Other Symptoms:  [X ]All other review of systems negative     Home Medications for symptoms if any:  I-Stop Reference No:    ------------------------------------------------------------------------------------------------------------    FUNCTIONAL STATUS:     Baseline ADL (prior to admission):  [ ] Independent  [ ] Moderate Assistance [ x] Dependent per PT evaluation today and that he owns a wheelchair and rolling walker      NUTRITIONAL STATUS:     Protein Calorie Malnutrition Present:   [ ]mild   [ ]moderate   [ ]severe   [ ]poor nutritional intake   [ ]artificial nutrition      Weight:   [ ]underweight (BMI 18.5 or less)   [ ]morbid obesity (BMI 30 or higher)   [ ]anasarca  [ ]significant weight loss     Height (cm): 175.3 (23 @ 07:22), 177.8 (23 @ 18:06), 177.8 (23 @ 12:30)  Weight (kg): 58.2 (23 @ 07:22), 55.6 (23 @ 04:43), 56.9893 (23 @ 11:00)  BMI (kg/m2): 18.9 (23 @ 07:22), 17.6 (23 @ 04:43), 18 (23 @ 18:06)    ------------------------------------------------------------------------------------------------------------    PRIOR ADVANCE DIRECTIVES:    [x ] DNR/MOLST    [ ] Living Will    [x ] Health Care Proxy(s)    DECISION MAKER(s):  [ ] Patient    [ ] Surrogate(s)  [x ] HCP   [ ] Guardian             ------------------------------------------------------------------------------------------------------------  PHYSICAL EXAM:  Vital Signs Last 24 Hrs  T(C): 36.3 (2023 05:20), Max: 36.4 (2023 03:40)  T(F): 97.4 (2023 05:20), Max: 97.6 (2023 03:40)  HR: 67 (2023 03:40) (64 - 74)  BP: 96/57 (2023 05:20) (96/57 - 107/64)  BP(mean): --  RR: 18 (2023 05:20) (17 - 20)  SpO2: 100% (2023 05:20) (100% - 100%)    Parameters below as of 2023 05:20  Patient On (Oxygen Delivery Method): nasal cannula  O2 Flow (L/min): 2   I&O's Summary    2023 07:01  -  2023 07:00  --------------------------------------------------------  IN: 300 mL / OUT: 90 mL / NET: 210 mL        GENERAL:  [x ]Cachexia  [ x] Frail  [x ]Awake  [ x]Oriented x 1-2 (self, place)  [ ]Lethargic  [ ]Unarousable  [ ]Verbal  [ ]Non-Verbal    BEHAVIORAL:   [ ] Anxiety  [ ] Delirium [ ] Agitation [ ] Other    HEENT:   [ ]Normal   [ ]Dry mouth   [ ]ET Tube/Trach  [ ]Oral lesions    PULMONARY:   [x ]Clear [ ]Tachypnea  [ ]Audible excessive secretions   [ ]Rhonchi        [ ]Right [ ]Left [ ]Bilateral  [ ]Crackles        [ ]Right [ ]Left [ ]Bilateral  [ ]Wheezing     [ ]Right [ ]Left [ ]Bilateral  [ ]Diminished breath sounds [ ]right [ ]left [ ]bilateral  + nasal cannula    CARDIOVASCULAR:    [ x]Regular [ ]Irregular [ ]Tachy  [ ]Tristan [ ]Murmur [ ]Other    GASTROINTESTINAL:  [x ]Soft  [ ]Distended   [ ]+BS  [ x]Non tender [ ]Tender  [ ]Other [ ]PEG [ ]OGT/ NGT      GENITOURINARY:  [ ]Normal [ ] Incontinent   [ ]Oliguria/Anuria   [x ]Rodriguez    MUSCULOSKELETAL:   [ ]Normal   [ x]Weakness  [ ]Bed/Wheelchair bound [ ]Edema    NEUROLOGIC:   [ ]No focal deficits  [ ]Cognitive impairment  [ ]Dysphagia [ ]Dysarthria [ ]Paresis [ ]Other     SKIN:   [ ]Normal  [ ]Rash  [ ]Other  [ ]Pressure ulcer(s)       Present on admission [ ]y [ ]n    ------------------------------------------------------------------------------------------------------------    LABS:                        8.3    13.10 )-----------( 184      ( 2023 06:00 )             25.8   07-19    134<L>  |  97<L>  |  110<H>  ----------------------------<  110<H>  4.4   |  19<L>  |  11.38<H>    Ca    9.2      2023 06:00  Phos  6.9     07-  Mg     2.40     07-19    TPro  5.9<L>  /  Alb  2.5<L>  /  TBili  0.2  /  DBili  x   /  AST  9   /  ALT  9   /  AlkPhos  64  07-19  PT/INR - ( 2023 06:00 )   PT: 13.8 sec;   INR: 1.19 ratio         PTT - ( 2023 06:00 )  PTT:35.3 sec    Urinalysis Basic - ( 2023 06:00 )    Color: x / Appearance: x / SG: x / pH: x  Gluc: 110 mg/dL / Ketone: x  / Bili: x / Urobili: x   Blood: x / Protein: x / Nitrite: x   Leuk Esterase: x / RBC: x / WBC x   Sq Epi: x / Non Sq Epi: x / Bacteria: x        ------------------------------------------------------------------------------------------------------------    RADIOLOGY & ADDITIONAL STUDIES:      ------------------------------------------------------------------------------------------------------------    ALLERGIES:  Allergies    azithromycin (Nausea)    Intolerances        MEDICATIONS  (STANDING):  atorvastatin 40 milliGRAM(s) Oral at bedtime  budesonide  80 MICROgram(s)/formoterol 4.5 MICROgram(s) Inhaler 2 Puff(s) Inhalation two times a day  chlorhexidine 2% Cloths 1 Application(s) Topical daily  doxycycline monohydrate Capsule 100 milliGRAM(s) Oral every 12 hours  FLUoxetine 40 milliGRAM(s) Oral daily  lidocaine 2% Jelly 1 milliLiter(s) IntraUrethral every 8 hours  piperacillin/tazobactam IVPB.. 3.375 Gram(s) IV Intermittent every 12 hours  tamsulosin 0.4 milliGRAM(s) Oral at bedtime  tiotropium 2.5 MICROgram(s) Inhaler 2 Puff(s) Inhalation daily    MEDICATIONS  (PRN):  acetaminophen     Tablet .. 650 milliGRAM(s) Oral every 6 hours PRN Temp greater or equal to 38C (100.4F), Mild Pain (1 - 3)  aluminum hydroxide/magnesium hydroxide/simethicone Suspension 30 milliLiter(s) Oral every 4 hours PRN Dyspepsia  diazepam    Tablet 5 milliGRAM(s) Oral two times a day PRN anxiety  HYDROmorphone  Injectable 0.5 milliGRAM(s) IV Push every 4 hours PRN Moderate Pain (4 - 6)  HYDROmorphone  Injectable 1 milliGRAM(s) IV Push every 4 hours PRN Severe Pain (7 - 10)  melatonin 3 milliGRAM(s) Oral at bedtime PRN Insomnia  ondansetron Injectable 4 milliGRAM(s) IV Push every 8 hours PRN Nausea and/or Vomiting  oxybutynin 5 milliGRAM(s) Oral every 8 hours PRN Bladder spasms           HPI:  Patient is a 79yo M w/ hx of HTN, HLD, CAD (s/p PCI in ), HFrEF c/b pericardial effusion, bradycardia (s/p PPM upgraded to BiV-ICD ), CKD3, COPD, recent diagnosis of bladder cancer transferred from [place] for hematuria iso newly diagnosed muscular invasive bladder cancer, HARLEEN, symptomatic anemia. Pt was hospitalized at [place] from  for gross hematuria. Per hospital course, he had symptomatic anemia to 7.5 from baseline of 11, in 3/2023. [facility] records show 7.7 on 7/10/2023. Course c/b HARLEEN, Cr of 5.0 from baseline 2.01 on 7/10/2023. At [place], pt evaluated by urology and given low PVR ~11cc and bladder mostly filled with mass, rodriguez would have increased pain and not but of utility to patient so deferred. Renal was consulted and pt was on IVF and low K diet, however Cr kept uptrending and plan was to consult IR for nephrostomies today if no improvement. Patient was subsequently transferred to medicine and admitted for treatment of HARLEEN on superimposed CKD. Urology and nephrology were consulted, in the setting of bilateral hydronephrosis, for which nephrostomy tubes were indicated. 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 After multiple GOC discussion with patient and family, decision was made to focus on comfort rather than aggressive treatment, refusing HD. Geriatrics and Palliative were consulted for symptom management.     Patient seen at bedside this morning. Denied any complaints, though noted some abdominal pain that has resolved this morning. Denies nausea or vomiting. No chest pain as well and no shortness of breath. Patient was eating at bedside also. However seemed confused, was able to mention that he is in the hospital and say his name and  but was not able to elucidate where he lived, what date or day it is.    PERTINENT PM/SXH:   HTN (Hypertension)    Depression    Bradycardia    Dyslipidemia    CAD (Coronary Artery Disease)    S/P PTCA (Percutaneous Transluminal Coronary Angioplasty)    TIA (Transient Ischemic Attack)    COPD (chronic obstructive pulmonary disease)    NSTEMI (non-ST elevation myocardial infarction)    H/O constipation    Anxiety and depression    H/O CHF    LV dysfunction    Bladder mass      S/P Rotator Cuff Surgery    S/P drug eluting coronary stent placement    AICD (automatic cardioverter/defibrillator) present    S/P inguinal hernia repair    S/P cardiac pacemaker procedure    Cardiac resynchronization therapy defibrillator (CRT-D) in place      FAMILY HISTORY:  Patient unable to recall     ------------------------------------------------------------------------------------------------------------  ITEMS NOT CHECKED ARE NOT PRESENT    SOCIAL HISTORY:   Living Situation: [ ]Home  [ ]Long term care  [ ]Rehab [ x]Other - reportedly lives in an apartment with elevator and reportedly dependent as per PT evaluation today  Support: Niece and nephew     Substance hx:  [ ]   Tobacco hx:  [ ]   Alcohol hx: [ ]   Family Hx substance abuse [ ]yes [ ]no    Yarsani/Spirituality:  PCSSQ[Palliative Care Spiritual Screening Question]   Severity (0-10): 0  Score of 4 or > indicate consideration of Chaplaincy referral.  Chaplaincy Referral: [ ] yes [X ] refused [ ] following    Anticipatory Grief present?:  [ ] yes [X ] no  [ ] Deferred                      Other Referrals:    [ ]Hospice   [ ]Caregiver [place] Support  [ ]Child Life    [ ]Patient & Family Centered Care Referral  [ ]Holistic Therapy     ------------------------------------------------------------------------------------------------------------    PRESENT SYMPTOMS:    [ ] No     [ ] Unable to self-report      [ ] CPOT (ICU)     [ ] PAINADs     [ ] RDOS    [ ] Yes     Source if other than patient:  [ ]Family   [ ]Team     PAIN:   If blank, patient unable to specify   [x ]yes [ ]no  QOL impact-   Location - abdomen                   Aggravating factors -   Quality - sharp  Radiation - denied  Timing- intermittent  Pain at most severe level (0-10 scale): 8/10  Pain at minimal acceptable level/Pain Goal (0-10 scale):     SYMPTOMS:   Dyspnea:                           [ ]Mild [ ]Moderate [ ]Severe  Anxiety:                             [ ]Mild [ ]Moderate [ ]Severe  Fatigue:                             [ ]Mild [ ]Moderate [ ]Severe  Nausea/Vomiting:              [ ]Mild [ ]Moderate [ ]Severe  Loss of appetite:                [ ]Mild [ ]Moderate [ ]Severe  Constipation:                     [ ]Mild [ ]Moderate [ ]Severe    Other Symptoms:  [X ]All other review of systems negative     Home Medications for symptoms if any:  I-Stop Reference No:    ------------------------------------------------------------------------------------------------------------    FUNCTIONAL STATUS:     Baseline ADL (prior to admission):  [ ] Independent  [ ] Moderate Assistance [ x] Dependent  PPS 30%       NUTRITIONAL STATUS:     Protein Calorie Malnutrition Present:   [ ]mild   [ ]moderate   [ ]severe   [ ]poor nutritional intake   [ ]artificial nutrition      Weight:   [X ]underweight (BMI 18.5 or less)   [ ]morbid obesity (BMI 30 or higher)   [ ]anasarca  [ ]significant weight loss     Height (cm): 175.3 (23 @ 07:22), 177.8 (23 @ 18:06), 177.8 (23 @ 12:30)  Weight (kg): 58.2 (23 @ 07:22), 55.6 (23 @ 04:43), 56.9893 (23 @ 11:00)  BMI (kg/m2): 18.9 (23 @ 07:22), 17.6 (23 @ 04:43), 18 (23 @ 18:06)    ------------------------------------------------------------------------------------------------------------    PRIOR ADVANCE DIRECTIVES:    [x ] DNR/MOLST    [ ] Living Will    [x ] Health Care Proxy(s)- karyn Black     DECISION MAKER(s):  [ ] Patient    [ ] Surrogate(s)  [x ] HCP   [ ] Guardian             ------------------------------------------------------------------------------------------------------------  PHYSICAL EXAM:  Vital Signs Last 24 Hrs  T(C): 36.3 (2023 05:20), Max: 36.4 (2023 03:40)  T(F): 97.4 (2023 05:20), Max: 97.6 (2023 03:40)  HR: 67 (2023 03:40) (64 - 74)  BP: 96/57 (2023 05:20) (96/57 - 107/64)  BP(mean): --  RR: 18 (2023 05:20) (17 - 20)  SpO2: 100% (2023 05:20) (100% - 100%)    Parameters below as of 2023 05:20  Patient On (Oxygen Delivery Method): nasal cannula  O2 Flow (L/min): 2   I&O's Summary    2023 07:01  -  2023 07:00  --------------------------------------------------------  IN: 300 mL / OUT: 90 mL / NET: 210 mL    GENERAL:  [x ]Cachexia  [ x] Frail  [x ]Awake  [ x]Oriented x 1-2 (self, place)  [ ]Lethargic  [ ]Unarousable  [ ]Verbal  [ ]Non-Verbal    BEHAVIORAL:   [ ] Anxiety  [ ] Delirium [ ] Agitation [ ] Other    HEENT:   [x ]Normal   [ ]Dry mouth   [ ]ET Tube/Trach  [ ]Oral lesions    PULMONARY:   [x ]Clear [ ]Tachypnea  [ ]Audible excessive secretions   [ ]Rhonchi        [ ]Right [ ]Left [ ]Bilateral  [ ]Crackles        [ ]Right [ ]Left [ ]Bilateral  [ ]Wheezing     [ ]Right [ ]Left [ ]Bilateral  [ ]Diminished breath sounds [ ]right [ ]left [ ]bilateral  + nasal cannula    CARDIOVASCULAR:    [ x]Regular [ ]Irregular [ ]Tachy  [ ]Tristan [ ]Murmur [ ]Other    GASTROINTESTINAL:  [x ]Soft  [ ]Distended   [ ]+BS  [ x]Non tender [ ]Tender  [ ]Other [ ]PEG [ ]OGT/ NGT      GENITOURINARY:  [ ]Normal [ ] Incontinent   [ ]Oliguria/Anuria   [x ]Rodriguez- with bloody output     MUSCULOSKELETAL:   [ ]Normal   [ x]Weakness  [ ]Bed/Wheelchair bound [ ]Edema    NEUROLOGIC:   [ ]No focal deficits  [x ]Cognitive impairment  [ ]Dysphagia [ ]Dysarthria [ ]Paresis [ ]Other     SKIN:   [x ]Normal  [ ]Rash  [ ]Other  [ ]Pressure ulcer(s)       Present on admission [ ]y [ ]n    ------------------------------------------------------------------------------------------------------------    LABS:                        8.3    13.10 )-----------( 184       06:00 )             25.8   07-19    134<L>  |  97<L>  |  110<H>  ----------------------------<  110<H>  4.4   |  19<L>  |  11.38<H>    Ca    9.2      2023 06:00  Phos  6.9       Mg     2.40         TPro  5.9<L>  /  Alb  2.5<L>  /  TBili  0.2  /  DBili  x   /  AST  9   /  ALT  9   /  AlkPhos  64  07-19  PT/INR - ( 2023 06:00 )   PT: 13.8 sec;   INR: 1.19 ratio         PTT - ( 2023 06:00 )  PTT:35.3 sec    Urinalysis Basic - ( 2023 06:00 )    Color: x / Appearance: x / SG: x / pH: x  Gluc: 110 mg/dL / Ketone: x  / Bili: x / Urobili: x   Blood: x / Protein: x / Nitrite: x   Leuk Esterase: x / RBC: x / WBC x   Sq Epi: x / Non Sq Epi: x / Bacteria: x    ------------------------------------------------------------------------------------------------------------    RADIOLOGY & ADDITIONAL STUDIES:    < from: Xray Chest 1 View- PORTABLE-Urgent (23 @ 18:49) >  IMPRESSION:  Scattered nodular opacities in the right lower lung field, not   significantly changed since chest radiograph performed on 2023    < end of copied text >    < from: CT Angio Chest PE Protocol w/ IV Cont (23 @ 21:29) >  IMPRESSION:  No evidence of central, lobar, or segmental pulmonary embolus.   Respiratory motion artifact.    Interval increase in size and number of right-sided pulmonary nodules.    Small amount of pericardial fluid similar to prior.    < end of copied text >    < from: US Kidney and Bladder (23 @ 11:57) >  FINDINGS:  Right kidney: 10.8 cm. Moderate to severe right hydronephrosis    Left kidney: 10.3 cm. New moderate left hydronephrosis. Upper pole cyst   measuring 1.7 x 1.2 x 1.3 cm. Midpole cyst measuring 1.2 x 1.5 x 1.5 cm.    Urinary bladder: Decompressed with a Rodriguez catheter. Mass seen in the   region of the bladder.    < end of copied text >    ------------------------------------------------------------------------------------------------------------    ALLERGIES:  Allergies    azithromycin (Nausea)    Intolerances        MEDICATIONS  (STANDING):  atorvastatin 40 milliGRAM(s) Oral at bedtime  budesonide  80 MICROgram(s)/formoterol 4.5 MICROgram(s) Inhaler 2 Puff(s) Inhalation two times a day  chlorhexidine 2% Cloths 1 Application(s) Topical daily  doxycycline monohydrate Capsule 100 milliGRAM(s) Oral every 12 hours  FLUoxetine 40 milliGRAM(s) Oral daily  lidocaine 2% Jelly 1 milliLiter(s) IntraUrethral every 8 hours  piperacillin/tazobactam IVPB.. 3.375 Gram(s) IV Intermittent every 12 hours  tamsulosin 0.4 milliGRAM(s) Oral at bedtime  tiotropium 2.5 MICROgram(s) Inhaler 2 Puff(s) Inhalation daily    MEDICATIONS  (PRN):  acetaminophen     Tablet .. 650 milliGRAM(s) Oral every 6 hours PRN Temp greater or equal to 38C (100.4F), Mild Pain (1 - 3)  aluminum hydroxide/magnesium hydroxide/simethicone Suspension 30 milliLiter(s) Oral every 4 hours PRN Dyspepsia  diazepam    Tablet 5 milliGRAM(s) Oral two times a day PRN anxiety  HYDROmorphone  Injectable 0.5 milliGRAM(s) IV Push every 4 hours PRN Moderate Pain (4 - 6)  HYDROmorphone  Injectable 1 milliGRAM(s) IV Push every 4 hours PRN Severe Pain (7 - 10)  melatonin 3 milliGRAM(s) Oral at bedtime PRN Insomnia  ondansetron Injectable 4 milliGRAM(s) IV Push every 8 hours PRN Nausea and/or Vomiting  oxybutynin 5 milliGRAM(s) Oral every 8 hours PRN Bladder spasms           HPI:  Patient is a 77yo M w/ hx of HTN, HLD, CAD (s/p PCI in ), HFrEF c/b pericardial effusion, 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 Pt was hospitalized at [facility] from  for gross hematuria. Per hospital course, he had symptomatic anemia to 7.5 from baseline of 11, in 3/2023. [facility] records show 7.7 on 7/10/2023. Course c/b HARLEEN, Cr of 5.0 from baseline 2.01 on 7/10/2023. At [facility], pt evaluated by urology and given low PVR ~11cc and bladder mostly filled with mass, rodriguez would have increased pain and not but of utility to patient so deferred. Renal was consulted and pt was on IVF and low K diet, however Cr kept uptrending and plan was to consult IR for nephrostomies today if no improvement. Patient was subsequently transferred to medicine and admitted for treatment of HARLEEN on superimposed CKD. Urology and nephrology were consulted, in the setting of bilateral hydronephrosis, for which nephrostomy tubes were indicated. 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 After multiple GOC discussion with patient and family, decision was made to focus on comfort rather than aggressive treatment, refusing HD. Palliative consulted for symptom management.     Patient seen at bedside this morning. Denied any complaints, though noted some abdominal pain that has resolved this morning. Denies nausea or vomiting. No chest pain as well and no shortness of breath. Patient was eating at bedside also. However seemed confused, was able to mention that he is in the hospital and say his name and  but was not able to elucidate where he lived, what date or day it is.    PERTINENT PM/SXH:   HTN (Hypertension)    Depression    Bradycardia    Dyslipidemia    CAD (Coronary Artery Disease)    S/P PTCA (Percutaneous Transluminal Coronary Angioplasty)    TIA (Transient Ischemic Attack)    COPD (chronic obstructive pulmonary disease)    NSTEMI (non-ST elevation myocardial infarction)    H/O constipation    Anxiety and depression    H/O CHF    LV dysfunction    Bladder mass      S/P Rotator Cuff Surgery    S/P drug eluting coronary stent placement    AICD (automatic cardioverter/defibrillator) present    S/P inguinal hernia repair    S/P cardiac pacemaker procedure    Cardiac resynchronization therapy defibrillator (CRT-D) in place      FAMILY HISTORY:  Patient unable to recall     ------------------------------------------------------------------------------------------------------------  ITEMS NOT CHECKED ARE NOT PRESENT    SOCIAL HISTORY:   Living Situation: [ ]Home  [ ]Long term care  [ ]Rehab [ x]Other - reportedly lives in an apartment with elevator and reportedly dependent as per PT evaluation today  Support: Niece and nephew     Substance hx:  [ ]   Tobacco hx:  [ ]   Alcohol hx: [ ]   Family Hx substance abuse [ ]yes [ ]no    Zoroastrian/Spirituality:  PCSSQ[Palliative Care Spiritual Screening Question]   Severity (0-10): 0  Score of 4 or > indicate consideration of Chaplaincy referral.  Chaplaincy Referral: [ ] yes [X ] refused [ ] following    Anticipatory Grief present?:  [ ] yes [X ] no  [ ] Deferred                      Other Referrals:    [ ]Hospice   [ ]Caregiver [place] Support  [ ]Child Life    [ ]Patient & Family Centered Care Referral  [ ]Holistic Therapy     ------------------------------------------------------------------------------------------------------------    PRESENT SYMPTOMS:    [ ] No     [ ] Unable to self-report      [ ] CPOT (ICU)     [ ] PAINADs     [ ] RDOS    [ ] Yes     Source if other than patient:  [ ]Family   [ ]Team     PAIN:   If blank, patient unable to specify   [x ]yes [ ]no  QOL impact-   Location - abdomen                   Aggravating factors -   Quality - sharp  Radiation - denied  Timing- intermittent  Pain at most severe level (0-10 scale): 8/10  Pain at minimal acceptable level/Pain Goal (0-10 scale):     SYMPTOMS:   Dyspnea:                           [ ]Mild [ ]Moderate [ ]Severe  Anxiety:                             [ ]Mild [ ]Moderate [ ]Severe  Fatigue:                             [ ]Mild [ ]Moderate [ ]Severe  Nausea/Vomiting:              [ ]Mild [ ]Moderate [ ]Severe  Loss of appetite:                [ ]Mild [ ]Moderate [ ]Severe  Constipation:                     [ ]Mild [ ]Moderate [ ]Severe    Other Symptoms:  [X ]All other review of systems negative     Home Medications for symptoms if any:  I-Stop Reference No:    ------------------------------------------------------------------------------------------------------------    FUNCTIONAL STATUS:     Baseline ADL (prior to admission):  [ ] Independent  [ ] Moderate Assistance [ x] Dependent  PPS 30%       NUTRITIONAL STATUS:     Protein Calorie Malnutrition Present:   [ ]mild   [ ]moderate   [ ]severe   [ ]poor nutritional intake   [ ]artificial nutrition      Weight:   [X ]underweight (BMI 18.5 or less)   [ ]morbid obesity (BMI 30 or higher)   [ ]anasarca  [ ]significant weight loss     Height (cm): 175.3 (23 @ 07:22), 177.8 (23 @ 18:06), 177.8 (23 @ 12:30)  Weight (kg): 58.2 (23 @ 07:22), 55.6 (23 @ 04:43), 56.9893 (23 @ 11:00)  BMI (kg/m2): 18.9 (23 @ 07:22), 17.6 (23 @ 04:43), 18 (23 @ 18:06)    ------------------------------------------------------------------------------------------------------------    PRIOR ADVANCE DIRECTIVES:    [x ] DNR/MOLST    [ ] Living Will    [x ] Health Care Proxy(s)- karyn Black     DECISION MAKER(s):  [ ] Patient    [ ] Surrogate(s)  [x ] HCP   [ ] Guardian             ------------------------------------------------------------------------------------------------------------  PHYSICAL EXAM:  Vital Signs Last 24 Hrs  T(C): 36.3 (2023 05:20), Max: 36.4 (2023 03:40)  T(F): 97.4 (2023 05:20), Max: 97.6 (2023 03:40)  HR: 67 (2023 03:40) (64 - 74)  BP: 96/57 (2023 05:20) (96/57 - 107/64)  BP(mean): --  RR: 18 (2023 05:20) (17 - 20)  SpO2: 100% (2023 05:20) (100% - 100%)    Parameters below as of 2023 05:20  Patient On (Oxygen Delivery Method): nasal cannula  O2 Flow (L/min): 2   I&O's Summary    2023 07:01  -  2023 07:00  --------------------------------------------------------  IN: 300 mL / OUT: 90 mL / NET: 210 mL    GENERAL:  [x ]Cachexia  [ x] Frail  [x ]Awake  [ x]Oriented x 1-2 (self, place)  [ ]Lethargic  [ ]Unarousable  [ ]Verbal  [ ]Non-Verbal    BEHAVIORAL:   [ ] Anxiety  [ ] Delirium [ ] Agitation [ ] Other    HEENT:   [x ]Normal   [ ]Dry mouth   [ ]ET Tube/Trach  [ ]Oral lesions    PULMONARY:   [x ]Clear [ ]Tachypnea  [ ]Audible excessive secretions   [ ]Rhonchi        [ ]Right [ ]Left [ ]Bilateral  [ ]Crackles        [ ]Right [ ]Left [ ]Bilateral  [ ]Wheezing     [ ]Right [ ]Left [ ]Bilateral  [ ]Diminished breath sounds [ ]right [ ]left [ ]bilateral  + nasal cannula    CARDIOVASCULAR:    [ x]Regular [ ]Irregular [ ]Tachy  [ ]Tristan [ ]Murmur [ ]Other    GASTROINTESTINAL:  [x ]Soft  [ ]Distended   [ ]+BS  [ x]Non tender [ ]Tender  [ ]Other [ ]PEG [ ]OGT/ NGT      GENITOURINARY:  [ ]Normal [ ] Incontinent   [ ]Oliguria/Anuria   [x ]Rodriguez- with bloody output     MUSCULOSKELETAL:   [ ]Normal   [ x]Weakness  [ ]Bed/Wheelchair bound [ ]Edema    NEUROLOGIC:   [ ]No focal deficits  [x ]Cognitive impairment  [ ]Dysphagia [ ]Dysarthria [ ]Paresis [ ]Other     SKIN:   [x ]Normal  [ ]Rash  [ ]Other  [ ]Pressure ulcer(s)       Present on admission [ ]y [ ]n    ------------------------------------------------------------------------------------------------------------    LABS:                        8.3    13.10 )-----------(  06:00 )             25.8   07-19    134<L>  |  97<L>  |  110<H>  ----------------------------<  110<H>  4.4   |  19<L>  |  11.38<H>    Ca    9.2      2023 06:00  Phos  6.9       Mg     2.40         TPro  5.9<L>  /  Alb  2.5<L>  /  TBili  0.2  /  DBili  x   /  AST  9   /  ALT  9   /  AlkPhos  64  07  PT/INR - ( 2023 06:00 )   PT: 13.8 sec;   INR: 1.19 ratio         PTT - ( 2023 06:00 )  PTT:35.3 sec    Urinalysis Basic - ( 2023 06:00 )    Color: x / Appearance: x / SG: x / pH: x  Gluc: 110 mg/dL / Ketone: x  / Bili: x / Urobili: x   Blood: x / Protein: x / Nitrite: x   Leuk Esterase: x / RBC: x / WBC x   Sq Epi: x / Non Sq Epi: x / Bacteria: x    ------------------------------------------------------------------------------------------------------------    RADIOLOGY & ADDITIONAL STUDIES:    < from: Xray Chest 1 View- PORTABLE-Urgent (23 @ 18:49) >  IMPRESSION:  Scattered nodular opacities in the right lower lung field, not   significantly changed since chest radiograph performed on 2023    < end of copied text >    < from: CT Angio Chest PE Protocol w/ IV Cont (23 @ 21:29) >  IMPRESSION:  No evidence of central, lobar, or segmental pulmonary embolus.   Respiratory motion artifact.    Interval increase in size and number of right-sided pulmonary nodules.    Small amount of pericardial fluid similar to prior.    < end of copied text >    < from: US Kidney and Bladder (23 @ 11:57) >  FINDINGS:  Right kidney: 10.8 cm. Moderate to severe right hydronephrosis    Left kidney: 10.3 cm. New moderate left hydronephrosis. Upper pole cyst   measuring 1.7 x 1.2 x 1.3 cm. Midpole cyst measuring 1.2 x 1.5 x 1.5 cm.    Urinary bladder: Decompressed with a Rodriguez catheter. Mass seen in the   region of the bladder.    < end of copied text >    ------------------------------------------------------------------------------------------------------------    ALLERGIES:  Allergies    azithromycin (Nausea)    Intolerances        MEDICATIONS  (STANDING):  atorvastatin 40 milliGRAM(s) Oral at bedtime  budesonide  80 MICROgram(s)/formoterol 4.5 MICROgram(s) Inhaler 2 Puff(s) Inhalation two times a day  chlorhexidine 2% Cloths 1 Application(s) Topical daily  doxycycline monohydrate Capsule 100 milliGRAM(s) Oral every 12 hours  FLUoxetine 40 milliGRAM(s) Oral daily  lidocaine 2% Jelly 1 milliLiter(s) IntraUrethral every 8 hours  piperacillin/tazobactam IVPB.. 3.375 Gram(s) IV Intermittent every 12 hours  tamsulosin 0.4 milliGRAM(s) Oral at bedtime  tiotropium 2.5 MICROgram(s) Inhaler 2 Puff(s) Inhalation daily    MEDICATIONS  (PRN):  acetaminophen     Tablet .. 650 milliGRAM(s) Oral every 6 hours PRN Temp greater or equal to 38C (100.4F), Mild Pain (1 - 3)  aluminum hydroxide/magnesium hydroxide/simethicone Suspension 30 milliLiter(s) Oral every 4 hours PRN Dyspepsia  diazepam    Tablet 5 milliGRAM(s) Oral two times a day PRN anxiety  HYDROmorphone  Injectable 0.5 milliGRAM(s) IV Push every 4 hours PRN Moderate Pain (4 - 6)  HYDROmorphone  Injectable 1 milliGRAM(s) IV Push every 4 hours PRN Severe Pain (7 - 10)  melatonin 3 milliGRAM(s) Oral at bedtime PRN Insomnia  ondansetron Injectable 4 milliGRAM(s) IV Push every 8 hours PRN Nausea and/or Vomiting  oxybutynin 5 milliGRAM(s) Oral every 8 hours PRN Bladder spasms

## 2023-07-20 NOTE — PROGRESS NOTE ADULT - SUBJECTIVE AND OBJECTIVE BOX
Patient is a 78y old  Male who presents with a chief complaint of hematuria (20 Jul 2023 09:45)      SUBJECTIVE / OVERNIGHT EVENTS:  resting in bed. notes he is cpmfortable but very confused.  says he 'knocked 10x and you did not answer"    MEDICATIONS  (STANDING):  atorvastatin 40 milliGRAM(s) Oral at bedtime  budesonide  80 MICROgram(s)/formoterol 4.5 MICROgram(s) Inhaler 2 Puff(s) Inhalation two times a day  chlorhexidine 2% Cloths 1 Application(s) Topical daily  doxycycline monohydrate Capsule 100 milliGRAM(s) Oral every 12 hours  FLUoxetine 40 milliGRAM(s) Oral daily  lidocaine 2% Jelly 1 milliLiter(s) IntraUrethral every 8 hours  piperacillin/tazobactam IVPB.. 3.375 Gram(s) IV Intermittent every 12 hours  tamsulosin 0.4 milliGRAM(s) Oral at bedtime  tiotropium 2.5 MICROgram(s) Inhaler 2 Puff(s) Inhalation daily    MEDICATIONS  (PRN):  acetaminophen     Tablet .. 650 milliGRAM(s) Oral every 6 hours PRN Temp greater or equal to 38C (100.4F), Mild Pain (1 - 3)  aluminum hydroxide/magnesium hydroxide/simethicone Suspension 30 milliLiter(s) Oral every 4 hours PRN Dyspepsia  diazepam    Tablet 5 milliGRAM(s) Oral two times a day PRN anxiety  HYDROmorphone  Injectable 1 milliGRAM(s) IV Push every 4 hours PRN Severe Pain (7 - 10)  HYDROmorphone  Injectable 0.5 milliGRAM(s) IV Push every 4 hours PRN Moderate Pain (4 - 6)  melatonin 3 milliGRAM(s) Oral at bedtime PRN Insomnia  ondansetron Injectable 4 milliGRAM(s) IV Push every 8 hours PRN Nausea and/or Vomiting  oxybutynin 5 milliGRAM(s) Oral every 8 hours PRN Bladder spasms      I&O's Summary    19 Jul 2023 07:01  -  20 Jul 2023 07:00  --------------------------------------------------------  IN: 300 mL / OUT: 90 mL / NET: 210 mL    Vital Signs Last 24 Hrs  T(F): 97.4 (20 Jul 2023 05:20), Max: 97.6 (20 Jul 2023 03:40)  HR: 67 (20 Jul 2023 03:40) (64 - 74)  BP: 96/57 (20 Jul 2023 05:20) (96/57 - 107/64  RR: 18 (20 Jul 2023 05:20) (17 - 20)  SpO2: 100% (20 Jul 2023 05:20) (100% - 100%)    Parameters below as of 20 Jul 2023 05:20  Patient On (Oxygen Delivery Method): nasal cannula  O2 Flow (L/min): 2      PHYSICAL EXAM:  GENERAL: NAD, on NC oxygen  HEAD:  Atraumatic, Normocephalic  EYES: EOMI, PERRLA, conjunctiva and sclera clear  NECK: Supple, No JVD  CHEST/LUNG: Clear to auscultation bilaterally; No wheeze  HEART: Regular rate and rhythm; No murmurs, rubs, or gallops  ABDOMEN: Soft, Nontender, Nondistended; Bowel sounds present  EXTREMITIES:  2+ Peripheral Pulses, No clubbing, cyanosis, or edema  Positive asterixis to hands   PSYCH: Alert, confused  NEUROLOGY: non-focal  Villarreal in place with pink urine    LABS:                        8.3    13.10 )-----------( 184      ( 19 Jul 2023 06:00 )             25.8     07-19    134<L>  |  97<L>  |  110<H>  ----------------------------<  110<H>  4.4   |  19<L>  |  11.38<H>    Ca    9.2      19 Jul 2023 06:00  Phos  6.9     07-19  Mg     2.40     07-19    TPro  5.9<L>  /  Alb  2.5<L>  /  TBili  0.2  /  DBili  x   /  AST  9   /  ALT  9   /  AlkPhos  64  07-19    PT/INR - ( 19 Jul 2023 06:00 )   PT: 13.8 sec;   INR: 1.19 ratio         PTT - ( 19 Jul 2023 06:00 )  PTT:35.3 sec  CARDIAC MARKERS ( 19 Jul 2023 06:00 )  x     / x     / 15 U/L / x     / x              Care Discussed with Consultants/Other Providers:  NP--> comfort care  7/19 family declined NT

## 2023-07-20 NOTE — DIETITIAN INITIAL EVALUATION ADULT - PROBLEM SELECTOR PLAN 4
newly diagnosed high grade invasive urothelial cancer  -?mets to lung as CT Chest done at [place] with "numerous bilateral solid and ground glass pulm nodules, several w/central cavitation, largest on Left is GGO, [id number]. Lingular and b/l lower lobe subpleural reticular opacities.   -Heme/onc consulted, f/u recs

## 2023-07-20 NOTE — CONSULT NOTE ADULT - PROBLEM SELECTOR RECOMMENDATION 4
- History of COPD, HTN, bladder cancer  - Reportedly dependent on ADLs per care coordination notes, patient also requiring maximum assistance while in inpatient CT C/A/P noncon (7/13): diffuse hyperattenuation of R kidney and ureter are favored to represent hemorrhage, primary ddx would be malignancy. Bladder hyperdensity favored to represent active hemorrhage into urinary bladder subacute clot. Numerous pulm nodules, several cavitating. Hepatic cirrhosis. Severe coronary PAD,   TTE (7/13): EF 35-40%, normal RV, mild MR, no AS, mild AR, mod TR, small pericardial effusion. PPM/AICD wire in RV - Patient is hospice appropriate in the setting of aggressive cancer with poor prognosis according to heme/onc- MOLST document in chart with Aren Black as decision-maker (who is the HCP), DNR, DNI, comfort care filled out on 7/18/23.  Per team, plan to transition to a rehab setting and ultimately to hospice care  - Patient is hospice appropriate in the setting of aggressive cancer with poor prognosis according to heme/onc - Patient is hospice appropriate in the setting of aggressive cancer with poor prognosis according to heme/onc- MOLST document in chart with Aren Black as decision-maker (who is the HCP), DNR, DNI, comfort care filled out on 7/18/23.  Per team, plan to transition to a rehab setting and ultimately to hospice care  - Patient is hospice appropriate in the setting of aggressive cancer with poor prognosis according to heme/onc  - 7/20- Discussed with karyn Younger about AICD deactivation (not pacemaker); Nephew agreed - Patient had PPM placed in 2011, later upgraded to biventricular AICD  - As patient is now comfort care, recommended ICD deactivation to nephleslie, who agreed - Patient had PPM placed in 2011 for sinus bradycardia, later upgraded to biventricular AICD in 2019, had EF 30%   - As patient is now comfort care, recommended ICD deactivation to nephleslie, who agreed

## 2023-07-20 NOTE — DISCHARGE NOTE PROVIDER - HOSPITAL COURSE
79yo M w/ hx of HTN, HLD, CAD (s/p PCI in 2001), HFrEF c/b pericardial effusion, bradycardia (s/p PPM upgraded to BiV-ICD 2019), CKD3, COPD, recent diagnosis of bladder cancer transferred from [place] for hematuria iso newly diagnosed muscular invasive bladder cancer, HARLEEN, symptomatic anemia.   Pt was hospitalized at [place] from 7/13 for gross hematuria. Per hospital course, he had symptomatic anemia to 7.5 from baseline of 11, in 3/2023. [facility] records show 7.7 on 7/10/2023.   Course c/b HARLEEN, Cr of 5.0 from baseline 2.01 on 7/10/2023. At [place], pt evaluated by urology and given low PVR ~11cc and bladder mostly filled with mass, rodriguez would have increased pain and not but of utility to patient so deferred. Renal was consulted and pt was on IVF and low K diet, however Cr kept uptrending and plan was to consult IR for nephrostomies today if no improvement.    Imaging/Labs from [facility] in chart  Bld cx x2 (7/13) NGTD  NM Lung V/Q Scan (7/14) : low prob of PE  CT C/A/P noncon (7/13): diffuse hyperattenuation of R kidney and ureter are favored to represent hemorrhage, primary ddx would be malignancy. Bladder hyperdensity favored to represent active hemorrhage into urinary bladder subacute clot. Numerous pulm nodules, several cavitating. Hepatic cirrhosis. Severe coronary PAD,   TTE (7/13): EF 35-40%, normal RV, mild MR, no AS, mild AR, mod TR, small pericardial effusion. PPM/AICD wire in RV   Pt transferred to 8N at [facility] on 7/17. Pt reports feeling well, had BM this morning in toilet, urinated a little then. Unclear if bloody or not.     HOSPITAL COURSE:   77yo M w/ hx of HTN, HLD, CAD (s/p PCI in 2001), HFrEF c/b pericardial effusion, bradycardia (s/p PPM upgraded to BiV-ICD 2019), CKD3, COPD, recent diagnosis of bladder cancer transferred from [place] for hematuria iso newly diagnosed muscular invasive bladder cancer, HARLEEN, symptomatic anemia.   Pt was hospitalized at [place] from 7/13 for gross hematuria. Per hospital course, he had symptomatic anemia to 7.5 from baseline of 11, in 3/2023. [facility] records show 7.7 on 7/10/2023.   Course c/b HARLEEN, Cr of 5.0 from baseline 2.01 on 7/10/2023. At [place], pt evaluated by urology and given low PVR ~11cc and bladder mostly filled with mass, rodriguez would have increased pain and not but of utility to patient so deferred. Renal was consulted and pt was on IVF and low K diet, however Cr kept uptrending and plan was to consult IR for nephrostomies today if no improvement.    Imaging/Labs from [facility] in chart  Bld cx x2 (7/13) NGTD  NM Lung V/Q Scan (7/14) : low prob of PE  CT C/A/P noncon (7/13): diffuse hyperattenuation of R kidney and ureter are favored to represent hemorrhage, primary ddx would be malignancy. Bladder hyperdensity favored to represent active hemorrhage into urinary bladder subacute clot. Numerous pulm nodules, several cavitating. Hepatic cirrhosis. Severe coronary PAD,   TTE (7/13): EF 35-40%, normal RV, mild MR, no AS, mild AR, mod TR, small pericardial effusion. PPM/AICD wire in RV   Pt transferred to 8N at [facility] on 7/17. Pt reports feeling well, had BM this morning in toilet, urinated a little then. Unclear if bloody or not.     HOSPITAL COURSE:  Plan - 1:  ·  Problem: Acute kidney injury superimposed on CKD.   ·  Plan: With AGMA  Pt with known invasive bladder cancer p/w hematuria at [place] on 7/13, was receiving CTX. (unable to locate UCx)   Labs show uptrending Cr 2 --> 3.6 --> 5.06 --> 6.38 on (7/16) --> 11.3  -Renal US as above  -s/p CTX at [place], will place on renally dosed Zosyn for now, UA/UCx pending   -Per RN, bladder scan unable to properly read residual as pt with renal mass.   -Pt voiding, but minimal amounts, strict I&Os ordered. Of note, urology did not place rodriguez at [place] d/t low PVR and more discomfort to pt.   -Urology and Renal appreciated  -IR consulted for nephrostomies was cancelled--> patient and family did not want--> comfort care.     Problem/Plan - 2:  ·  Problem: Hematuria.   ·  Plan: 2/2 bladder ca  -pt reports red urine, no clots, unable to see any samples in room   -urology consulted  -Hb stable 7's, transfuse as needed  -C/w Zosyn for now, plan to stop after 5 days.     Problem/Plan - 3:  ·  Problem: Symptomatic anemia.   ·  Plan: Hb stable ~7s   -s/p 2U pRBC at [place] on 7/13 per notes   Hgb stable     Problem/Plan - 4:  ·  Problem: Bladder cancer.   ·  Plan: newly diagnosed high grade invasive urothelial cancer  -?mets to lung as CT Chest done at [place] with "numerous bilateral solid and ground glass pulm nodules, several w/central cavitation, largest on Left is GGO, [id number]. Lingular and b/l lower lobe subpleural reticular opacities.   -No plan for oncology f/u as patient and family wants comfort care    #Diarrhea  -stool studies showed yersinia enterolytica. Started on doxy - will cont for now. NO new episodes of diarrhea.     78M w/ hx of HTN, HLD, CAD (s/p PCI in 2001), HFrEF c/b pericardia effusion, bradycardia (s/p PPM upgraded to BiV-ICD 2019), CKD3, COPD, recent diagnosis of bladder cancer transferred from [place] for hematuria iso newly diagnosed muscular invasive bladder cancer c/b HARLEEN on CKD 2/2 obstructive uropathy and acute blood loss anemia 2/2 hematuria.   Problem/Plan - 1:  ·  Problem: Acute kidney injury superimposed on CKD.   ·  Plan: With AGMA  Pt with known invasive bladder cancer p/w hematuria at [place] on 7/13, was receiving CTX. (unable to locate UCx)   Labs show uptrending Cr 2 --> 3.6 --> 5.06 --> 6.38 on (7/16) --> 11.3  -Renal US as above  -s/p CTX at [place], will place on renally dosed Zosyn for now, UA/UCx pending   -Per RN, bladder scan unable to properly read residual as pt with renal mass.   -Pt voiding, but minimal amounts, strict I&Os ordered. Of note, urology did not place rodriguez at [place] d/t low PVR and more discomfort to pt. no more blood draws  -Urology and Renal appreciated  -IR consulted for nephrostomies was cancelled--> patient and family did not want--> comfort care  7/21 planning for hospice  - dispo: pending dc to rehab with transition to hospice ([facility]), pending auth, f/u SW  d/w HCN, pt is not candidate for inpatient hospice, qualifies for home hospice but no immediate family member to take care of him, only got niece/nephew.     Problem/Plan - 2:  ·  Problem: Hematuria.   ·  Plan: 2/2 bladder ca  -pt reports red urine, no clots, unable to see any samples in room   -urology consulted  -Hb stable 7's, transfuse as needed  -Completed 5 days of zosyn 7/21.     Problem/Plan - 3:  ·  Problem: Symptomatic anemia.   ·  Plan: Hb stable ~7s   -s/p 2U pRBC at [place] on 7/13 per notes   -hgb stable at 8.3  Comfort care at this time.     Problem/Plan - 4:  ·  Problem: Bladder cancer.   ·  Plan: newly diagnosed high grade invasive urothelial cancer  -?mets to lung as CT Chest done at [place] with "numerous bilateral solid and ground glass pulm nodules, several w/central cavitation, largest on Left is GGO, [id number]. Lingular and b/l lower lobe subpleural reticular opacities.   -Heme/onc appreciated- agree with plan for hospice care.  Comfort care at this time    #Diarrhea  -stool studies showed yersinia enterolytica. completed 5 days of doxycycline  No new episodes of diarrhea.     Problem/Plan - 5:  ·  Problem: Pulmonary nodules.   ·  Plan: [place] imaging w/?mets to lung "numerous bilateral solid and ground glass pulm nodules, several w/central cavitation, largest on Left is GGO, [id number]. Lingular and b/l lower lobe subpleural reticular opacities.  -Previous imaging June 2023 at [facility]- Several right-sided pulmonary nodules, new since prior CT Chest 4/5/2023,   concerning for metastatic disease.     Problem/Plan - 6:  ·  Problem: Chronic systolic heart failure.   ·  Plan: CAD s/p PCI, s/p Bi-V ICD   -EF 35-40% and small pericardial effusion on TTE at [place]   -c/w statin, unclear why pt not on bb  -Seen by cards at [place], will consult house cardiology if needed  Comfort care at this time.     Problem/Plan - 7:  ·  Problem: COPD with hypoxia.   ·  Plan: Known COPD, takes Trelegy at home.  -Imaging at [place] w/?mets to lung as CT Chest done at [place] with "numerous bilateral solid and ground glass pulm nodules, several w/central cavitation, largest on Left is GGO, [id number]. Lingular and b/l lower lobe subpleural reticular opacities.   -C/w Symbicort and Spiriva while inpt  -C/w home supplemental O2.     Problem/Plan - 8:  ·  Problem: Prophylactic measure.   ·  Plan: Hold chemical ppx given ongoing hematuria. SCD for now.  Dispo: Rehab to transition to hospice      78M w/ hx of HTN, HLD, CAD (s/p PCI in 2001), HFrEF c/b pericardia effusion, bradycardia (s/p PPM upgraded to BiV-ICD 2019), CKD3, COPD, recent diagnosis of bladder cancer transferred from [place] for hematuria iso newly diagnosed muscular invasive bladder cancer c/b HARLEEN on CKD 2/2 obstructive uropathy and acute blood loss anemia 2/2 hematuria.   Problem/Plan - 1:  ·  Problem: Acute kidney injury superimposed on CKD.   ·  Plan: With AGMA  Pt with known invasive bladder cancer p/w hematuria at [place] on 7/13, was receiving CTX. (unable to locate UCx)   Labs show uptrending Cr 2 --> 3.6 --> 5.06 --> 6.38 on (7/16) --> 11.3  -Renal US as above  -s/p CTX at [place], will place on renally dosed Zosyn for now, UA/UCx pending   -Per RN, bladder scan unable to properly read residual as pt with renal mass.   -Pt voiding, but minimal amounts, strict I&Os ordered. Of note, urology did not place rodriguez at [place] d/t low PVR and more discomfort to pt. no more blood draws  -Urology and Renal appreciated  -IR consulted for nephrostomies was cancelled--> patient and family did not want--> comfort care  7/21 planning for hospice  - dispo: pending dc to rehab with transition to hospice ([facility])      Problem/Plan - 2:  ·  Problem: Hematuria.   ·  Plan: 2/2 bladder ca  -pt reports red urine, no clots, unable to see any samples in room   -urology consulted  -Hb stable 7's, transfuse as needed  -Completed 5 days of zosyn 7/21.     Problem/Plan - 3:  ·  Problem: Symptomatic anemia.   ·  Plan: Hb stable ~7s   -s/p 2U pRBC at [place] on 7/13 per notes   -hgb stable at 8.3  Comfort care at this time.     Problem/Plan - 4:  ·  Problem: Bladder cancer.   ·  Plan: newly diagnosed high grade invasive urothelial cancer  -?mets to lung as CT Chest done at [place] with "numerous bilateral solid and ground glass pulm nodules, several w/central cavitation, largest on Left is GGO, [id number]. Lingular and b/l lower lobe subpleural reticular opacities.   -Heme/onc appreciated- agree with plan for hospice care.  Comfort care at this time    #Diarrhea  -stool studies showed yersinia enterolytica. completed 5 days of doxycycline  No new episodes of diarrhea.     Problem/Plan - 5:  ·  Problem: Pulmonary nodules.   ·  Plan: [place] imaging w/?mets to lung "numerous bilateral solid and ground glass pulm nodules, several w/central cavitation, largest on Left is GGO, [id number]. Lingular and b/l lower lobe subpleural reticular opacities.  -Previous imaging June 2023 at [facility]- Several right-sided pulmonary nodules, new since prior CT Chest 4/5/2023,   concerning for metastatic disease.     Problem/Plan - 6:  ·  Problem: Chronic systolic heart failure.   ·  Plan: CAD s/p PCI, s/p Bi-V ICD   -EF 35-40% and small pericardial effusion on TTE at [place]   -c/w statin, unclear why pt not on bb  -Seen by cards at [place], will consult house cardiology if needed  Comfort care at this time.     Problem/Plan - 7:  ·  Problem: COPD with hypoxia.   ·  Plan: Known COPD, takes Trelegy at home.  -Imaging at [place] w/?mets to lung as CT Chest done at [place] with "numerous bilateral solid and ground glass pulm nodules, several w/central cavitation, largest on Left is GGO, [id number]. Lingular and b/l lower lobe subpleural reticular opacities.   -C/w Symbicort and Spiriva while inpt  -C/w home supplemental O2.     Problem/Plan - 8:  ·  Problem: Prophylactic measure.   ·  Plan: Hold chemical ppx given ongoing hematuria. SCD for now.  Dispo: Rehab to transition to hospice

## 2023-07-20 NOTE — PROGRESS NOTE ADULT - PROBLEM SELECTOR PLAN 2
2/2 bladder ca  -pt reports red urine, no clots, unable to see any samples in room   -urology consulted  -Hb stable 7's, transfuse as needed  -C/w Zosyn for now 2/2 bladder ca  -pt reports red urine, no clots, unable to see any samples in room   -urology consulted  -Hb stable 7's, transfuse as needed  -C/w Zosyn for now, plan to stop after 5 days 2/2 bladder ca  -pt reports red urine, no clots, unable to see any samples in room   -urology consulted  -Hb stable 7's, transfuse as needed  -C/w Zosyn for now, plan to stop after 5 days on 7/21

## 2023-07-20 NOTE — CHART NOTE - NSCHARTNOTEFT_GEN_A_CORE
[employer] CRT-D tachy therapy was turned off as per pt and his family requests.  Patient is DNR/DNI status.

## 2023-07-20 NOTE — PROGRESS NOTE ADULT - PROBLEM SELECTOR PLAN 4
newly diagnosed high grade invasive urothelial cancer  -?mets to lung as CT Chest done at [place] with "numerous bilateral solid and ground glass pulm nodules, several w/central cavitation, largest on Left is GGO, [id number]. Lingular and b/l lower lobe subpleural reticular opacities.   -Heme/onc consulted, f/u recs    #Diarrhea  -stool studies showed yersinia enterolytica. Started on doxy - will cont for now. NO new episodes of diarrhea. newly diagnosed high grade invasive urothelial cancer  -?mets to lung as CT Chest done at [place] with "numerous bilateral solid and ground glass pulm nodules, several w/central cavitation, largest on Left is GGO, [id number]. Lingular and b/l lower lobe subpleural reticular opacities.   -Heme/onc appreciated- agree with plan for hospice care.    #Diarrhea  -stool studies showed yersinia enterolytica. Started on doxy - will cont for now. NO new episodes of diarrhea.

## 2023-07-20 NOTE — DIETITIAN INITIAL EVALUATION ADULT - ORAL INTAKE PTA/DIET HISTORY
Patient seen for assessment. Patient not receptive to interview at this time. Reports poor appetite.

## 2023-07-20 NOTE — PHYSICAL THERAPY INITIAL EVALUATION ADULT - PERTINENT HX OF CURRENT PROBLEM, REHAB EVAL
78M w/ hx of HTN, HLD, CAD (s/p PCI in 2001), HFrEF c/b pericardia effusion, bradycardia (s/p PPM upgraded to BiV-ICD 2019), CKD3, COPD, recent diagnosis of bladder cancer transferred from [place] for hematuria iso newly diagnosed muscular invasive bladder cancer c/b HARLEEN on CKD 2/2 obstructive uropathy and acute blood loss anemia 2/2 hematuria. No

## 2023-07-20 NOTE — DIETITIAN INITIAL EVALUATION ADULT - PROBLEM SELECTOR PLAN 1
With AGMA  Pt with known invasive bladder cancer p/w hematuria at [place] on 7/13, was receiving CTX. (unable to locate UCx)   Labs show uptrending Cr 2 --> 3.6 --> 5.06 --> 6.38 on (7/16)   -BUN Cr --> 89/8.6 on 7/17  -Check UA, [place]   -Renal US ordered   -s/p CTX at [place], will place on renally dosed Zosyn for now, UA/UCx pending   -Per RN, bladder scan unable to properly read residual as pt with renal mass.   -Pt voiding, but minimal amounts, strict I&Os ordered. Of note, urology did not place rodriguez at [place] d/t low PVR and more discomfort to pt.   -Urology and Renal consulted at [facility], f/u recs  -IR consulted for nephrostomies

## 2023-07-20 NOTE — DIETITIAN INITIAL EVALUATION ADULT - PROBLEM SELECTOR PLAN 3
Hb stable ~7s   -s/p 2U pRBC at [place] on 7/13 per notes   -Check iron studies  -Trend CBC  -Transfuse if Hb <7, active T&S

## 2023-07-20 NOTE — PHYSICAL THERAPY INITIAL EVALUATION ADULT - LEVEL OF INDEPENDENCE: SUPINE/SIT, REHAB EVAL
Attempted to perform, unable to complete, pt deferring further attempts 2/2 fatigue/unable to perform

## 2023-07-20 NOTE — PHYSICAL THERAPY INITIAL EVALUATION ADULT - ADDITIONAL COMMENTS
As per care coordination notes, As per care coordination notes, pt lives in an apartment +elevator and is completely dependent at baseline. Pt owns a wheelchair and rolling walker.   Post PT evaluation, pt left semi-supine, alarm on, call bell and remote within reach, all precautions maintained, NAD. RN aware.

## 2023-07-20 NOTE — CONSULT NOTE ADULT - PROBLEM SELECTOR RECOMMENDATION 9
- Pain relatively controlled at this time, consider switching IV hydromorphone to PO hydromorphone 2mg / 4mg for moderate / severe pain PRN  - Avoid nephrotoxic medications in the setting of ESRD/HARLEEN/CKD - Pain relatively controlled at this time and patient is eating   - Discontinued IV hydromorphone   - Started PO hydromorphone 2mg / 4mg for moderate / severe pain PRN  - Avoid nephrotoxic medications in the setting of ESRD/HARLEEN/CKD

## 2023-07-20 NOTE — CONSULT NOTE ADULT - CONSULT REQUESTED DATE/TIME
17-Jul-2023 12:43
17-Jul-2023 11:07
17-Jul-2023 13:10
17-Jul-2023 09:27
18-Jul-2023 18:10
19-Jul-2023 16:13

## 2023-07-20 NOTE — DISCHARGE NOTE PROVIDER - NSDCFUSCHEDAPPT_GEN_ALL_CORE_FT
[facility]  [address] [facility]  Scheduled Appointment: 07/24/2023    Ryan Spivey  [facility]  [facility]  Scheduled Appointment: 08/10/2023    [facility]  [address]  Scheduled Appointment: 08/14/2023     Ryan Spivey  [facility] Physician [facility]  [facility]  Scheduled Appointment: 08/10/2023    [facility]  [address]  Scheduled Appointment: 08/14/2023

## 2023-07-20 NOTE — DIETITIAN INITIAL EVALUATION ADULT - PROBLEM SELECTOR PLAN 5
[place] imaging w/?mets to lung "numerous bilateral solid and ground glass pulm nodules, several w/central cavitation, largest on Left is GGO, [id number]. Lingular and b/l lower lobe subpleural reticular opacities.  -Previous imaging June 2023 at [facility]- Several right-sided pulmonary nodules, new since prior CT Chest 4/5/2023,   concerning for metastatic disease.

## 2023-07-20 NOTE — DIETITIAN INITIAL EVALUATION ADULT - OTHER INFO
78 year old male with a PMH of HTN, HLD, CAD, HFrEF, CKD3, COPD, recent diagnosis of bladder cancer transferred from [place] for hematuria iso newly diagnosed muscular invasive bladder cancer c/b HARLEEN on CKD 2/2 obstructive uropathy and acute blood loss anemia 2/2 hematuria per chart.    Patient stating poor appetite. Intakes are 26-75% per RN flow sheet. Didn't want to discuss food preferences, stated "I just want to drink my calorie". No GI distress or chewing/swallowing difficulties reported. Has no food allergies. Unable to obtain UBW at this time. Per HIE, noted w/ weights 54.4 kg (6/12) and 62.6 kg (4/10). ABW is 56.2 kg (7/19) per chart indicating a -10.3% weight loss x 3 months and +3.3% weight gain x 1 month. Patient w/ HF so weight fluctuations expected, still w/ significant weight loss over 3 month period. No edema or pressure injuries noted per RN flow sheet.

## 2023-07-20 NOTE — DIETITIAN INITIAL EVALUATION ADULT - PROBLEM SELECTOR PLAN 7
Known COPD, takes Trelegy at home.  -Imaging at [place] w/?mets to lung as CT Chest done at [place] with "numerous bilateral solid and ground glass pulm nodules, several w/central cavitation, largest on Left is GGO, [id number]. Lingular and b/l lower lobe subpleural reticular opacities.   -C/w Symbicort and Spiriva while inpt  -C/w home supplemental O2

## 2023-07-20 NOTE — CONSULT NOTE ADULT - PROBLEM SELECTOR RECOMMENDATION 2
- History of bladder cancer now in the setting of bilateral hydronephrosis  - Urology and nephrology following  - Patient does not want to have HD  - IR consulted for nephrostomy tubes, however patient has opted not to have nephrostomy tube placement  - Currently with Villarreal catheter with hematuria - History of bladder cancer now in the setting of bilateral hydronephrosis  - Patient does not want to have HD  - IR consulted for nephrostomy tubes, however patient has opted not to have nephrostomy tube placement  - Currently with Villarreal catheter with hematuria - Per heme/onc, reportedly has previously met with Dr. Spivey [facility], treatment plans were being considered   - Patient refusing HD and nephrostomy placement for severe hydronephrosis   - Per heme onc Patient is hospice appropriate in the setting of aggressive cancer with poor prognosis  - GOC discussion with patient and family (HCP), they have opted for focus on comfort care only, no HD - Patient met with Dr. Spivey at [facility], treatment plans were being considered   - Patient refusing HD and nephrostomy placement for severe hydronephrosis   - Per heme onc patient is hospice appropriate in the setting of aggressive cancer with poor prognosis  - GOC discussion with patient and family (HCP), they have opted for focus on comfort care only, no HD

## 2023-07-20 NOTE — DISCHARGE NOTE PROVIDER - NSDCCPCAREPLAN_GEN_ALL_CORE_FT
PRINCIPAL DISCHARGE DIAGNOSIS  Diagnosis: Bladder cancer  Assessment and Plan of Treatment: Comfort Care at Rehab

## 2023-07-20 NOTE — DIETITIAN NUTRITION RISK NOTIFICATION - TREATMENT: THE FOLLOWING DIET HAS BEEN RECOMMENDED
Diet, DASH/TLC:   Sodium & Cholesterol Restricted  For patients receiving Renal Replacement - No Protein Restr, No Conc K, No Conc Phos, Low Sodium (RENAL) (07-17-23 @ 15:46) [Active]

## 2023-07-20 NOTE — PROGRESS NOTE ADULT - PROBLEM SELECTOR PLAN 8
Hold chemical ppx given ongoing hematuria. SCD for now.  GOC Hold chemical ppx given ongoing hematuria. SCD for now.  [facility]    GOC  d/w NP--> planning for comfort care. patient already DNR Hold chemical ppx given ongoing hematuria. SCD for now.  [facility]    GOC  d/w NP--> planning for comfort care. patient already DNR  no more vs  no more blood draws.

## 2023-07-20 NOTE — DIETITIAN INITIAL EVALUATION ADULT - PERTINENT LABORATORY DATA
07-19    134<L>  |  97<L>  |  110<H>  ----------------------------<  110<H>  4.4   |  19<L>  |  11.38<H>    Ca    9.2      19 Jul 2023 06:00  Phos  6.9     07-19  Mg     2.40     07-19    TPro  5.9<L>  /  Alb  2.5<L>  /  TBili  0.2  /  DBili  x   /  AST  9   /  ALT  9   /  AlkPhos  64  07-19

## 2023-07-20 NOTE — DIETITIAN INITIAL EVALUATION ADULT - PERTINENT MEDS FT
MEDICATIONS  (STANDING):  atorvastatin 40 milliGRAM(s) Oral at bedtime  budesonide  80 MICROgram(s)/formoterol 4.5 MICROgram(s) Inhaler 2 Puff(s) Inhalation two times a day  chlorhexidine 2% Cloths 1 Application(s) Topical daily  doxycycline monohydrate Capsule 100 milliGRAM(s) Oral every 12 hours  FLUoxetine 40 milliGRAM(s) Oral daily  lidocaine 2% Jelly 1 milliLiter(s) IntraUrethral every 8 hours  piperacillin/tazobactam IVPB.. 3.375 Gram(s) IV Intermittent every 12 hours  tamsulosin 0.4 milliGRAM(s) Oral at bedtime  tiotropium 2.5 MICROgram(s) Inhaler 2 Puff(s) Inhalation daily    MEDICATIONS  (PRN):  acetaminophen     Tablet .. 650 milliGRAM(s) Oral every 6 hours PRN Temp greater or equal to 38C (100.4F), Mild Pain (1 - 3)  aluminum hydroxide/magnesium hydroxide/simethicone Suspension 30 milliLiter(s) Oral every 4 hours PRN Dyspepsia  diazepam    Tablet 5 milliGRAM(s) Oral two times a day PRN anxiety  HYDROmorphone   Tablet 4 milliGRAM(s) Oral every 4 hours PRN Severe Pain (7 - 10)  HYDROmorphone   Tablet 2 milliGRAM(s) Oral every 4 hours PRN Moderate Pain (4 - 6)  melatonin 3 milliGRAM(s) Oral at bedtime PRN Insomnia  ondansetron Injectable 4 milliGRAM(s) IV Push every 8 hours PRN Nausea and/or Vomiting  oxybutynin 5 milliGRAM(s) Oral every 8 hours PRN Bladder spasms

## 2023-07-20 NOTE — PHYSICAL THERAPY INITIAL EVALUATION ADULT - GENERAL OBSERVATIONS, REHAB EVAL
Pt received semi-supine in bed, with all lines intact, NAD, all precautions maintained. Pt on 2L O2 via NC

## 2023-07-20 NOTE — DISCHARGE NOTE PROVIDER - DETAILS OF MALNUTRITION DIAGNOSIS/DIAGNOSES
This patient has been assessed with a concern for Malnutrition and was treated during this hospitalization for the following Nutrition diagnosis/diagnoses:     -  07/20/2023: Severe protein-calorie malnutrition   -  07/20/2023: Underweight (BMI < 19)

## 2023-07-20 NOTE — CONSULT NOTE ADULT - PROBLEM SELECTOR RECOMMENDATION 3
- Per heme/onc, reportedly has previously met with Dr. Spivey at [facility] but has not initiated any systemic therapy to date  - Heme/onc following  - GOC discussion with patient and family (HCP), they have opted for focus on comfort care only - Per heme/onc, reportedly has previously met with Dr. Spivey at [facility] but has not initiated any systemic therapy to date  - Patient is hospice appropriate in the setting of aggressive cancer with poor prognosis according to heme/onc  - Heme/onc following  - GOC discussion with patient and family (HCP), they have opted for focus on comfort care only, no HD - History of bladder cancer now in the setting of bilateral hydronephrosis  - Patient does not want to have HD  - IR consulted for nephrostomy tubes, however patient has opted not to have nephrostomy tube placement  - Currently with Villarreal catheter with hematuria - History of bladder cancer now in the setting of bilateral hydronephrosis  - Patient refusing HD  - IR consulted for nephrostomy tubes, however patient has opted not to have nephrostomy tube placement  - Currently with Villarreal catheter with hematuria

## 2023-07-20 NOTE — DIETITIAN INITIAL EVALUATION ADULT - PROBLEM SELECTOR PLAN 8
Hold chemical ppx as pt may need IR procedure  NPO for now    [facility], [phone].

## 2023-07-20 NOTE — PROVIDER CONTACT NOTE (OTHER) - ASSESSMENT
pt c/o lower abdominal pain 7/10; dilaudid 1 mg IVP given, clots evacuated from bladder via rodriguez. bladder irrigation continued pt reports relief

## 2023-07-20 NOTE — PROGRESS NOTE ADULT - ASSESSMENT
IMPRESSION: 78M w/ HTN, CAD, COPD, and recently diagnosed bladder CA, 7/16/23 from [facility] with HARLEEN    (1)CKD - base creatinine ~2, it appears - unclear exact etiology    (2)HARLEEN - Most likely ischemic ATN from hypotension on admission to [facility]. Not tumor lysis based on bloodwork. Obstruction unlikely based on imaging at [facility]; Bilateral hydronephrosis seen on renal US here at [facility] - indicated for nephrostomy tubes     (3)Hyperphosphatemia - renally mediated - slightly lower as of late     (4)Onc - metastatic bladder CA    (5)GOC - patient now DNR/DNHD - now on comfort measures only     RECOMMEND:  (1)A/w palliative approach   (2)Would maintain rodriguez for now  (3)Dose new meds for GFR<10    Lynette Urbina DNP  [facility]  [phone]        IMPRESSION: 78M w/ HTN, CAD, COPD, and recently diagnosed bladder CA, 7/16/23 from [facility] with HARLEEN    (1)CKD - base creatinine ~2, it appears - unclear exact etiology    (2)HARLEEN - Most likely ischemic ATN from hypotension on admission to [facility]. Not tumor lysis based on bloodwork. Obstruction unlikely based on imaging at [facility]; Bilateral hydronephrosis seen on renal US here at [facility] - indicated for nephrostomy tubes     (3)Hyperphosphatemia - renally mediated - slightly lower as of late     (4)Onc - metastatic bladder CA    (5)GOC - patient now DNR/DNHD - now on comfort measures only     RECOMMEND:  (1)A/w palliative approach   (2)Would maintain rodriguez for now  (3)Dose new meds for GFR<10    Lynette Urbina DNP  [facility]  [phone]     RENAL ATTENDING NOTE  Patient seen and examined with NP. Agree with assessment and plan as above.  Counseled patient with family at bedside. Patient asks as to whether it would be worthwhile to have nephrostomy tubes placed/to potentially extend lifespan; I advised that I believe he would be suffering for the most part during the extra time afforded by decompression of the collecting system. He asks about Home Hospice as an option; I advised that I agreed with his family that it would not be the best option, given the limited services that would be provided from Home Hospice. Advised that an inpatient Hospice facility seems a better alternative.    Joseph Pires MD  [facility]  [phone]

## 2023-07-21 NOTE — PROGRESS NOTE ADULT - PROBLEM SELECTOR PLAN 1
With AGMA  Pt with known invasive bladder cancer p/w hematuria at [place] on 7/13, was receiving CTX. (unable to locate UCx)   Labs show uptrending Cr 2 --> 3.6 --> 5.06 --> 6.38 on (7/16) --> 11.3  -Renal US as above  -s/p CTX at [place], will place on renally dosed Zosyn for now, UA/UCx pending   -Per RN, bladder scan unable to properly read residual as pt with renal mass.   -Pt voiding, but minimal amounts, strict I&Os ordered. Of note, urology did not place rodriguez at [place] d/t low PVR and more discomfort to pt. no more blood draws  -Urology and Renal appreciated  -IR consulted for nephrostomies was cancelled--> patient and family did not want--> comfort care With AGMA  Pt with known invasive bladder cancer p/w hematuria at [place] on 7/13, was receiving CTX. (unable to locate UCx)   Labs show uptrending Cr 2 --> 3.6 --> 5.06 --> 6.38 on (7/16) --> 11.3  -Renal US as above  -s/p CTX at [place], will place on renally dosed Zosyn for now, UA/UCx pending   -Per RN, bladder scan unable to properly read residual as pt with renal mass.   -Pt voiding, but minimal amounts, strict I&Os ordered. Of note, urology did not place rodriguez at [place] d/t low PVR and more discomfort to pt. no more blood draws  -Urology and Renal appreciated  -IR consulted for nephrostomies was cancelled--> patient and family did not want--> comfort care  7/21 planning for hospice

## 2023-07-21 NOTE — PROGRESS NOTE ADULT - PROBLEM SELECTOR PLAN 4
newly diagnosed high grade invasive urothelial cancer  -?mets to lung as CT Chest done at [facility] with "numerous bilateral solid and ground glass pulm nodules, several w/central cavitation, largest on Left is GGO, [id number]. Lingular and b/l lower lobe subpleural reticular opacities.   -Heme/onc appreciated- agree with plan for hospice care.    #Diarrhea  -stool studies showed yersinia enterolytica. Started on doxy - will cont for now. NO new episodes of diarrhea.

## 2023-07-21 NOTE — PROGRESS NOTE ADULT - ASSESSMENT
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     7/18 Called by Renal to talk to patient REE HD.  Oncology Dr Jenkins notes patient is refusing HD and is appropriate in light of advance cancer NOT to give HD    called by radiology Dr Parker-- noted new hydro on US     GOC 7/18  Patient seen with RHIANNON stewart and HCP Aren Black at bedside .  Aren is HCP at [phone]  Patient is Ox2, year 2000 something. Aren is HCP.  Patient insistes he DOES NOT WANT HD  Aren is Ox3 and HCP at beside agrees with patient wishes not to have Dialysis and to be comfortable. Patient also wants to be DNR  Aren agrees and signed DNR / Molsr form  Form placed in chart  Aren will bring in HCP form for chart as well    7/19 - discussed with pt re recommendation for nephrostomy tubes. IR order was placed and they wanted further clarification of GOC and whether pt is amendable to it or not. At this time, pt is undecided and needs to talk with Aren about this. States he is otherwise comfortable and not in pain. Discussed that his renal continues to worsen, although Cr may improve once obstruction is alleviated, it does not alter trajectory of his underlying advanced malignancy. Will await final decision from pt/family.

## 2023-07-21 NOTE — PROGRESS NOTE ADULT - PROBLEM SELECTOR PLAN 2
2/2 bladder ca  -pt reports red urine, no clots, unable to see any samples in room   -urology consulted  -Hb stable 7's, transfuse as needed  -C/w Zosyn for now, plan to stop after 5 days on 7/21

## 2023-07-21 NOTE — PROGRESS NOTE ADULT - SUBJECTIVE AND OBJECTIVE BOX
Overnight events noted      VITAL:  T(C): , Max: 36.5 (07-20-23 @ 13:15)  T(F): , Max: 97.7 (07-20-23 @ 13:15)  HR: 60 (07-21-23 @ 12:52)  BP: 99/60 (07-21-23 @ 12:52)  BP(mean): --  RR: 18 (07-21-23 @ 12:52)  SpO2: 98% (07-21-23 @ 12:52)  Wt(kg): --      PHYSICAL EXAM:  Constitutional: NAD, Alert  HEENT: NCAT, MMM  Neck: Supple, No JVD  Respiratory: CTA-b/l  Cardiovascular: RRR  Gastrointestinal: BS+, soft, NT/ND  : (+)rodriguez (hematuria)  Extremities: No peripheral edema b/l  Neurological: no focal deficits; strength grossly intact  Back: no CVAT b/l  Skin: No rashes, no nevi    LABS:  Na(134)/K(4.4)/Cl(97)/HCO3(19)/BUN(110)/Cr(11.38)Glu(110)/Ca(9.2)/Mg(2.40)/PO4(6.9)    07-19 @ 06:00      IMPRESSION: 78M w/ HTN, CAD, COPD, and recently diagnosed bladder CA, 7/16/23 from [facility] with HARLEEN    (1)CKD - base creatinine ~2, it appears - unclear exact etiology    (2)HARLEEN - obstructive - oligoanuric    (3)Onc - metastatic bladder CA    (4)GOC - patient now DNR/DNHD - now on comfort measures only       RECOMMEND:  (1)Dose new meds for GFR<10  (2)Reconsult as needed            Joseph Pires MD  [facility]  Office/on call physician: [phone]  Cell (7a-7p): [phone]

## 2023-07-21 NOTE — PROGRESS NOTE ADULT - PROBLEM SELECTOR PLAN 8
Hold chemical ppx given ongoing hematuria. SCD for now.  GOC    GOC  d/w NP--> planning for comfort care. patient already DNR  no more vs  no more blood draws.  Planning for hospice  40 min to coord d/c

## 2023-07-21 NOTE — PROGRESS NOTE ADULT - SUBJECTIVE AND OBJECTIVE BOX
Patient is a 78y old  Male who presents with a chief complaint of hematuria (21 Jul 2023 08:54)      SUBJECTIVE / OVERNIGHT EVENTS:  Resting in bed  comfortable, no complaints    MEDICATIONS  (STANDING):  atorvastatin 40 milliGRAM(s) Oral at bedtime  budesonide  80 MICROgram(s)/formoterol 4.5 MICROgram(s) Inhaler 2 Puff(s) Inhalation two times a day  chlorhexidine 2% Cloths 1 Application(s) Topical daily  doxycycline monohydrate Capsule 100 milliGRAM(s) Oral every 12 hours  FLUoxetine 40 milliGRAM(s) Oral daily  lidocaine 2% Jelly 1 milliLiter(s) IntraUrethral every 8 hours  piperacillin/tazobactam IVPB.. 3.375 Gram(s) IV Intermittent every 12 hours  tamsulosin 0.4 milliGRAM(s) Oral at bedtime  tiotropium 2.5 MICROgram(s) Inhaler 2 Puff(s) Inhalation daily    MEDICATIONS  (PRN):  acetaminophen     Tablet .. 650 milliGRAM(s) Oral every 6 hours PRN Temp greater or equal to 38C (100.4F), Mild Pain (1 - 3)  aluminum hydroxide/magnesium hydroxide/simethicone Suspension 30 milliLiter(s) Oral every 4 hours PRN Dyspepsia  diazepam    Tablet 5 milliGRAM(s) Oral two times a day PRN anxiety  HYDROmorphone   Tablet 4 milliGRAM(s) Oral every 4 hours PRN Severe Pain (7 - 10)  HYDROmorphone   Tablet 2 milliGRAM(s) Oral every 4 hours PRN Moderate Pain (4 - 6)  melatonin 3 milliGRAM(s) Oral at bedtime PRN Insomnia  ondansetron Injectable 4 milliGRAM(s) IV Push every 8 hours PRN Nausea and/or Vomiting  oxybutynin 5 milliGRAM(s) Oral every 8 hours PRN Bladder spasms      Vital Signs Last 24 Hrs  T(F): 97.7 (20 Jul 2023 13:15), Max: 97.7 (20 Jul 2023 13:15)  HR: 56 (20 Jul 2023 13:15) (56 - 56)  BP: 100/60 (20 Jul 2023 13:15) (100/60 - 100/60)  SpO2: 97% (20 Jul 2023 13:15) (97% - 97%)  nasal cannula        PHYSICAL EXAM:  GENERAL: NAD, NC oxygen  HEAD:  Atraumatic, Normocephalic  EYES: EOMI, PERRLA, conjunctiva and sclera clear  NECK: Supple, No JVD  CHEST/LUNG: Clear to auscultation bilaterally; No wheeze  HEART: Regular rate and rhythm; No murmurs, rubs, or gallops  ABDOMEN: Soft, Nontender, Nondistended; Bowel sounds present  EXTREMITIES:  2+ Peripheral Pulses, No clubbing, cyanosis, or edema  PSYCH: Alert        Care Discussed with Consultants/Other Providers:  SW--> Hospice

## 2023-07-22 NOTE — PROVIDER CONTACT NOTE (MEDICATION) - ASSESSMENT
Pt comfort care. O2 saturation stable on nasal cannula. Pt received valium 5mg PO last night at 21:32pm.

## 2023-07-22 NOTE — PROGRESS NOTE ADULT - PROBLEM SELECTOR PLAN 8
Hold chemical ppx given ongoing hematuria. SCD for now.  C    GOC  d/w NP--> planning for comfort care. patient already DNR  no more vs  no more blood draws  Planning for hospice, SW/CM follow up on Monday, seems that may need insurance authorization

## 2023-07-22 NOTE — PROGRESS NOTE ADULT - SUBJECTIVE AND OBJECTIVE BOX
Patient is a 78y old  Male who presents with a chief complaint of hematuria (21 Jul 2023 13:11)      INTERVAL HPI/OVERNIGHT EVENTS:  Seen by me this afternoon, confused, wanting to go to the bathroom to urinate but he's at risk of falling.    Review of Systems: unable to fully obtain due to MS    MEDICATIONS  (STANDING):  atorvastatin 40 milliGRAM(s) Oral at bedtime  budesonide  80 MICROgram(s)/formoterol 4.5 MICROgram(s) Inhaler 2 Puff(s) Inhalation two times a day  chlorhexidine 2% Cloths 1 Application(s) Topical daily  doxycycline monohydrate Capsule 100 milliGRAM(s) Oral every 12 hours  FLUoxetine 40 milliGRAM(s) Oral daily  lidocaine 2% Jelly 1 milliLiter(s) IntraUrethral every 8 hours  piperacillin/tazobactam IVPB.. 3.375 Gram(s) IV Intermittent every 12 hours  tamsulosin 0.4 milliGRAM(s) Oral at bedtime  tiotropium 2.5 MICROgram(s) Inhaler 2 Puff(s) Inhalation daily    MEDICATIONS  (PRN):  acetaminophen     Tablet .. 650 milliGRAM(s) Oral every 6 hours PRN Temp greater or equal to 38C (100.4F), Mild Pain (1 - 3)  aluminum hydroxide/magnesium hydroxide/simethicone Suspension 30 milliLiter(s) Oral every 4 hours PRN Dyspepsia  HYDROmorphone   Tablet 2 milliGRAM(s) Oral every 4 hours PRN Moderate Pain (4 - 6)  HYDROmorphone   Tablet 4 milliGRAM(s) Oral every 4 hours PRN Severe Pain (7 - 10)  LORazepam     Tablet 1 milliGRAM(s) Oral every 4 hours PRN Anxiety  melatonin 3 milliGRAM(s) Oral at bedtime PRN Insomnia  ondansetron Injectable 4 milliGRAM(s) IV Push every 8 hours PRN Nausea and/or Vomiting  oxybutynin 5 milliGRAM(s) Oral every 8 hours PRN Bladder spasms      Allergies    azithromycin (Nausea)    Intolerances          Vital Signs Last 24 Hrs  T(C): 36.1 (22 Jul 2023 14:00), Max: 36.1 (22 Jul 2023 14:00)  T(F): 97 (22 Jul 2023 14:00), Max: 97 (22 Jul 2023 14:00)  HR: 68 (22 Jul 2023 14:00) (68 - 68)  BP: 99/59 (22 Jul 2023 14:00) (99/59 - 99/59)  BP(mean): --  RR: 16 (22 Jul 2023 14:00) (16 - 16)  SpO2: 100% (22 Jul 2023 14:00) (100% - 100%)    Parameters below as of 22 Jul 2023 14:00  Patient On (Oxygen Delivery Method): nasal cannula  O2 Flow (L/min): 2    CAPILLARY BLOOD GLUCOSE            Physical Exam:    Daily     Daily   General:  Well appearing, NAD, cachetic  HEENT:  Nonicteric, PERRLA  CV:  RRR, no murmur, no JVD  Lungs:  CTA B/L, no wheezes, rales, rhonchi  Abdomen:  Soft, non-tender, no distended, positive BS  Extremities:  2+ pulses, no c/c, no edema  Skin:  Warm and dry, no rashes  :  No rodriguez  Neuro:  Alert, confused  No Restraints    LABS:                  RADIOLOGY & ADDITIONAL TESTS:  Reviewed by me

## 2023-07-22 NOTE — PROGRESS NOTE ADULT - ASSESSMENT
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     7/18 Called by Renal to talk to patient REE HD.  Oncology Dr Jenkins notes patient is refusing HD and is appropriate in light of advance cancer NOT to give HD    called by radiology Dr Parker-- noted new hydro on US     GOC 7/18  Patient seen with RHIANNON stewart and HCP Aren Black at bedside .  Aren is HCP at [phone]  Patient is Ox2, year 2000 something. Aren is HCP.  Patient insistes he DOES NOT WANT HD  Aren is Ox3 and HCP at beside agrees with patient wishes not to have Dialysis and to be comfortable. Patient also wants to be DNR  Aren agrees and signed DNR / Molsr form  Form placed in chart  Aren will bring in HCP form for chart as well    7/19 - discussed with pt re recommendation for nephrostomy tubes. IR order was placed and they wanted further clarification of GOC and whether pt is amendable to it or not. At this time, pt is undecided and needs to talk with Aren about this. States he is otherwise comfortable and not in pain. Discussed that his renal continues to worsen, although Cr may improve once obstruction is alleviated, it does not alter trajectory of his underlying advanced malignancy. Will await final decision from pt/family.

## 2023-07-22 NOTE — PROGRESS NOTE ADULT - PROBLEM SELECTOR PLAN 2
2/2 bladder ca  -pt reports red urine, no clots, unable to see any samples in room   -urology consulted  -Hb stable 7's, transfuse as needed  -Completed 5 days of zosyn 7/21

## 2023-07-22 NOTE — PROGRESS NOTE ADULT - PROBLEM SELECTOR PLAN 1
With AGMA  Pt with known invasive bladder cancer p/w hematuria at [place] on 7/13, was receiving CTX. (unable to locate UCx)   Labs show uptrending Cr 2 --> 3.6 --> 5.06 --> 6.38 on (7/16) --> 11.3  -Renal US as above  -s/p CTX at [place], will place on renally dosed Zosyn for now, UA/UCx pending   -Per RN, bladder scan unable to properly read residual as pt with renal mass.   -Pt voiding, but minimal amounts, strict I&Os ordered. Of note, urology did not place rodriguez at [place] d/t low PVR and more discomfort to pt. no more blood draws  -Urology and Renal appreciated  -IR consulted for nephrostomies was cancelled--> patient and family did not want--> comfort care  7/21 planning for hospice

## 2023-07-22 NOTE — PROGRESS NOTE ADULT - PROBLEM SELECTOR PLAN 4
newly diagnosed high grade invasive urothelial cancer  -?mets to lung as CT Chest done at [place] with "numerous bilateral solid and ground glass pulm nodules, several w/central cavitation, largest on Left is GGO, [id number]. Lingular and b/l lower lobe subpleural reticular opacities.   -Heme/onc appreciated- agree with plan for hospice care.  Comfort care at this time    #Diarrhea  -stool studies showed yersinia enterolytica. Started on doxy, to complete total of 5 days thru tomorrow.   NO new episodes of diarrhea.

## 2023-07-23 NOTE — PROGRESS NOTE ADULT - PROBLEM SELECTOR PLAN 8
Hold chemical ppx given ongoing hematuria. SCD for now.  GOC    GOC  Planning for comfort care, patient already DNR  no more vs  no more blood draws  Planning for hospice, SW/CM follow up on Monday, seems that may need insurance authorization Pt in ED for cough, fever. Pt well appearing, in NAD, non-toxic appearing. Temp 102.2 in ED. No evidence of respiratory distress, lungs cta b/l. Likely viral uri. Will provide tylenol for fever control. Parents educated on proper fever control with tylenol and motrin and instructed to f/u with PMD within 48 hours. Return precautions discussed.

## 2023-07-23 NOTE — PROGRESS NOTE ADULT - PROBLEM SELECTOR PLAN 4
newly diagnosed high grade invasive urothelial cancer  -?mets to lung as CT Chest done at [place] with "numerous bilateral solid and ground glass pulm nodules, several w/central cavitation, largest on Left is GGO, [id number]. Lingular and b/l lower lobe subpleural reticular opacities.   -Heme/onc appreciated- agree with plan for hospice care.  Comfort care at this time    #Diarrhea  -stool studies showed yersinia enterolytica. Started on doxy, to complete total of 5 days thru tomorrow.   NO new episodes of diarrhea.

## 2023-07-23 NOTE — PROGRESS NOTE ADULT - PROBLEM SELECTOR PLAN 1
With AGMA  Pt with known invasive bladder cancer p/w hematuria at [place] on 7/13, was receiving CTX. (unable to locate UCx)   Labs show uptrending Cr 2 --> 3.6 --> 5.06 --> 6.38 on (7/16) --> 11.3  -Renal US as above  -s/p CTX at [place], will place on renally dosed Zosyn for now, UA/UCx pending   -Per RN, bladder scan unable to properly read residual as pt with renal mass.   -Pt voiding, but minimal amounts, strict I&Os ordered. Of note, urology did not place rodriguez at [place] d/t low PVR and more discomfort to pt. no more blood draws  -Urology and Renal appreciated  -IR consulted for nephrostomies was cancelled--> patient and family did not want--> comfort care  7/21 planning for hospice

## 2023-07-23 NOTE — PROGRESS NOTE ADULT - PROBLEM SELECTOR PLAN 3
Hb stable ~7s   -s/p 2U pRBC at [place] on 7/13 per notes   -hgb stable at 8.3  Comfort care at this time

## 2023-07-23 NOTE — PROGRESS NOTE ADULT - ASSESSMENT
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     7/18 Called by Renal to talk to patient REE HD.  Oncology Dr Jenkins notes patient is refusing HD and is appropriate in light of advance cancer NOT to give HD    called by radiology Dr Parker-- noted new hydro on US     GOC 7/18  Patient seen with RHIANNON stewart and HCP Aren Black at bedside .  Aren is HCP at [phone]  Patient is Ox2, year 2000 something. Aren is HCP.  Patient insistes he DOES NOT WANT HD  Aren is Ox3 and HCP at beside agrees with patient wishes not to have Dialysis and to be comfortable. Patient also wants to be DNR  Aren agrees and signed DNR / Molsr form  Form placed in chart  Aren will bring in HCP form for chart as well    7/19 - discussed with pt re recommendation for nephrostomy tubes. IR order was placed and they wanted further clarification of GOC and whether pt is amendable to it or not. At this time, pt is undecided and needs to talk with Aren about this. States he is otherwise comfortable and not in pain. Discussed that his renal continues to worsen, although Cr may improve once obstruction is alleviated, it does not alter trajectory of his underlying advanced malignancy. Will await final decision from pt/family.

## 2023-07-23 NOTE — PROGRESS NOTE ADULT - SUBJECTIVE AND OBJECTIVE BOX
Patient is a 78y old  Male who presents with a chief complaint of hematuria (22 Jul 2023 16:31)      INTERVAL HPI/OVERNIGHT EVENTS:  Seen by me this morning, confused, looking comfortable, some blood noted in stewart.    Review of Systems: unable to obtain    MEDICATIONS  (STANDING):  atorvastatin 40 milliGRAM(s) Oral at bedtime  budesonide  80 MICROgram(s)/formoterol 4.5 MICROgram(s) Inhaler 2 Puff(s) Inhalation two times a day  chlorhexidine 2% Cloths 1 Application(s) Topical daily  FLUoxetine 40 milliGRAM(s) Oral daily  lidocaine 2% Jelly 1 milliLiter(s) IntraUrethral every 8 hours  tamsulosin 0.4 milliGRAM(s) Oral at bedtime  tiotropium 2.5 MICROgram(s) Inhaler 2 Puff(s) Inhalation daily    MEDICATIONS  (PRN):  acetaminophen     Tablet .. 650 milliGRAM(s) Oral every 6 hours PRN Temp greater or equal to 38C (100.4F), Mild Pain (1 - 3)  aluminum hydroxide/magnesium hydroxide/simethicone Suspension 30 milliLiter(s) Oral every 4 hours PRN Dyspepsia  HYDROmorphone   Tablet 2 milliGRAM(s) Oral every 4 hours PRN Moderate Pain (4 - 6)  HYDROmorphone   Tablet 4 milliGRAM(s) Oral every 4 hours PRN Severe Pain (7 - 10)  LORazepam     Tablet 1 milliGRAM(s) Oral every 4 hours PRN Anxiety  melatonin 3 milliGRAM(s) Oral at bedtime PRN Insomnia  ondansetron Injectable 4 milliGRAM(s) IV Push every 8 hours PRN Nausea and/or Vomiting  oxybutynin 5 milliGRAM(s) Oral every 8 hours PRN Bladder spasms      Allergies    azithromycin (Nausea)    Intolerances          Vital Signs Last 24 Hrs  T(C): 36.3 (23 Jul 2023 00:34), Max: 36.3 (23 Jul 2023 00:34)  T(F): 97.4 (23 Jul 2023 00:34), Max: 97.4 (23 Jul 2023 00:34)  HR: --  BP: 114/51 (23 Jul 2023 00:34) (114/51 - 114/51)  BP(mean): --  RR: 17 (23 Jul 2023 00:34) (17 - 17)  SpO2: 98% (23 Jul 2023 00:34) (98% - 98%)    Parameters below as of 23 Jul 2023 00:34  Patient On (Oxygen Delivery Method): nasal cannula  O2 Flow (L/min): 2    CAPILLARY BLOOD GLUCOSE            Physical Exam:    Daily     Daily   General:  Well appearing, NAD, cachetic  HEENT:  Nonicteric, PERRLA  CV:  RRR, no murmur, no JVD  Lungs:  CTA B/L, no wheezes, rales, rhonchi  Abdomen:  Soft, non-tender, no distended, positive BS  Extremities:  2+ pulses, no c/c, no edema  Skin:  Warm and dry, no rashes  :  No rodriguez  Neuro:  Alert, confused  No Restraints    LABS:                  RADIOLOGY & ADDITIONAL TESTS:  Reviewed by me

## 2023-07-24 NOTE — PROGRESS NOTE ADULT - PROBLEM SELECTOR PLAN 1
With AGMA  Pt with known invasive bladder cancer p/w hematuria at [place] on 7/13, was receiving CTX. (unable to locate UCx)   Labs show uptrending Cr 2 --> 3.6 --> 5.06 --> 6.38 on (7/16) --> 11.3  -Renal US as above  -s/p CTX at [place], will place on renally dosed Zosyn for now, UA/UCx pending   -Per RN, bladder scan unable to properly read residual as pt with renal mass.   -Pt voiding, but minimal amounts, strict I&Os ordered. Of note, urology did not place rodriguez at [place] d/t low PVR and more discomfort to pt. no more blood draws  -Urology and Renal appreciated  -IR consulted for nephrostomies was cancelled--> patient and family did not want--> comfort care  7/21 planning for hospice  - dispo: pending dc to rehab with transition to hospice ([facility]), pending auth, f/u SW

## 2023-07-24 NOTE — PROGRESS NOTE ADULT - PROBLEM SELECTOR PLAN 3
Hb stable ~7s   -s/p 2U pRBC at [place] on 7/13 per notes   -hgb stable at 8.3  Comfort care at this time

## 2023-07-24 NOTE — PROGRESS NOTE ADULT - PROBLEM SELECTOR PLAN 8
Hold chemical ppx given ongoing hematuria. SCD for now.  GOC    GOC  Planning for comfort care, patient already DNR  no more vs  no more blood draws  Planning for hospice, SW/CM follow up on Monday, seems that may need insurance authorization

## 2023-07-24 NOTE — PROGRESS NOTE ADULT - PROBLEM SELECTOR PLAN 4
newly diagnosed high grade invasive urothelial cancer  -?mets to lung as CT Chest done at [place] with "numerous bilateral solid and ground glass pulm nodules, several w/central cavitation, largest on Left is GGO, [id number]. Lingular and b/l lower lobe subpleural reticular opacities.   -Heme/onc appreciated- agree with plan for hospice care.  Comfort care at this time    #Diarrhea  -stool studies showed yersinia enterolytica. completed 5 days of doxycycline  No new episodes of diarrhea.

## 2023-07-24 NOTE — PROGRESS NOTE ADULT - SUBJECTIVE AND OBJECTIVE BOX
Dr. Gina Pizano  Pager [phone]    PROGRESS NOTE:     Patient is a 78y old  Male who presents with a chief complaint of hematuria (23 Jul 2023 19:48)      SUBJECTIVE / OVERNIGHT EVENTS: denies chest pain or sob , no specific complaints in bed   ADDITIONAL REVIEW OF SYSTEMS afebrile    MEDICATIONS  (STANDING):  atorvastatin 40 milliGRAM(s) Oral at bedtime  budesonide  80 MICROgram(s)/formoterol 4.5 MICROgram(s) Inhaler 2 Puff(s) Inhalation two times a day  chlorhexidine 2% Cloths 1 Application(s) Topical daily  FLUoxetine 40 milliGRAM(s) Oral daily  lidocaine 2% Jelly 1 milliLiter(s) IntraUrethral every 8 hours  tamsulosin 0.4 milliGRAM(s) Oral at bedtime  tiotropium 2.5 MICROgram(s) Inhaler 2 Puff(s) Inhalation daily    MEDICATIONS  (PRN):  acetaminophen     Tablet .. 650 milliGRAM(s) Oral every 6 hours PRN Temp greater or equal to 38C (100.4F), Mild Pain (1 - 3)  aluminum hydroxide/magnesium hydroxide/simethicone Suspension 30 milliLiter(s) Oral every 4 hours PRN Dyspepsia  HYDROmorphone   Tablet 2 milliGRAM(s) Oral every 4 hours PRN Moderate Pain (4 - 6)  HYDROmorphone   Tablet 4 milliGRAM(s) Oral every 4 hours PRN Severe Pain (7 - 10)  LORazepam     Tablet 1 milliGRAM(s) Oral every 4 hours PRN Anxiety  melatonin 3 milliGRAM(s) Oral at bedtime PRN Insomnia  ondansetron Injectable 4 milliGRAM(s) IV Push every 8 hours PRN Nausea and/or Vomiting  oxybutynin 5 milliGRAM(s) Oral every 8 hours PRN Bladder spasms      CAPILLARY BLOOD GLUCOSE        I&O's Summary      PHYSICAL EXAM:  Vital Signs Last 24 Hrs  T(C): 36.7 (24 Jul 2023 00:44), Max: 36.7 (24 Jul 2023 00:44)  T(F): 98.1 (24 Jul 2023 00:44), Max: 98.1 (24 Jul 2023 00:44)  HR: 93 (24 Jul 2023 00:44) (93 - 93)  BP: 116/57 (24 Jul 2023 00:44) (116/57 - 116/57)  BP(mean): --  RR: 18 (24 Jul 2023 00:44) (18 - 18)  SpO2: 95% (24 Jul 2023 00:44) (95% - 95%)    Parameters below as of 24 Jul 2023 00:44  Patient On (Oxygen Delivery Method): room air      CONSTITUTIONAL: NAD, cachectic with muscle wasting   RESPIRATORY: Normal respiratory effort; lungs are clear to auscultation bilaterally  CARDIOVASCULAR: Regular rate and rhythm, normal S1 and S2, no murmur/rub/gallop; No lower extremity edema; Peripheral pulses are 2+ bilaterally  ABDOMEN: Nontender to palpation, normoactive bowel sounds, no rebound/guarding; No hepatosplenomegaly  MUSCULOSKELETAL: no clubbing or cyanosis of digits; no joint swelling or tenderness to palpation  PSYCH: confused     LABS:    < from: US Kidney and Bladder (07.18.23 @ 11:57) >  IMPRESSION:  New moderate left hydronephrosis.  Redemonstration of moderate to severe right hydronephrosis.  Mass seen in the region of the bladder            RADIOLOGY & ADDITIONAL TESTS:  Results Reviewed:   Imaging Personally Reviewed:  Electrocardiogram Personally Reviewed:    COORDINATION OF CARE:  Care Discussed with Consultants/Other Providers [Y/N]: Judi scott, pending rehab with hospice   Prior or Outpatient Records Reviewed [Y/N]:

## 2023-07-24 NOTE — PROGRESS NOTE ADULT - ASSESSMENT
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     7/18 Called by Renal to talk to patient REE HD.  Oncology Dr Jenkins notes patient is refusing HD and is appropriate in light of advance cancer NOT to give HD    called by radiology Dr Parker-- noted new hydro on US     GOC 7/18  Patient seen with RHIANNON stewart and HCP Aren Black at bedside .  Aren is HCP at [phone]  Patient is Ox2, year 2000 something. Aren is HCP.  Patient insistes he DOES NOT WANT HD  Aren is Ox3 and HCP at beside agrees with patient wishes not to have Dialysis and to be comfortable. Patient also wants to be DNR  Aren agrees and signed DNR / Molsr form  Form placed in chart  Aren will bring in HCP form for chart as well    7/19 - discussed with pt re recommendation for nephrostomy tubes. IR order was placed and they wanted further clarification of GOC and whether pt is amendable to it or not. At this time, pt is undecided and needs to talk with Aren about this. States he is otherwise comfortable and not in pain. Discussed that his renal continues to worsen, although Cr may improve once obstruction is alleviated, it does not alter trajectory of his underlying advanced malignancy. Will await final decision from pt/family.

## 2023-07-25 NOTE — CHART NOTE - NSCHARTNOTEFT_GEN_A_CORE
Source: Patient [ ]    Family [ ]     other [ x] PCA, chart review    Patient seen for severe malnutrition f/u. 78 year old male with a PMH of HTN, HLD, CAD, HFrEF, CKD3, COPD, recent diagnosis of bladder cancer transferred from [place] for hematuria iso newly diagnosed muscular invasive bladder cancer c/b HARLEEN on CKD 2/2 obstructive uropathy and acute blood loss anemia 2/2 hematuria, plan for hospice per chart.    Patient sleeping during encounter. Per PCA, patient w/ poor PO intake. Intakes are 0-100% per RN flow sheet. Receives Nepro, but hasn't been drinking either. No GI distress or chewing/swallowing difficulties reported. No edema or pressure injuries per RN flow sheet.    Diet : Diet, Regular:   No Concentrated Phosphorus  Supplement Feeding Modality:  Oral  Nepro Cans or Servings Per Day:  1       Frequency:  Three Times a day (07-20-23 @ 14:58)    Current Weight: 56 kg (7/25) per bed scale observation  56.2 kg (7/19)  58.2 kg (7/17)-?accuracy, might be scale error  Weight Change: stable weight x 6 days    Pertinent Medications: MEDICATIONS  (STANDING):  atorvastatin 40 milliGRAM(s) Oral at bedtime  budesonide  80 MICROgram(s)/formoterol 4.5 MICROgram(s) Inhaler 2 Puff(s) Inhalation two times a day  chlorhexidine 2% Cloths 1 Application(s) Topical daily  FLUoxetine 40 milliGRAM(s) Oral daily  lidocaine 2% Jelly 1 milliLiter(s) IntraUrethral every 8 hours  tamsulosin 0.4 milliGRAM(s) Oral at bedtime  tiotropium 2.5 MICROgram(s) Inhaler 2 Puff(s) Inhalation daily    MEDICATIONS  (PRN):  acetaminophen     Tablet .. 650 milliGRAM(s) Oral every 6 hours PRN Temp greater or equal to 38C (100.4F), Mild Pain (1 - 3)  aluminum hydroxide/magnesium hydroxide/simethicone Suspension 30 milliLiter(s) Oral every 4 hours PRN Dyspepsia  HYDROmorphone   Tablet 2 milliGRAM(s) Oral every 4 hours PRN Moderate Pain (4 - 6)  HYDROmorphone   Tablet 4 milliGRAM(s) Oral every 4 hours PRN Severe Pain (7 - 10)  LORazepam     Tablet 1 milliGRAM(s) Oral every 4 hours PRN Anxiety  melatonin 3 milliGRAM(s) Oral at bedtime PRN Insomnia  ondansetron Injectable 4 milliGRAM(s) IV Push every 8 hours PRN Nausea and/or Vomiting  oxybutynin 5 milliGRAM(s) Oral every 8 hours PRN Bladder spasms    Pertinent Labs: no new labs to assess    Estimated Needs:   [ x] no change since previous assessment    Previous Nutrition Diagnosis: Severe malnutrition    Nutrition Diagnosis is [x ] ongoing  [ ] resolved [ ] not applicable     Education:    [  ] Given today    Type of education provided:    [  ] Given on previous assessment by RD    [  ] Not applicable 2/2 cognitive deficit    [  ] Pt refusal of education offered    [  ] Not applicable 2/2 current prognosis    [x  ] Not warranted at present    Recommend  - continue diet as ordered  - continue w/ Nepro TID (1,260 kcal, 57 g pro)  - document PO intake to monitor trend    Monitoring and Evaluation:   [x ] PO intake [x ] Tolerance to diet prescription [x ] weights [ x] follow up per protocol    Jacky Pierre, [id number] or TEAMS

## 2023-07-25 NOTE — PROGRESS NOTE ADULT - PROBLEM SELECTOR PLAN 4
newly diagnosed high grade invasive urothelial cancer  -?mets to lung as CT Chest done at [place] with "numerous bilateral solid and ground glass pulm nodules, several w/central cavitation, largest on Left is GGO, [id number]. Lingular and b/l lower lobe subpleural reticular opacities.   -Heme/onc appreciated- agree with plan for hospice care.  Comfort care at this time    #Diarrhea  -stool studies showed yersinia enterolytica. completed 5 days of doxycycline  No new episodes of diarrhea.

## 2023-07-25 NOTE — PROGRESS NOTE ADULT - PROBLEM SELECTOR PLAN 3
Hb stable ~7s   -s/p 2U pRBC at [place] on 7/13 per notes   -hgb stable at 8.3  Comfort care at this time

## 2023-07-25 NOTE — PROGRESS NOTE ADULT - ASSESSMENT
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     7/18 Called by Renal to talk to patient REE HD.  Oncology Dr Jenkins notes patient is refusing HD and is appropriate in light of advance cancer NOT to give HD    called by radiology Dr Parker-- noted new hydro on US     GOC 7/18  Patient seen with RHIANNON stewart and HCP Aren Black at bedside .  Aren is HCP at [phone]  Patient is Ox2, year 2000 something. Aren is HCP.  Patient insistes he DOES NOT WANT HD  Aren is Ox3 and HCP at beside agrees with patient wishes not to have Dialysis and to be comfortable. Patient also wants to be DNR  Aren agrees and signed DNR / Molsr form  Form placed in chart  rAen will bring in HCP form for chart as well    7/19 - discussed with pt re recommendation for nephrostomy tubes. IR order was placed and they wanted further clarification of GOC and whether pt is amendable to it or not. At this time, pt is undecided and needs to talk with Aren about this. States he is otherwise comfortable and not in pain. Discussed that his renal continues to worsen, although Cr may improve once obstruction is alleviated, it does not alter trajectory of his underlying advanced malignancy. Will await final decision from pt/family.

## 2023-07-25 NOTE — PROGRESS NOTE ADULT - SUBJECTIVE AND OBJECTIVE BOX
Gina Pizano MD  Pager [phone]    CHIEF COMPLAINT: Patient is a 78y old  male who presents with a chief complaint of hematuria (24 Jul 2023 13:12)      SUBJECTIVE / OVERNIGHT EVENTS: denies chest pain or sob     MEDICATIONS  (STANDING):  atorvastatin 40 milliGRAM(s) Oral at bedtime  budesonide  80 MICROgram(s)/formoterol 4.5 MICROgram(s) Inhaler 2 Puff(s) Inhalation two times a day  chlorhexidine 2% Cloths 1 Application(s) Topical daily  FLUoxetine 40 milliGRAM(s) Oral daily  lidocaine 2% Jelly 1 milliLiter(s) IntraUrethral every 8 hours  tamsulosin 0.4 milliGRAM(s) Oral at bedtime  tiotropium 2.5 MICROgram(s) Inhaler 2 Puff(s) Inhalation daily    MEDICATIONS  (PRN):  acetaminophen     Tablet .. 650 milliGRAM(s) Oral every 6 hours PRN Temp greater or equal to 38C (100.4F), Mild Pain (1 - 3)  aluminum hydroxide/magnesium hydroxide/simethicone Suspension 30 milliLiter(s) Oral every 4 hours PRN Dyspepsia  HYDROmorphone   Tablet 2 milliGRAM(s) Oral every 4 hours PRN Moderate Pain (4 - 6)  HYDROmorphone   Tablet 4 milliGRAM(s) Oral every 4 hours PRN Severe Pain (7 - 10)  LORazepam     Tablet 1 milliGRAM(s) Oral every 4 hours PRN Anxiety  melatonin 3 milliGRAM(s) Oral at bedtime PRN Insomnia  ondansetron Injectable 4 milliGRAM(s) IV Push every 8 hours PRN Nausea and/or Vomiting  oxybutynin 5 milliGRAM(s) Oral every 8 hours PRN Bladder spasms      VITALS:  T(F): 97.2 (07-25-23 @ 12:03), Max: 97.4 (07-24-23 @ 13:48)  HR: 66 (07-25-23 @ 12:03) (66 - 73)  BP: 100/59 (07-25-23 @ 12:03) (100/59 - 108/52)  RR: 17 (07-25-23 @ 12:03) (17 - 176)  SpO2: 97% (07-25-23 @ 12:03)      CAPILLARY BLOOD GLUCOSE    Output     I&O's Summary  T(F): 97.2 (07-25-23 @ 12:03), Max: 97.4 (07-24-23 @ 13:48)  HR: 66 (07-25-23 @ 12:03) (66 - 73)  BP: 100/59 (07-25-23 @ 12:03) (100/59 - 108/52)  RR: 17 (07-25-23 @ 12:03) (17 - 176)  SpO2: 97% (07-25-23 @ 12:03)    PHYSICAL EXAM:  CONSTITUTIONAL: NAD, cachectic with muscle wasting  RESPIRATORY: Normal respiratory effort; lungs are clear to auscultation bilaterally  CARDIOVASCULAR: Regular rate and rhythm, normal S1 and S2, no murmur/rub/gallop; No lower extremity edema; Peripheral pulses are 2+ bilaterally  ABDOMEN: Nontender to palpation, normoactive bowel sounds, no rebound/guarding; No hepatosplenomegaly  MUSCULOSKELETAL: no clubbing or cyanosis of digits; no joint swelling or tenderness to palpation  PSYCH: confused     LABS:                        MICROBIOLOGY:        RADIOLOGY & ADDITIONAL TESTS:    Imaging Personally Reviewed:    [ ] Consultant(s) Notes Reviewed:  [ ] Care Discussed with Consultants/Other Providers:

## 2023-07-25 NOTE — PROGRESS NOTE ADULT - PROBLEM SELECTOR PLAN 1
With AGMA  Pt with known invasive bladder cancer p/w hematuria at [place] on 7/13, was receiving CTX. (unable to locate UCx)   Labs show uptrending Cr 2 --> 3.6 --> 5.06 --> 6.38 on (7/16) --> 11.3  -Renal US as above  -s/p CTX at [place], will place on renally dosed Zosyn for now, UA/UCx pending   -Per RN, bladder scan unable to properly read residual as pt with renal mass.   -Pt voiding, but minimal amounts, strict I&Os ordered. Of note, urology did not place rodriguez at [place] d/t low PVR and more discomfort to pt. no more blood draws  -Urology and Renal appreciated  -IR consulted for nephrostomies was cancelled--> patient and family did not want--> comfort care  7/21 planning for hospice  - dispo: pending dc to rehab with transition to hospice ([facility]), pending auth, f/u SW

## 2023-07-26 NOTE — PROGRESS NOTE ADULT - PROBLEM SELECTOR PLAN 7
Known COPD, takes Trelegy at home.  -Imaging at [place] w/?mets to lung as CT Chest done at [place] with "numerous bilateral solid and ground glass pulm nodules, several w/central cavitation, largest on Left is GGO, [id number]. Lingular and b/l lower lobe subpleural reticular opacities.   -C/w Symbicort and Spiriva while inpt  -C/w home supplemental O2
Known COPD, takes Trelegy at home.  -Imaging at [place] w/?mets to lung as CT Chest done at [place] with "numerous bilateral solid and ground glass pulm nodules, several w/central cavitation, largest on Left is GGO, [id number]. Lingular and b/l lower lobe subpleural reticular opacities.   -C/w Symbicort and Spiriva while inpt  -C/w home supplemental O2
Known COPD, takes Trelegy at home.  -Imaging at [place] w/?mets to lung as CT Chest done at [place] with "numerous bilateral solid and ground glass pulm nodules, several w/central cavitation, largest on Left is GGO, [id number]. Lingular and b/l lower lobe subpleural reticular opacities.   -C/w Symbicort and Spiriva while inpt  -C/w home supplemental O2
Known COPD, takes Trelegy at home.  -Imaging at [place] w/?mets to lung as CT Chest done at [place] with "numerous bilateral solid and ground glass pulm nodules, several w/central cavitation, largest on Left is GGO, [id number]. Lingular and b/l lower lobe subpleural reticular opacities.   -C/w Symbicort and Spiriva while inpt  -C/w home supplemental O2
Known COPD, takes Trelegy at home.  -Imaging at [place] w/?mets to lung as CT Chest done at [place] with "numerous bilateral solid and ground glass pulm nodules, several w/central cavitation, largest on Left is GGO, [id number]. Lingular and b/l lower lobe subpleural reticular opacities.   -C/w Symbicort and Spiriva while inpt  -C/w home supplemental O2
Known COPD, takes Trelegy at home.  -Imaging at [place] w/?mets to lung as CT Chest done at [place] with "numerous bilateral solid and ground glass pulm nodules, several w/central cavitation, largest on Left is GGO, [id number]. Lingular and b/l lower lobe subpleural reticular opacities.   -C/w Symbicort and Spiriva while inpt  -C/w home supplemental O2
Known COPD, takes Trelegy at home.  -Imaging at [place] w/?mets to lung as CT Chest done at [place] with "numerous bilateral solid and ground glass pulm nodules, several w/central cavitation, largest on Left is GGO, [id number]. Lingular and b/l lower lobe subpleural reticular opacities.   -C/w Symbicort and Spiriva while inpt  -C/w home supplemental O2
Known COPD, takes Trelegy at home.  -Imaging at [place] w/?mets to lung as CT Chest done at [place] with "numerous bilateral solid and ground glass pulm nodules, several w/central cavitation, largest on Left is GGO, [id number]. Lingular and b/l lower lobe subpleural reticular opacities.   -C/w Symbicort and Spiriva while inpt  -C/w home supplemental O2
Known COPD, takes Trelegy at home.  -Imaging at [place] w/?mets to lung as CT Chest done at [place] with "numerous bilateral solid and ground glass pulm nodules, several w/central cavitation, largest on Left is GGO, [id number]. Lingular and b/l lower lobe subpleural reticular opacities.   -C/w Symbicort and Spiriva while inpt  -C/w home supplemental O2

## 2023-07-26 NOTE — PROGRESS NOTE ADULT - PROBLEM SELECTOR PLAN 3
Hb stable ~7s   -s/p 2U pRBC at [place] on 7/13 per notes   -hgb stable at 8.3  Comfort care at this time

## 2023-07-26 NOTE — PROGRESS NOTE ADULT - PROVIDER SPECIALTY LIST ADULT
Hospitalist
Nephrology
Heme/Onc
Nephrology
Hospitalist

## 2023-07-26 NOTE — PROGRESS NOTE ADULT - PROBLEM SELECTOR PROBLEM 5
Pulmonary nodules

## 2023-07-26 NOTE — PROGRESS NOTE ADULT - PROBLEM SELECTOR PLAN 5
[place] imaging w/?mets to lung "numerous bilateral solid and ground glass pulm nodules, several w/central cavitation, largest on Left is GGO, [id number]. Lingular and b/l lower lobe subpleural reticular opacities.  -Previous imaging June 2023 at [facility]- Several right-sided pulmonary nodules, new since prior CT Chest 4/5/2023,   concerning for metastatic disease.
[place] imaging w/?mets to lung "numerous bilateral solid and ground glass pulm nodules, several w/central cavitation, largest on Left is GGO, [id number]. Lingular and b/l lower lobe subpleural reticular opacities.  -Previous imaging June 2023 at [facility]- Several right-sided pulmonary nodules, new since prior CT Chest 4/5/2023,   concerning for metastatic disease.
[place] imaging w/?mets to lung "numerous bilateral solid and ground glass pulm nodules, several w/central cavitation, largest on Left is GGO, [id number]. Lingular and b/l lower lobe subpleural reticular opacities.  -Previous imaging June 2023 at [facility]- Several right-sided pulmonary nodules, new since prior CT Chest 4/5/2023,   concerning for metastatic disease.
[place] imaging w/?mets to lung "numerous bilateral solid and ground glass pulm nodules, several w/central cavitation, largest on Left is GGO, [id number]. Lingular and b/l lower lobe subpleural reticular opacities.  -Previous imaging June 2023 at [facility]- Several right-sided pulmonary nodules, new since prior CT Chest 4/5/2023,   concerning for metastatic disease.
[place] imaging w/?mets to lung "numerous bilateral solid and ground glass pulm nodules, several w/central cavitation, largest on Left is GGO, [id number]. Lingular and b/l lower lobe subpleural reticular opacities.  -Previous imaging June 2023 at [facility]- Several right-sided pulmonary nodules, new since prior CT Chest 4/5/2023,   concerning for metastatic disease.
[place] imaging w/?mets to lung "numerous bilateral solid and ground glass pulm nodules, several w/central cavitation, largest on Left is GGO, [id number]. Lingular and b/l lower lobe subpleural reticular opacities.  -Previous imaging June 2023 at [facility]- Several right-sided pulmonary nodules, new since prior CT Chest 4/5/2023,   concerning for metastatic disease.
[place] imaging w/?mets to lung "numerous bilateral solid and ground glass pulm nodules, several w/central cavitation, largest on Left is GGO, [id number]. Lingular and b/l lower lobe subpleural reticular opacities.  -Previous imaging June 2023 at [facility]- Several right-sided pulmonary nodules, new since prior CT Chest 4/5/2023,   concerning for metastatic disease.
[place] imaging w/?mets to lung "numerous bilateral solid and ground glass pulm nodules, several w/central cavitation, largest on Left is GGO, [id number]. Lingular and b/l lower lobe subpleural reticular opacities.  -Previous imaging June 2023 at [facility]- Several right-sided pulmonary nodules, new since prior CT Chest 4/5/2023,   concerning for metastatic disease.
[place] imaging w/?mets to lung "numerous bilateral solid and ground glass pulm nodules, several w/central cavitation, largest on Left is GGO, [id number]. Lingular and b/l lower lobe subpleural reticular opacities.  -Previous imaging June 2023 at [facility]- Several right-sided pulmonary nodules, new since prior CT Chest 4/5/2023,   concerning for metastatic disease.

## 2023-07-26 NOTE — DISCHARGE NOTE NURSING/CASE MANAGEMENT/SOCIAL WORK - NSDCPETBCESMAN_GEN_ALL_CORE
Chief Complaint   Patient presents with    Immunization/Injection     nv only for vaccines      Visit Vitals    Temp 98.7 °F (37.1 °C) (Tympanic)     Immunization/s administered 7/12/2017 by Jenna Moreno LPN with guardian's consent. Patient tolerated procedure well. No reactions noted.
If you are a smoker, it is important for your health to stop smoking. Please be aware that second hand smoke is also harmful.

## 2023-07-26 NOTE — PROGRESS NOTE ADULT - PROBLEM SELECTOR PROBLEM 3
Symptomatic anemia

## 2023-07-26 NOTE — PROGRESS NOTE ADULT - PROBLEM/PLAN-8
DISPLAY PLAN FREE TEXT
alert/awake
DISPLAY PLAN FREE TEXT

## 2023-07-26 NOTE — DISCHARGE NOTE NURSING/CASE MANAGEMENT/SOCIAL WORK - PATIENT PORTAL LINK FT
You can access the FollowMyHealth Patient Portal offered by [facility] by registering at the following website: [email or web address]. By joining [employer]’s FollowMyHealth portal, you will also be able to view your health information using other applications (apps) compatible with our system.

## 2023-07-26 NOTE — PROGRESS NOTE ADULT - PROBLEM SELECTOR PROBLEM 1
Acute kidney injury superimposed on CKD

## 2023-07-26 NOTE — PROGRESS NOTE ADULT - PROBLEM SELECTOR PLAN 6
CAD s/p PCI, s/p Bi-V ICD   -EF 35-40% and small pericardial effusion on TTE at [place]   -c/w statin, unclear why pt not on bb  -Seen by cards at [place], will consult house cardiology if needed
CAD s/p PCI, s/p Bi-V ICD   -EF 35-40% and small pericardial effusion on TTE at [place]   -c/w statin, unclear why pt not on bb  -Seen by cards at [place], will consult house cardiology if needed  Comfort care at this time
CAD s/p PCI, s/p Bi-V ICD   -EF 35-40% and small pericardial effusion on TTE at [place]   -c/w statin, unclear why pt not on bb  -Seen by cards at [place], will consult house cardiology if needed  Comfort care at this time
CAD s/p PCI, s/p Bi-V ICD   -EF 35-40% and small pericardial effusion on TTE at [place]   -c/w statin, unclear why pt not on bb  -Seen by cards at [place], will consult house cardiology if needed  Comfort care at this time
CAD s/p PCI, s/p Bi-V ICD   -EF 35-40% and small pericardial effusion on TTE at [place]   -c/w statin, unclear why pt not on bb  -Seen by cards at [place], will consult house cardiology if needed  Comfort care at this time
CAD s/p PCI, s/p Bi-V ICD   -EF 35-40% and small pericardial effusion on TTE at [place]   -c/w statin, unclear why pt not on bb  -Seen by cards at [place], will consult house cardiology if needed
CAD s/p PCI, s/p Bi-V ICD   -EF 35-40% and small pericardial effusion on TTE at [place]   -c/w statin, unclear why pt not on bb  -Seen by cards at [place], will consult house cardiology if needed
CAD s/p PCI, s/p Bi-V ICD   -EF 35-40% and small pericardial effusion on TTE at [place]   -c/w statin, unclear why pt not on bb  -Seen by cards at [place], will consult house cardiology if needed
CAD s/p PCI, s/p Bi-V ICD   -EF 35-40% and small pericardial effusion on TTE at [place]   -c/w statin, unclear why pt not on bb  -Seen by cards at [place], will consult house cardiology if needed  Comfort care at this time

## 2023-07-26 NOTE — PROGRESS NOTE ADULT - PROBLEM SELECTOR PLAN 1
With AGMA  Pt with known invasive bladder cancer p/w hematuria at [place] on 7/13, was receiving CTX. (unable to locate UCx)   Labs show uptrending Cr 2 --> 3.6 --> 5.06 --> 6.38 on (7/16) --> 11.3  -Renal US as above  -s/p CTX at [place], will place on renally dosed Zosyn for now, UA/UCx pending   -Per RN, bladder scan unable to properly read residual as pt with renal mass.   -Pt voiding, but minimal amounts, strict I&Os ordered. Of note, urology did not place rodriguez at [place] d/t low PVR and more discomfort to pt. no more blood draws  -Urology and Renal appreciated  -IR consulted for nephrostomies was cancelled--> patient and family did not want--> comfort care  7/21 planning for hospice  - dispo: pending dc to rehab with transition to hospice ([facility]), pending auth, f/u SW  d/w HCN, pt is not candidate for inpatient hospice, qualifies for home hospice but no immediate family member to take care of him, only got niece/nephew

## 2023-07-26 NOTE — DISCHARGE NOTE NURSING/CASE MANAGEMENT/SOCIAL WORK - NSDCPEFALRISK_GEN_ALL_CORE
For information on Fall & Injury Prevention, visit: [email or web address] OR  [email or web address] OR  https://www.cdc.gov/steadi/patient.html

## 2023-07-26 NOTE — PROGRESS NOTE ADULT - PROBLEM SELECTOR PROBLEM 7
COPD with hypoxia

## 2023-07-26 NOTE — PROGRESS NOTE ADULT - SUBJECTIVE AND OBJECTIVE BOX
Dr. Gina Pizano  Pager [phone]    PROGRESS NOTE:     Patient is a 78y old  Male who presents with a chief complaint of hematuria (25 Jul 2023 12:28)      SUBJECTIVE / OVERNIGHT EVENTS: confused, no specific complaints, denies significant pain  ADDITIONAL REVIEW OF SYSTEMS: afebrile    MEDICATIONS  (STANDING):  atorvastatin 40 milliGRAM(s) Oral at bedtime  budesonide  80 MICROgram(s)/formoterol 4.5 MICROgram(s) Inhaler 2 Puff(s) Inhalation two times a day  chlorhexidine 2% Cloths 1 Application(s) Topical daily  FLUoxetine 40 milliGRAM(s) Oral daily  tamsulosin 0.4 milliGRAM(s) Oral at bedtime  tiotropium 2.5 MICROgram(s) Inhaler 2 Puff(s) Inhalation daily    MEDICATIONS  (PRN):  acetaminophen     Tablet .. 650 milliGRAM(s) Oral every 6 hours PRN Temp greater or equal to 38C (100.4F), Mild Pain (1 - 3)  aluminum hydroxide/magnesium hydroxide/simethicone Suspension 30 milliLiter(s) Oral every 4 hours PRN Dyspepsia  HYDROmorphone   Tablet 4 milliGRAM(s) Oral every 4 hours PRN Severe Pain (7 - 10)  HYDROmorphone   Tablet 2 milliGRAM(s) Oral every 4 hours PRN Moderate Pain (4 - 6)  LORazepam     Tablet 1 milliGRAM(s) Oral every 4 hours PRN Anxiety  melatonin 3 milliGRAM(s) Oral at bedtime PRN Insomnia  ondansetron Injectable 4 milliGRAM(s) IV Push every 8 hours PRN Nausea and/or Vomiting  oxybutynin 5 milliGRAM(s) Oral every 8 hours PRN Bladder spasms      CAPILLARY BLOOD GLUCOSE        I&O's Summary      PHYSICAL EXAM:  Vital Signs Last 24 Hrs  T(C): 36.6 (26 Jul 2023 12:46), Max: 36.6 (26 Jul 2023 02:13)  T(F): 97.8 (26 Jul 2023 12:46), Max: 97.8 (26 Jul 2023 02:13)  HR: 70 (26 Jul 2023 12:46) (70 - 80)  BP: 104/39 (26 Jul 2023 12:46) (104/39 - 117/94)  BP(mean): --  RR: 16 (26 Jul 2023 12:46) (16 - 18)  SpO2: 100% (26 Jul 2023 12:46) (93% - 100%)    Parameters below as of 26 Jul 2023 12:46  Patient On (Oxygen Delivery Method): room air      CONSTITUTIONAL: NAD, cachectic with muscle wasting  RESPIRATORY: Normal respiratory effort; lungs are clear to auscultation bilaterally  CARDIOVASCULAR: Regular rate and rhythm, normal S1 and S2, no murmur/rub/gallop; No lower extremity edema; Peripheral pulses are 2+ bilaterally  ABDOMEN: Nontender to palpation, normoactive bowel sounds, no rebound/guarding; No hepatosplenomegaly  MUSCULOSKELETAL: no clubbing or cyanosis of digits; no joint swelling or tenderness to palpation  PSYCH: confused     LABS:                      RADIOLOGY & ADDITIONAL TESTS:  Results Reviewed:   Imaging Personally Reviewed:  Electrocardiogram Personally Reviewed:    COORDINATION OF CARE:  Care Discussed with Consultants/Other Providers [Y/N]:  Prior or Outpatient Records Reviewed [Y/N]:

## 2023-07-26 NOTE — PROGRESS NOTE ADULT - ASSESSMENT
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     7/18 Called by Renal to talk to patient REE HD.  Oncology Dr Jenkins notes patient is refusing HD and is appropriate in light of advance cancer NOT to give HD    called by radiology Dr Parker-- noted new hydro on US     GOC 7/18  Patient seen with RHIANNON stewart and HCP Aren Black at bedside .  Aren is HCP at [phone]  Patient is Ox2, year 2000 something. Aren is HCP.  Patient insistes he DOES NOT WANT HD  Aren is Ox3 and HCP at beside agrees with patient wishes not to have Dialysis and to be comfortable. Patient also wants to be DNR  Aren agrees and signed DNR / Molsr form  Form placed in chart  Aren will bring in HCP form for chart as well    7/19 - discussed with pt re recommendation for nephrostomy tubes. IR order was placed and they wanted further clarification of GOC and whether pt is amendable to it or not. At this time, pt is undecided and needs to talk with Aren about this. States he is otherwise comfortable and not in pain. Discussed that his renal continues to worsen, although Cr may improve once obstruction is alleviated, it does not alter trajectory of his underlying advanced malignancy. Will await final decision from pt/family.

## 2023-07-26 NOTE — PROGRESS NOTE ADULT - NUTRITIONAL ASSESSMENT
This patient has been assessed with a concern for Malnutrition and has been determined to have a diagnosis/diagnoses of Severe protein-calorie malnutrition and Underweight (BMI < 19).    This patient is being managed with:   Diet Regular-  No Concentrated Phosphorus  Supplement Feeding Modality:  Oral  Nepro Cans or Servings Per Day:  1       Frequency:  Three Times a day  Entered: Jul 20 2023  2:58PM  

## 2023-07-26 NOTE — PROGRESS NOTE ADULT - PROBLEM SELECTOR PROBLEM 6
Chronic systolic heart failure

## 2023-07-26 NOTE — PROGRESS NOTE ADULT - PROBLEM SELECTOR PLAN 4
newly diagnosed high grade invasive urothelial cancer  -?mets to lung as CT Chest done at [place] with "numerous bilateral solid and ground glass pulm nodules, several w/central cavitation, largest on Left is GGO, [id number]. Lingular and b/l lower lobe subpleural reticular opacities.   -Heme/onc appreciated- agree with plan for hospice care.  Comfort care at this time    #Diarrhea  -stool studies showed yersinia enterolytica. completed 5 days of doxycycline  No new episodes of diarrhea.

## 2023-08-03 ENCOUNTER — OUTPATIENT (OUTPATIENT)
Dept: OUTPATIENT SERVICES | Facility: HOSPITAL | Age: 78
LOS: 1 days | Discharge: HOME | End: 2023-08-03

## 2023-08-03 DIAGNOSIS — Z95.810 PRESENCE OF AUTOMATIC (IMPLANTABLE) CARDIAC DEFIBRILLATOR: Chronic | ICD-10-CM

## 2023-08-03 DIAGNOSIS — C67.9 MALIGNANT NEOPLASM OF BLADDER, UNSPECIFIED: ICD-10-CM

## 2023-08-03 DIAGNOSIS — Z95.5 PRESENCE OF CORONARY ANGIOPLASTY IMPLANT AND GRAFT: Chronic | ICD-10-CM

## 2023-08-03 DIAGNOSIS — Z95.0 PRESENCE OF CARDIAC PACEMAKER: Chronic | ICD-10-CM

## 2023-08-03 DIAGNOSIS — Z98.890 OTHER SPECIFIED POSTPROCEDURAL STATES: Chronic | ICD-10-CM

## 2023-08-04 PROBLEM — D63.0 ANEMIA IN NEOPLASTIC DISEASE: Status: ACTIVE | Noted: 2023-01-01

## 2023-08-04 PROBLEM — C67.9 BLADDER CANCER: Status: ACTIVE | Noted: 2023-01-01

## 2023-08-04 PROBLEM — R31.0 GROSS HEMATURIA: Status: ACTIVE | Noted: 2023-01-01

## 2023-08-04 NOTE — RESULTS/DATA
[FreeTextEntry1] : EXAM: [id number] - PETCT SKUL-THI ONC FDG INIT - ORDERED BY: SAMY HELM\par  PROCEDURE DATE: 06/19/2023\par  INTERPRETATION: CLINICAL INFORMATION: Urinary bladder cancer, evaluate retroperitoneal lymphadenopathy.\par  \par  TREATMENT STRATEGY EVALUATION: Initial\par  FASTING BLOOD SUGAR: 112 mg/dL\par  RADIOPHARMACEUTICAL: 13.2 mCi F-18, FDG, I.V.\par  UPTAKE PERIOD: 60 minutes\par  SCANNER: [id number]\par  ORAL CONTRAST: Patient drank OMNIPAQUE contrast during the uptake period.\par  PHARMACOLOGIC INTERVENTION: None.\par  \par  TECHNIQUE: Following intravenous injection of radiopharmaceutical and above uptake period, PET/CT was obtained from base of skull to mid-thigh. CT protocol was optimized for PET attenuation correction and anatomic localization and was not designed to produce and cannot replace state-of-the-art diagnostic CT images with specific imaging protocols for different body parts and indications. Images were reconstructed and reviewed in axial, coronal and sagittal views and three-dimensional MIP.\par  \par  The standardized uptake values (SUV) are normalized to patient body weight and indicate the highest activity concentration (SUVmax) in a given site. All image numbers refer to axial image number.\par  \par  COMPARISON: No prior FDG-PET/CT\par  \par  OTHER STUDIES USED FOR CORRELATION: CT abdomen 6/12/2023, CT chest 6/7/2023\par  \par  FINDINGS:\par  \par  HEAD/NECK: Physiologic FDG activity in visualized brain. FDG-avid soft tissue in the posterior nasopharynx, approximately 2.5 x 2.4 cm, SUV 4.2, image 21.\par  \par  THORAX: No abnormal FDG activity. No lymphadenopathy.\par  \par  LUNGS: Within 10 FDG-avid right lung nodules, for example:\par  Posterior right lower lobe, 1.1 x 0.7 cm, SUV 2.8, image 112; previously approximately 1.0 x 0.6 cm on CT 6/7/2023..\par  Right upper lobe, 1.3 x 0.8 cm, SUV 2.4, image 96; previously approximately 0.7 x 0.3 cm on CT 6/7/2023.\par  A previously 1.3 x 1.3 cm right lower lobe nodule, image 57 of prior exam, has decreased in size and now measures 0.9 x 0.7 cm, SUV 2.8 image 112.\par  A previously 1.4 x 1.0 cm right upper lobe nodule, image 47 of prior exam, has slightly decreased in size and now measures 1.2 x 0.7 cm, SUV 1.9, image 104.\par  Emphysema. AICD.\par  \par  PLEURA/PERICARDIUM: No abnormal FDG activity. No effusion.\par  \par  HEPATOBILIARY/PANCREAS: Physiologic FDG activity. For reference, normal liver demonstrates SUV mean 1.9.\par  \par  SPLEEN: Physiologic FDG activity. Normal in size.\par  \par  ADRENAL GLANDS: No abnormal FDG activity. No nodule.\par  \par  KIDNEYS/URINARY BLADDER: FDG-avid rim enhancing mass with irregular borders associated with the urinary bladder, SUV 19, image 206. Physiologic excreted FDG activity. Moderate right hydronephrosis with diminished excretion of radiotracer.\par  \par  REPRODUCTIVE ORGANS: No abnormal FDG activity.\par  \par  ABDOMINOPELVIC LYMPH NODES/RETROPERITONEUM: No enlarged and FDG-avid lymph node.\par  \par  ESOPHAGUS/STOMACH/BOWEL/PERITONEUM/MESENTERY: No abnormal FDG activity.\par  \par  VESSELS: Coronary and large vessel calcifications. No aneurysm.\par  \par  BONES/SOFT TISSUES: FDG-avid focus in the cutaneous/subcutaneous tissues of the left lower arm, SUV 1.3, image 120. FDG avid foci in the right lower arm is favored to be due to radiotracer injection.\par  \par  IMPRESSION:\par  \par  1. Primary urinary bladder cancer with no FDG-avid lymphadenopathy.\par  \par  2. Multiple FDG-avid right pulmonary nodules, some of which have decreased in size since prior exam, and some of which have increased in size since prior exam. Query infectious etiology. Recommend CT follow-up after treatment to evaluate for persistent nodules.\par  \par  3. FDG-avid soft tissue in the posterior nasopharynx. This could be further evaluated with otolaryngology consultation.\par  \par  4. Moderate right hydronephrosis with diminished excretion of radiotracer suggests parenchymal damage.\par  \par  5. Possible FDG avid cutaneous/subcutaneous lesion in the left lower arm. Clinical inspection recommended for signs of infection or neoplasm.\par  \par  --- End of Report ---\par  \par  JAMIL CERVANTES MD; Attending Radiologist\par  This document has been electronically signed. Jun 20 2023 9:44AM\par  ******************************************************************

## 2023-08-04 NOTE — CONSULT LETTER
[Dear  ___] : Dear  [unfilled], [Courtesy Letter:] : I had the pleasure of seeing your patient, [unfilled], in my office today. [Please see my note below.] : Please see my note below. [Sincerely,] : Sincerely, [Consult Closing:] : Thank you very much for allowing me to participate in the care of this patient.  If you have any questions, please do not hesitate to contact me. [DrTraci  ___] : Dr. MURO

## 2023-08-04 NOTE — ASSESSMENT
[FreeTextEntry1] : Jim is seen in the office today along with his cousin.  He had a PET/CT performed on June 19.  This revealed an avid lesion in the posterior nasopharynx measuring 2.5 x 2.4.  There were at least 10 FDG avid lung nodules present.  There was a rim-enhancing FDG avid lesion with irregular borders associated with the urinary bladder with an SUV of 19 with moderate right hydronephrosis.\par  He reports that he feels "fine".  He says that his appetite is "great" and there is no weight loss.  There is no nausea vomiting or diarrhea but he does have some constipation at times and he takes MiraLAX on a daily basis.  He denies any headaches or dizziness.  He does have some balance issues and gets lightheaded at times.  He slid off the chair recently.  He came in a wheelchair.  He was discharged from rehabilitation this morning.  He walks slowly with a walker.  He does not use it when he is in the house.  There is no chest pain chest pressure or palpitations.  He does have some shortness of breath when he becomes anxious.  There is no cough.  Fatigue is present.  He is independent in his ADLs with the exception of the need for some minor assistance in showering.\par  \par  On physical examination, his performance status is 2.  His blood pressure was 99/67 and his weight was 57 kg, down 4.5 kg from May 24 despite him saying that he has a good appetite.  The HEENT examination is normal.  The lungs are clear and the heart examination is normal.  There is no edema of the extremities.  There is no palpable abnormality upon examination of the abdomen.  There is no spinal column or chest wall tenderness to palpation or percussion.\par  \par  No laboratory values were performed.  I do not think he went to the laboratory after the visit.\par  The PET scan imaging studies were reviewed personally by me.  I contacted the office of his pulmonologist, Dr. Asher, however she is away at this time.  I received a telephone call back from one of her partners, Dr. Ritter, who revealed the films for me.  We will make sure that Dr. Asher is aware of the question at hand.  I arranged for an ENT evaluation for him through the [facility].\par  \par  Some of the pulmonary nodules had increased in size in comparison to the prior CT scan and some had decreased in size, which favors an infectious/inflammatory process.  The finding in the nasopharynx needs to be investigated.  He may still have clinically localized bladder cancer if there are alternate explanations for these 2 findings on the PET scan.  He is not cisplatin eligible.  Blood work from May revealed a creatinine of 1.57.\par  \par  Patients who are not eligible for cisplatin based chemotherapy for metastases can receive pembrolizumab and enfortumab vedotin in combination.  The results of his [facility] assessment revealed 33 mutations per megabase.  There was no microsatellite instability.\par  \par  These issues need to be sorted out before any plan can be made for some form of treatment.  We discussed how patients with nonmetastatic muscle invasive bladder cancer can have a cystectomy alone followed by adjuvant therapy with nivolumab.\par  \par  All questions were answered to the best of my ability and to their apparent satisfaction.  I have not given him a return appointment yet as we need to evaluate these issues rapidly and then I will have him return to the office.  Unfortunately, his cousin will be leaving on vacation shortly and he usually accompanies him. [Palliative Care Plan] : not applicable at this time

## 2023-08-04 NOTE — HISTORY OF PRESENT ILLNESS
[Disease: _____________________] : Disease: [unfilled] [de-identified] : This patient is an 79yo gentleman with PMH of htn, CAD, MI s/p stent in 2001, TIA, COPD, bradycardia s/p ppm 2011, upgraded to bi-V AICD in 2019 due to low EF, CKD stage III, recent diagnosis of bladder cancer who presents for initial consultation.  Patient reported having intermittent gross hematuria x 8 months. He reported abdominal pain and constipation as well. He denied any nausea or vomiting. He had lost about 40+ lbs unintentionally over this time. He was hospitalized at [facility] for urinary retention and required rodriguez catheter placement. He thereafter was hospitalized at [facility] for the  He saw urologist Dr. Tony Diallo who advised TURBT.   3/3/23- CT CAP with IVC Heterogeneous and partially enhancing enhancing mass measuring 5.2 x 3.0 cm along the right posterior lateral bladder wall concerning for neoplasm. Visualization with cystoscopy recommended. Mildly dilated main pancreatic duct without obstructive lesion. If clinically warranted, this can be further evaluated with an abdominal MRI.  3/31/23- Urine cytology was negative for high grade urothelial carcinoma.  4/5/23- CT chest non con found solid and ground glass nodules measuring up to 5mm on a background of emphysema. Partially imaged right hydroureter, increased since 3/3/2023.   4/24/23- TURBT performed, found high grade invasive urothelial carcinoma. No non-invasive component identified   Carcinoma invades muscularis propria (detrusor muscle). Lymphovascular invasion was not identified. Tumor cells are positive for p63 and ROXANNA-3, negative for PSA and NKX 3.1.  4/25-4/28/23- He was hospitalized at [facility] for abdominal spasm, found to have urinary retention with HARLEEN and drop in Hgb. Patient was transfused 1u pRBC  and had Rodriguez catheter replaced. He was discharged to [facility]  4/19-5/5/23- Patient was rehospitalized for suspected COPD exacerbation and UTI. UCX were negative, thus antibiotics were discontinued. Suprapubic pain was treated with IV tylenol. Patient had rodriguez removed while inpatient.   Patient was referred to medical oncology clinic. He denies fevers or chills. He reports fatigue.He has not had any CP, palpitations, dyspnea or cough. He still has mild abdominal pain. His hematuria has resolved since TURBT procedure.   PMH: htn, CAD, MI, COPD, CKD, ppm PSH: bilateral inguinal hernia repair, rotator cuff repair, cystoscopy, TURBT Fam Hx:  no family history of cancer  Social Hx: Lives independently Quit smoking 20 years ago (smoked for about 30 years, <1ppd) no alcohol use  Walks independently Smokes marijuana occasionally  Per nephew, patient is a former heroin user.  He recently lost his wife after 40 years of marriage. His wife was under the care of Dr. Pond-Kellerman.  Allergies: NKDA  6/30/23...A PET/CT was performed on June 19.  This revealed an FDG avid soft tissue lesion in the posterior nasopharynx measuring approximately 2.5 x 2.4, with an SUV of 4.2.  There were at least 10 FDG avid lung nodules.  There was an FDG avid rim-enhancing mass with irregular borders associated with the urinary bladder with an SUV of 19.  There was moderate right hydronephrosis.  There was an FDG avid focus in the cutaneous/subcutaneous tissues of the left lower arm. Some of the FDG avid right pulmonary nodules have decreased in size from the prior exam, and some had increased since the prior exam.  The radiologist queried an infectious etiology.  He will require an ENT evaluation. Foundation One results, TMB 33 Muts/Mb, MS-stable. Feels "fine". Appetite is "great", no weight loss. No N/V/D/, has constipation. taking MiraLAX daily. No headaches, no dizziness. Some balance issues, gets lightheaded, slid off the chair. Comes in a wheelchair. he was discharged from rehab this AM. Walks slowly, does not use it in the house. No chest pain/pressure/palpitations. No edema. No cough, Some SOB with anxiety.  fatigue is present, usually not.  Independent in ADLs, needs some help with showering.  pulmonologist Myesha Beach needs ENT eval  Hospital Course:  Discharge Date	16-Jun-2023  Admission Date	13-Jun-2023 00:27  Reason for Admission	hematuria  Hospital Course	  78 yr old male with high grade bladder CA s/p TURBT in 4/2023  HTN , CAD , MI - s/p stented CAD 2001, TIA (25 Yrs ago), COPD on Trelegy, Bradycardia - s/p pacemaker placement 2011, Echo (2019) - showed EF - 30% - Device upgraded to BIV ICD (2019), s/p TURBT (4/24), presents to ED c/o suprapubic pain and  hematuria x 5 day. Pt had similar visit last week, at that time CT showed increased bladder tumor burden and pulmonary nodules, concerning for mets. Pt  was treated with rodriguez placement and bladder irrigation w/ CBI in the ED. Urology was consulted and followed the patient until the urine was running  clear. The patient is now stable and cleared for DC w/ rodriguez in place. Pt medically cleared for discharge to Rehab today per Dr Madrid.   [de-identified] : Foundation One results, TMB 33 Muts/Mb, MS-stable [de-identified] : 8/10/23...A PET/CT was performed on June 19.  This revealed an FDG avid soft tissue lesion in the posterior nasopharynx measuring approximately 2.5 x 2.4, with an SUV of 4.2.  There were at least 10 FDG avid lung nodules.  There was an FDG avid rim-enhancing mass with irregular borders associated with the urinary bladder with an SUV of 19.  There was moderate right hydronephrosis.  There was an FDG avid focus in the cutaneous/subcutaneous tissues of the left lower arm. Some of the FDG avid right pulmonary nodules have decreased in size from the prior exam, and some had increased since the prior exam.  The radiologist queried an infectious etiology.  He will require an ENT evaluation. Foundation One results, TMB 33 Muts/Mb, MS-stable.  Hospital Course Discharge Date 26-Jul-2023 Admission Date 17-Jul-2023 07:21 Reason for Admission hematuria Hospital Course   78M w/ hx of HTN, HLD, CAD (s/p PCI in 2001), HFrEF c/b pericardia effusion, bradycardia (s/p PPM upgraded to BiV-ICD 2019), CKD3, COPD, recent diagnosis of bladder cancer transferred from [facility] for hematuria iso newly diagnosed. muscular invasive bladder cancer c/b HARLEEN on CKD 2/2 obstructive uropathy and acute blood loss anemia 2/2 hematuria. Problem/Plan - 1: -  Problem: Acute kidney injury superimposed on CKD. -  Plan: With AGMA Pt with known invasive bladder cancer p/w hematuria at [facility] on 7/13, was receiving CTX. (unable to locate UCx) Labs show uptrending Cr 2 --> 3.6 --> 5.06 --> 6.38 on (7/16) --> 11.3 -Renal US as above -s/p CTX at [facility], will place on renally dosed Zosyn for now, UA/UCx pending -Per RN, bladder scan unable to properly read residual as pt with renal mass. -Pt voiding, but minimal amounts, strict I&Os ordered. Of note, urology did not place rodriguez at [facility] d/t low PVR and more discomfort to pt. no more blood  draws -Urology and Renal appreciated -IR consulted for nephrostomies was cancelled--> patient and family did not want--> comfort care 7/21 planning for hospice - dispo: pending dc to rehab with transition to hospice ([facility])  Problem/Plan - 2: -  Problem: Hematuria. -  Plan: 2/2 bladder ca -pt reports red urine, no clots, unable to see any samples in room -urology consulted -Hb stable 7's, transfuse as needed -Completed 5 days of zosyn 7/21.  Problem/Plan - 3: -  Problem: Symptomatic anemia. -  Plan: Hb stable s -s/p 2U pRBC at [facility] on 7/13 per notes -hgb stable at 8.3 Comfort care at this time.  Problem/Plan - 4: -  Problem: Bladder cancer. -  Plan: newly diagnosed high grade invasive urothelial cancer -?mets to lung as CT Chest done at [facility] with "numerous bilateral solid and ground glass pulm nodules, several w/central cavitation, largest on Left is GGO, [id number]. Lingular and b/l lower lobe subpleural reticular opacities. -Heme/onc appreciated- agree with plan for hospice care. Comfort care at this time #Diarrhea -stool studies showed yersinia enterolytica. completed 5 days of doxycycline No new episodes of diarrhea.  Problem/Plan - 5: -  Problem: Pulmonary nodules. -  Plan: [facility] imaging w/?mets to lung "numerous bilateral solid and ground glass pulm nodules, several w/central cavitation, largest on Left is GGO, [id number]. Lingular and b/l lower lobe subpleural reticular opacities. -Previous imaging June 2023 at [facility]- Several right-sided pulmonary nodules, new since prior CT Chest 4/5/2023, concerning for metastatic disease.  Problem/Plan - 6: -  Problem: Chronic systolic heart failure. -  Plan: CAD s/p PCI, s/p Bi-V ICD -EF 35-40% and small pericardial effusion on TTE at [facility] -c/w statin, unclear why pt not on bb -Seen by cards at [facility], will consult house cardiology if needed Comfort care at this time.  Problem/Plan - 7: -  Problem: COPD with hypoxia. -  Plan: Known COPD, takes Trelegy at home. -Imaging at [facility] w/?mets to lung as CT Chest done at [facility] with "numerous bilateral solid and ground glass pulm nodules, several w/central cavitation, largest on Left is GGO, [id number]. Lingular and b/l lower lobe  subpleural reticular opacities. -C/w Symbicort and Spiriva while inpt -C/w home supplemental O2.

## 2023-08-10 ENCOUNTER — APPOINTMENT (OUTPATIENT)
Dept: HEMATOLOGY ONCOLOGY | Facility: CLINIC | Age: 78
End: 2023-08-10

## 2023-08-10 DIAGNOSIS — D63.0 ANEMIA IN NEOPLASTIC DISEASE: ICD-10-CM

## 2023-08-10 DIAGNOSIS — C67.9 MALIGNANT NEOPLASM OF BLADDER, UNSPECIFIED: ICD-10-CM

## 2023-08-10 DIAGNOSIS — R31.0 GROSS HEMATURIA: ICD-10-CM

## 2023-08-14 ENCOUNTER — APPOINTMENT (OUTPATIENT)
Dept: ELECTROPHYSIOLOGY | Facility: CLINIC | Age: 78
End: 2023-08-14

## 2023-08-21 NOTE — DIETITIAN INITIAL EVALUATION ADULT - ENTER TO (CAL/KG)
35 Birth Control Pills Counseling: Birth Control Pill Counseling: I discussed with the patient the potential side effects of OCPs including but not limited to increased risk of stroke, heart attack, thrombophlebitis, deep venous thrombosis, hepatic adenomas, breast changes, GI upset, headaches, and depression.  The patient verbalized understanding of the proper use and possible adverse effects of OCPs. All of the patient's questions and concerns were addressed.

## 2023-09-15 ENCOUNTER — APPOINTMENT (OUTPATIENT)
Dept: ELECTROPHYSIOLOGY | Facility: CLINIC | Age: 78
End: 2023-09-15

## 2023-11-13 NOTE — CHART NOTE - NSCHARTNOTEFT_GEN_A_CORE
Called by nurse for patient complaining of 10/10 abdominal pain that patient thought was due to constipation. Per nurse she had given him Miralax and PO Tylenol with no relief. Patient seen and examined at bedside, and was received sitting up in a chair. Was noted to be holding his hand to his lower abdomen, more of a suprapubic location Called by nurse for patient complaining of 10/10 abdominal pain that patient thought was due to constipation. Per nurse she had given him Miralax and PO Tylenol with no relief. Patient seen and examined at bedside, and was received sitting up in a chair. Was noted to be holding his hand to his lower abdomen, more of a suprapubic location. Denied radiation of the pain. Not made better by changing position. Vital signs stable, patient remains afebrile. Alert to name, month, location. Does have confabulations, which he came in with. Villarreal in place and draining light yellow urine, no blood appreciated. Patient endorsed last bowel movement was a small amount. Lungs clear to auscultation bilaterally, cardiac exam benign. Abdomen not distended, bowel sounds present. No pain to palpation in all quadrants. Did have suprapubic tenderness. Per prior notes patient has complained about pain in the area of his bladder before as well. Actively being treated with ceftriaxone. Had nurse bladder scan to assess for retention and scan showed 15mL of urine present. Held off on abdominal imaging. Pain is likely related to previously noted pain. Could also be product of small bowel movements if he has been only having small movements. Ordered for 2.5mg oxy IR PO and will order dulcolax suppository.    Update: 2.5mg oxy IR PO did not provide much relief. Given extra 2.5mg of oxy IR    Eddie Lopez MD  PGY1 Internal Medicine  Available on TEAMS  [facility]: [phone], [facility]: [phone] Called by nurse for patient complaining of 10/10 abdominal pain that patient thought was due to constipation. Per nurse she had given him Miralax and PO Tylenol with no relief. Patient seen and examined at bedside, and was received sitting up in a chair. Was noted to be holding his hand to his lower abdomen, more of a suprapubic location. Denied radiation of the pain. Not made better by changing position. Vital signs stable, patient remains afebrile. Alert to name, month, location. Does have confabulations, which he came in with. Villarreal in place and draining light yellow urine, no blood appreciated. Patient endorsed last bowel movement was a small amount. Lungs clear to auscultation bilaterally, cardiac exam benign. Abdomen not distended, bowel sounds present. No pain to palpation in all quadrants. Did have suprapubic tenderness. Per prior notes patient has complained about pain in the area of his bladder before as well. Actively being treated with ceftriaxone. Had nurse bladder scan to assess for retention and scan showed 15mL of urine present. Held off on abdominal imaging. Pain is likely related to previously noted pain. Could also be product of constipation and mass effect on bladder if he has been only having small movements. Ordered for 2.5mg oxy IR PO and dulcolax suppository.    Update: 2.5mg oxy IR PO did not provide much relief. Given extra 2.5mg of oxy IR    Eddie Lopez MD  PGY1 Internal Medicine  Available on TEAMS  [facility]: [phone], [facility]: [phone] Called by nurse for patient complaining of 10/10 abdominal pain that patient thought was due to constipation. Per nurse she had given him Miralax and PO Tylenol with no relief. Patient seen and examined at bedside, and was received sitting up in a chair. Was noted to be holding his hand to his lower abdomen, more of a suprapubic location. Denied radiation of the pain. Not made better by changing position. Vital signs stable, patient remains afebrile. Alert to name, month, location. Does have confabulations, which he came in with. Villarreal in place and draining light yellow urine, no blood appreciated. Patient endorsed last bowel movement was a small amount. Lungs clear to auscultation bilaterally, cardiac exam benign. Abdomen not distended, bowel sounds present. No pain to palpation in all quadrants. Did have suprapubic tenderness. Per prior notes patient has complained about pain in the area of his bladder before as well. Actively being treated with ceftriaxone. Had nurse bladder scan to assess for retention and scan showed 15mL of urine present. Held off on abdominal imaging. Pain is likely related to previously noted pain. Could also be product of constipation and mass effect on bladder if he has been only having small movements. Ordered for 2.5mg oxy IR PO and dulcolax suppository.    Update: Per nurse, 2.5mg oxy IR PO did not provide relief and when plan was to give another 2.5mg dose nurse stated he did not want more, and endorsed he feels it made it worse. Per nurse, patient requesting morphine as that has helped him in the past. iSTOP as below....       Patient Name: Jim OlveraBirth Date: 1945  Address: [address] [place] [id number]: Male  Rx Written	Rx Dispensed	Drug	Quantity	Days Supply	Prescriber Name	Prescriber Dinaelys #	Payment Method	Dispenser  04/12/2023	04/12/2023	oxycodone-acetaminophen 5-325 mg tab	14	7	Jaylen Bhatia	[id number]	[facility]  04/04/2023	04/06/2023	diazepam 5 mg tablet	75	25	Jaylen Bhatia	[id number]	[facility]  02/28/2023	02/28/2023	diazepam 10 mg tablet	75	25	Jaylen Bhatia	[id number]	[facility]  12/07/2022	12/07/2022	diazepam 10 mg tablet	75	25	Jaylen Bhatia	[id number]	Insurance	[facility]  05/02/2022	05/03/2022	diazepam 10 mg tablet	120	30	Colton Scott	[id number]	Insurance	[facility]    Patient Name: Jim OlveraBirth Date: 1945  Address: [address] [place] [id number]: Male  Rx Written	Rx Dispensed	Drug	Quantity	Days Supply	Prescriber Name	Prescriber Dianelys #	Payment Method	Dispenser  07/28/2022	07/28/2022	diazepam 10 mg tablet	75	25	Jaylen Bhatia	[id number]	Medicare	[facility]    Patient Name: Jim OlveraBirth Date: 1945  Address: [address] [place] [id number]: Male  Rx Written	Rx Dispensed	Drug	Quantity	Days Supply	Prescriber Name	Prescriber Dianelys #	Payment Method	Dispenser  04/19/2023	04/19/2023	oxycodone-acetaminophen 5-325 mg tablet	14	7	Jaylen Bhatia	[id number]	Insurance	[facility]      Will dose 2mg IV morphine x1 dose and if pain persists will dose IV acetaminophen when able    Eddie Lopez MD  PGY1 Internal Medicine  Available on TEAMS  [facility]: [phone], J: [phone] Patient is a 26-year-old male who was assigned female gender at birth and now identifies as male who presents with anxiety, racing heartbeat, and elevated blood pressure.  Patient was seen here yesterday after an accidental ingestion of methamphetamine.  Patient had full work-up yesterday and multiple medications to reverse the drugs affect.  He was feeling better at discharge but went home and was unable to sleep at all overnight and this morning.  He reports symptoms returned similar to yesterday but not as severe.  He called his doctor and she recommended he take his usual hydrochlorothiazide, but his blood pressure continued to go up so he came in for evaluation.  He denies any chest pain, shortness of breath, abdominal pain, headache, or other concerns.

## 2023-11-22 NOTE — BH CONSULTATION LIAISON ASSESSMENT NOTE - NSHPLANGLIMITEDENGLISH_GEN_A_CORE

## 2024-01-05 NOTE — ED PROVIDER NOTE - NSICDXPASTSURGICALHX_GEN_ALL_CORE_FT
Patient is calling about his right foot. Patient states with the compression sock is he able to get his foot in a shoe. Patient would like to know if he can drive and start going on short walks.     Please call to advise.     [phone]   PAST SURGICAL HISTORY:  Cardiac resynchronization therapy defibrillator (CRT-D) in place     S/P cardiac pacemaker procedure     S/P drug eluting coronary stent placement     S/P inguinal hernia repair     S/P Rotator Cuff Surgery

## 2024-02-23 NOTE — PATIENT PROFILE ADULT - NS PRO AD ANY ON CHART
Writer left message on patient's voicemail. Will await a returned call to clinic. Also left message on patient's portal to clarify name of doctor.    No

## 2024-05-21 NOTE — DISCHARGE NOTE PROVIDER - NSDCFUSCHEDAPPT_GEN_ALL_CORE_FT
[facility]  ELECTROPH [address]  Scheduled Appointment: 07/13/2023    
Resulted

## 2025-01-15 NOTE — DISCHARGE NOTE PROVIDER - NSDCCPGOAL_GEN_ALL_CORE_FT
Quality 431: Preventive Care And Screening: Unhealthy Alcohol Use - Screening: Patient not identified as an unhealthy alcohol user when screened for unhealthy alcohol use using a systematic screening method
Quality 130: Documentation Of Current Medications In The Medical Record: Current Medications Documented
Quality 226: Preventive Care And Screening: Tobacco Use: Screening And Cessation Intervention: Patient screened for tobacco use and is an ex/non-smoker
Detail Level: Detailed
To get better and follow your care plan as instructed.

## 2025-06-03 NOTE — PATIENT PROFILE ADULT - VISION (WITH CORRECTIVE LENSES IF THE PATIENT USUALLY WEARS THEM):
Debrox for ears    Normal vision: sees adequately in most situations; can see medication labels, newsprint

## (undated) DEVICE — ELCTR PLASMA LOOP MEDIUM 24FR 12-16 DEG

## (undated) DEVICE — BAG URINE W METER 2L

## (undated) DEVICE — GLV 7.5 PROTEXIS (WHITE)

## (undated) DEVICE — ELCTR PLASMA LOOP MEDIUM ANGLED 24FR 12-30 DEG

## (undated) DEVICE — TUBING SUCTION 20FT

## (undated) DEVICE — SYR LUER LOK 30CC

## (undated) DEVICE — ELCTR CUTTING LOOP 24FR 12 DEG 0.35 WIRE

## (undated) DEVICE — TUBING RANGER FLUID IRRIGATION SET DISP

## (undated) DEVICE — GOWN TRIMAX LG

## (undated) DEVICE — IRRIGATION TRAY W PISTON SYRINGE 60ML

## (undated) DEVICE — POSITIONER FOAM EGG CRATE ULNAR 2PCS (PINK)

## (undated) DEVICE — FOLEY CATH 2-WAY 18FR 5CC LATEX HYDROGEL

## (undated) DEVICE — SOL IRR BAG NS 0.9% 3000ML

## (undated) DEVICE — WARMING BLANKET UPPER ADULT

## (undated) DEVICE — PACK CYSTO

## (undated) DEVICE — SOL IRR BAG H2O 3000ML

## (undated) DEVICE — FOLEY HOLDER STATLOCK 2 WAY ADULT

## (undated) DEVICE — VENODYNE/SCD SLEEVE CALF LARGE

## (undated) DEVICE — ELCTR BUGBEE FULGATING 5FR X 58CM